# Patient Record
Sex: FEMALE | Race: WHITE | Employment: OTHER | URBAN - METROPOLITAN AREA
[De-identification: names, ages, dates, MRNs, and addresses within clinical notes are randomized per-mention and may not be internally consistent; named-entity substitution may affect disease eponyms.]

---

## 2017-06-07 ENCOUNTER — APPOINTMENT (OUTPATIENT)
Dept: GENERAL RADIOLOGY | Age: 67
DRG: 481 | End: 2017-06-07
Payer: MEDICARE

## 2017-06-07 ENCOUNTER — HOSPITAL ENCOUNTER (INPATIENT)
Age: 67
LOS: 4 days | Discharge: OTHER INSTITUTION | DRG: 481 | End: 2017-06-11
Attending: EMERGENCY MEDICINE | Admitting: ORTHOPAEDIC SURGERY
Payer: MEDICARE

## 2017-06-07 DIAGNOSIS — S72.002A HIP FRACTURE, LEFT, CLOSED, INITIAL ENCOUNTER (HCC): Primary | ICD-10-CM

## 2017-06-07 LAB
ABSOLUTE EOS #: 0.2 K/UL (ref 0–0.4)
ABSOLUTE LYMPH #: 1.2 K/UL (ref 1–4.8)
ABSOLUTE MONO #: 0.6 K/UL (ref 0.1–1.3)
ANION GAP SERPL CALCULATED.3IONS-SCNC: 14 MMOL/L (ref 9–17)
BASOPHILS # BLD: 1 %
BASOPHILS ABSOLUTE: 0 K/UL (ref 0–0.2)
BUN BLDV-MCNC: 25 MG/DL (ref 8–23)
BUN/CREAT BLD: ABNORMAL (ref 9–20)
CALCIUM SERPL-MCNC: 9.5 MG/DL (ref 8.6–10.4)
CHLORIDE BLD-SCNC: 105 MMOL/L (ref 98–107)
CO2: 25 MMOL/L (ref 20–31)
CREAT SERPL-MCNC: 0.98 MG/DL (ref 0.5–0.9)
DIFFERENTIAL TYPE: NORMAL
EOSINOPHILS RELATIVE PERCENT: 3 %
GFR AFRICAN AMERICAN: >60 ML/MIN
GFR NON-AFRICAN AMERICAN: 57 ML/MIN
GFR SERPL CREATININE-BSD FRML MDRD: ABNORMAL ML/MIN/{1.73_M2}
GFR SERPL CREATININE-BSD FRML MDRD: ABNORMAL ML/MIN/{1.73_M2}
GLUCOSE BLD-MCNC: 112 MG/DL (ref 70–99)
HCT VFR BLD CALC: 38.6 % (ref 36–46)
HEMOGLOBIN: 12.7 G/DL (ref 12–16)
INR BLD: 1
LYMPHOCYTES # BLD: 14 %
MCH RBC QN AUTO: 30 PG (ref 26–34)
MCHC RBC AUTO-ENTMCNC: 32.9 G/DL (ref 31–37)
MCV RBC AUTO: 91.2 FL (ref 80–100)
MONOCYTES # BLD: 7 %
PARTIAL THROMBOPLASTIN TIME: 25.2 SEC (ref 23–31)
PDW BLD-RTO: 13.6 % (ref 11.5–14.9)
PLATELET # BLD: 174 K/UL (ref 150–450)
PLATELET ESTIMATE: NORMAL
PMV BLD AUTO: 8 FL (ref 6–12)
POTASSIUM SERPL-SCNC: 4 MMOL/L (ref 3.7–5.3)
PROTHROMBIN TIME: 10.3 SEC (ref 9.7–12)
RBC # BLD: 4.23 M/UL (ref 4–5.2)
RBC # BLD: NORMAL 10*6/UL
SEG NEUTROPHILS: 75 %
SEGMENTED NEUTROPHILS ABSOLUTE COUNT: 6.4 K/UL (ref 1.3–9.1)
SODIUM BLD-SCNC: 144 MMOL/L (ref 135–144)
WBC # BLD: 8.4 K/UL (ref 3.5–11)
WBC # BLD: NORMAL 10*3/UL

## 2017-06-07 PROCEDURE — 99284 EMERGENCY DEPT VISIT MOD MDM: CPT

## 2017-06-07 PROCEDURE — 96374 THER/PROPH/DIAG INJ IV PUSH: CPT

## 2017-06-07 PROCEDURE — 6360000002 HC RX W HCPCS: Performed by: EMERGENCY MEDICINE

## 2017-06-07 PROCEDURE — 6370000000 HC RX 637 (ALT 250 FOR IP): Performed by: ORTHOPAEDIC SURGERY

## 2017-06-07 PROCEDURE — 93005 ELECTROCARDIOGRAM TRACING: CPT

## 2017-06-07 PROCEDURE — 80048 BASIC METABOLIC PNL TOTAL CA: CPT

## 2017-06-07 PROCEDURE — 85025 COMPLETE CBC W/AUTO DIFF WBC: CPT

## 2017-06-07 PROCEDURE — 6370000000 HC RX 637 (ALT 250 FOR IP): Performed by: EMERGENCY MEDICINE

## 2017-06-07 PROCEDURE — 73552 X-RAY EXAM OF FEMUR 2/>: CPT

## 2017-06-07 PROCEDURE — 36415 COLL VENOUS BLD VENIPUNCTURE: CPT

## 2017-06-07 PROCEDURE — 85610 PROTHROMBIN TIME: CPT

## 2017-06-07 PROCEDURE — 73502 X-RAY EXAM HIP UNI 2-3 VIEWS: CPT

## 2017-06-07 PROCEDURE — 1200000000 HC SEMI PRIVATE

## 2017-06-07 PROCEDURE — 85730 THROMBOPLASTIN TIME PARTIAL: CPT

## 2017-06-07 RX ORDER — SODIUM CHLORIDE 0.9 % (FLUSH) 0.9 %
10 SYRINGE (ML) INJECTION PRN
Status: DISCONTINUED | OUTPATIENT
Start: 2017-06-07 | End: 2017-06-09

## 2017-06-07 RX ORDER — MORPHINE SULFATE 2 MG/ML
2 INJECTION, SOLUTION INTRAMUSCULAR; INTRAVENOUS
Status: DISCONTINUED | OUTPATIENT
Start: 2017-06-07 | End: 2017-06-11 | Stop reason: HOSPADM

## 2017-06-07 RX ORDER — FENTANYL CITRATE 50 UG/ML
50 INJECTION, SOLUTION INTRAMUSCULAR; INTRAVENOUS ONCE
Status: COMPLETED | OUTPATIENT
Start: 2017-06-07 | End: 2017-06-07

## 2017-06-07 RX ORDER — HYDROCODONE BITARTRATE AND ACETAMINOPHEN 5; 325 MG/1; MG/1
1 TABLET ORAL EVERY 6 HOURS PRN
Status: ON HOLD | COMMUNITY
End: 2018-12-12

## 2017-06-07 RX ORDER — HYDROCODONE BITARTRATE AND ACETAMINOPHEN 5; 325 MG/1; MG/1
1 TABLET ORAL EVERY 4 HOURS PRN
Status: DISCONTINUED | OUTPATIENT
Start: 2017-06-07 | End: 2017-06-11 | Stop reason: HOSPADM

## 2017-06-07 RX ORDER — OXYCODONE HYDROCHLORIDE AND ACETAMINOPHEN 5; 325 MG/1; MG/1
1 TABLET ORAL ONCE
Status: COMPLETED | OUTPATIENT
Start: 2017-06-07 | End: 2017-06-07

## 2017-06-07 RX ORDER — HYDROCODONE BITARTRATE AND ACETAMINOPHEN 5; 325 MG/1; MG/1
2 TABLET ORAL EVERY 4 HOURS PRN
Status: DISCONTINUED | OUTPATIENT
Start: 2017-06-07 | End: 2017-06-11 | Stop reason: HOSPADM

## 2017-06-07 RX ORDER — SODIUM CHLORIDE 0.9 % (FLUSH) 0.9 %
10 SYRINGE (ML) INJECTION EVERY 12 HOURS SCHEDULED
Status: DISCONTINUED | OUTPATIENT
Start: 2017-06-07 | End: 2017-06-09

## 2017-06-07 RX ORDER — ACETAMINOPHEN 325 MG/1
650 TABLET ORAL EVERY 4 HOURS PRN
Status: DISCONTINUED | OUTPATIENT
Start: 2017-06-07 | End: 2017-06-09

## 2017-06-07 RX ADMIN — HYDROCODONE BITARTRATE AND ACETAMINOPHEN 2 TABLET: 5; 325 TABLET ORAL at 22:44

## 2017-06-07 RX ADMIN — FENTANYL CITRATE 50 MCG: 50 INJECTION, SOLUTION INTRAMUSCULAR; INTRAVENOUS at 17:13

## 2017-06-07 RX ADMIN — OXYCODONE HYDROCHLORIDE AND ACETAMINOPHEN 1 TABLET: 5; 325 TABLET ORAL at 15:46

## 2017-06-07 ASSESSMENT — ENCOUNTER SYMPTOMS
BACK PAIN: 0
COUGH: 0
SHORTNESS OF BREATH: 0
EYE DISCHARGE: 0
DIARRHEA: 0
RHINORRHEA: 0
ABDOMINAL PAIN: 0
NAUSEA: 0
EYE REDNESS: 0
WHEEZING: 0
SINUS PRESSURE: 0
STRIDOR: 0
VOMITING: 0

## 2017-06-07 ASSESSMENT — PAIN SCALES - GENERAL
PAINLEVEL_OUTOF10: 10
PAINLEVEL_OUTOF10: 10
PAINLEVEL_OUTOF10: 3
PAINLEVEL_OUTOF10: 10

## 2017-06-08 ENCOUNTER — ANESTHESIA EVENT (OUTPATIENT)
Dept: OPERATING ROOM | Age: 67
DRG: 481 | End: 2017-06-08
Payer: MEDICARE

## 2017-06-08 ENCOUNTER — APPOINTMENT (OUTPATIENT)
Dept: MRI IMAGING | Age: 67
DRG: 481 | End: 2017-06-08
Payer: MEDICARE

## 2017-06-08 ENCOUNTER — APPOINTMENT (OUTPATIENT)
Dept: GENERAL RADIOLOGY | Age: 67
DRG: 481 | End: 2017-06-08
Payer: MEDICARE

## 2017-06-08 LAB
EKG ATRIAL RATE: 72 BPM
EKG P AXIS: 72 DEGREES
EKG P-R INTERVAL: 144 MS
EKG Q-T INTERVAL: 394 MS
EKG QRS DURATION: 98 MS
EKG QTC CALCULATION (BAZETT): 431 MS
EKG R AXIS: 53 DEGREES
EKG T AXIS: 56 DEGREES
EKG VENTRICULAR RATE: 72 BPM

## 2017-06-08 PROCEDURE — 6370000000 HC RX 637 (ALT 250 FOR IP): Performed by: ORTHOPAEDIC SURGERY

## 2017-06-08 PROCEDURE — 51702 INSERT TEMP BLADDER CATH: CPT

## 2017-06-08 PROCEDURE — 1200000000 HC SEMI PRIVATE

## 2017-06-08 PROCEDURE — 99223 1ST HOSP IP/OBS HIGH 75: CPT | Performed by: INTERNAL MEDICINE

## 2017-06-08 PROCEDURE — 6360000002 HC RX W HCPCS: Performed by: ORTHOPAEDIC SURGERY

## 2017-06-08 PROCEDURE — 6370000000 HC RX 637 (ALT 250 FOR IP): Performed by: INTERNAL MEDICINE

## 2017-06-08 PROCEDURE — 70250 X-RAY EXAM OF SKULL: CPT

## 2017-06-08 PROCEDURE — 73721 MRI JNT OF LWR EXTRE W/O DYE: CPT

## 2017-06-08 PROCEDURE — 6360000002 HC RX W HCPCS: Performed by: INTERNAL MEDICINE

## 2017-06-08 PROCEDURE — 2580000003 HC RX 258: Performed by: ORTHOPAEDIC SURGERY

## 2017-06-08 RX ORDER — DIAZEPAM 5 MG/1
5 TABLET ORAL ONCE
Status: COMPLETED | OUTPATIENT
Start: 2017-06-08 | End: 2017-06-08

## 2017-06-08 RX ORDER — ATORVASTATIN CALCIUM 20 MG/1
20 TABLET, FILM COATED ORAL NIGHTLY
Status: DISCONTINUED | OUTPATIENT
Start: 2017-06-08 | End: 2017-06-11 | Stop reason: HOSPADM

## 2017-06-08 RX ORDER — AMLODIPINE BESYLATE 5 MG/1
5 TABLET ORAL DAILY
Status: DISCONTINUED | OUTPATIENT
Start: 2017-06-08 | End: 2017-06-11 | Stop reason: HOSPADM

## 2017-06-08 RX ORDER — TOPIRAMATE 25 MG/1
50 TABLET ORAL 2 TIMES DAILY
Status: DISCONTINUED | OUTPATIENT
Start: 2017-06-08 | End: 2017-06-11 | Stop reason: HOSPADM

## 2017-06-08 RX ORDER — QUETIAPINE FUMARATE 200 MG/1
200 TABLET, FILM COATED ORAL NIGHTLY
Status: DISCONTINUED | OUTPATIENT
Start: 2017-06-08 | End: 2017-06-11 | Stop reason: HOSPADM

## 2017-06-08 RX ORDER — ASPIRIN 81 MG/1
81 TABLET ORAL DAILY
Status: DISCONTINUED | OUTPATIENT
Start: 2017-06-08 | End: 2017-06-11 | Stop reason: HOSPADM

## 2017-06-08 RX ORDER — FERROUS SULFATE 325(65) MG
325 TABLET ORAL
Status: DISCONTINUED | OUTPATIENT
Start: 2017-06-09 | End: 2017-06-11 | Stop reason: HOSPADM

## 2017-06-08 RX ORDER — LAMOTRIGINE 100 MG/1
100 TABLET ORAL DAILY
Status: DISCONTINUED | OUTPATIENT
Start: 2017-06-08 | End: 2017-06-11 | Stop reason: HOSPADM

## 2017-06-08 RX ORDER — METOCLOPRAMIDE 5 MG/1
5 TABLET ORAL 3 TIMES DAILY
Status: DISCONTINUED | OUTPATIENT
Start: 2017-06-08 | End: 2017-06-11 | Stop reason: HOSPADM

## 2017-06-08 RX ADMIN — ASPIRIN 81 MG: 81 TABLET, COATED ORAL at 12:19

## 2017-06-08 RX ADMIN — MORPHINE SULFATE 2 MG: 2 INJECTION, SOLUTION INTRAMUSCULAR; INTRAVENOUS at 17:02

## 2017-06-08 RX ADMIN — TOPIRAMATE 50 MG: 25 TABLET, FILM COATED ORAL at 19:30

## 2017-06-08 RX ADMIN — MORPHINE SULFATE 2 MG: 2 INJECTION, SOLUTION INTRAMUSCULAR; INTRAVENOUS at 06:24

## 2017-06-08 RX ADMIN — ENOXAPARIN SODIUM 40 MG: 40 INJECTION SUBCUTANEOUS at 12:19

## 2017-06-08 RX ADMIN — MORPHINE SULFATE 2 MG: 2 INJECTION, SOLUTION INTRAMUSCULAR; INTRAVENOUS at 14:14

## 2017-06-08 RX ADMIN — ATORVASTATIN CALCIUM 20 MG: 20 TABLET, FILM COATED ORAL at 19:30

## 2017-06-08 RX ADMIN — VORTIOXETINE 10 MG: 10 TABLET, FILM COATED ORAL at 12:20

## 2017-06-08 RX ADMIN — MORPHINE SULFATE 2 MG: 2 INJECTION, SOLUTION INTRAMUSCULAR; INTRAVENOUS at 12:19

## 2017-06-08 RX ADMIN — MORPHINE SULFATE 2 MG: 2 INJECTION, SOLUTION INTRAMUSCULAR; INTRAVENOUS at 04:13

## 2017-06-08 RX ADMIN — QUETIAPINE FUMARATE 200 MG: 200 TABLET, FILM COATED ORAL at 19:36

## 2017-06-08 RX ADMIN — LAMOTRIGINE 100 MG: 100 TABLET ORAL at 12:19

## 2017-06-08 RX ADMIN — TOPIRAMATE 50 MG: 25 TABLET, FILM COATED ORAL at 12:20

## 2017-06-08 RX ADMIN — MORPHINE SULFATE 2 MG: 2 INJECTION, SOLUTION INTRAMUSCULAR; INTRAVENOUS at 00:42

## 2017-06-08 RX ADMIN — Medication 10 ML: at 19:36

## 2017-06-08 RX ADMIN — METOCLOPRAMIDE 5 MG: 5 TABLET ORAL at 12:21

## 2017-06-08 RX ADMIN — MORPHINE SULFATE 2 MG: 2 INJECTION, SOLUTION INTRAMUSCULAR; INTRAVENOUS at 19:29

## 2017-06-08 RX ADMIN — DIAZEPAM 5 MG: 5 TABLET ORAL at 14:14

## 2017-06-08 RX ADMIN — MORPHINE SULFATE 2 MG: 2 INJECTION, SOLUTION INTRAMUSCULAR; INTRAVENOUS at 09:20

## 2017-06-08 RX ADMIN — Medication 10 ML: at 12:20

## 2017-06-08 RX ADMIN — Medication 10 ML: at 14:15

## 2017-06-08 RX ADMIN — METOCLOPRAMIDE 5 MG: 5 TABLET ORAL at 19:31

## 2017-06-08 RX ADMIN — AMLODIPINE BESYLATE 5 MG: 5 TABLET ORAL at 12:19

## 2017-06-08 ASSESSMENT — PAIN SCALES - GENERAL
PAINLEVEL_OUTOF10: 8
PAINLEVEL_OUTOF10: 10
PAINLEVEL_OUTOF10: 10
PAINLEVEL_OUTOF10: 7
PAINLEVEL_OUTOF10: 8
PAINLEVEL_OUTOF10: 9
PAINLEVEL_OUTOF10: 8
PAINLEVEL_OUTOF10: 8
PAINLEVEL_OUTOF10: 4
PAINLEVEL_OUTOF10: 10
PAINLEVEL_OUTOF10: 5

## 2017-06-09 ENCOUNTER — ANESTHESIA (OUTPATIENT)
Dept: OPERATING ROOM | Age: 67
DRG: 481 | End: 2017-06-09
Payer: MEDICARE

## 2017-06-09 ENCOUNTER — APPOINTMENT (OUTPATIENT)
Dept: GENERAL RADIOLOGY | Age: 67
DRG: 481 | End: 2017-06-09
Payer: MEDICARE

## 2017-06-09 VITALS — DIASTOLIC BLOOD PRESSURE: 54 MMHG | OXYGEN SATURATION: 96 % | SYSTOLIC BLOOD PRESSURE: 102 MMHG

## 2017-06-09 LAB
ABSOLUTE EOS #: 0.4 K/UL (ref 0–0.4)
ABSOLUTE LYMPH #: 1.2 K/UL (ref 1–4.8)
ABSOLUTE MONO #: 0.9 K/UL (ref 0.1–1.3)
ANION GAP SERPL CALCULATED.3IONS-SCNC: 13 MMOL/L (ref 9–17)
BASOPHILS # BLD: 1 %
BASOPHILS ABSOLUTE: 0.1 K/UL (ref 0–0.2)
BUN BLDV-MCNC: 18 MG/DL (ref 8–23)
BUN/CREAT BLD: NORMAL (ref 9–20)
CALCIUM SERPL-MCNC: 9 MG/DL (ref 8.6–10.4)
CHLORIDE BLD-SCNC: 106 MMOL/L (ref 98–107)
CO2: 22 MMOL/L (ref 20–31)
CREAT SERPL-MCNC: 0.87 MG/DL (ref 0.5–0.9)
DIFFERENTIAL TYPE: ABNORMAL
EOSINOPHILS RELATIVE PERCENT: 5 %
GFR AFRICAN AMERICAN: >60 ML/MIN
GFR NON-AFRICAN AMERICAN: >60 ML/MIN
GFR SERPL CREATININE-BSD FRML MDRD: NORMAL ML/MIN/{1.73_M2}
GFR SERPL CREATININE-BSD FRML MDRD: NORMAL ML/MIN/{1.73_M2}
GLUCOSE BLD-MCNC: 99 MG/DL (ref 70–99)
HCT VFR BLD CALC: 40.2 % (ref 36–46)
HEMOGLOBIN: 13.4 G/DL (ref 12–16)
LYMPHOCYTES # BLD: 14 %
MCH RBC QN AUTO: 30.1 PG (ref 26–34)
MCHC RBC AUTO-ENTMCNC: 33.3 G/DL (ref 31–37)
MCV RBC AUTO: 90.4 FL (ref 80–100)
MONOCYTES # BLD: 10 %
PDW BLD-RTO: 13.1 % (ref 11.5–14.9)
PLATELET # BLD: 148 K/UL (ref 150–450)
PLATELET ESTIMATE: ABNORMAL
PMV BLD AUTO: 7.4 FL (ref 6–12)
POTASSIUM SERPL-SCNC: 3.8 MMOL/L (ref 3.7–5.3)
RBC # BLD: 4.44 M/UL (ref 4–5.2)
RBC # BLD: ABNORMAL 10*6/UL
SEG NEUTROPHILS: 70 %
SEGMENTED NEUTROPHILS ABSOLUTE COUNT: 6 K/UL (ref 1.3–9.1)
SODIUM BLD-SCNC: 141 MMOL/L (ref 135–144)
WBC # BLD: 8.6 K/UL (ref 3.5–11)
WBC # BLD: ABNORMAL 10*3/UL

## 2017-06-09 PROCEDURE — 73502 X-RAY EXAM HIP UNI 2-3 VIEWS: CPT

## 2017-06-09 PROCEDURE — 3209999900 FLUORO FOR SURGICAL PROCEDURES

## 2017-06-09 PROCEDURE — 6370000000 HC RX 637 (ALT 250 FOR IP): Performed by: INTERNAL MEDICINE

## 2017-06-09 PROCEDURE — 80048 BASIC METABOLIC PNL TOTAL CA: CPT

## 2017-06-09 PROCEDURE — 6370000000 HC RX 637 (ALT 250 FOR IP): Performed by: ORTHOPAEDIC SURGERY

## 2017-06-09 PROCEDURE — 2500000003 HC RX 250 WO HCPCS: Performed by: ORTHOPAEDIC SURGERY

## 2017-06-09 PROCEDURE — 6370000000 HC RX 637 (ALT 250 FOR IP): Performed by: ANESTHESIOLOGY

## 2017-06-09 PROCEDURE — 2580000003 HC RX 258: Performed by: ANESTHESIOLOGY

## 2017-06-09 PROCEDURE — 1200000000 HC SEMI PRIVATE

## 2017-06-09 PROCEDURE — 6360000002 HC RX W HCPCS: Performed by: NURSE ANESTHETIST, CERTIFIED REGISTERED

## 2017-06-09 PROCEDURE — 0QS734Z REPOSITION LEFT UPPER FEMUR WITH INTERNAL FIXATION DEVICE, PERCUTANEOUS APPROACH: ICD-10-PCS | Performed by: ORTHOPAEDIC SURGERY

## 2017-06-09 PROCEDURE — C1769 GUIDE WIRE: HCPCS | Performed by: ORTHOPAEDIC SURGERY

## 2017-06-09 PROCEDURE — 3700000001 HC ADD 15 MINUTES (ANESTHESIA): Performed by: ORTHOPAEDIC SURGERY

## 2017-06-09 PROCEDURE — 3600000003 HC SURGERY LEVEL 3 BASE: Performed by: ORTHOPAEDIC SURGERY

## 2017-06-09 PROCEDURE — 6360000002 HC RX W HCPCS: Performed by: ORTHOPAEDIC SURGERY

## 2017-06-09 PROCEDURE — 6360000002 HC RX W HCPCS: Performed by: ANESTHESIOLOGY

## 2017-06-09 PROCEDURE — 7100000000 HC PACU RECOVERY - FIRST 15 MIN: Performed by: ORTHOPAEDIC SURGERY

## 2017-06-09 PROCEDURE — 99233 SBSQ HOSP IP/OBS HIGH 50: CPT | Performed by: INTERNAL MEDICINE

## 2017-06-09 PROCEDURE — 36415 COLL VENOUS BLD VENIPUNCTURE: CPT

## 2017-06-09 PROCEDURE — 3700000000 HC ANESTHESIA ATTENDED CARE: Performed by: ORTHOPAEDIC SURGERY

## 2017-06-09 PROCEDURE — 2580000003 HC RX 258: Performed by: ORTHOPAEDIC SURGERY

## 2017-06-09 PROCEDURE — 94664 DEMO&/EVAL PT USE INHALER: CPT

## 2017-06-09 PROCEDURE — 3600000013 HC SURGERY LEVEL 3 ADDTL 15MIN: Performed by: ORTHOPAEDIC SURGERY

## 2017-06-09 PROCEDURE — 7100000001 HC PACU RECOVERY - ADDTL 15 MIN: Performed by: ORTHOPAEDIC SURGERY

## 2017-06-09 PROCEDURE — 2720000010 HC SURG SUPPLY STERILE: Performed by: ORTHOPAEDIC SURGERY

## 2017-06-09 PROCEDURE — 2500000003 HC RX 250 WO HCPCS: Performed by: NURSE ANESTHETIST, CERTIFIED REGISTERED

## 2017-06-09 PROCEDURE — C1713 ANCHOR/SCREW BN/BN,TIS/BN: HCPCS | Performed by: ORTHOPAEDIC SURGERY

## 2017-06-09 PROCEDURE — 85025 COMPLETE CBC W/AUTO DIFF WBC: CPT

## 2017-06-09 PROCEDURE — 2720000003 HC MISC SUTURE/STAPLES/RELOADS/ETC: Performed by: ORTHOPAEDIC SURGERY

## 2017-06-09 DEVICE — SCREW BNE L80MM DIA7.3MM THRD L16MM CANC S STL SELF DRL ST: Type: IMPLANTABLE DEVICE | Site: HIP | Status: FUNCTIONAL

## 2017-06-09 RX ORDER — ACETAMINOPHEN 325 MG/1
650 TABLET ORAL EVERY 4 HOURS PRN
Status: DISCONTINUED | OUTPATIENT
Start: 2017-06-09 | End: 2017-06-11 | Stop reason: HOSPADM

## 2017-06-09 RX ORDER — SODIUM CHLORIDE, SODIUM LACTATE, POTASSIUM CHLORIDE, CALCIUM CHLORIDE 600; 310; 30; 20 MG/100ML; MG/100ML; MG/100ML; MG/100ML
INJECTION, SOLUTION INTRAVENOUS CONTINUOUS
Status: DISCONTINUED | OUTPATIENT
Start: 2017-06-09 | End: 2017-06-11 | Stop reason: HOSPADM

## 2017-06-09 RX ORDER — FENTANYL CITRATE 50 UG/ML
INJECTION, SOLUTION INTRAMUSCULAR; INTRAVENOUS PRN
Status: DISCONTINUED | OUTPATIENT
Start: 2017-06-09 | End: 2017-06-09 | Stop reason: SDUPTHER

## 2017-06-09 RX ORDER — BACITRACIN 50000 [USP'U]/1
INJECTION, POWDER, LYOPHILIZED, FOR SOLUTION INTRAMUSCULAR PRN
Status: DISCONTINUED | OUTPATIENT
Start: 2017-06-09 | End: 2017-06-09 | Stop reason: HOSPADM

## 2017-06-09 RX ORDER — PROPOFOL 10 MG/ML
INJECTION, EMULSION INTRAVENOUS PRN
Status: DISCONTINUED | OUTPATIENT
Start: 2017-06-09 | End: 2017-06-09 | Stop reason: SDUPTHER

## 2017-06-09 RX ORDER — CEFAZOLIN SODIUM 1 G/3ML
INJECTION, POWDER, FOR SOLUTION INTRAMUSCULAR; INTRAVENOUS PRN
Status: DISCONTINUED | OUTPATIENT
Start: 2017-06-09 | End: 2017-06-09 | Stop reason: SDUPTHER

## 2017-06-09 RX ORDER — LABETALOL HYDROCHLORIDE 5 MG/ML
5 INJECTION, SOLUTION INTRAVENOUS EVERY 10 MIN PRN
Status: DISCONTINUED | OUTPATIENT
Start: 2017-06-09 | End: 2017-06-09 | Stop reason: HOSPADM

## 2017-06-09 RX ORDER — LIDOCAINE HYDROCHLORIDE 10 MG/ML
INJECTION, SOLUTION EPIDURAL; INFILTRATION; INTRACAUDAL; PERINEURAL PRN
Status: DISCONTINUED | OUTPATIENT
Start: 2017-06-09 | End: 2017-06-09 | Stop reason: SDUPTHER

## 2017-06-09 RX ORDER — SCOLOPAMINE TRANSDERMAL SYSTEM 1 MG/1
1 PATCH, EXTENDED RELEASE TRANSDERMAL
Status: DISCONTINUED | OUTPATIENT
Start: 2017-06-09 | End: 2017-06-09

## 2017-06-09 RX ORDER — SODIUM CHLORIDE 0.9 % (FLUSH) 0.9 %
10 SYRINGE (ML) INJECTION EVERY 12 HOURS SCHEDULED
Status: DISCONTINUED | OUTPATIENT
Start: 2017-06-09 | End: 2017-06-11 | Stop reason: HOSPADM

## 2017-06-09 RX ORDER — MEPERIDINE HYDROCHLORIDE 25 MG/ML
12.5 INJECTION INTRAMUSCULAR; INTRAVENOUS; SUBCUTANEOUS EVERY 5 MIN PRN
Status: DISCONTINUED | OUTPATIENT
Start: 2017-06-09 | End: 2017-06-09 | Stop reason: HOSPADM

## 2017-06-09 RX ORDER — ONDANSETRON 2 MG/ML
4 INJECTION INTRAMUSCULAR; INTRAVENOUS EVERY 6 HOURS PRN
Status: DISCONTINUED | OUTPATIENT
Start: 2017-06-09 | End: 2017-06-11 | Stop reason: HOSPADM

## 2017-06-09 RX ORDER — DOCUSATE SODIUM 100 MG/1
100 CAPSULE, LIQUID FILLED ORAL 2 TIMES DAILY
Status: DISCONTINUED | OUTPATIENT
Start: 2017-06-09 | End: 2017-06-11 | Stop reason: HOSPADM

## 2017-06-09 RX ORDER — ONDANSETRON 2 MG/ML
INJECTION INTRAMUSCULAR; INTRAVENOUS PRN
Status: DISCONTINUED | OUTPATIENT
Start: 2017-06-09 | End: 2017-06-09 | Stop reason: SDUPTHER

## 2017-06-09 RX ORDER — SODIUM CHLORIDE 0.9 % (FLUSH) 0.9 %
10 SYRINGE (ML) INJECTION PRN
Status: DISCONTINUED | OUTPATIENT
Start: 2017-06-09 | End: 2017-06-11 | Stop reason: HOSPADM

## 2017-06-09 RX ORDER — MIDAZOLAM HYDROCHLORIDE 1 MG/ML
INJECTION INTRAMUSCULAR; INTRAVENOUS PRN
Status: DISCONTINUED | OUTPATIENT
Start: 2017-06-09 | End: 2017-06-09 | Stop reason: SDUPTHER

## 2017-06-09 RX ORDER — ONDANSETRON 2 MG/ML
4 INJECTION INTRAMUSCULAR; INTRAVENOUS
Status: COMPLETED | OUTPATIENT
Start: 2017-06-09 | End: 2017-06-09

## 2017-06-09 RX ORDER — MORPHINE SULFATE 2 MG/ML
2 INJECTION, SOLUTION INTRAMUSCULAR; INTRAVENOUS EVERY 5 MIN PRN
Status: DISCONTINUED | OUTPATIENT
Start: 2017-06-09 | End: 2017-06-09 | Stop reason: HOSPADM

## 2017-06-09 RX ORDER — DIPHENHYDRAMINE HYDROCHLORIDE 50 MG/ML
12.5 INJECTION INTRAMUSCULAR; INTRAVENOUS
Status: DISCONTINUED | OUTPATIENT
Start: 2017-06-09 | End: 2017-06-09 | Stop reason: HOSPADM

## 2017-06-09 RX ADMIN — HYDROMORPHONE HYDROCHLORIDE 0.5 MG: 1 INJECTION, SOLUTION INTRAMUSCULAR; INTRAVENOUS; SUBCUTANEOUS at 14:18

## 2017-06-09 RX ADMIN — ATORVASTATIN CALCIUM 20 MG: 20 TABLET, FILM COATED ORAL at 23:09

## 2017-06-09 RX ADMIN — METOCLOPRAMIDE 5 MG: 5 TABLET ORAL at 23:10

## 2017-06-09 RX ADMIN — HYDROMORPHONE HYDROCHLORIDE 0.5 MG: 1 INJECTION, SOLUTION INTRAMUSCULAR; INTRAVENOUS; SUBCUTANEOUS at 14:12

## 2017-06-09 RX ADMIN — ONDANSETRON 4 MG: 2 INJECTION, SOLUTION INTRAMUSCULAR; INTRAVENOUS at 13:12

## 2017-06-09 RX ADMIN — HYDROMORPHONE HYDROCHLORIDE 0.5 MG: 1 INJECTION, SOLUTION INTRAMUSCULAR; INTRAVENOUS; SUBCUTANEOUS at 14:24

## 2017-06-09 RX ADMIN — QUETIAPINE FUMARATE 200 MG: 200 TABLET, FILM COATED ORAL at 23:09

## 2017-06-09 RX ADMIN — DOCUSATE SODIUM 100 MG: 100 CAPSULE, LIQUID FILLED ORAL at 23:09

## 2017-06-09 RX ADMIN — ONDANSETRON 4 MG: 2 INJECTION INTRAMUSCULAR; INTRAVENOUS at 14:20

## 2017-06-09 RX ADMIN — HYDROCODONE BITARTRATE AND ACETAMINOPHEN 1 TABLET: 5; 325 TABLET ORAL at 23:09

## 2017-06-09 RX ADMIN — MIDAZOLAM HYDROCHLORIDE 2 MG: 1 INJECTION, SOLUTION INTRAMUSCULAR; INTRAVENOUS at 12:38

## 2017-06-09 RX ADMIN — SODIUM CHLORIDE, POTASSIUM CHLORIDE, SODIUM LACTATE AND CALCIUM CHLORIDE: 600; 310; 30; 20 INJECTION, SOLUTION INTRAVENOUS at 12:36

## 2017-06-09 RX ADMIN — CEFAZOLIN 2000 MG: 330 INJECTION, POWDER, FOR SOLUTION INTRAMUSCULAR; INTRAVENOUS at 12:36

## 2017-06-09 RX ADMIN — LIDOCAINE HYDROCHLORIDE 50 MG: 10 INJECTION, SOLUTION EPIDURAL; INFILTRATION; INTRACAUDAL; PERINEURAL at 12:38

## 2017-06-09 RX ADMIN — TOPIRAMATE 50 MG: 25 TABLET, FILM COATED ORAL at 23:09

## 2017-06-09 RX ADMIN — Medication 10 ML: at 23:10

## 2017-06-09 RX ADMIN — HYDROMORPHONE HYDROCHLORIDE 0.5 MG: 1 INJECTION, SOLUTION INTRAMUSCULAR; INTRAVENOUS; SUBCUTANEOUS at 14:05

## 2017-06-09 RX ADMIN — PHENYLEPHRINE HYDROCHLORIDE 50 MCG: 10 INJECTION INTRAVENOUS at 12:48

## 2017-06-09 RX ADMIN — PROPOFOL 150 MG: 10 INJECTION, EMULSION INTRAVENOUS at 12:38

## 2017-06-09 RX ADMIN — FENTANYL CITRATE 50 MCG: 50 INJECTION, SOLUTION INTRAMUSCULAR; INTRAVENOUS at 12:38

## 2017-06-09 RX ADMIN — HYDROMORPHONE HYDROCHLORIDE 0.5 MG: 1 INJECTION, SOLUTION INTRAMUSCULAR; INTRAVENOUS; SUBCUTANEOUS at 18:03

## 2017-06-09 RX ADMIN — SODIUM CHLORIDE, POTASSIUM CHLORIDE, SODIUM LACTATE AND CALCIUM CHLORIDE: 600; 310; 30; 20 INJECTION, SOLUTION INTRAVENOUS at 12:34

## 2017-06-09 ASSESSMENT — PAIN SCALES - GENERAL
PAINLEVEL_OUTOF10: 0
PAINLEVEL_OUTOF10: 0
PAINLEVEL_OUTOF10: 6
PAINLEVEL_OUTOF10: 5
PAINLEVEL_OUTOF10: 10
PAINLEVEL_OUTOF10: 10
PAINLEVEL_OUTOF10: 7
PAINLEVEL_OUTOF10: 8
PAINLEVEL_OUTOF10: 8

## 2017-06-09 ASSESSMENT — PAIN DESCRIPTION - ORIENTATION
ORIENTATION: LEFT
ORIENTATION: LEFT

## 2017-06-09 ASSESSMENT — PAIN DESCRIPTION - LOCATION
LOCATION: HIP

## 2017-06-09 ASSESSMENT — PAIN DESCRIPTION - DESCRIPTORS
DESCRIPTORS: SORE;ACHING
DESCRIPTORS: DISCOMFORT

## 2017-06-09 ASSESSMENT — PAIN DESCRIPTION - PAIN TYPE
TYPE: SURGICAL PAIN
TYPE: SURGICAL PAIN

## 2017-06-09 ASSESSMENT — PAIN DESCRIPTION - ONSET: ONSET: ON-GOING

## 2017-06-09 ASSESSMENT — PAIN DESCRIPTION - FREQUENCY: FREQUENCY: INTERMITTENT

## 2017-06-09 ASSESSMENT — PAIN DESCRIPTION - PROGRESSION: CLINICAL_PROGRESSION: NOT CHANGED

## 2017-06-10 LAB
ABSOLUTE EOS #: 0.4 K/UL (ref 0–0.4)
ABSOLUTE LYMPH #: 1.2 K/UL (ref 1–4.8)
ABSOLUTE MONO #: 1 K/UL (ref 0.1–1.3)
ANION GAP SERPL CALCULATED.3IONS-SCNC: 14 MMOL/L (ref 9–17)
BASOPHILS # BLD: 0 %
BASOPHILS ABSOLUTE: 0 K/UL (ref 0–0.2)
BUN BLDV-MCNC: 18 MG/DL (ref 8–23)
BUN/CREAT BLD: ABNORMAL (ref 9–20)
CALCIUM SERPL-MCNC: 8.6 MG/DL (ref 8.6–10.4)
CHLORIDE BLD-SCNC: 105 MMOL/L (ref 98–107)
CO2: 22 MMOL/L (ref 20–31)
CREAT SERPL-MCNC: 0.89 MG/DL (ref 0.5–0.9)
DIFFERENTIAL TYPE: ABNORMAL
EOSINOPHILS RELATIVE PERCENT: 4 %
GFR AFRICAN AMERICAN: >60 ML/MIN
GFR NON-AFRICAN AMERICAN: >60 ML/MIN
GFR SERPL CREATININE-BSD FRML MDRD: ABNORMAL ML/MIN/{1.73_M2}
GFR SERPL CREATININE-BSD FRML MDRD: ABNORMAL ML/MIN/{1.73_M2}
GLUCOSE BLD-MCNC: 117 MG/DL (ref 70–99)
HCT VFR BLD CALC: 39.4 % (ref 36–46)
HEMOGLOBIN: 13.1 G/DL (ref 12–16)
LYMPHOCYTES # BLD: 12 %
MCH RBC QN AUTO: 30.1 PG (ref 26–34)
MCHC RBC AUTO-ENTMCNC: 33.2 G/DL (ref 31–37)
MCV RBC AUTO: 90.9 FL (ref 80–100)
MONOCYTES # BLD: 11 %
PDW BLD-RTO: 13.2 % (ref 11.5–14.9)
PLATELET # BLD: 148 K/UL (ref 150–450)
PLATELET ESTIMATE: ABNORMAL
PMV BLD AUTO: 8.1 FL (ref 6–12)
POTASSIUM SERPL-SCNC: 3.8 MMOL/L (ref 3.7–5.3)
RBC # BLD: 4.34 M/UL (ref 4–5.2)
RBC # BLD: ABNORMAL 10*6/UL
SEG NEUTROPHILS: 73 %
SEGMENTED NEUTROPHILS ABSOLUTE COUNT: 7.2 K/UL (ref 1.3–9.1)
SODIUM BLD-SCNC: 141 MMOL/L (ref 135–144)
WBC # BLD: 9.9 K/UL (ref 3.5–11)
WBC # BLD: ABNORMAL 10*3/UL

## 2017-06-10 PROCEDURE — 36415 COLL VENOUS BLD VENIPUNCTURE: CPT

## 2017-06-10 PROCEDURE — 80048 BASIC METABOLIC PNL TOTAL CA: CPT

## 2017-06-10 PROCEDURE — 97161 PT EVAL LOW COMPLEX 20 MIN: CPT

## 2017-06-10 PROCEDURE — 97535 SELF CARE MNGMENT TRAINING: CPT

## 2017-06-10 PROCEDURE — 6370000000 HC RX 637 (ALT 250 FOR IP): Performed by: ORTHOPAEDIC SURGERY

## 2017-06-10 PROCEDURE — 6360000002 HC RX W HCPCS: Performed by: ORTHOPAEDIC SURGERY

## 2017-06-10 PROCEDURE — 6370000000 HC RX 637 (ALT 250 FOR IP): Performed by: INTERNAL MEDICINE

## 2017-06-10 PROCEDURE — 85025 COMPLETE CBC W/AUTO DIFF WBC: CPT

## 2017-06-10 PROCEDURE — 97166 OT EVAL MOD COMPLEX 45 MIN: CPT

## 2017-06-10 PROCEDURE — 99233 SBSQ HOSP IP/OBS HIGH 50: CPT | Performed by: INTERNAL MEDICINE

## 2017-06-10 PROCEDURE — G8988 SELF CARE GOAL STATUS: HCPCS

## 2017-06-10 PROCEDURE — 1200000000 HC SEMI PRIVATE

## 2017-06-10 PROCEDURE — 97530 THERAPEUTIC ACTIVITIES: CPT

## 2017-06-10 PROCEDURE — 97116 GAIT TRAINING THERAPY: CPT

## 2017-06-10 PROCEDURE — G8987 SELF CARE CURRENT STATUS: HCPCS

## 2017-06-10 RX ADMIN — TOPIRAMATE 50 MG: 25 TABLET, FILM COATED ORAL at 21:05

## 2017-06-10 RX ADMIN — DOCUSATE SODIUM 100 MG: 100 CAPSULE, LIQUID FILLED ORAL at 21:05

## 2017-06-10 RX ADMIN — QUETIAPINE FUMARATE 200 MG: 200 TABLET, FILM COATED ORAL at 21:05

## 2017-06-10 RX ADMIN — VORTIOXETINE 10 MG: 10 TABLET, FILM COATED ORAL at 08:59

## 2017-06-10 RX ADMIN — LAMOTRIGINE 100 MG: 100 TABLET ORAL at 09:00

## 2017-06-10 RX ADMIN — FERROUS SULFATE TAB 325 MG (65 MG ELEMENTAL FE) 325 MG: 325 (65 FE) TAB at 08:59

## 2017-06-10 RX ADMIN — METOCLOPRAMIDE 5 MG: 5 TABLET ORAL at 21:06

## 2017-06-10 RX ADMIN — ENOXAPARIN SODIUM 40 MG: 40 INJECTION SUBCUTANEOUS at 08:59

## 2017-06-10 RX ADMIN — HYDROCODONE BITARTRATE AND ACETAMINOPHEN 2 TABLET: 5; 325 TABLET ORAL at 18:35

## 2017-06-10 RX ADMIN — ATORVASTATIN CALCIUM 20 MG: 20 TABLET, FILM COATED ORAL at 21:05

## 2017-06-10 RX ADMIN — METOCLOPRAMIDE 5 MG: 5 TABLET ORAL at 14:23

## 2017-06-10 RX ADMIN — DOCUSATE SODIUM 100 MG: 100 CAPSULE, LIQUID FILLED ORAL at 08:58

## 2017-06-10 RX ADMIN — HYDROCODONE BITARTRATE AND ACETAMINOPHEN 2 TABLET: 5; 325 TABLET ORAL at 23:31

## 2017-06-10 RX ADMIN — HYDROCODONE BITARTRATE AND ACETAMINOPHEN 2 TABLET: 5; 325 TABLET ORAL at 13:22

## 2017-06-10 RX ADMIN — HYDROCODONE BITARTRATE AND ACETAMINOPHEN 1 TABLET: 5; 325 TABLET ORAL at 08:58

## 2017-06-10 RX ADMIN — METOCLOPRAMIDE 5 MG: 5 TABLET ORAL at 09:00

## 2017-06-10 RX ADMIN — ASPIRIN 81 MG: 81 TABLET, COATED ORAL at 08:59

## 2017-06-10 RX ADMIN — AMLODIPINE BESYLATE 5 MG: 5 TABLET ORAL at 08:59

## 2017-06-10 RX ADMIN — TOPIRAMATE 50 MG: 25 TABLET, FILM COATED ORAL at 09:01

## 2017-06-10 ASSESSMENT — PAIN DESCRIPTION - ORIENTATION
ORIENTATION: LEFT

## 2017-06-10 ASSESSMENT — PAIN DESCRIPTION - ONSET
ONSET: ON-GOING
ONSET: ON-GOING

## 2017-06-10 ASSESSMENT — PAIN SCALES - GENERAL
PAINLEVEL_OUTOF10: 8
PAINLEVEL_OUTOF10: 10
PAINLEVEL_OUTOF10: 3
PAINLEVEL_OUTOF10: 10
PAINLEVEL_OUTOF10: 10
PAINLEVEL_OUTOF10: 3
PAINLEVEL_OUTOF10: 8
PAINLEVEL_OUTOF10: 9

## 2017-06-10 ASSESSMENT — PAIN DESCRIPTION - FREQUENCY
FREQUENCY: INTERMITTENT
FREQUENCY: INTERMITTENT

## 2017-06-10 ASSESSMENT — PAIN DESCRIPTION - PAIN TYPE
TYPE: SURGICAL PAIN

## 2017-06-10 ASSESSMENT — PAIN DESCRIPTION - PROGRESSION

## 2017-06-10 ASSESSMENT — PAIN DESCRIPTION - DESCRIPTORS
DESCRIPTORS: SORE
DESCRIPTORS: ACHING;SORE
DESCRIPTORS: ACHING;SORE

## 2017-06-10 ASSESSMENT — PAIN DESCRIPTION - LOCATION
LOCATION: HIP

## 2017-06-11 VITALS
RESPIRATION RATE: 14 BRPM | SYSTOLIC BLOOD PRESSURE: 103 MMHG | BODY MASS INDEX: 26.01 KG/M2 | WEIGHT: 137.79 LBS | DIASTOLIC BLOOD PRESSURE: 56 MMHG | TEMPERATURE: 97.9 F | OXYGEN SATURATION: 93 % | HEART RATE: 93 BPM | HEIGHT: 61 IN

## 2017-06-11 LAB
ABSOLUTE EOS #: 0.5 K/UL (ref 0–0.4)
ABSOLUTE LYMPH #: 1.3 K/UL (ref 1–4.8)
ABSOLUTE MONO #: 1 K/UL (ref 0.1–1.3)
ANION GAP SERPL CALCULATED.3IONS-SCNC: 14 MMOL/L (ref 9–17)
BASOPHILS # BLD: 1 %
BASOPHILS ABSOLUTE: 0.1 K/UL (ref 0–0.2)
BUN BLDV-MCNC: 31 MG/DL (ref 8–23)
BUN/CREAT BLD: ABNORMAL (ref 9–20)
CALCIUM SERPL-MCNC: 8.9 MG/DL (ref 8.6–10.4)
CHLORIDE BLD-SCNC: 103 MMOL/L (ref 98–107)
CO2: 23 MMOL/L (ref 20–31)
CREAT SERPL-MCNC: 0.93 MG/DL (ref 0.5–0.9)
DIFFERENTIAL TYPE: ABNORMAL
EOSINOPHILS RELATIVE PERCENT: 5 %
GFR AFRICAN AMERICAN: >60 ML/MIN
GFR NON-AFRICAN AMERICAN: >60 ML/MIN
GFR SERPL CREATININE-BSD FRML MDRD: ABNORMAL ML/MIN/{1.73_M2}
GFR SERPL CREATININE-BSD FRML MDRD: ABNORMAL ML/MIN/{1.73_M2}
GLUCOSE BLD-MCNC: 122 MG/DL (ref 70–99)
HCT VFR BLD CALC: 37.2 % (ref 36–46)
HEMOGLOBIN: 12.2 G/DL (ref 12–16)
LYMPHOCYTES # BLD: 12 %
MCH RBC QN AUTO: 30.8 PG (ref 26–34)
MCHC RBC AUTO-ENTMCNC: 32.9 G/DL (ref 31–37)
MCV RBC AUTO: 93.6 FL (ref 80–100)
MONOCYTES # BLD: 10 %
PDW BLD-RTO: 13.3 % (ref 11.5–14.9)
PLATELET # BLD: 140 K/UL (ref 150–450)
PLATELET ESTIMATE: ABNORMAL
PMV BLD AUTO: 8.1 FL (ref 6–12)
POTASSIUM SERPL-SCNC: 3.9 MMOL/L (ref 3.7–5.3)
RBC # BLD: 3.97 M/UL (ref 4–5.2)
RBC # BLD: ABNORMAL 10*6/UL
SEG NEUTROPHILS: 72 %
SEGMENTED NEUTROPHILS ABSOLUTE COUNT: 8.1 K/UL (ref 1.3–9.1)
SODIUM BLD-SCNC: 140 MMOL/L (ref 135–144)
WBC # BLD: 10.9 K/UL (ref 3.5–11)
WBC # BLD: ABNORMAL 10*3/UL

## 2017-06-11 PROCEDURE — 6370000000 HC RX 637 (ALT 250 FOR IP): Performed by: INTERNAL MEDICINE

## 2017-06-11 PROCEDURE — 85025 COMPLETE CBC W/AUTO DIFF WBC: CPT

## 2017-06-11 PROCEDURE — 80048 BASIC METABOLIC PNL TOTAL CA: CPT

## 2017-06-11 PROCEDURE — 6360000002 HC RX W HCPCS: Performed by: ORTHOPAEDIC SURGERY

## 2017-06-11 PROCEDURE — 99232 SBSQ HOSP IP/OBS MODERATE 35: CPT | Performed by: INTERNAL MEDICINE

## 2017-06-11 PROCEDURE — 6370000000 HC RX 637 (ALT 250 FOR IP): Performed by: ORTHOPAEDIC SURGERY

## 2017-06-11 PROCEDURE — 36415 COLL VENOUS BLD VENIPUNCTURE: CPT

## 2017-06-11 PROCEDURE — 97110 THERAPEUTIC EXERCISES: CPT

## 2017-06-11 RX ORDER — HYDROCODONE BITARTRATE AND ACETAMINOPHEN 5; 325 MG/1; MG/1
2 TABLET ORAL EVERY 4 HOURS PRN
Qty: 40 TABLET | Refills: 0 | Status: SHIPPED | OUTPATIENT
Start: 2017-06-11 | End: 2017-06-18

## 2017-06-11 RX ADMIN — DOCUSATE SODIUM 100 MG: 100 CAPSULE, LIQUID FILLED ORAL at 07:59

## 2017-06-11 RX ADMIN — FERROUS SULFATE TAB 325 MG (65 MG ELEMENTAL FE) 325 MG: 325 (65 FE) TAB at 07:59

## 2017-06-11 RX ADMIN — HYDROCODONE BITARTRATE AND ACETAMINOPHEN 2 TABLET: 5; 325 TABLET ORAL at 13:09

## 2017-06-11 RX ADMIN — HYDROCODONE BITARTRATE AND ACETAMINOPHEN 2 TABLET: 5; 325 TABLET ORAL at 07:58

## 2017-06-11 RX ADMIN — LAMOTRIGINE 100 MG: 100 TABLET ORAL at 08:03

## 2017-06-11 RX ADMIN — AMLODIPINE BESYLATE 5 MG: 5 TABLET ORAL at 07:59

## 2017-06-11 RX ADMIN — VORTIOXETINE 10 MG: 10 TABLET, FILM COATED ORAL at 08:04

## 2017-06-11 RX ADMIN — ENOXAPARIN SODIUM 40 MG: 40 INJECTION SUBCUTANEOUS at 07:59

## 2017-06-11 RX ADMIN — ASPIRIN 81 MG: 81 TABLET, COATED ORAL at 07:59

## 2017-06-11 RX ADMIN — TOPIRAMATE 50 MG: 25 TABLET, FILM COATED ORAL at 08:03

## 2017-06-11 RX ADMIN — METOCLOPRAMIDE 5 MG: 5 TABLET ORAL at 08:03

## 2017-06-11 ASSESSMENT — PAIN DESCRIPTION - LOCATION
LOCATION: HIP
LOCATION: HIP

## 2017-06-11 ASSESSMENT — PAIN DESCRIPTION - PAIN TYPE
TYPE: SURGICAL PAIN
TYPE: SURGICAL PAIN

## 2017-06-11 ASSESSMENT — PAIN DESCRIPTION - ORIENTATION
ORIENTATION: LEFT
ORIENTATION: LEFT

## 2017-06-11 ASSESSMENT — PAIN SCALES - GENERAL
PAINLEVEL_OUTOF10: 10
PAINLEVEL_OUTOF10: 5
PAINLEVEL_OUTOF10: 5

## 2017-06-11 ASSESSMENT — PAIN DESCRIPTION - DESCRIPTORS
DESCRIPTORS: ACHING;DISCOMFORT
DESCRIPTORS: ACHING;DISCOMFORT

## 2017-06-11 ASSESSMENT — PAIN DESCRIPTION - PROGRESSION: CLINICAL_PROGRESSION: GRADUALLY IMPROVING

## 2017-07-17 ENCOUNTER — OFFICE VISIT (OUTPATIENT)
Dept: ORTHOPEDIC SURGERY | Age: 67
End: 2017-07-17

## 2017-07-17 DIAGNOSIS — S72.002D HIP FRACTURE, LEFT, CLOSED, WITH ROUTINE HEALING, SUBSEQUENT ENCOUNTER: Primary | ICD-10-CM

## 2017-07-17 PROCEDURE — 99024 POSTOP FOLLOW-UP VISIT: CPT | Performed by: ORTHOPAEDIC SURGERY

## 2017-07-17 RX ORDER — ALENDRONATE SODIUM 70 MG/1
70 TABLET ORAL
Status: ON HOLD | COMMUNITY
Start: 2018-12-12 | End: 2022-05-09 | Stop reason: HOSPADM

## 2017-07-17 RX ORDER — POLYETHYLENE GLYCOL 3350 17 G/17G
17 POWDER, FOR SOLUTION ORAL DAILY PRN
Status: ON HOLD | COMMUNITY
End: 2018-12-11 | Stop reason: ALTCHOICE

## 2017-07-17 RX ORDER — ATORVASTATIN CALCIUM 10 MG/1
10 TABLET, FILM COATED ORAL NIGHTLY
COMMUNITY

## 2017-07-17 RX ORDER — LOPERAMIDE HYDROCHLORIDE 2 MG/1
2 CAPSULE ORAL 4 TIMES DAILY PRN
COMMUNITY

## 2017-07-18 ENCOUNTER — TELEPHONE (OUTPATIENT)
Dept: ORTHOPEDIC SURGERY | Age: 67
End: 2017-07-18

## 2017-08-28 ENCOUNTER — OFFICE VISIT (OUTPATIENT)
Dept: ORTHOPEDIC SURGERY | Age: 67
End: 2017-08-28

## 2017-08-28 DIAGNOSIS — S72.002D HIP FRACTURE, LEFT, CLOSED, WITH ROUTINE HEALING, SUBSEQUENT ENCOUNTER: Primary | ICD-10-CM

## 2017-08-28 PROCEDURE — 99024 POSTOP FOLLOW-UP VISIT: CPT | Performed by: ORTHOPAEDIC SURGERY

## 2017-08-28 RX ORDER — DOCUSATE SODIUM 100 MG/1
100 CAPSULE, LIQUID FILLED ORAL DAILY PRN
Status: ON HOLD | COMMUNITY
End: 2018-12-12

## 2017-08-28 RX ORDER — ACETAMINOPHEN 325 MG/1
650 TABLET ORAL EVERY 6 HOURS PRN
COMMUNITY
End: 2020-11-30

## 2017-09-18 ENCOUNTER — OFFICE VISIT (OUTPATIENT)
Dept: ORTHOPEDIC SURGERY | Age: 67
End: 2017-09-18

## 2017-09-18 DIAGNOSIS — S72.002D HIP FRACTURE, LEFT, CLOSED, WITH ROUTINE HEALING, SUBSEQUENT ENCOUNTER: Primary | ICD-10-CM

## 2017-09-18 PROCEDURE — 99024 POSTOP FOLLOW-UP VISIT: CPT | Performed by: ORTHOPAEDIC SURGERY

## 2018-02-20 ENCOUNTER — APPOINTMENT (OUTPATIENT)
Dept: GENERAL RADIOLOGY | Age: 68
End: 2018-02-20
Payer: MEDICARE

## 2018-02-20 ENCOUNTER — HOSPITAL ENCOUNTER (EMERGENCY)
Age: 68
Discharge: HOME | End: 2018-02-21
Attending: EMERGENCY MEDICINE
Payer: MEDICARE

## 2018-02-20 DIAGNOSIS — J18.9 PNEUMONIA DUE TO ORGANISM: ICD-10-CM

## 2018-02-20 DIAGNOSIS — J11.1 INFLUENZA: Primary | ICD-10-CM

## 2018-02-20 LAB
ABSOLUTE EOS #: 0.1 K/UL (ref 0–0.4)
ABSOLUTE IMMATURE GRANULOCYTE: ABNORMAL K/UL (ref 0–0.3)
ABSOLUTE LYMPH #: 0.4 K/UL (ref 1–4.8)
ABSOLUTE MONO #: 0.7 K/UL (ref 0.1–1.3)
BASOPHILS # BLD: 1 % (ref 0–2)
BASOPHILS ABSOLUTE: 0 K/UL (ref 0–0.2)
DIFFERENTIAL TYPE: ABNORMAL
EOSINOPHILS RELATIVE PERCENT: 2 % (ref 0–4)
HCT VFR BLD CALC: 40.1 % (ref 36–46)
HEMOGLOBIN: 13.4 G/DL (ref 12–16)
IMMATURE GRANULOCYTES: ABNORMAL %
LYMPHOCYTES # BLD: 5 % (ref 24–44)
MCH RBC QN AUTO: 31.3 PG (ref 26–34)
MCHC RBC AUTO-ENTMCNC: 33.5 G/DL (ref 31–37)
MCV RBC AUTO: 93.6 FL (ref 80–100)
MONOCYTES # BLD: 10 % (ref 1–7)
NRBC AUTOMATED: ABNORMAL PER 100 WBC
PDW BLD-RTO: 12.9 % (ref 11.5–14.9)
PLATELET # BLD: 186 K/UL (ref 150–450)
PLATELET ESTIMATE: ABNORMAL
PMV BLD AUTO: 8.1 FL (ref 6–12)
RBC # BLD: 4.28 M/UL (ref 4–5.2)
RBC # BLD: ABNORMAL 10*6/UL
SEG NEUTROPHILS: 82 % (ref 36–66)
SEGMENTED NEUTROPHILS ABSOLUTE COUNT: 6.1 K/UL (ref 1.3–9.1)
WBC # BLD: 7.4 K/UL (ref 3.5–11)
WBC # BLD: ABNORMAL 10*3/UL

## 2018-02-20 PROCEDURE — 84484 ASSAY OF TROPONIN QUANT: CPT

## 2018-02-20 PROCEDURE — 87804 INFLUENZA ASSAY W/OPTIC: CPT

## 2018-02-20 PROCEDURE — 2580000003 HC RX 258: Performed by: EMERGENCY MEDICINE

## 2018-02-20 PROCEDURE — 99284 EMERGENCY DEPT VISIT MOD MDM: CPT

## 2018-02-20 PROCEDURE — 36415 COLL VENOUS BLD VENIPUNCTURE: CPT

## 2018-02-20 PROCEDURE — 83605 ASSAY OF LACTIC ACID: CPT

## 2018-02-20 PROCEDURE — 71045 X-RAY EXAM CHEST 1 VIEW: CPT

## 2018-02-20 PROCEDURE — 80048 BASIC METABOLIC PNL TOTAL CA: CPT

## 2018-02-20 PROCEDURE — 85025 COMPLETE CBC W/AUTO DIFF WBC: CPT

## 2018-02-20 RX ORDER — 0.9 % SODIUM CHLORIDE 0.9 %
1000 INTRAVENOUS SOLUTION INTRAVENOUS ONCE
Status: COMPLETED | OUTPATIENT
Start: 2018-02-20 | End: 2018-02-21

## 2018-02-20 RX ADMIN — SODIUM CHLORIDE 1000 ML: 9 INJECTION, SOLUTION INTRAVENOUS at 23:57

## 2018-02-21 VITALS
DIASTOLIC BLOOD PRESSURE: 55 MMHG | TEMPERATURE: 100.2 F | WEIGHT: 140 LBS | HEIGHT: 62 IN | SYSTOLIC BLOOD PRESSURE: 124 MMHG | RESPIRATION RATE: 16 BRPM | BODY MASS INDEX: 25.76 KG/M2 | OXYGEN SATURATION: 97 % | HEART RATE: 111 BPM

## 2018-02-21 LAB
ANION GAP SERPL CALCULATED.3IONS-SCNC: 14 MMOL/L (ref 9–17)
BUN BLDV-MCNC: 22 MG/DL (ref 8–23)
BUN/CREAT BLD: ABNORMAL (ref 9–20)
CALCIUM SERPL-MCNC: 9 MG/DL (ref 8.6–10.4)
CHLORIDE BLD-SCNC: 101 MMOL/L (ref 98–107)
CO2: 23 MMOL/L (ref 20–31)
CREAT SERPL-MCNC: 0.97 MG/DL (ref 0.5–0.9)
DIRECT EXAM: ABNORMAL
DIRECT EXAM: ABNORMAL
GFR AFRICAN AMERICAN: >60 ML/MIN
GFR NON-AFRICAN AMERICAN: 57 ML/MIN
GFR SERPL CREATININE-BSD FRML MDRD: ABNORMAL ML/MIN/{1.73_M2}
GFR SERPL CREATININE-BSD FRML MDRD: ABNORMAL ML/MIN/{1.73_M2}
GLUCOSE BLD-MCNC: 116 MG/DL (ref 70–99)
LACTIC ACID: 1.2 MMOL/L (ref 0.5–2.2)
Lab: ABNORMAL
POTASSIUM SERPL-SCNC: 3.7 MMOL/L (ref 3.7–5.3)
SODIUM BLD-SCNC: 138 MMOL/L (ref 135–144)
SPECIMEN DESCRIPTION: ABNORMAL
SPECIMEN DESCRIPTION: ABNORMAL
STATUS: ABNORMAL
TROPONIN INTERP: NORMAL
TROPONIN T: <0.03 NG/ML

## 2018-02-21 PROCEDURE — 6370000000 HC RX 637 (ALT 250 FOR IP): Performed by: EMERGENCY MEDICINE

## 2018-02-21 PROCEDURE — 6360000002 HC RX W HCPCS: Performed by: EMERGENCY MEDICINE

## 2018-02-21 PROCEDURE — 94640 AIRWAY INHALATION TREATMENT: CPT

## 2018-02-21 PROCEDURE — 96374 THER/PROPH/DIAG INJ IV PUSH: CPT

## 2018-02-21 RX ORDER — KETOROLAC TROMETHAMINE 30 MG/ML
30 INJECTION, SOLUTION INTRAMUSCULAR; INTRAVENOUS ONCE
Status: COMPLETED | OUTPATIENT
Start: 2018-02-21 | End: 2018-02-21

## 2018-02-21 RX ORDER — ACETAMINOPHEN 325 MG/1
650 TABLET ORAL ONCE
Status: COMPLETED | OUTPATIENT
Start: 2018-02-21 | End: 2018-02-21

## 2018-02-21 RX ORDER — LEVOFLOXACIN 750 MG/1
750 TABLET ORAL DAILY
Qty: 10 TABLET | Refills: 0 | Status: SHIPPED | OUTPATIENT
Start: 2018-02-21 | End: 2018-03-03

## 2018-02-21 RX ORDER — ACETAMINOPHEN 325 MG/1
650 TABLET ORAL EVERY 6 HOURS PRN
Qty: 40 TABLET | Refills: 0 | Status: ON HOLD | OUTPATIENT
Start: 2018-02-21 | End: 2018-12-13 | Stop reason: HOSPADM

## 2018-02-21 RX ORDER — IPRATROPIUM BROMIDE AND ALBUTEROL SULFATE 2.5; .5 MG/3ML; MG/3ML
1 SOLUTION RESPIRATORY (INHALATION) ONCE
Status: COMPLETED | OUTPATIENT
Start: 2018-02-21 | End: 2018-02-21

## 2018-02-21 RX ORDER — LEVOFLOXACIN 500 MG/1
500 TABLET, FILM COATED ORAL ONCE
Status: COMPLETED | OUTPATIENT
Start: 2018-02-21 | End: 2018-02-21

## 2018-02-21 RX ADMIN — KETOROLAC TROMETHAMINE 30 MG: 30 INJECTION, SOLUTION INTRAMUSCULAR at 00:47

## 2018-02-21 RX ADMIN — ACETAMINOPHEN 650 MG: 325 TABLET, FILM COATED ORAL at 01:16

## 2018-02-21 RX ADMIN — IPRATROPIUM BROMIDE AND ALBUTEROL SULFATE 1 AMPULE: .5; 3 SOLUTION RESPIRATORY (INHALATION) at 00:14

## 2018-02-21 RX ADMIN — LEVOFLOXACIN 500 MG: 500 TABLET, FILM COATED ORAL at 01:16

## 2018-02-21 ASSESSMENT — PAIN SCALES - GENERAL
PAINLEVEL_OUTOF10: 10
PAINLEVEL_OUTOF10: 10
PAINLEVEL_OUTOF10: 8

## 2018-02-21 ASSESSMENT — PAIN DESCRIPTION - PAIN TYPE: TYPE: ACUTE PAIN

## 2018-02-21 NOTE — ED NOTES
Pt to ED after visiting the urgent care and was dx with pneumonia. Pt has had a cough and told her caregiver that she felt generalized weakness and body aches. Pt is complaining of severe pain but is awake, alert and cooperative.  Caregiver is at bedside     Ana Alvarado, [address] [place] [address]  02/21/18 0412

## 2018-02-21 NOTE — ED NOTES
Blood work and flu swab sent to lab- Dr. Catrachito Jimenez attempting IV access now     Cielo Lui, RN  02/20/18 8809

## 2018-08-23 ENCOUNTER — HOSPITAL ENCOUNTER (OUTPATIENT)
Age: 68
Setting detail: SPECIMEN
Discharge: HOME | End: 2018-08-23
Payer: MEDICARE

## 2018-08-23 LAB
-: ABNORMAL
AMORPHOUS: ABNORMAL
BACTERIA: ABNORMAL
BILIRUBIN URINE: NEGATIVE
CASTS UA: ABNORMAL /LPF (ref 0–8)
COLOR: YELLOW
COMMENT UA: ABNORMAL
CRYSTALS, UA: ABNORMAL /HPF
EPITHELIAL CELLS UA: ABNORMAL /HPF (ref 0–5)
GLUCOSE URINE: NEGATIVE
KETONES, URINE: NEGATIVE
LEUKOCYTE ESTERASE, URINE: ABNORMAL
MUCUS: ABNORMAL
NITRITE, URINE: NEGATIVE
OTHER OBSERVATIONS UA: ABNORMAL
PH UA: 6 (ref 5–8)
PROTEIN UA: NEGATIVE
RBC UA: ABNORMAL /HPF (ref 0–4)
RENAL EPITHELIAL, UA: ABNORMAL /HPF
SPECIFIC GRAVITY UA: 1.02 (ref 1–1.03)
TRICHOMONAS: ABNORMAL
TURBIDITY: ABNORMAL
URINE HGB: NEGATIVE
UROBILINOGEN, URINE: NORMAL
WBC UA: ABNORMAL /HPF (ref 0–5)
YEAST: ABNORMAL

## 2018-08-24 LAB
CULTURE: NO GROWTH
Lab: NORMAL
SPECIMEN DESCRIPTION: NORMAL
STATUS: NORMAL

## 2018-12-11 ENCOUNTER — APPOINTMENT (OUTPATIENT)
Dept: GENERAL RADIOLOGY | Age: 68
DRG: 605 | End: 2018-12-11
Payer: MEDICARE

## 2018-12-11 ENCOUNTER — APPOINTMENT (OUTPATIENT)
Dept: CT IMAGING | Age: 68
DRG: 605 | End: 2018-12-11
Payer: MEDICARE

## 2018-12-11 ENCOUNTER — HOSPITAL ENCOUNTER (INPATIENT)
Age: 68
LOS: 1 days | Discharge: NURSING, REHAB OR LONG-TERM CARE | DRG: 605 | End: 2018-12-13
Attending: EMERGENCY MEDICINE | Admitting: INTERNAL MEDICINE
Payer: MEDICARE

## 2018-12-11 DIAGNOSIS — S70.02XA CONTUSION OF LEFT HIP, INITIAL ENCOUNTER: ICD-10-CM

## 2018-12-11 DIAGNOSIS — S09.90XA INJURY OF HEAD, INITIAL ENCOUNTER: Primary | ICD-10-CM

## 2018-12-11 PROBLEM — S70.00XA CONTUSION OF HIP: Status: ACTIVE | Noted: 2018-12-11

## 2018-12-11 LAB
-: ABNORMAL
ABSOLUTE EOS #: 0.2 K/UL (ref 0–0.4)
ABSOLUTE IMMATURE GRANULOCYTE: ABNORMAL K/UL (ref 0–0.3)
ABSOLUTE LYMPH #: 1.4 K/UL (ref 1–4.8)
ABSOLUTE MONO #: 0.7 K/UL (ref 0.1–1.3)
AMORPHOUS: ABNORMAL
ANION GAP SERPL CALCULATED.3IONS-SCNC: 11 MMOL/L (ref 9–17)
BACTERIA: ABNORMAL
BASOPHILS # BLD: 1 % (ref 0–2)
BASOPHILS ABSOLUTE: 0 K/UL (ref 0–0.2)
BILIRUBIN URINE: NEGATIVE
BUN BLDV-MCNC: 19 MG/DL (ref 8–23)
BUN/CREAT BLD: ABNORMAL (ref 9–20)
CALCIUM SERPL-MCNC: 9.1 MG/DL (ref 8.6–10.4)
CASTS UA: ABNORMAL /LPF
CHLORIDE BLD-SCNC: 108 MMOL/L (ref 98–107)
CO2: 22 MMOL/L (ref 20–31)
COLOR: YELLOW
COMMENT UA: ABNORMAL
CREAT SERPL-MCNC: 1.04 MG/DL (ref 0.5–0.9)
CRYSTALS, UA: ABNORMAL /HPF
DIFFERENTIAL TYPE: ABNORMAL
EOSINOPHILS RELATIVE PERCENT: 2 % (ref 0–4)
EPITHELIAL CELLS UA: ABNORMAL /HPF
GFR AFRICAN AMERICAN: >60 ML/MIN
GFR NON-AFRICAN AMERICAN: 53 ML/MIN
GFR SERPL CREATININE-BSD FRML MDRD: ABNORMAL ML/MIN/{1.73_M2}
GFR SERPL CREATININE-BSD FRML MDRD: ABNORMAL ML/MIN/{1.73_M2}
GLUCOSE BLD-MCNC: 138 MG/DL (ref 70–99)
GLUCOSE URINE: NEGATIVE
HCT VFR BLD CALC: 40.1 % (ref 36–46)
HEMOGLOBIN: 13.1 G/DL (ref 12–16)
IMMATURE GRANULOCYTES: ABNORMAL %
KETONES, URINE: NEGATIVE
LEUKOCYTE ESTERASE, URINE: ABNORMAL
LYMPHOCYTES # BLD: 16 % (ref 24–44)
MCH RBC QN AUTO: 31.4 PG (ref 26–34)
MCHC RBC AUTO-ENTMCNC: 32.7 G/DL (ref 31–37)
MCV RBC AUTO: 96 FL (ref 80–100)
MONOCYTES # BLD: 7 % (ref 1–7)
MUCUS: ABNORMAL
NITRITE, URINE: NEGATIVE
NRBC AUTOMATED: ABNORMAL PER 100 WBC
OTHER OBSERVATIONS UA: ABNORMAL
PDW BLD-RTO: 12.8 % (ref 11.5–14.9)
PH UA: 7.5 (ref 5–8)
PLATELET # BLD: 210 K/UL (ref 150–450)
PLATELET ESTIMATE: ABNORMAL
PMV BLD AUTO: 8 FL (ref 6–12)
POTASSIUM SERPL-SCNC: 4 MMOL/L (ref 3.7–5.3)
PROTEIN UA: NEGATIVE
RBC # BLD: 4.18 M/UL (ref 4–5.2)
RBC # BLD: ABNORMAL 10*6/UL
RBC UA: ABNORMAL /HPF
RENAL EPITHELIAL, UA: ABNORMAL /HPF
SEG NEUTROPHILS: 74 % (ref 36–66)
SEGMENTED NEUTROPHILS ABSOLUTE COUNT: 6.9 K/UL (ref 1.3–9.1)
SODIUM BLD-SCNC: 141 MMOL/L (ref 135–144)
SPECIFIC GRAVITY UA: 1.01 (ref 1–1.03)
TRICHOMONAS: ABNORMAL
TURBIDITY: ABNORMAL
URINE HGB: NEGATIVE
UROBILINOGEN, URINE: NORMAL
WBC # BLD: 9.2 K/UL (ref 3.5–11)
WBC # BLD: ABNORMAL 10*3/UL
WBC UA: ABNORMAL /HPF
YEAST: ABNORMAL

## 2018-12-11 PROCEDURE — 72125 CT NECK SPINE W/O DYE: CPT

## 2018-12-11 PROCEDURE — G0378 HOSPITAL OBSERVATION PER HR: HCPCS

## 2018-12-11 PROCEDURE — 99223 1ST HOSP IP/OBS HIGH 75: CPT | Performed by: INTERNAL MEDICINE

## 2018-12-11 PROCEDURE — 99285 EMERGENCY DEPT VISIT HI MDM: CPT

## 2018-12-11 PROCEDURE — 71045 X-RAY EXAM CHEST 1 VIEW: CPT

## 2018-12-11 PROCEDURE — 36415 COLL VENOUS BLD VENIPUNCTURE: CPT

## 2018-12-11 PROCEDURE — 81001 URINALYSIS AUTO W/SCOPE: CPT

## 2018-12-11 PROCEDURE — 70450 CT HEAD/BRAIN W/O DYE: CPT

## 2018-12-11 PROCEDURE — 73502 X-RAY EXAM HIP UNI 2-3 VIEWS: CPT

## 2018-12-11 PROCEDURE — 85025 COMPLETE CBC W/AUTO DIFF WBC: CPT

## 2018-12-11 PROCEDURE — 80048 BASIC METABOLIC PNL TOTAL CA: CPT

## 2018-12-11 RX ORDER — TRAMADOL HYDROCHLORIDE 50 MG/1
50 TABLET ORAL DAILY PRN
Status: ON HOLD | COMMUNITY
End: 2018-12-13

## 2018-12-11 RX ORDER — TRAMADOL HYDROCHLORIDE 200 MG/1
200 CAPSULE ORAL
Status: ON HOLD | COMMUNITY
End: 2018-12-13 | Stop reason: HOSPADM

## 2018-12-11 RX ORDER — TOPIRAMATE 50 MG/1
75 TABLET, FILM COATED ORAL EVERY EVENING
COMMUNITY
End: 2020-12-01

## 2018-12-11 RX ORDER — SODIUM CHLORIDE 0.9 % (FLUSH) 0.9 %
10 SYRINGE (ML) INJECTION EVERY 12 HOURS SCHEDULED
Status: DISCONTINUED | OUTPATIENT
Start: 2018-12-11 | End: 2018-12-13 | Stop reason: HOSPADM

## 2018-12-11 RX ORDER — CHOLECALCIFEROL (VITAMIN D3) 125 MCG
5 CAPSULE ORAL DAILY
COMMUNITY
End: 2020-12-01

## 2018-12-11 RX ORDER — SODIUM CHLORIDE 0.9 % (FLUSH) 0.9 %
10 SYRINGE (ML) INJECTION PRN
Status: DISCONTINUED | OUTPATIENT
Start: 2018-12-11 | End: 2018-12-13 | Stop reason: HOSPADM

## 2018-12-11 RX ORDER — CALCIUM CARBONATE 200(500)MG
1 TABLET,CHEWABLE ORAL 4 TIMES DAILY PRN
Status: ON HOLD | COMMUNITY
End: 2022-05-09 | Stop reason: HOSPADM

## 2018-12-11 RX ORDER — CIPROFLOXACIN 500 MG/1
500 TABLET, FILM COATED ORAL ONCE
Status: DISCONTINUED | OUTPATIENT
Start: 2018-12-11 | End: 2018-12-11

## 2018-12-11 RX ORDER — TRAMADOL HYDROCHLORIDE 50 MG/1
50 TABLET ORAL 2 TIMES DAILY
Status: ON HOLD | COMMUNITY
End: 2018-12-13

## 2018-12-11 RX ORDER — IBUPROFEN 200 MG
1 TABLET ORAL PRN
COMMUNITY
End: 2020-12-01

## 2018-12-11 RX ORDER — CIPROFLOXACIN 250 MG/1
250 TABLET, FILM COATED ORAL EVERY 12 HOURS SCHEDULED
Status: DISCONTINUED | OUTPATIENT
Start: 2018-12-11 | End: 2018-12-13 | Stop reason: HOSPADM

## 2018-12-11 RX ORDER — LAMOTRIGINE 200 MG/1
200 TABLET ORAL NIGHTLY
COMMUNITY
End: 2020-12-01

## 2018-12-11 RX ORDER — ACETAMINOPHEN 325 MG/1
650 TABLET ORAL EVERY 4 HOURS PRN
Status: DISCONTINUED | OUTPATIENT
Start: 2018-12-11 | End: 2018-12-13 | Stop reason: HOSPADM

## 2018-12-11 RX ORDER — BUSPIRONE HYDROCHLORIDE 5 MG/1
5 TABLET ORAL 2 TIMES DAILY
COMMUNITY

## 2018-12-11 RX ORDER — BISMUTH SUBSALICYLATE 262 MG/1
524 TABLET, CHEWABLE ORAL PRN
COMMUNITY
End: 2022-05-05

## 2018-12-11 RX ORDER — IBUPROFEN 200 MG
200 TABLET ORAL EVERY 6 HOURS PRN
COMMUNITY
End: 2022-01-13

## 2018-12-11 RX ORDER — AMOXICILLIN 250 MG
1 CAPSULE ORAL PRN
COMMUNITY
End: 2022-05-05

## 2018-12-11 ASSESSMENT — PAIN SCALES - GENERAL
PAINLEVEL_OUTOF10: 5
PAINLEVEL_OUTOF10: 10

## 2018-12-11 ASSESSMENT — PAIN DESCRIPTION - FREQUENCY: FREQUENCY: CONTINUOUS

## 2018-12-11 ASSESSMENT — PAIN DESCRIPTION - PAIN TYPE
TYPE: ACUTE PAIN

## 2018-12-11 ASSESSMENT — PAIN DESCRIPTION - ORIENTATION
ORIENTATION: LEFT
ORIENTATION: LEFT

## 2018-12-11 ASSESSMENT — PAIN DESCRIPTION - LOCATION
LOCATION: LEG
LOCATION: LEG

## 2018-12-12 PROBLEM — R26.2 INABILITY TO AMBULATE DUE TO HIP: Status: ACTIVE | Noted: 2018-12-12

## 2018-12-12 PROBLEM — N39.0 UTI (URINARY TRACT INFECTION), UNCOMPLICATED: Status: ACTIVE | Noted: 2018-12-12

## 2018-12-12 LAB
ANION GAP SERPL CALCULATED.3IONS-SCNC: 10 MMOL/L (ref 9–17)
BUN BLDV-MCNC: 18 MG/DL (ref 8–23)
BUN/CREAT BLD: ABNORMAL (ref 9–20)
CALCIUM SERPL-MCNC: 8.7 MG/DL (ref 8.6–10.4)
CHLORIDE BLD-SCNC: 110 MMOL/L (ref 98–107)
CO2: 22 MMOL/L (ref 20–31)
CREAT SERPL-MCNC: 0.97 MG/DL (ref 0.5–0.9)
GFR AFRICAN AMERICAN: >60 ML/MIN
GFR NON-AFRICAN AMERICAN: 57 ML/MIN
GFR SERPL CREATININE-BSD FRML MDRD: ABNORMAL ML/MIN/{1.73_M2}
GFR SERPL CREATININE-BSD FRML MDRD: ABNORMAL ML/MIN/{1.73_M2}
GLUCOSE BLD-MCNC: 111 MG/DL (ref 70–99)
HCT VFR BLD CALC: 39.2 % (ref 36–46)
HEMOGLOBIN: 12.7 G/DL (ref 12–16)
MAGNESIUM: 2 MG/DL (ref 1.6–2.6)
MCH RBC QN AUTO: 31.1 PG (ref 26–34)
MCHC RBC AUTO-ENTMCNC: 32.5 G/DL (ref 31–37)
MCV RBC AUTO: 95.6 FL (ref 80–100)
NRBC AUTOMATED: NORMAL PER 100 WBC
PDW BLD-RTO: 12.7 % (ref 11.5–14.9)
PLATELET # BLD: 190 K/UL (ref 150–450)
PMV BLD AUTO: 8 FL (ref 6–12)
POTASSIUM SERPL-SCNC: 3.5 MMOL/L (ref 3.7–5.3)
RBC # BLD: 4.1 M/UL (ref 4–5.2)
SODIUM BLD-SCNC: 142 MMOL/L (ref 135–144)
WBC # BLD: 8.1 K/UL (ref 3.5–11)

## 2018-12-12 PROCEDURE — 6370000000 HC RX 637 (ALT 250 FOR IP): Performed by: NURSE PRACTITIONER

## 2018-12-12 PROCEDURE — 97162 PT EVAL MOD COMPLEX 30 MIN: CPT

## 2018-12-12 PROCEDURE — 83735 ASSAY OF MAGNESIUM: CPT

## 2018-12-12 PROCEDURE — G8979 MOBILITY GOAL STATUS: HCPCS

## 2018-12-12 PROCEDURE — G0378 HOSPITAL OBSERVATION PER HR: HCPCS

## 2018-12-12 PROCEDURE — 6360000002 HC RX W HCPCS: Performed by: INTERNAL MEDICINE

## 2018-12-12 PROCEDURE — 96372 THER/PROPH/DIAG INJ SC/IM: CPT

## 2018-12-12 PROCEDURE — G8987 SELF CARE CURRENT STATUS: HCPCS

## 2018-12-12 PROCEDURE — 85027 COMPLETE CBC AUTOMATED: CPT

## 2018-12-12 PROCEDURE — 97165 OT EVAL LOW COMPLEX 30 MIN: CPT

## 2018-12-12 PROCEDURE — 99221 1ST HOSP IP/OBS SF/LOW 40: CPT | Performed by: ORTHOPAEDIC SURGERY

## 2018-12-12 PROCEDURE — 36415 COLL VENOUS BLD VENIPUNCTURE: CPT

## 2018-12-12 PROCEDURE — G8978 MOBILITY CURRENT STATUS: HCPCS

## 2018-12-12 PROCEDURE — 97530 THERAPEUTIC ACTIVITIES: CPT

## 2018-12-12 PROCEDURE — G8988 SELF CARE GOAL STATUS: HCPCS

## 2018-12-12 PROCEDURE — 80048 BASIC METABOLIC PNL TOTAL CA: CPT

## 2018-12-12 RX ORDER — LIDOCAINE 4 G/G
1 PATCH TOPICAL DAILY
Status: DISCONTINUED | OUTPATIENT
Start: 2018-12-12 | End: 2018-12-13 | Stop reason: HOSPADM

## 2018-12-12 RX ORDER — TRAMADOL HYDROCHLORIDE 50 MG/1
50 TABLET ORAL 2 TIMES DAILY
Status: DISCONTINUED | OUTPATIENT
Start: 2018-12-12 | End: 2018-12-13 | Stop reason: HOSPADM

## 2018-12-12 RX ORDER — METOCLOPRAMIDE 5 MG/1
5 TABLET ORAL 3 TIMES DAILY
Status: DISCONTINUED | OUTPATIENT
Start: 2018-12-12 | End: 2018-12-13 | Stop reason: HOSPADM

## 2018-12-12 RX ORDER — LOPERAMIDE HYDROCHLORIDE 2 MG/1
2 CAPSULE ORAL 4 TIMES DAILY PRN
Status: DISCONTINUED | OUTPATIENT
Start: 2018-12-12 | End: 2018-12-13 | Stop reason: HOSPADM

## 2018-12-12 RX ORDER — ALENDRONATE SODIUM 70 MG/1
70 TABLET ORAL WEEKLY
Status: DISCONTINUED | OUTPATIENT
Start: 2018-12-12 | End: 2018-12-12

## 2018-12-12 RX ORDER — LAMOTRIGINE 100 MG/1
100 TABLET ORAL DAILY
Status: DISCONTINUED | OUTPATIENT
Start: 2018-12-12 | End: 2018-12-13 | Stop reason: HOSPADM

## 2018-12-12 RX ORDER — TOPIRAMATE 50 MG/1
50 TABLET, FILM COATED ORAL DAILY
Status: DISCONTINUED | OUTPATIENT
Start: 2018-12-13 | End: 2018-12-13 | Stop reason: HOSPADM

## 2018-12-12 RX ORDER — BISACODYL 10 MG
10 SUPPOSITORY, RECTAL RECTAL DAILY PRN
Status: DISCONTINUED | OUTPATIENT
Start: 2018-12-12 | End: 2018-12-13 | Stop reason: HOSPADM

## 2018-12-12 RX ORDER — CALCIUM CARBONATE 200(500)MG
1 TABLET,CHEWABLE ORAL 4 TIMES DAILY PRN
Status: DISCONTINUED | OUTPATIENT
Start: 2018-12-12 | End: 2018-12-13 | Stop reason: HOSPADM

## 2018-12-12 RX ORDER — BIOTIN 5 MG
1 TABLET ORAL DAILY
Status: DISCONTINUED | OUTPATIENT
Start: 2018-12-12 | End: 2018-12-12 | Stop reason: RX

## 2018-12-12 RX ORDER — BUSPIRONE HYDROCHLORIDE 5 MG/1
5 TABLET ORAL 2 TIMES DAILY
Status: DISCONTINUED | OUTPATIENT
Start: 2018-12-12 | End: 2018-12-13 | Stop reason: HOSPADM

## 2018-12-12 RX ORDER — POTASSIUM CHLORIDE 20MEQ/15ML
40 LIQUID (ML) ORAL PRN
Status: DISCONTINUED | OUTPATIENT
Start: 2018-12-12 | End: 2018-12-13 | Stop reason: HOSPADM

## 2018-12-12 RX ORDER — POTASSIUM CHLORIDE 7.45 MG/ML
10 INJECTION INTRAVENOUS PRN
Status: DISCONTINUED | OUTPATIENT
Start: 2018-12-12 | End: 2018-12-13 | Stop reason: HOSPADM

## 2018-12-12 RX ORDER — MAGNESIUM SULFATE 1 G/100ML
1 INJECTION INTRAVENOUS PRN
Status: DISCONTINUED | OUTPATIENT
Start: 2018-12-12 | End: 2018-12-13 | Stop reason: HOSPADM

## 2018-12-12 RX ORDER — ONDANSETRON 2 MG/ML
4 INJECTION INTRAMUSCULAR; INTRAVENOUS EVERY 6 HOURS PRN
Status: DISCONTINUED | OUTPATIENT
Start: 2018-12-12 | End: 2018-12-13 | Stop reason: HOSPADM

## 2018-12-12 RX ORDER — CHOLECALCIFEROL (VITAMIN D3) 125 MCG
5 CAPSULE ORAL NIGHTLY
Status: DISCONTINUED | OUTPATIENT
Start: 2018-12-12 | End: 2018-12-12 | Stop reason: RX

## 2018-12-12 RX ORDER — ASPIRIN 81 MG/1
81 TABLET ORAL DAILY
Status: DISCONTINUED | OUTPATIENT
Start: 2018-12-12 | End: 2018-12-13 | Stop reason: HOSPADM

## 2018-12-12 RX ORDER — LAMOTRIGINE 100 MG/1
200 TABLET ORAL NIGHTLY
Status: DISCONTINUED | OUTPATIENT
Start: 2018-12-12 | End: 2018-12-13 | Stop reason: HOSPADM

## 2018-12-12 RX ORDER — TRAMADOL HYDROCHLORIDE 50 MG/1
50 TABLET ORAL DAILY PRN
Status: DISCONTINUED | OUTPATIENT
Start: 2018-12-12 | End: 2018-12-13 | Stop reason: HOSPADM

## 2018-12-12 RX ORDER — BISMUTH SUBSALICYLATE 262 MG/1
524 TABLET, CHEWABLE ORAL PRN
Status: DISCONTINUED | OUTPATIENT
Start: 2018-12-12 | End: 2018-12-12 | Stop reason: RX

## 2018-12-12 RX ORDER — SENNA AND DOCUSATE SODIUM 50; 8.6 MG/1; MG/1
1 TABLET, FILM COATED ORAL PRN
Status: DISCONTINUED | OUTPATIENT
Start: 2018-12-12 | End: 2018-12-13 | Stop reason: HOSPADM

## 2018-12-12 RX ORDER — POTASSIUM CHLORIDE 20 MEQ/1
40 TABLET, EXTENDED RELEASE ORAL PRN
Status: DISCONTINUED | OUTPATIENT
Start: 2018-12-12 | End: 2018-12-13 | Stop reason: HOSPADM

## 2018-12-12 RX ORDER — QUETIAPINE FUMARATE 50 MG/1
50 TABLET, FILM COATED ORAL NIGHTLY
Status: DISCONTINUED | OUTPATIENT
Start: 2018-12-12 | End: 2018-12-13 | Stop reason: HOSPADM

## 2018-12-12 RX ORDER — FERROUS SULFATE 325(65) MG
325 TABLET ORAL
Status: DISCONTINUED | OUTPATIENT
Start: 2018-12-12 | End: 2018-12-13 | Stop reason: HOSPADM

## 2018-12-12 RX ORDER — TOPIRAMATE 25 MG/1
75 TABLET ORAL EVERY EVENING
Status: DISCONTINUED | OUTPATIENT
Start: 2018-12-12 | End: 2018-12-13 | Stop reason: HOSPADM

## 2018-12-12 RX ORDER — ATORVASTATIN CALCIUM 10 MG/1
10 TABLET, FILM COATED ORAL DAILY
Status: DISCONTINUED | OUTPATIENT
Start: 2018-12-12 | End: 2018-12-13 | Stop reason: HOSPADM

## 2018-12-12 RX ORDER — AMLODIPINE BESYLATE 5 MG/1
5 TABLET ORAL DAILY
Status: DISCONTINUED | OUTPATIENT
Start: 2018-12-12 | End: 2018-12-13 | Stop reason: HOSPADM

## 2018-12-12 RX ORDER — M-VIT,TX,IRON,MINS/CALC/FOLIC 27MG-0.4MG
1 TABLET ORAL DAILY
Status: DISCONTINUED | OUTPATIENT
Start: 2018-12-12 | End: 2018-12-13 | Stop reason: HOSPADM

## 2018-12-12 RX ORDER — TOPIRAMATE 25 MG/1
50 TABLET ORAL DAILY
Status: DISCONTINUED | OUTPATIENT
Start: 2018-12-12 | End: 2018-12-12 | Stop reason: CLARIF

## 2018-12-12 RX ORDER — TRAMADOL HYDROCHLORIDE 200 MG/1
200 CAPSULE ORAL
Status: DISCONTINUED | OUTPATIENT
Start: 2018-12-12 | End: 2018-12-12 | Stop reason: RX

## 2018-12-12 RX ADMIN — TOPIRAMATE 50 MG: 25 TABLET, FILM COATED ORAL at 08:56

## 2018-12-12 RX ADMIN — TRAMADOL HYDROCHLORIDE 50 MG: 50 TABLET, FILM COATED ORAL at 08:57

## 2018-12-12 RX ADMIN — METOCLOPRAMIDE HYDROCHLORIDE 5 MG: 5 TABLET ORAL at 08:58

## 2018-12-12 RX ADMIN — TOPIRAMATE 75 MG: 25 TABLET, FILM COATED ORAL at 16:37

## 2018-12-12 RX ADMIN — METOCLOPRAMIDE HYDROCHLORIDE 5 MG: 5 TABLET ORAL at 16:38

## 2018-12-12 RX ADMIN — POTASSIUM CHLORIDE 40 MEQ: 20 TABLET, EXTENDED RELEASE ORAL at 08:57

## 2018-12-12 RX ADMIN — ATORVASTATIN CALCIUM 10 MG: 10 TABLET, FILM COATED ORAL at 08:58

## 2018-12-12 RX ADMIN — ENOXAPARIN SODIUM 40 MG: 100 INJECTION SUBCUTANEOUS at 08:56

## 2018-12-12 RX ADMIN — CIPROFLOXACIN HYDROCHLORIDE 250 MG: 250 TABLET, FILM COATED ORAL at 00:20

## 2018-12-12 RX ADMIN — LAMOTRIGINE 200 MG: 100 TABLET ORAL at 21:38

## 2018-12-12 RX ADMIN — LAMOTRIGINE 100 MG: 100 TABLET ORAL at 08:56

## 2018-12-12 RX ADMIN — METOCLOPRAMIDE HYDROCHLORIDE 5 MG: 5 TABLET ORAL at 21:39

## 2018-12-12 RX ADMIN — MULTIPLE VITAMINS W/ MINERALS TAB 1 TABLET: TAB at 08:58

## 2018-12-12 RX ADMIN — QUETIAPINE FUMARATE 50 MG: 50 TABLET ORAL at 21:39

## 2018-12-12 RX ADMIN — CIPROFLOXACIN HYDROCHLORIDE 250 MG: 250 TABLET, FILM COATED ORAL at 08:58

## 2018-12-12 RX ADMIN — AMLODIPINE BESYLATE 5 MG: 5 TABLET ORAL at 08:58

## 2018-12-12 RX ADMIN — VORTIOXETINE 10 MG: 10 TABLET, FILM COATED ORAL at 08:56

## 2018-12-12 RX ADMIN — CIPROFLOXACIN HYDROCHLORIDE 250 MG: 250 TABLET, FILM COATED ORAL at 21:39

## 2018-12-12 RX ADMIN — ASPIRIN 81 MG: 81 TABLET, COATED ORAL at 08:58

## 2018-12-12 RX ADMIN — BUSPIRONE HYDROCHLORIDE 5 MG: 5 TABLET ORAL at 08:58

## 2018-12-12 RX ADMIN — TRAMADOL HYDROCHLORIDE 50 MG: 50 TABLET, FILM COATED ORAL at 21:39

## 2018-12-12 RX ADMIN — FERROUS SULFATE TAB 325 MG (65 MG ELEMENTAL FE) 325 MG: 325 (65 FE) TAB at 08:57

## 2018-12-12 ASSESSMENT — PAIN SCALES - GENERAL
PAINLEVEL_OUTOF10: 0
PAINLEVEL_OUTOF10: 1
PAINLEVEL_OUTOF10: 10
PAINLEVEL_OUTOF10: 10
PAINLEVEL_OUTOF10: 7
PAINLEVEL_OUTOF10: 10
PAINLEVEL_OUTOF10: 6
PAINLEVEL_OUTOF10: 10
PAINLEVEL_OUTOF10: 0

## 2018-12-12 ASSESSMENT — PAIN DESCRIPTION - ORIENTATION
ORIENTATION: LEFT
ORIENTATION: LEFT

## 2018-12-12 ASSESSMENT — ENCOUNTER SYMPTOMS
SHORTNESS OF BREATH: 0
NAUSEA: 0
SORE THROAT: 0
WHEEZING: 0
ABDOMINAL PAIN: 0
BACK PAIN: 0
CONSTIPATION: 0
VOMITING: 0
DIARRHEA: 0
COUGH: 0

## 2018-12-12 ASSESSMENT — PAIN DESCRIPTION - LOCATION: LOCATION: HIP

## 2018-12-12 ASSESSMENT — PAIN DESCRIPTION - PAIN TYPE
TYPE: ACUTE PAIN

## 2018-12-12 ASSESSMENT — PAIN DESCRIPTION - FREQUENCY: FREQUENCY: CONTINUOUS

## 2018-12-12 NOTE — PROGRESS NOTES
[address] [facility]    Occupational Therapy Evaluation  Date: 18  Patient Name: Soraida Farr       Room: 7218/3538-16  MRN: [id number]  Account: [de-identified]   : 1950  (74 y.o.) Gender: female     Discharge Recommendations:  24 hour supervision or assist, Home with nursing aide, Home with Home health OT (Group Home )  Equipment Needed: No    Referring Practitioner: Dr Dulce Lopez   Diagnosis: Left hip contusion after fall in bathroom    Additional Pertinent Hx:  Shunt;  Prior CVA;  ORIF left hip     Treatment Diagnosis: Altered Mobility and ability to care for self   Past Medical History:  has a past medical history of Acid reflux; Bipolar affective ([facility] [address] Chronic headaches; and Hydrocephalus. Past Surgical History:   has a past surgical history that includes other surgical history; shunt revision; Cholecystectomy; Tonsillectomy; Hip fracture surgery (Left, 2017); and hip pinning (Left, 2017).     Restrictions  Restrictions/Precautions: Fall Risk (History of Fallin with Head Contact;  V=P Shunt ; left hip ORIF; CVA  )  Other position/activity restrictions: Left leg support for pain reduction      Vitals  Temp: 97.5 °F (36.4 °C)  Pulse: 76  Resp: 18  BP: (!) 144/70  Height: 5' 1\" (154.9 cm)  Weight: 150 lb 5.7 oz (68.2 kg)  BMI (Calculated): 28.5  Oxygen Therapy  SpO2: 99 %  O2 Device: None (Room air)  O2 Flow Rate (L/min): 0 L/min  Level of Consciousness: Alert    Subjective  Subjective: Alert and cooperative   Overall Orientation Status: Within Functional Limits  Vision  Vision: Impaired  Vision Exceptions: Wears glasses at all times  Hearing  Hearing: Exceptions to [facility]  Hearing Exceptions: Bilateral hearing aid  Social/Functional History  Lives With: Other (comment) (Group Home )  Type of Home: House  Home Layout: Two level  Home Access: Level entry  Bathroom Shower/Tub: Walk-in shower  Bathroom Toilet: Handicap height  Bathroom Equipment: Grab bars in shower, Hand-held shower, Grab bars around toilet  Bathroom Accessibility: Accessible  Home Equipment: Rolling walker  Receives Help From: Personal care attendant, Other (comment) (Group Home )  ADL Assistance: Needs assistance  Homemaking Responsibilities: No  Ambulation Assistance: Independent  Transfer Assistance: Needs assistance (supervision / assist )  Active : No  Mode of Transportation: Other  Occupation: On disability    Objective      Cognition  Overall Cognitive Status: WFL       ADL  Feeding: Setup, Increased time to complete  Grooming: Minimal assistance  UE Bathing: Moderate assistance  LE Bathing: Maximum assistance  UE Dressing: Minimal assistance  LE Dressing: Maximum assistance  Toileting: Maximum assistance    UE Function  Hand Dominance  Hand Dominance: Right        LUE Strength  Gross LUE Strength: WFL  L Hand Grasp: 4/5     LUE Tone: Normotonic     LUE AROM (degrees)  LUE AROM : WFL     Left Hand AROM (degrees)  Left Hand AROM: WFL  RUE Strength  Gross RUE Strength: WFL  R Hand Grasp: 4/5      RUE Tone: Normotonic     RUE AROM (degrees)  RUE AROM : WFL     Right Hand AROM (degrees)  Right Hand AROM: WFL    Fine Motor Skills  Coordination  Movements Are Fluid And Coordinated: Yes                           Mobility          Balance  Sitting Balance: Modified independent                      Assessment  Performance deficits / Impairments: Decreased functional mobility , Decreased endurance, Decreased ADL status, Decreased strength, Decreased safe awareness  Treatment Diagnosis: Altered Mobility and ability to care for self   Prognosis: Good, Fair  Decision Making: Medium Complexity  History: Moderate Chart Review   Exam: 5 areas of altered performance   Assistance / Modification: Assist / setup for care   Patient Education: OT POC, safety.  conditioning   Barriers to Learning: Hard of Hearing   REQUIRES OT FOLLOW UP: Yes  Discharge Recommendations: 24 hour supervision or assist, Home with nursing aide, Home

## 2018-12-12 NOTE — H&P
[address] [facility]     HISTORY AND PHYSICAL EXAMINATION            Date:   12/12/2018  Patientname: David David  Date of admission:  12/11/2018  4:42 PM  MRN:   [id number]  Account:  [de-identified]  YOB: 1950  PCP:    Mary Gold MD  Room:   2068/2068-01  Code Status:    Full Code    CHIEF COMPLAINT     Chief Complaint   Patient presents with    Leg Pain     left    Neck Pain    Headache     HISTORY OF PRESENT ILLNESS  (Character, Onset, Location, Duration,  Exacerbating/RelievingFactors, Radiation,   Associated Symptoms, Severity )      The patient is a 70 y.o. female who presents with hip pain following a fall  According to patient, she was in the bathroom with her walker and fell backward. Pt is a resident of a [facility]). Pt denies any LOC. She complains of extreme, non-radiating, continuous pain to her left hip/leg. Symptoms are associated with pain, inability to bear weight to affected extremity  Denies fever, chills, chest pain, cough, abdominal pain, nausea, vomiting, diarrhea, and urinary symptoms. Symptoms are aggravated by Movement, ambulation  Symptoms are alleviated by rest and pain medications  Symptoms are reported as severe and debilitating, rating pain 15/10    PAST MEDICAL HISTORY   Patient  has a past medical history of Acid reflux; Bipolar affective ([address] Chronic headaches; and Hydrocephalus. PAST SURGICAL HISTORY    Patient  has a past surgical history that includes other surgical history; shunt revision; Cholecystectomy; Tonsillectomy; Hip fracture surgery (Left, 06/09/2017); and hip pinning (Left, 6/9/2017). FAMILY HISTORY    Patient family history is not on file. SOCIAL HISTORY    Patient  reports that she has never smoked. She has never used smokeless tobacco. She reports that she does not drink alcohol or use drugs.      HOME MEDICATIONS        Prior to Admission medications    Medication Sig Start Date -PT & OT eval and treat  -Patient with history of congenital hydrocephalus  --has 3 shunts; only one functional   -Lives in group home  --SS consult for discharge planning    Electronically signed by ANNALISA Patel CNP on 12/12/18 at 3:59 AM    Attending Physician Statement  Patient seen and examined  I have discussed the care of the patient, including pertinent history and exam findings,  with the resident. I have reviewed the key elements of all parts of the encounter with the resident. I agree with the assessment, plan and orders as documented by the resident.     Dom Conrad

## 2018-12-13 VITALS
RESPIRATION RATE: 18 BRPM | BODY MASS INDEX: 28.55 KG/M2 | OXYGEN SATURATION: 97 % | SYSTOLIC BLOOD PRESSURE: 136 MMHG | TEMPERATURE: 97.9 F | HEIGHT: 61 IN | DIASTOLIC BLOOD PRESSURE: 73 MMHG | HEART RATE: 79 BPM | WEIGHT: 151.24 LBS

## 2018-12-13 LAB
ANION GAP SERPL CALCULATED.3IONS-SCNC: 9 MMOL/L (ref 9–17)
BUN BLDV-MCNC: 20 MG/DL (ref 8–23)
BUN/CREAT BLD: ABNORMAL (ref 9–20)
CALCIUM SERPL-MCNC: 8.7 MG/DL (ref 8.6–10.4)
CHLORIDE BLD-SCNC: 110 MMOL/L (ref 98–107)
CO2: 22 MMOL/L (ref 20–31)
CREAT SERPL-MCNC: 1.03 MG/DL (ref 0.5–0.9)
GFR AFRICAN AMERICAN: >60 ML/MIN
GFR NON-AFRICAN AMERICAN: 53 ML/MIN
GFR SERPL CREATININE-BSD FRML MDRD: ABNORMAL ML/MIN/{1.73_M2}
GFR SERPL CREATININE-BSD FRML MDRD: ABNORMAL ML/MIN/{1.73_M2}
GLUCOSE BLD-MCNC: 99 MG/DL (ref 70–99)
HCT VFR BLD CALC: 38.7 % (ref 36–46)
HEMOGLOBIN: 12.8 G/DL (ref 12–16)
INR BLD: 1
MCH RBC QN AUTO: 31.6 PG (ref 26–34)
MCHC RBC AUTO-ENTMCNC: 33.2 G/DL (ref 31–37)
MCV RBC AUTO: 95.1 FL (ref 80–100)
NRBC AUTOMATED: NORMAL PER 100 WBC
PDW BLD-RTO: 12.9 % (ref 11.5–14.9)
PLATELET # BLD: 212 K/UL (ref 150–450)
PMV BLD AUTO: 8.2 FL (ref 6–12)
POTASSIUM SERPL-SCNC: 4.2 MMOL/L (ref 3.7–5.3)
PROTHROMBIN TIME: 10.3 SEC (ref 9.7–12)
RBC # BLD: 4.07 M/UL (ref 4–5.2)
SODIUM BLD-SCNC: 141 MMOL/L (ref 135–144)
WBC # BLD: 7 K/UL (ref 3.5–11)

## 2018-12-13 PROCEDURE — 99239 HOSP IP/OBS DSCHRG MGMT >30: CPT | Performed by: INTERNAL MEDICINE

## 2018-12-13 PROCEDURE — 36415 COLL VENOUS BLD VENIPUNCTURE: CPT

## 2018-12-13 PROCEDURE — 6360000002 HC RX W HCPCS: Performed by: INTERNAL MEDICINE

## 2018-12-13 PROCEDURE — 6370000000 HC RX 637 (ALT 250 FOR IP): Performed by: NURSE PRACTITIONER

## 2018-12-13 PROCEDURE — 97530 THERAPEUTIC ACTIVITIES: CPT

## 2018-12-13 PROCEDURE — 80048 BASIC METABOLIC PNL TOTAL CA: CPT

## 2018-12-13 PROCEDURE — 96372 THER/PROPH/DIAG INJ SC/IM: CPT

## 2018-12-13 PROCEDURE — 1200000000 HC SEMI PRIVATE

## 2018-12-13 PROCEDURE — 85610 PROTHROMBIN TIME: CPT

## 2018-12-13 PROCEDURE — 85027 COMPLETE CBC AUTOMATED: CPT

## 2018-12-13 RX ORDER — LIDOCAINE 4 G/G
1 PATCH TOPICAL DAILY
Qty: 3 PATCH | Refills: 0 | Status: SHIPPED | OUTPATIENT
Start: 2018-12-14 | End: 2022-01-13

## 2018-12-13 RX ORDER — SODIUM CHLORIDE 9 MG/ML
INJECTION, SOLUTION INTRAVENOUS CONTINUOUS
Status: DISCONTINUED | OUTPATIENT
Start: 2018-12-13 | End: 2018-12-13 | Stop reason: HOSPADM

## 2018-12-13 RX ORDER — TOPIRAMATE 50 MG/1
50 TABLET, FILM COATED ORAL DAILY
Qty: 60 TABLET | Refills: 3 | COMMUNITY
Start: 2018-12-13

## 2018-12-13 RX ORDER — TRAMADOL HYDROCHLORIDE 50 MG/1
50 TABLET ORAL DAILY PRN
Qty: 3 TABLET | Refills: 0 | Status: SHIPPED | OUTPATIENT
Start: 2018-12-13 | End: 2018-12-16

## 2018-12-13 RX ORDER — CIPROFLOXACIN 250 MG/1
250 TABLET, FILM COATED ORAL EVERY 12 HOURS SCHEDULED
Qty: 14 TABLET | Refills: 0 | Status: SHIPPED | OUTPATIENT
Start: 2018-12-13 | End: 2018-12-20

## 2018-12-13 RX ORDER — TRAMADOL HYDROCHLORIDE 50 MG/1
50 TABLET ORAL 2 TIMES DAILY
Qty: 6 TABLET | Refills: 0 | Status: SHIPPED | OUTPATIENT
Start: 2018-12-13 | End: 2018-12-16

## 2018-12-13 RX ADMIN — VORTIOXETINE 10 MG: 10 TABLET, FILM COATED ORAL at 10:01

## 2018-12-13 RX ADMIN — ENOXAPARIN SODIUM 40 MG: 100 INJECTION SUBCUTANEOUS at 09:53

## 2018-12-13 RX ADMIN — METOCLOPRAMIDE HYDROCHLORIDE 5 MG: 5 TABLET ORAL at 15:01

## 2018-12-13 RX ADMIN — FERROUS SULFATE TAB 325 MG (65 MG ELEMENTAL FE) 325 MG: 325 (65 FE) TAB at 09:56

## 2018-12-13 RX ADMIN — LAMOTRIGINE 100 MG: 100 TABLET ORAL at 09:56

## 2018-12-13 RX ADMIN — AMLODIPINE BESYLATE 5 MG: 5 TABLET ORAL at 09:53

## 2018-12-13 RX ADMIN — METOCLOPRAMIDE HYDROCHLORIDE 5 MG: 5 TABLET ORAL at 09:57

## 2018-12-13 RX ADMIN — BUSPIRONE HYDROCHLORIDE 5 MG: 5 TABLET ORAL at 09:53

## 2018-12-13 RX ADMIN — TOPIRAMATE 50 MG: 50 TABLET, FILM COATED ORAL at 09:57

## 2018-12-13 RX ADMIN — ATORVASTATIN CALCIUM 10 MG: 10 TABLET, FILM COATED ORAL at 09:57

## 2018-12-13 RX ADMIN — ASPIRIN 81 MG: 81 TABLET, COATED ORAL at 09:53

## 2018-12-13 RX ADMIN — MULTIPLE VITAMINS W/ MINERALS TAB 1 TABLET: TAB at 09:53

## 2018-12-13 RX ADMIN — CIPROFLOXACIN HYDROCHLORIDE 250 MG: 250 TABLET, FILM COATED ORAL at 09:53

## 2018-12-13 ASSESSMENT — PAIN DESCRIPTION - ORIENTATION: ORIENTATION: LEFT

## 2018-12-13 ASSESSMENT — PAIN DESCRIPTION - LOCATION: LOCATION: HIP

## 2018-12-13 ASSESSMENT — PAIN DESCRIPTION - FREQUENCY: FREQUENCY: CONTINUOUS

## 2018-12-13 ASSESSMENT — PAIN SCALES - GENERAL
PAINLEVEL_OUTOF10: 1
PAINLEVEL_OUTOF10: 10

## 2018-12-13 ASSESSMENT — PAIN DESCRIPTION - PAIN TYPE: TYPE: ACUTE PAIN

## 2018-12-13 NOTE — PROGRESS NOTES
[address] [facility]    INPATIENT OCCUPATIONAL THERAPY  PROGRESS NOTE  Date: 2018  Patient Name: Zhane Pat      Room: 4027/9048-52  MRN: [id number]    : 1950  (74 y.o.) Gender: female     Discharge Recommendations:  24 hour supervision or assist, Home with nursing aide, Home with Home health OT (Group Home )  Equipment Needed: No    Referring Practitioner: Dr Nieves Ferreira   Diagnosis: Left hip contusion after fall in bathroom    General  Chart Reviewed: Yes  Patient assessed for rehabilitation services?: Yes  Additional Pertinent Hx:  Shunt;  Prior CVA;  ORIF left hip   Family / Caregiver Present: No  Referring Practitioner: Dr Nieves Ferreira   Diagnosis: Left hip contusion after fall in bathroom      Restrictions  Restrictions/Precautions: Fall Risk (History of Fallin with Head Contact;  V=P Shunt ; left hip ORIF; CVA  )  Other position/activity restrictions: Left leg support for pain reduction       Subjective  Subjective: pt states that she lives in a group home, pt perseverates on hip pain  Comments: Alert and cooperative   Patient Currently in Pain: Yes  Pain Level: 10  Pain Location: Hip  Pain Orientation: Left  Overall Orientation Status: Within Functional Limits       Objective     Bed mobility  Scooting: Maximal assistance  Balance  Sitting Balance: Modified independent   Standing Balance: Contact guard assistance  Standing Balance  Time: 2-3 minutes c RW  Activity: func mobility  Sit to stand: Contact guard assistance  Stand to sit: Contact guard assistance  Comment: pt req encouragement to stand, VCs for tech c G return, no LOB noted  Functional Mobility  Functional - Mobility Device: Rolling Walker  Activity: Other (chair around bed to [facility], )  Assist Level: Contact guard assistance  Functional Mobility Comments: multiple short standing RB req   ADL  Toileting: Maximum assistance  Additional Comments: pt req aissist to pull down pants to sit on [facility]     Transfers  Sit to stand:

## 2018-12-13 NOTE — PROGRESS NOTES
Pt is alert and  O x4  No c/o of pain, or SOB at this time  Helped pt. To be repositioned in bed  Resp.  Easy  @  18/min

## 2018-12-13 NOTE — PROGRESS NOTES
SPINE WITHOUT CONTRAST 12/11/2018 6:02 pm TECHNIQUE: CT of the cervical spine was performed without the administration of intravenous contrast. Multiplanar reformatted images are provided for review. Dose modulation, iterative reconstruction, and/or weight based adjustment of the mA/kV was utilized to reduce the radiation dose to as low as reasonably achievable. COMPARISON: October 9, 2012 HISTORY: ORDERING SYSTEM PROVIDED HISTORY: Trauma TECHNOLOGIST PROVIDED HISTORY: Ordering Physician Provided Reason for Exam: trauma Acuity: Acute Mechanism of Injury: pt states fell backwards from a standing position Relevant Medical/Surgical History: pt states has 3 shunts, but only one functional FINDINGS: BONES/ALIGNMENT: There is no evidence of an acute cervical spine fracture. Slight reversal the normal cervical lordosis. No listhesis. . DEGENERATIVE CHANGES: Prominent hypertrophic changes are present in the mid and lower cervical spine. Mild facet arthropathy and disc disease is also present, unchanged in appearance. SOFT TISSUES: No acute soft tissue abnormality identified.  shunt tubing is partially visualized projecting over the left occipital region, left neck and chest.  Small left effusion. Ground-glass opacity in the right upper lobe is noted. No acute abnormality of the cervical spine. Ground-glass opacity in the right upper lobe is noted. This may represent developing inflammatory process in the appropriate clinical setting. Xr Chest Portable    Result Date: 12/11/2018  EXAMINATION: SINGLE XRAY VIEW OF THE CHEST 12/11/2018 5:34 pm COMPARISON: February 21, 2018 HISTORY: ORDERING SYSTEM PROVIDED HISTORY: Trauma TECHNOLOGIST PROVIDED HISTORY: Trauma Ordering Physician Provided Reason for Exam: Pain Acuity: Acute Type of Exam: Initial FINDINGS: Cardiac silhouette enlargement is noted. Left pleural effusion appears unchanged. Probable small right pleural effusion. No pneumothorax or evidence for edema. Multiple ventricular catheters are noted projecting over the left neck and left chest.  1 catheter courses off the field of view in the abdomen. Another catheter projects over the inferior left hemithorax. These findings appear unchanged. No acute osseous abnormality identified. Similar appearance of left pleural effusion. Suspect new small right pleural effusion. Partially visualized ventricular catheters are again noted, as seen previously. ASSESSMENT:    Patient Active Problem List   Diagnosis    Contusion of hip    UTI (urinary tract infection), uncomplicated    Inability to ambulate due to hip      hip contusion     hydrocephalus     unable to walk due to pain      on cipro   cr 1.03   pre renal stella     PLAN:    ivf at 74     cont cipro   pt     d/c planning       MD BERNIE Stephens[address], [address] [place] [address].    Phone [phone]   Fax: [phone]  Answering Service: [phone]

## 2018-12-13 NOTE — DISCHARGE SUMMARY
neck and chest.  Small left effusion. Ground-glass opacity in the right upper lobe is noted. No acute abnormality of the cervical spine. Ground-glass opacity in the right upper lobe is noted. This may represent developing inflammatory process in the appropriate clinical setting. Xr Chest Portable    Result Date: 12/11/2018  EXAMINATION: SINGLE XRAY VIEW OF THE CHEST 12/11/2018 5:34 pm COMPARISON: February 21, 2018 HISTORY: ORDERING SYSTEM PROVIDED HISTORY: Trauma TECHNOLOGIST PROVIDED HISTORY: Trauma Ordering Physician Provided Reason for Exam: Pain Acuity: Acute Type of Exam: Initial FINDINGS: Cardiac silhouette enlargement is noted. Left pleural effusion appears unchanged. Probable small right pleural effusion. No pneumothorax or evidence for edema. Multiple ventricular catheters are noted projecting over the left neck and left chest.  1 catheter courses off the field of view in the abdomen. Another catheter projects over the inferior left hemithorax. These findings appear unchanged. No acute osseous abnormality identified. Similar appearance of left pleural effusion. Suspect new small right pleural effusion. Partially visualized ventricular catheters are again noted, as seen previously. Consultations:    Consults:     Final Specialist Recommendations/Findings:   IP CONSULT TO INTERNAL MEDICINE  IP CONSULT TO SOCIAL WORK  IP CONSULT TO ORTHOPEDIC SURGERY      The patient was seen and examined on day of discharge and this discharge summary is in conjunction with any daily progress note from day of discharge.     Discharge plan:     Disposition: Skilled Facility    Physician Follow Up:     Lorri Abdi MD  [address] [place] [address] [id number] [phone]             Requiring Further Evaluation/Follow Up POST HOSPITALIZATION/Incidental Findings:     Diet: regular diet    Activity: As tolerated    Instructions to Patient:     Discharge Medications:      Medication List      START taking

## 2018-12-13 NOTE — PROGRESS NOTES
Tried to call report to [address], unsuccessful at this time, Nurse unable to take report at this time

## 2019-02-07 ENCOUNTER — APPOINTMENT (OUTPATIENT)
Dept: CT IMAGING | Age: 69
DRG: 535 | End: 2019-02-07
Payer: MEDICARE

## 2019-02-07 ENCOUNTER — HOSPITAL ENCOUNTER (INPATIENT)
Age: 69
LOS: 3 days | Discharge: NURSING, REHAB OR LONG-TERM CARE | DRG: 535 | End: 2019-02-10
Attending: EMERGENCY MEDICINE | Admitting: SURGERY
Payer: MEDICARE

## 2019-02-07 ENCOUNTER — APPOINTMENT (OUTPATIENT)
Dept: GENERAL RADIOLOGY | Age: 69
DRG: 535 | End: 2019-02-07
Payer: MEDICARE

## 2019-02-07 DIAGNOSIS — S32.592A CLOSED FRACTURE OF INFERIOR PUBIC RAMUS, LEFT, INITIAL ENCOUNTER (HCC): Primary | ICD-10-CM

## 2019-02-07 DIAGNOSIS — S32.402A CLOSED DISPLACED FRACTURE OF LEFT ACETABULUM, UNSPECIFIED PORTION OF ACETABULUM, INITIAL ENCOUNTER (HCC): ICD-10-CM

## 2019-02-07 LAB
-: NORMAL
ABSOLUTE EOS #: 0.2 K/UL (ref 0–0.4)
ABSOLUTE IMMATURE GRANULOCYTE: ABNORMAL K/UL (ref 0–0.3)
ABSOLUTE LYMPH #: 1.2 K/UL (ref 1–4.8)
ABSOLUTE MONO #: 0.5 K/UL (ref 0.1–1.3)
ANION GAP SERPL CALCULATED.3IONS-SCNC: 12 MMOL/L (ref 9–17)
BASOPHILS # BLD: 1 % (ref 0–2)
BASOPHILS ABSOLUTE: 0.1 K/UL (ref 0–0.2)
BUN BLDV-MCNC: 16 MG/DL (ref 8–23)
BUN/CREAT BLD: ABNORMAL (ref 9–20)
CALCIUM SERPL-MCNC: 9.2 MG/DL (ref 8.6–10.4)
CHLORIDE BLD-SCNC: 109 MMOL/L (ref 98–107)
CO2: 24 MMOL/L (ref 20–31)
CREAT SERPL-MCNC: 0.92 MG/DL (ref 0.5–0.9)
DIFFERENTIAL TYPE: ABNORMAL
EOSINOPHILS RELATIVE PERCENT: 2 % (ref 0–4)
GFR AFRICAN AMERICAN: >60 ML/MIN
GFR NON-AFRICAN AMERICAN: >60 ML/MIN
GFR SERPL CREATININE-BSD FRML MDRD: ABNORMAL ML/MIN/{1.73_M2}
GFR SERPL CREATININE-BSD FRML MDRD: ABNORMAL ML/MIN/{1.73_M2}
GLUCOSE BLD-MCNC: 102 MG/DL (ref 70–99)
HCT VFR BLD CALC: 43.3 % (ref 36–46)
HEMOGLOBIN: 14 G/DL (ref 12–16)
IMMATURE GRANULOCYTES: ABNORMAL %
INR BLD: 1
LYMPHOCYTES # BLD: 16 % (ref 24–44)
MCH RBC QN AUTO: 30.3 PG (ref 26–34)
MCHC RBC AUTO-ENTMCNC: 32.4 G/DL (ref 31–37)
MCV RBC AUTO: 93.6 FL (ref 80–100)
MONOCYTES # BLD: 6 % (ref 1–7)
NRBC AUTOMATED: ABNORMAL PER 100 WBC
PARTIAL THROMBOPLASTIN TIME: 29.8 SEC (ref 24–36)
PDW BLD-RTO: 12.9 % (ref 11.5–14.9)
PLATELET # BLD: 252 K/UL (ref 150–450)
PLATELET ESTIMATE: ABNORMAL
PMV BLD AUTO: 8.2 FL (ref 6–12)
POTASSIUM SERPL-SCNC: 4 MMOL/L (ref 3.7–5.3)
PROTHROMBIN TIME: 13.6 SEC (ref 11.8–14.6)
RBC # BLD: 4.62 M/UL (ref 4–5.2)
RBC # BLD: ABNORMAL 10*6/UL
REASON FOR REJECTION: NORMAL
SEG NEUTROPHILS: 75 % (ref 36–66)
SEGMENTED NEUTROPHILS ABSOLUTE COUNT: 5.4 K/UL (ref 1.3–9.1)
SODIUM BLD-SCNC: 145 MMOL/L (ref 135–144)
WBC # BLD: 7.2 K/UL (ref 3.5–11)
WBC # BLD: ABNORMAL 10*3/UL
ZZ NTE CLEAN UP: ORDERED TEST: NORMAL
ZZ NTE WITH NAME CLEAN UP: SPECIMEN SOURCE: NORMAL

## 2019-02-07 PROCEDURE — 72125 CT NECK SPINE W/O DYE: CPT

## 2019-02-07 PROCEDURE — 72131 CT LUMBAR SPINE W/O DYE: CPT

## 2019-02-07 PROCEDURE — 72128 CT CHEST SPINE W/O DYE: CPT

## 2019-02-07 PROCEDURE — 85610 PROTHROMBIN TIME: CPT

## 2019-02-07 PROCEDURE — 6370000000 HC RX 637 (ALT 250 FOR IP): Performed by: INTERNAL MEDICINE

## 2019-02-07 PROCEDURE — 85730 THROMBOPLASTIN TIME PARTIAL: CPT

## 2019-02-07 PROCEDURE — 80048 BASIC METABOLIC PNL TOTAL CA: CPT

## 2019-02-07 PROCEDURE — 71250 CT THORAX DX C-: CPT

## 2019-02-07 PROCEDURE — 74176 CT ABD & PELVIS W/O CONTRAST: CPT

## 2019-02-07 PROCEDURE — 2580000003 HC RX 258: Performed by: SURGERY

## 2019-02-07 PROCEDURE — 36415 COLL VENOUS BLD VENIPUNCTURE: CPT

## 2019-02-07 PROCEDURE — 72220 X-RAY EXAM SACRUM TAILBONE: CPT

## 2019-02-07 PROCEDURE — 6360000002 HC RX W HCPCS: Performed by: SURGERY

## 2019-02-07 PROCEDURE — 99285 EMERGENCY DEPT VISIT HI MDM: CPT

## 2019-02-07 PROCEDURE — 70450 CT HEAD/BRAIN W/O DYE: CPT

## 2019-02-07 PROCEDURE — 85025 COMPLETE CBC W/AUTO DIFF WBC: CPT

## 2019-02-07 PROCEDURE — 6370000000 HC RX 637 (ALT 250 FOR IP): Performed by: PHYSICIAN ASSISTANT

## 2019-02-07 PROCEDURE — 1200000000 HC SEMI PRIVATE

## 2019-02-07 RX ORDER — BUSPIRONE HYDROCHLORIDE 5 MG/1
5 TABLET ORAL 2 TIMES DAILY
Status: DISCONTINUED | OUTPATIENT
Start: 2019-02-07 | End: 2019-02-10 | Stop reason: HOSPADM

## 2019-02-07 RX ORDER — TOPIRAMATE 25 MG/1
75 TABLET ORAL EVERY EVENING
Status: DISCONTINUED | OUTPATIENT
Start: 2019-02-07 | End: 2019-02-10 | Stop reason: HOSPADM

## 2019-02-07 RX ORDER — ALENDRONATE SODIUM 70 MG/1
70 TABLET ORAL
Status: DISCONTINUED | OUTPATIENT
Start: 2019-02-13 | End: 2019-02-07

## 2019-02-07 RX ORDER — LOPERAMIDE HYDROCHLORIDE 2 MG/1
2 CAPSULE ORAL 4 TIMES DAILY PRN
Status: DISCONTINUED | OUTPATIENT
Start: 2019-02-07 | End: 2019-02-10 | Stop reason: HOSPADM

## 2019-02-07 RX ORDER — METOCLOPRAMIDE 5 MG/1
5 TABLET ORAL 3 TIMES DAILY
Status: DISCONTINUED | OUTPATIENT
Start: 2019-02-07 | End: 2019-02-10 | Stop reason: HOSPADM

## 2019-02-07 RX ORDER — TRAMADOL HYDROCHLORIDE 50 MG/1
50 TABLET ORAL ONCE
Status: COMPLETED | OUTPATIENT
Start: 2019-02-07 | End: 2019-02-07

## 2019-02-07 RX ORDER — FERROUS SULFATE 325(65) MG
325 TABLET ORAL
Status: DISCONTINUED | OUTPATIENT
Start: 2019-02-08 | End: 2019-02-10 | Stop reason: HOSPADM

## 2019-02-07 RX ORDER — LAMOTRIGINE 100 MG/1
200 TABLET ORAL NIGHTLY
Status: DISCONTINUED | OUTPATIENT
Start: 2019-02-07 | End: 2019-02-10 | Stop reason: HOSPADM

## 2019-02-07 RX ORDER — CALCIUM CARBONATE 200(500)MG
1 TABLET,CHEWABLE ORAL 4 TIMES DAILY PRN
Status: DISCONTINUED | OUTPATIENT
Start: 2019-02-07 | End: 2019-02-10 | Stop reason: HOSPADM

## 2019-02-07 RX ORDER — TOPIRAMATE 25 MG/1
50 TABLET ORAL DAILY
Status: DISCONTINUED | OUTPATIENT
Start: 2019-02-08 | End: 2019-02-10 | Stop reason: HOSPADM

## 2019-02-07 RX ORDER — 0.9 % SODIUM CHLORIDE 0.9 %
1000 INTRAVENOUS SOLUTION INTRAVENOUS ONCE
Status: DISCONTINUED | OUTPATIENT
Start: 2019-02-07 | End: 2019-02-10 | Stop reason: HOSPADM

## 2019-02-07 RX ORDER — ACETAMINOPHEN 325 MG/1
650 TABLET ORAL EVERY 4 HOURS PRN
Status: DISCONTINUED | OUTPATIENT
Start: 2019-02-07 | End: 2019-02-10 | Stop reason: HOSPADM

## 2019-02-07 RX ORDER — AMLODIPINE BESYLATE 5 MG/1
5 TABLET ORAL DAILY
Status: DISCONTINUED | OUTPATIENT
Start: 2019-02-08 | End: 2019-02-10 | Stop reason: HOSPADM

## 2019-02-07 RX ORDER — ATORVASTATIN CALCIUM 10 MG/1
10 TABLET, FILM COATED ORAL DAILY
Status: DISCONTINUED | OUTPATIENT
Start: 2019-02-07 | End: 2019-02-10 | Stop reason: HOSPADM

## 2019-02-07 RX ORDER — M-VIT,TX,IRON,MINS/CALC/FOLIC 27MG-0.4MG
1 TABLET ORAL DAILY
Status: DISCONTINUED | OUTPATIENT
Start: 2019-02-08 | End: 2019-02-10 | Stop reason: HOSPADM

## 2019-02-07 RX ORDER — LAMOTRIGINE 100 MG/1
100 TABLET ORAL DAILY
Status: DISCONTINUED | OUTPATIENT
Start: 2019-02-08 | End: 2019-02-10 | Stop reason: HOSPADM

## 2019-02-07 RX ORDER — SODIUM CHLORIDE 0.9 % (FLUSH) 0.9 %
10 SYRINGE (ML) INJECTION EVERY 12 HOURS SCHEDULED
Status: DISCONTINUED | OUTPATIENT
Start: 2019-02-07 | End: 2019-02-08

## 2019-02-07 RX ORDER — SODIUM CHLORIDE 0.9 % (FLUSH) 0.9 %
10 SYRINGE (ML) INJECTION PRN
Status: DISCONTINUED | OUTPATIENT
Start: 2019-02-07 | End: 2019-02-10 | Stop reason: HOSPADM

## 2019-02-07 RX ORDER — QUETIAPINE FUMARATE 50 MG/1
50 TABLET, FILM COATED ORAL NIGHTLY
Status: DISCONTINUED | OUTPATIENT
Start: 2019-02-07 | End: 2019-02-10 | Stop reason: HOSPADM

## 2019-02-07 RX ADMIN — LAMOTRIGINE 200 MG: 100 TABLET ORAL at 20:15

## 2019-02-07 RX ADMIN — TOPIRAMATE 75 MG: 25 TABLET, FILM COATED ORAL at 20:14

## 2019-02-07 RX ADMIN — METOCLOPRAMIDE HYDROCHLORIDE 5 MG: 5 TABLET ORAL at 20:14

## 2019-02-07 RX ADMIN — QUETIAPINE FUMARATE 50 MG: 50 TABLET ORAL at 20:15

## 2019-02-07 RX ADMIN — ENOXAPARIN SODIUM 40 MG: 100 INJECTION SUBCUTANEOUS at 20:14

## 2019-02-07 RX ADMIN — Medication 10 ML: at 20:15

## 2019-02-07 RX ADMIN — ATORVASTATIN CALCIUM 10 MG: 10 TABLET, FILM COATED ORAL at 20:14

## 2019-02-07 RX ADMIN — TRAMADOL HYDROCHLORIDE 50 MG: 50 TABLET, FILM COATED ORAL at 11:31

## 2019-02-07 RX ADMIN — BUSPIRONE HYDROCHLORIDE 5 MG: 5 TABLET ORAL at 20:15

## 2019-02-07 ASSESSMENT — PAIN DESCRIPTION - LOCATION
LOCATION: BACK
LOCATION: PELVIS
LOCATION: HEAD;BUTTOCKS

## 2019-02-07 ASSESSMENT — PAIN SCALES - GENERAL
PAINLEVEL_OUTOF10: 10
PAINLEVEL_OUTOF10: 10
PAINLEVEL_OUTOF10: 1
PAINLEVEL_OUTOF10: 10

## 2019-02-07 ASSESSMENT — PAIN DESCRIPTION - PAIN TYPE
TYPE: ACUTE PAIN

## 2019-02-07 ASSESSMENT — PAIN DESCRIPTION - FREQUENCY: FREQUENCY: CONTINUOUS

## 2019-02-07 ASSESSMENT — PAIN DESCRIPTION - DESCRIPTORS: DESCRIPTORS: ACHING

## 2019-02-08 ENCOUNTER — APPOINTMENT (OUTPATIENT)
Dept: GENERAL RADIOLOGY | Age: 69
DRG: 535 | End: 2019-02-08
Payer: MEDICARE

## 2019-02-08 PROBLEM — S32.040A CLOSED COMPRESSION FRACTURE OF L4 LUMBAR VERTEBRA: Status: ACTIVE | Noted: 2019-02-08

## 2019-02-08 PROCEDURE — 73502 X-RAY EXAM HIP UNI 2-3 VIEWS: CPT

## 2019-02-08 PROCEDURE — 99223 1ST HOSP IP/OBS HIGH 75: CPT | Performed by: ORTHOPAEDIC SURGERY

## 2019-02-08 PROCEDURE — 6370000000 HC RX 637 (ALT 250 FOR IP): Performed by: SURGERY

## 2019-02-08 PROCEDURE — 6360000002 HC RX W HCPCS: Performed by: SURGERY

## 2019-02-08 PROCEDURE — 6370000000 HC RX 637 (ALT 250 FOR IP): Performed by: INTERNAL MEDICINE

## 2019-02-08 PROCEDURE — 2580000003 HC RX 258: Performed by: SURGERY

## 2019-02-08 PROCEDURE — 1200000000 HC SEMI PRIVATE

## 2019-02-08 PROCEDURE — 99222 1ST HOSP IP/OBS MODERATE 55: CPT | Performed by: INTERNAL MEDICINE

## 2019-02-08 RX ORDER — MORPHINE SULFATE 4 MG/ML
1 INJECTION, SOLUTION INTRAMUSCULAR; INTRAVENOUS
Status: DISCONTINUED | OUTPATIENT
Start: 2019-02-08 | End: 2019-02-10 | Stop reason: HOSPADM

## 2019-02-08 RX ORDER — FAMOTIDINE 20 MG/1
20 TABLET, FILM COATED ORAL 2 TIMES DAILY
Status: DISCONTINUED | OUTPATIENT
Start: 2019-02-08 | End: 2019-02-10 | Stop reason: HOSPADM

## 2019-02-08 RX ORDER — SODIUM CHLORIDE 9 MG/ML
INJECTION, SOLUTION INTRAVENOUS CONTINUOUS
Status: DISCONTINUED | OUTPATIENT
Start: 2019-02-08 | End: 2019-02-10 | Stop reason: HOSPADM

## 2019-02-08 RX ADMIN — LAMOTRIGINE 100 MG: 100 TABLET ORAL at 09:05

## 2019-02-08 RX ADMIN — ACETAMINOPHEN 650 MG: 325 TABLET, FILM COATED ORAL at 09:03

## 2019-02-08 RX ADMIN — VORTIOXETINE 10 MG: 10 TABLET, FILM COATED ORAL at 09:06

## 2019-02-08 RX ADMIN — METOCLOPRAMIDE HYDROCHLORIDE 5 MG: 5 TABLET ORAL at 16:48

## 2019-02-08 RX ADMIN — MULTIPLE VITAMINS W/ MINERALS TAB 1 TABLET: TAB at 09:03

## 2019-02-08 RX ADMIN — FERROUS SULFATE TAB 325 MG (65 MG ELEMENTAL FE) 325 MG: 325 (65 FE) TAB at 09:03

## 2019-02-08 RX ADMIN — AMLODIPINE BESYLATE 5 MG: 5 TABLET ORAL at 09:03

## 2019-02-08 RX ADMIN — TOPIRAMATE 75 MG: 25 TABLET, FILM COATED ORAL at 16:48

## 2019-02-08 RX ADMIN — BUSPIRONE HYDROCHLORIDE 5 MG: 5 TABLET ORAL at 20:32

## 2019-02-08 RX ADMIN — TOPIRAMATE 50 MG: 25 TABLET, FILM COATED ORAL at 09:03

## 2019-02-08 RX ADMIN — LAMOTRIGINE 200 MG: 100 TABLET ORAL at 20:32

## 2019-02-08 RX ADMIN — FAMOTIDINE 20 MG: 20 TABLET ORAL at 20:32

## 2019-02-08 RX ADMIN — ATORVASTATIN CALCIUM 10 MG: 10 TABLET, FILM COATED ORAL at 09:03

## 2019-02-08 RX ADMIN — FAMOTIDINE 20 MG: 20 TABLET ORAL at 12:21

## 2019-02-08 RX ADMIN — SODIUM CHLORIDE: 9 INJECTION, SOLUTION INTRAVENOUS at 12:17

## 2019-02-08 RX ADMIN — ENOXAPARIN SODIUM 40 MG: 100 INJECTION SUBCUTANEOUS at 09:04

## 2019-02-08 RX ADMIN — MORPHINE SULFATE 1 MG: 4 INJECTION INTRAVENOUS at 12:21

## 2019-02-08 RX ADMIN — BUSPIRONE HYDROCHLORIDE 5 MG: 5 TABLET ORAL at 09:04

## 2019-02-08 RX ADMIN — QUETIAPINE FUMARATE 50 MG: 50 TABLET ORAL at 20:32

## 2019-02-08 RX ADMIN — METOCLOPRAMIDE HYDROCHLORIDE 5 MG: 5 TABLET ORAL at 09:03

## 2019-02-08 RX ADMIN — Medication 10 ML: at 09:06

## 2019-02-08 RX ADMIN — METOCLOPRAMIDE HYDROCHLORIDE 5 MG: 5 TABLET ORAL at 20:32

## 2019-02-08 RX ADMIN — MORPHINE SULFATE 1 MG: 4 INJECTION INTRAVENOUS at 16:41

## 2019-02-08 ASSESSMENT — PAIN DESCRIPTION - LOCATION
LOCATION: HIP
LOCATION: HIP

## 2019-02-08 ASSESSMENT — PAIN DESCRIPTION - PAIN TYPE
TYPE: ACUTE PAIN
TYPE: ACUTE PAIN

## 2019-02-08 ASSESSMENT — PAIN DESCRIPTION - ORIENTATION
ORIENTATION: LEFT
ORIENTATION: LEFT

## 2019-02-08 ASSESSMENT — PAIN SCALES - GENERAL
PAINLEVEL_OUTOF10: 5
PAINLEVEL_OUTOF10: 4
PAINLEVEL_OUTOF10: 8
PAINLEVEL_OUTOF10: 9

## 2019-02-08 ASSESSMENT — ENCOUNTER SYMPTOMS: BACK PAIN: 1

## 2019-02-09 PROCEDURE — 6360000002 HC RX W HCPCS: Performed by: SURGERY

## 2019-02-09 PROCEDURE — 1200000000 HC SEMI PRIVATE

## 2019-02-09 PROCEDURE — 97162 PT EVAL MOD COMPLEX 30 MIN: CPT

## 2019-02-09 PROCEDURE — 6370000000 HC RX 637 (ALT 250 FOR IP): Performed by: SURGERY

## 2019-02-09 PROCEDURE — 97116 GAIT TRAINING THERAPY: CPT

## 2019-02-09 PROCEDURE — 6370000000 HC RX 637 (ALT 250 FOR IP): Performed by: ORTHOPAEDIC SURGERY

## 2019-02-09 PROCEDURE — 6370000000 HC RX 637 (ALT 250 FOR IP): Performed by: INTERNAL MEDICINE

## 2019-02-09 PROCEDURE — 99232 SBSQ HOSP IP/OBS MODERATE 35: CPT | Performed by: INTERNAL MEDICINE

## 2019-02-09 PROCEDURE — 99231 SBSQ HOSP IP/OBS SF/LOW 25: CPT | Performed by: ORTHOPAEDIC SURGERY

## 2019-02-09 RX ORDER — HYDROCODONE BITARTRATE AND ACETAMINOPHEN 5; 325 MG/1; MG/1
1 TABLET ORAL EVERY 4 HOURS PRN
Status: DISCONTINUED | OUTPATIENT
Start: 2019-02-09 | End: 2019-02-10 | Stop reason: HOSPADM

## 2019-02-09 RX ORDER — HYDROCODONE BITARTRATE AND ACETAMINOPHEN 5; 325 MG/1; MG/1
2 TABLET ORAL EVERY 4 HOURS PRN
Status: DISCONTINUED | OUTPATIENT
Start: 2019-02-09 | End: 2019-02-10 | Stop reason: HOSPADM

## 2019-02-09 RX ADMIN — TOPIRAMATE 50 MG: 25 TABLET, FILM COATED ORAL at 09:38

## 2019-02-09 RX ADMIN — LAMOTRIGINE 100 MG: 100 TABLET ORAL at 09:40

## 2019-02-09 RX ADMIN — BUSPIRONE HYDROCHLORIDE 5 MG: 5 TABLET ORAL at 09:40

## 2019-02-09 RX ADMIN — ENOXAPARIN SODIUM 40 MG: 100 INJECTION SUBCUTANEOUS at 09:38

## 2019-02-09 RX ADMIN — MULTIPLE VITAMINS W/ MINERALS TAB 1 TABLET: TAB at 09:39

## 2019-02-09 RX ADMIN — LAMOTRIGINE 200 MG: 100 TABLET ORAL at 20:49

## 2019-02-09 RX ADMIN — FERROUS SULFATE TAB 325 MG (65 MG ELEMENTAL FE) 325 MG: 325 (65 FE) TAB at 09:39

## 2019-02-09 RX ADMIN — FAMOTIDINE 20 MG: 20 TABLET ORAL at 20:53

## 2019-02-09 RX ADMIN — VORTIOXETINE 10 MG: 10 TABLET, FILM COATED ORAL at 09:40

## 2019-02-09 RX ADMIN — ATORVASTATIN CALCIUM 10 MG: 10 TABLET, FILM COATED ORAL at 09:39

## 2019-02-09 RX ADMIN — AMLODIPINE BESYLATE 5 MG: 5 TABLET ORAL at 09:39

## 2019-02-09 RX ADMIN — TOPIRAMATE 75 MG: 25 TABLET, FILM COATED ORAL at 20:53

## 2019-02-09 RX ADMIN — QUETIAPINE FUMARATE 50 MG: 50 TABLET ORAL at 20:49

## 2019-02-09 RX ADMIN — MORPHINE SULFATE 1 MG: 4 INJECTION INTRAVENOUS at 01:29

## 2019-02-09 RX ADMIN — METOCLOPRAMIDE HYDROCHLORIDE 5 MG: 5 TABLET ORAL at 16:22

## 2019-02-09 RX ADMIN — METOCLOPRAMIDE HYDROCHLORIDE 5 MG: 5 TABLET ORAL at 20:53

## 2019-02-09 RX ADMIN — BUSPIRONE HYDROCHLORIDE 5 MG: 5 TABLET ORAL at 20:49

## 2019-02-09 RX ADMIN — HYDROCODONE BITARTRATE AND ACETAMINOPHEN 1 TABLET: 5; 325 TABLET ORAL at 16:22

## 2019-02-09 RX ADMIN — METOCLOPRAMIDE HYDROCHLORIDE 5 MG: 5 TABLET ORAL at 09:39

## 2019-02-09 RX ADMIN — FAMOTIDINE 20 MG: 20 TABLET ORAL at 09:39

## 2019-02-09 ASSESSMENT — PAIN DESCRIPTION - ONSET
ONSET: ON-GOING

## 2019-02-09 ASSESSMENT — PAIN DESCRIPTION - ORIENTATION
ORIENTATION: LEFT

## 2019-02-09 ASSESSMENT — PAIN SCALES - GENERAL
PAINLEVEL_OUTOF10: 10
PAINLEVEL_OUTOF10: 5
PAINLEVEL_OUTOF10: 8
PAINLEVEL_OUTOF10: 6
PAINLEVEL_OUTOF10: 3
PAINLEVEL_OUTOF10: 4

## 2019-02-09 ASSESSMENT — PAIN DESCRIPTION - LOCATION
LOCATION: LEG
LOCATION: HIP
LOCATION: HIP
LOCATION: BACK;HEAD
LOCATION: LEG

## 2019-02-09 ASSESSMENT — PAIN DESCRIPTION - DESCRIPTORS
DESCRIPTORS: ACHING
DESCRIPTORS: ACHING

## 2019-02-09 ASSESSMENT — PAIN DESCRIPTION - FREQUENCY
FREQUENCY: CONTINUOUS

## 2019-02-09 ASSESSMENT — PAIN DESCRIPTION - PAIN TYPE
TYPE: ACUTE PAIN
TYPE: CHRONIC PAIN

## 2019-02-09 ASSESSMENT — PAIN DESCRIPTION - PROGRESSION: CLINICAL_PROGRESSION: NOT CHANGED

## 2019-02-10 VITALS
HEART RATE: 77 BPM | OXYGEN SATURATION: 99 % | HEIGHT: 61 IN | WEIGHT: 134 LBS | RESPIRATION RATE: 14 BRPM | SYSTOLIC BLOOD PRESSURE: 139 MMHG | DIASTOLIC BLOOD PRESSURE: 66 MMHG | TEMPERATURE: 98.7 F | BODY MASS INDEX: 25.3 KG/M2

## 2019-02-10 PROCEDURE — 97110 THERAPEUTIC EXERCISES: CPT

## 2019-02-10 PROCEDURE — 6360000002 HC RX W HCPCS: Performed by: SURGERY

## 2019-02-10 PROCEDURE — 6370000000 HC RX 637 (ALT 250 FOR IP): Performed by: SURGERY

## 2019-02-10 PROCEDURE — 6370000000 HC RX 637 (ALT 250 FOR IP): Performed by: INTERNAL MEDICINE

## 2019-02-10 PROCEDURE — 97116 GAIT TRAINING THERAPY: CPT

## 2019-02-10 PROCEDURE — 6370000000 HC RX 637 (ALT 250 FOR IP): Performed by: ORTHOPAEDIC SURGERY

## 2019-02-10 PROCEDURE — 99232 SBSQ HOSP IP/OBS MODERATE 35: CPT | Performed by: INTERNAL MEDICINE

## 2019-02-10 RX ORDER — HYDROCODONE BITARTRATE AND ACETAMINOPHEN 5; 325 MG/1; MG/1
1 TABLET ORAL EVERY 4 HOURS PRN
Refills: 0 | Status: CANCELLED | OUTPATIENT
Start: 2019-02-10 | End: 2019-02-13

## 2019-02-10 RX ORDER — HYDROCODONE BITARTRATE AND ACETAMINOPHEN 5; 325 MG/1; MG/1
1 TABLET ORAL EVERY 6 HOURS PRN
Qty: 28 TABLET | Refills: 0 | Status: SHIPPED | OUTPATIENT
Start: 2019-02-10 | End: 2019-02-17

## 2019-02-10 RX ADMIN — LAMOTRIGINE 100 MG: 100 TABLET ORAL at 08:23

## 2019-02-10 RX ADMIN — ATORVASTATIN CALCIUM 10 MG: 10 TABLET, FILM COATED ORAL at 08:23

## 2019-02-10 RX ADMIN — MULTIPLE VITAMINS W/ MINERALS TAB 1 TABLET: TAB at 08:23

## 2019-02-10 RX ADMIN — HYDROCODONE BITARTRATE AND ACETAMINOPHEN 1 TABLET: 5; 325 TABLET ORAL at 08:23

## 2019-02-10 RX ADMIN — FAMOTIDINE 20 MG: 20 TABLET ORAL at 08:23

## 2019-02-10 RX ADMIN — AMLODIPINE BESYLATE 5 MG: 5 TABLET ORAL at 08:23

## 2019-02-10 RX ADMIN — ENOXAPARIN SODIUM 40 MG: 100 INJECTION SUBCUTANEOUS at 08:23

## 2019-02-10 RX ADMIN — METOCLOPRAMIDE HYDROCHLORIDE 5 MG: 5 TABLET ORAL at 08:23

## 2019-02-10 RX ADMIN — FERROUS SULFATE TAB 325 MG (65 MG ELEMENTAL FE) 325 MG: 325 (65 FE) TAB at 08:23

## 2019-02-10 RX ADMIN — HYDROCODONE BITARTRATE AND ACETAMINOPHEN 1 TABLET: 5; 325 TABLET ORAL at 13:23

## 2019-02-10 RX ADMIN — TOPIRAMATE 50 MG: 25 TABLET, FILM COATED ORAL at 08:23

## 2019-02-10 RX ADMIN — BUSPIRONE HYDROCHLORIDE 5 MG: 5 TABLET ORAL at 08:23

## 2019-02-10 RX ADMIN — VORTIOXETINE 10 MG: 10 TABLET, FILM COATED ORAL at 08:23

## 2019-02-10 ASSESSMENT — PAIN DESCRIPTION - DESCRIPTORS: DESCRIPTORS: ACHING

## 2019-02-10 ASSESSMENT — PAIN SCALES - GENERAL
PAINLEVEL_OUTOF10: 8
PAINLEVEL_OUTOF10: 8
PAINLEVEL_OUTOF10: 7
PAINLEVEL_OUTOF10: 0

## 2019-02-10 ASSESSMENT — PAIN DESCRIPTION - PAIN TYPE: TYPE: ACUTE PAIN

## 2019-02-10 ASSESSMENT — PAIN DESCRIPTION - PROGRESSION: CLINICAL_PROGRESSION: GRADUALLY IMPROVING

## 2019-02-10 ASSESSMENT — PAIN DESCRIPTION - ORIENTATION: ORIENTATION: LEFT

## 2019-02-10 ASSESSMENT — PAIN DESCRIPTION - FREQUENCY: FREQUENCY: CONTINUOUS

## 2019-02-10 ASSESSMENT — PAIN DESCRIPTION - LOCATION: LOCATION: HIP;GROIN

## 2019-02-10 ASSESSMENT — PAIN DESCRIPTION - ONSET: ONSET: ON-GOING

## 2019-03-18 ENCOUNTER — APPOINTMENT (OUTPATIENT)
Dept: CT IMAGING | Age: 69
End: 2019-03-18
Payer: MEDICARE

## 2019-03-18 ENCOUNTER — HOSPITAL ENCOUNTER (EMERGENCY)
Age: 69
Discharge: HOME | End: 2019-03-18
Attending: EMERGENCY MEDICINE
Payer: MEDICARE

## 2019-03-18 ENCOUNTER — APPOINTMENT (OUTPATIENT)
Dept: GENERAL RADIOLOGY | Age: 69
End: 2019-03-18
Payer: MEDICARE

## 2019-03-18 VITALS
RESPIRATION RATE: 14 BRPM | DIASTOLIC BLOOD PRESSURE: 59 MMHG | HEIGHT: 61 IN | HEART RATE: 79 BPM | TEMPERATURE: 97.5 F | OXYGEN SATURATION: 96 % | BODY MASS INDEX: 25.49 KG/M2 | SYSTOLIC BLOOD PRESSURE: 127 MMHG | WEIGHT: 135 LBS

## 2019-03-18 DIAGNOSIS — M25.511 ACUTE PAIN OF RIGHT SHOULDER: ICD-10-CM

## 2019-03-18 DIAGNOSIS — M79.601 ARM PAIN, RIGHT: ICD-10-CM
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
## 2019-03-18 DIAGNOSIS — S09.90XA CLOSED HEAD INJURY, INITIAL ENCOUNTER: ICD-10-CM

## 2019-03-18 DIAGNOSIS — M54.2 NECK PAIN: ICD-10-CM

## 2019-03-18 PROCEDURE — 73060 X-RAY EXAM OF HUMERUS: CPT

## 2019-03-18 PROCEDURE — 72100 X-RAY EXAM L-S SPINE 2/3 VWS: CPT

## 2019-03-18 PROCEDURE — 70486 CT MAXILLOFACIAL W/O DYE: CPT

## 2019-03-18 PROCEDURE — 99284 EMERGENCY DEPT VISIT MOD MDM: CPT

## 2019-03-18 PROCEDURE — 70450 CT HEAD/BRAIN W/O DYE: CPT

## 2019-03-18 PROCEDURE — 72072 X-RAY EXAM THORAC SPINE 3VWS: CPT

## 2019-03-18 PROCEDURE — 73030 X-RAY EXAM OF SHOULDER: CPT

## 2019-03-18 PROCEDURE — 73502 X-RAY EXAM HIP UNI 2-3 VIEWS: CPT

## 2019-03-18 PROCEDURE — 72125 CT NECK SPINE W/O DYE: CPT

## 2019-03-18 PROCEDURE — 6370000000 HC RX 637 (ALT 250 FOR IP): Performed by: PHYSICIAN ASSISTANT

## 2019-03-18 RX ORDER — TRAMADOL HYDROCHLORIDE 50 MG/1
50 TABLET ORAL ONCE
Status: COMPLETED | OUTPATIENT
Start: 2019-03-18 | End: 2019-03-18

## 2019-03-18 RX ADMIN — TRAMADOL HYDROCHLORIDE 50 MG: 50 TABLET, FILM COATED ORAL at 12:49

## 2019-03-18 ASSESSMENT — PAIN DESCRIPTION - FREQUENCY: FREQUENCY: CONTINUOUS

## 2019-03-18 ASSESSMENT — PAIN SCALES - GENERAL: PAINLEVEL_OUTOF10: 10

## 2019-04-03 ENCOUNTER — HOSPITAL ENCOUNTER (OUTPATIENT)
Age: 69
Setting detail: SPECIMEN
Discharge: HOME | End: 2019-04-03
Payer: MEDICARE

## 2019-04-03 LAB
-: ABNORMAL
AMORPHOUS: ABNORMAL
BACTERIA: ABNORMAL
BILIRUBIN URINE: NEGATIVE
CASTS UA: ABNORMAL /LPF
COLOR: YELLOW
COMMENT UA: ABNORMAL
CRYSTALS, UA: ABNORMAL /HPF
EPITHELIAL CELLS UA: ABNORMAL /HPF
GLUCOSE URINE: NEGATIVE
KETONES, URINE: NEGATIVE
LEUKOCYTE ESTERASE, URINE: ABNORMAL
MUCUS: ABNORMAL
NITRITE, URINE: NEGATIVE
OTHER OBSERVATIONS UA: ABNORMAL
PH UA: 6.5 (ref 5–8)
PROTEIN UA: NEGATIVE
RBC UA: ABNORMAL /HPF
RENAL EPITHELIAL, UA: ABNORMAL /HPF
SPECIFIC GRAVITY UA: 1.01 (ref 1–1.03)
TRICHOMONAS: ABNORMAL
TURBIDITY: ABNORMAL
URINE HGB: NEGATIVE
UROBILINOGEN, URINE: NORMAL
WBC UA: ABNORMAL /HPF
YEAST: ABNORMAL

## 2019-04-03 PROCEDURE — 87077 CULTURE AEROBIC IDENTIFY: CPT

## 2019-04-03 PROCEDURE — 87186 SC STD MICRODIL/AGAR DIL: CPT

## 2019-04-03 PROCEDURE — 87086 URINE CULTURE/COLONY COUNT: CPT

## 2019-04-03 PROCEDURE — 81001 URINALYSIS AUTO W/SCOPE: CPT

## 2019-04-05 LAB
CULTURE: ABNORMAL
Lab: ABNORMAL
SPECIMEN DESCRIPTION: ABNORMAL

## 2019-06-12 ENCOUNTER — APPOINTMENT (OUTPATIENT)
Dept: GENERAL RADIOLOGY | Age: 69
End: 2019-06-12
Payer: MEDICARE

## 2019-06-12 ENCOUNTER — HOSPITAL ENCOUNTER (EMERGENCY)
Age: 69
Discharge: HOME | End: 2019-06-12
Attending: EMERGENCY MEDICINE
Payer: MEDICARE

## 2019-06-12 ENCOUNTER — APPOINTMENT (OUTPATIENT)
Dept: CT IMAGING | Age: 69
End: 2019-06-12
Payer: MEDICARE

## 2019-06-12 VITALS
RESPIRATION RATE: 16 BRPM | HEART RATE: 73 BPM | BODY MASS INDEX: 27.38 KG/M2 | TEMPERATURE: 98 F | HEIGHT: 61 IN | WEIGHT: 145 LBS | OXYGEN SATURATION: 97 % | SYSTOLIC BLOOD PRESSURE: 122 MMHG | DIASTOLIC BLOOD PRESSURE: 60 MMHG

## 2019-06-12 DIAGNOSIS — M25.552 LEFT HIP PAIN: ICD-10-CM

## 2019-06-12 DIAGNOSIS — S01.01XA LACERATION OF SCALP, INITIAL ENCOUNTER: Primary | ICD-10-CM

## 2019-06-12 PROCEDURE — 2500000003 HC RX 250 WO HCPCS: Performed by: EMERGENCY MEDICINE

## 2019-06-12 PROCEDURE — 6370000000 HC RX 637 (ALT 250 FOR IP): Performed by: EMERGENCY MEDICINE

## 2019-06-12 PROCEDURE — 72125 CT NECK SPINE W/O DYE: CPT

## 2019-06-12 PROCEDURE — 70450 CT HEAD/BRAIN W/O DYE: CPT

## 2019-06-12 PROCEDURE — 73502 X-RAY EXAM HIP UNI 2-3 VIEWS: CPT

## 2019-06-12 PROCEDURE — 99284 EMERGENCY DEPT VISIT MOD MDM: CPT

## 2019-06-12 PROCEDURE — 12001 RPR S/N/AX/GEN/TRNK 2.5CM/<: CPT

## 2019-06-12 RX ORDER — LIDOCAINE HYDROCHLORIDE AND EPINEPHRINE 10; 10 MG/ML; UG/ML
20 INJECTION, SOLUTION INFILTRATION; PERINEURAL ONCE
Status: COMPLETED | OUTPATIENT
Start: 2019-06-12 | End: 2019-06-12

## 2019-06-12 RX ORDER — GINSENG 100 MG
CAPSULE ORAL ONCE
Status: COMPLETED | OUTPATIENT
Start: 2019-06-12 | End: 2019-06-12

## 2019-06-12 RX ADMIN — LIDOCAINE HYDROCHLORIDE,EPINEPHRINE BITARTRATE 20 ML: 10; .01 INJECTION, SOLUTION INFILTRATION; PERINEURAL at 20:00

## 2019-06-12 RX ADMIN — BACITRACIN 1 EACH: 500 OINTMENT TOPICAL at 21:31

## 2019-06-12 ASSESSMENT — ENCOUNTER SYMPTOMS
CONSTIPATION: 0
ABDOMINAL PAIN: 0
NAUSEA: 0
COLOR CHANGE: 0
DIARRHEA: 0
COUGH: 0
SHORTNESS OF BREATH: 0
BLOOD IN STOOL: 0
SORE THROAT: 0
VOMITING: 0
BACK PAIN: 0
TROUBLE SWALLOWING: 0

## 2019-06-12 ASSESSMENT — PAIN SCALES - GENERAL
PAINLEVEL_OUTOF10: 10
PAINLEVEL_OUTOF10: 10

## 2019-06-12 ASSESSMENT — PAIN DESCRIPTION - LOCATION: LOCATION: HEAD;HIP

## 2019-06-12 ASSESSMENT — PAIN DESCRIPTION - ORIENTATION: ORIENTATION: LEFT

## 2019-06-12 ASSESSMENT — PAIN DESCRIPTION - FREQUENCY: FREQUENCY: CONTINUOUS

## 2019-06-12 NOTE — ED NOTES
Bed: 01  Expected date: 6/12/19  Expected time: 7:39 PM  Means of arrival: [facility]  Comments:  Fall head injury headache     Val Douglass [facility]  06/12/19 1942

## 2019-06-13 NOTE — ED PROVIDER NOTES
[address] ED  eMERGENCY dEPARTMENT eNCOUnter    Pt Name: Radha Zheng  MRN: [id number]  YOB: 1950  Date of evaluation:6/12/19  PCP: Francisca Gentile MD    CHIEF COMPLAINT       Chief Complaint   Patient presents with    Head Injury     fell backwards and hit head    Hip Pain     (L)        HISTORY OF Paul Vázquez is a 70 y.o. female who presents after mechanical fall. Patient had a witnessed fall today where she fell backwards and hit her head on either chair or the ground. There is no loss of consciousness. She does have frequent falls. She does have developmental delay. There is no loss of conscious. She did have some bleeding from a small wound on the back of her head. She complains of some mild pain in that area. Nothing makes her symptoms better or worse. Also complaining of left hip pain that does not radiate. No numbness or tingling. No neck or back pain. No chest pain, abdominal pain, nausea or vomiting. Symptoms are acute. Symptoms are moderate. Nothing made symptoms better or worse. Patient has no other complaints at this time. REVIEW OF SYSTEMS       Review of Systems   Constitutional: Negative for chills, fatigue and fever. HENT: Negative for congestion, ear pain, sore throat and trouble swallowing. Eyes: Negative for visual disturbance. Respiratory: Negative for cough and shortness of breath. Cardiovascular: Negative for chest pain, palpitations and leg swelling. Gastrointestinal: Negative for abdominal pain, blood in stool, constipation, diarrhea, nausea and vomiting. Genitourinary: Negative for dysuria and flank pain. Musculoskeletal: Positive for arthralgias. Negative for back pain, myalgias and neck pain. Skin: Positive for wound. Negative for color change and rash. Neurological: Positive for headaches. Negative for dizziness, weakness, light-headedness and numbness.    Psychiatric/Behavioral: Negative for tablet by mouth 4 times daily as needed for Heartburn      acetaminophen (TYLENOL) 325 MG tablet Take 650 mg by mouth every 6 hours as needed for Pain      alendronate (FOSAMAX) 70 MG tablet Take 70 mg by mouth every 7 days Every Wednesday      atorvastatin (LIPITOR) 10 MG tablet Take 10 mg by mouth daily      loperamide (IMODIUM) 2 MG capsule Take 2 mg by mouth 4 times daily as needed for Diarrhea      VORTIoxetine (TRINTELLIX) 5 MG tablet Take 10 mg by mouth daily      amLODIPine (NORVASC) 10 MG tablet Take 5 mg by mouth daily       Multiple Vitamins-Minerals (THERAPEUTIC MULTIVITAMIN-MINERALS) tablet Take 1 tablet by mouth daily. ferrous sulfate 325 (65 FE) MG EC tablet Take 325 mg by mouth daily (with breakfast). !! lamoTRIgine (LAMICTAL) 100 MG tablet Take 100 mg by mouth daily       aspirin 81 MG tablet Take 81 mg by mouth daily. QUEtiapine Fumarate (SEROQUEL PO) Take 50 mg by mouth nightly       Metoclopramide HCl (REGLAN PO) Take 5 mg by mouth 3 times daily        ! ! - Potential duplicate medications found. Please discuss with provider. ALLERGIES     is allergic to levaquin [levofloxacin in d5w]; adhesive tape; chocolate; indomethacin; and neurontin [gabapentin]. FAMILY HISTORY     indicated that her mother is . She indicated that her father is . family history is not on file. SOCIAL HISTORY      reports that she has never smoked. She has never used smokeless tobacco. She reports that she does not drink alcohol or use drugs. PHYSICAL EXAM     INITIAL VITALS:  height is 5' 1\" (1.549 m) and weight is 145 lb (65.8 kg). Her oral temperature is 98 °F (36.7 °C). Her blood pressure is 129/69 and her pulse is 79. Her respiration is 16 and oxygen saturation is 95%. Physical Exam   Constitutional: She is oriented to person, place, and time. No distress. HENT:   Head: Normocephalic and atraumatic. Eyes: Pupils are equal, round, and reactive to light. Conjunctivae are normal.   Neck: Neck supple. Cardiovascular: Normal rate, regular rhythm, normal heart sounds and intact distal pulses. No murmur heard. Pulmonary/Chest: Effort normal and breath sounds normal. No respiratory distress. Abdominal: Soft. Bowel sounds are normal. She exhibits no distension. There is no tenderness. Musculoskeletal: She exhibits no edema. Left hip: She exhibits bony tenderness. She exhibits no deformity. Cervical back: She exhibits tenderness. Lymphadenopathy:     She has no cervical adenopathy. Neurological: She is alert and oriented to person, place, and time. Skin: Skin is warm and dry. No rash noted. Psychiatric: Judgment normal.   Nursing note and vitals reviewed. DIFFERENTIAL DIAGNOSIS/MDM:   69-year-old female presents after witnessed mechanical fall. She is afebrile, nontoxic, normal vital signs. Not in any acute distress. She was brought in without cervical collar by EMS. She does have some tenderness throughout her posterior neck however I do not appreciate any specific midline cervical spine tenderness. She has no neurologic deficits on exam.  She is alert and oriented. She is at her baseline per caretaker. She has some left hip tenderness as well without any obvious deformity. Will get head CT, cervical spine CT, x-ray of pelvis. Will update tetanus, close laceration. DIAGNOSTIC RESULTS     EKG: All EKG's are interpreted by the Emergency Department Physician who either signs or Co-signs this chart in the absence of a cardiologist.        RADIOLOGY:   I directly visualized the following  images and reviewed the radiologist interpretations:  XR HIP 2-3 VW W PELVIS LEFT   Final Result   No acute fracture. Bilateral femoral screws unchanged. Healing fracture   left inferior pubic ramus unchanged. Possible loose catheter fragment in the   pelvis unchanged. New right lower quadrant calcification.          CT Cervical Spine WO Contrast   Final Result   No acute intracranial abnormality. No acute abnormality of the cervical spine. CT Head WO Contrast   Final Result   No acute intracranial abnormality. No acute abnormality of the cervical spine. ED BEDSIDE ULTRASOUND:      LABS:  Labs Reviewed - No data to display      EMERGENCY DEPARTMENT COURSE:   Vitals:    Vitals:    06/12/19 1944   BP: 129/69   Pulse: 79   Resp: 16   Temp: 98 °F (36.7 °C)   TempSrc: Oral   SpO2: 95%   Weight: 145 lb (65.8 kg)   Height: 5' 1\" (1.549 m)     9:12 PM  CT head and cervical spine are unremarkable. Hydrocephalus is unchanged. X-ray of pelvis shows old healing fracture seen on prior x-rays but no acute fracture or dislocation. Pain is well controlled. Patient feels comfortable going home. We will ambulate her, send back to group home. Advised to return in 5 to 7 days for wound recheck and staple removal.  Advised to return sooner if any symptoms worsen. Patient and caretaker agreeable to plan she will be discharged home today. CRITICALCARE:      CONSULTS:  None      PROCEDURES:  Lac Repair  Date/Time: 6/12/2019 8:16 PM  Performed by: Fay Antonio DO  Authorized by: Fay Antonio DO     Consent:     Consent obtained:  Verbal    Consent given by:  Patient    Risks discussed:  Infection, pain, retained foreign body, need for additional repair and poor cosmetic result    Alternatives discussed:  No treatment and delayed treatment  Anesthesia (see MAR for exact dosages):      Anesthesia method:  Local infiltration    Local anesthetic:  Lidocaine 1% w/o epi  Laceration details:     Location:  Scalp    Scalp location:  Occipital    Length (cm):  2    Depth (mm):  1  Repair type:     Repair type:  Simple  Pre-procedure details:     Preparation:  Patient was prepped and draped in usual sterile fashion  Exploration:     Hemostasis achieved with:  Direct pressure    Wound exploration: entire depth of wound probed and

## 2019-06-17 ENCOUNTER — HOSPITAL ENCOUNTER (OUTPATIENT)
Age: 69
Setting detail: SPECIMEN
Discharge: HOME | End: 2019-06-17
Payer: MEDICARE

## 2019-06-17 LAB
-: ABNORMAL
AMORPHOUS: ABNORMAL
BACTERIA: ABNORMAL
BILIRUBIN URINE: NEGATIVE
CASTS UA: ABNORMAL /LPF
COLOR: YELLOW
COMMENT UA: ABNORMAL
CRYSTALS, UA: ABNORMAL /HPF
EPITHELIAL CELLS UA: ABNORMAL /HPF
GLUCOSE URINE: NEGATIVE
KETONES, URINE: NEGATIVE
LEUKOCYTE ESTERASE, URINE: ABNORMAL
MUCUS: ABNORMAL
NITRITE, URINE: POSITIVE
OTHER OBSERVATIONS UA: ABNORMAL
PH UA: 6 (ref 5–8)
PROTEIN UA: NEGATIVE
RBC UA: ABNORMAL /HPF
RENAL EPITHELIAL, UA: ABNORMAL /HPF
SPECIFIC GRAVITY UA: 1.01 (ref 1–1.03)
TRICHOMONAS: ABNORMAL
TURBIDITY: ABNORMAL
URINE HGB: NEGATIVE
UROBILINOGEN, URINE: NORMAL
WBC UA: ABNORMAL /HPF
YEAST: ABNORMAL

## 2019-06-17 PROCEDURE — 87086 URINE CULTURE/COLONY COUNT: CPT

## 2019-06-17 PROCEDURE — 81001 URINALYSIS AUTO W/SCOPE: CPT

## 2019-06-17 PROCEDURE — 87077 CULTURE AEROBIC IDENTIFY: CPT

## 2019-06-17 PROCEDURE — 87186 SC STD MICRODIL/AGAR DIL: CPT

## 2019-06-19 LAB
CULTURE: ABNORMAL
Lab: ABNORMAL
SPECIMEN DESCRIPTION: ABNORMAL

## 2019-06-21 ENCOUNTER — HOSPITAL ENCOUNTER (EMERGENCY)
Age: 69
Discharge: HOME | End: 2019-06-21
Attending: EMERGENCY MEDICINE
Payer: MEDICARE

## 2019-06-21 VITALS
TEMPERATURE: 98.4 F | BODY MASS INDEX: 25.68 KG/M2 | SYSTOLIC BLOOD PRESSURE: 152 MMHG | WEIGHT: 136 LBS | HEIGHT: 61 IN | OXYGEN SATURATION: 95 % | DIASTOLIC BLOOD PRESSURE: 74 MMHG | RESPIRATION RATE: 15 BRPM | HEART RATE: 95 BPM

## 2019-06-21 DIAGNOSIS — Z48.02 ENCOUNTER FOR STAPLE REMOVAL: Primary | ICD-10-CM

## 2019-06-21 PROCEDURE — 99281 EMR DPT VST MAYX REQ PHY/QHP: CPT

## 2019-06-21 ASSESSMENT — ENCOUNTER SYMPTOMS
VOMITING: 0
SHORTNESS OF BREATH: 0
NAUSEA: 0
TROUBLE SWALLOWING: 0
COUGH: 0

## 2019-06-21 NOTE — ED PROVIDER NOTES
[address]  eMERGENCY dEPARTMENT eNCOUnter      Pt Name: Jesica Lyman  MRN: [id number]  [place] 1950  Date of evaluation: 6/21/2019  Provider: Della Najera, APRN - [address]       Chief Complaint   Patient presents with    Suture / Staple Removal         HISTORY OF PRESENT ILLNESS  (Location/Symptom, Timing/Onset, Context/Setting, Quality, Duration, ModifyingFactors, Severity.)   Jesica Lyman is a 70 y.o. female who presents to the emergency department for suture or staple removal:    Location/Symptom:    Staple or suture removal evaluation  Context/Setting: Patient to ED for suture/ staples removal. Sutures/ staples were placed 8 days ago. Patient had laceration to scalp. Wound is healing well and patient denies any pain, redness, swelling or drainage. Denies any fever or chills. Denies any other complaints. Quality:   No pain  Duration:   resolved  Modifying Factors:   none  Severity:none    NursingNotes were reviewed and I agree. REVIEW OF SYSTEMS    (2-9 systems for level 4, 10 or more for level 5)     Review of Systems   Constitutional: Negative for chills and fever. HENT: Negative for trouble swallowing. Respiratory: Negative for cough and shortness of breath. Cardiovascular: Negative for chest pain and palpitations. Gastrointestinal: Negative for nausea and vomiting. Skin: Positive for wound (here for staples removal). Except as noted above the remainder of the review of systems was reviewed and negative. PASTMEDICAL HISTORY         Diagnosis Date    Acid reflux     Bipolar affective (HCC)     Chronic headaches     Hydrocephalus      Reviewed.   SURGICAL HISTORY           Procedure Laterality Date    CHOLECYSTECTOMY      HIP FRACTURE SURGERY Left 06/09/2017    PINNING AND SCREW    HIP PINNING Left 6/9/2017    HIP PINNING 7.3 CANNULATED SCREW WITH SYNTHES PRODUCT APPLICATION AND INTRAOPERATIVE C-ARM performed by Lenora Jennings MD at

## 2019-07-02 NOTE — ED PROVIDER NOTES
eMERGENCY dEPARTMENT eNCOUnter   Independent Attestation     Pt Name: Daly Gaitan  MRN: [id number]  [place] [birth year]  Date of evaluation: 7/1/19     Daly Gaitan is a 70 y.o. female with CC: Suture / Staple Removal      Based on the medical record the care appears appropriate. I was personally available for consultation in the Emergency Department.     Amy Khan MD  Attending Emergency Physician                    Amy Khan MD  07/01/19 7738

## 2019-08-01 ENCOUNTER — HOSPITAL ENCOUNTER (EMERGENCY)
Age: 69
Discharge: HOME | End: 2019-08-01
Attending: EMERGENCY MEDICINE
Payer: MEDICARE

## 2019-08-01 ENCOUNTER — APPOINTMENT (OUTPATIENT)
Dept: CT IMAGING | Age: 69
End: 2019-08-01
Payer: MEDICARE

## 2019-08-01 VITALS
OXYGEN SATURATION: 97 % | DIASTOLIC BLOOD PRESSURE: 65 MMHG | SYSTOLIC BLOOD PRESSURE: 128 MMHG | WEIGHT: 135 LBS | HEART RATE: 88 BPM | RESPIRATION RATE: 17 BRPM | BODY MASS INDEX: 24.84 KG/M2 | HEIGHT: 62 IN | TEMPERATURE: 98.2 F

## 2019-08-01 DIAGNOSIS — S01.01XA LACERATION OF SCALP, INITIAL ENCOUNTER: ICD-10-CM
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
## 2019-08-01 DIAGNOSIS — S16.1XXA ACUTE STRAIN OF NECK MUSCLE, INITIAL ENCOUNTER: ICD-10-CM

## 2019-08-01 DIAGNOSIS — S09.90XA INJURY OF HEAD, INITIAL ENCOUNTER: Primary | ICD-10-CM

## 2019-08-01 PROCEDURE — 70450 CT HEAD/BRAIN W/O DYE: CPT

## 2019-08-01 PROCEDURE — 12002 RPR S/N/AX/GEN/TRNK2.6-7.5CM: CPT

## 2019-08-01 PROCEDURE — 99283 EMERGENCY DEPT VISIT LOW MDM: CPT

## 2019-08-01 PROCEDURE — 72125 CT NECK SPINE W/O DYE: CPT

## 2019-08-01 ASSESSMENT — PAIN SCALES - GENERAL: PAINLEVEL_OUTOF10: 7

## 2019-08-01 NOTE — ED PROVIDER NOTES
[address] [address]  [phone]    If symptoms worsen      DISCHARGE MEDICATIONS:  Discharge Medication List as of 8/1/2019 12:36 PM          (Please note that portions of this note were completed with a voice recognition program.  Efforts were made to edit the dictations but occasionally words are mis-transcribed.)        Johnna Tejada PA-C  08/01/19 5009

## 2019-08-09 ENCOUNTER — HOSPITAL ENCOUNTER (EMERGENCY)
Age: 69
Discharge: HOME | End: 2019-08-09
Attending: EMERGENCY MEDICINE
Payer: MEDICARE

## 2019-08-09 VITALS
SYSTOLIC BLOOD PRESSURE: 132 MMHG | TEMPERATURE: 97.5 F | DIASTOLIC BLOOD PRESSURE: 69 MMHG | OXYGEN SATURATION: 97 % | HEIGHT: 61 IN | HEART RATE: 80 BPM | WEIGHT: 140 LBS | BODY MASS INDEX: 26.43 KG/M2 | RESPIRATION RATE: 18 BRPM

## 2019-08-09 DIAGNOSIS — Z48.02 ENCOUNTER FOR STAPLE REMOVAL: Primary | ICD-10-CM

## 2019-08-09 PROCEDURE — 99281 EMR DPT VST MAYX REQ PHY/QHP: CPT

## 2019-08-09 ASSESSMENT — PAIN DESCRIPTION - LOCATION: LOCATION: NECK;HEAD;FACE

## 2019-08-09 ASSESSMENT — PAIN DESCRIPTION - DESCRIPTORS: DESCRIPTORS: ACHING;CONSTANT

## 2019-08-09 ASSESSMENT — PAIN DESCRIPTION - ONSET: ONSET: ON-GOING

## 2019-08-09 ASSESSMENT — PAIN DESCRIPTION - FREQUENCY: FREQUENCY: CONTINUOUS

## 2019-08-09 ASSESSMENT — PAIN SCALES - GENERAL: PAINLEVEL_OUTOF10: 10

## 2019-08-09 ASSESSMENT — PAIN DESCRIPTION - PAIN TYPE: TYPE: CHRONIC PAIN

## 2019-08-09 NOTE — ED PROVIDER NOTES
VITALS: /69   Pulse 80   Temp 97.5 °F (36.4 °C) (Oral)   Resp 18   Ht 5' 1\" (1.549 m)   Wt 140 lb (63.5 kg)   SpO2 97%   BMI 26.45 kg/m²     Constitutional:  Well developed   Eyes:  Pupils equal and readily reactive to light  HENT:  Atraumatic, external ears normal, nose normal, oropharynx moist. Neck- supple   Respiratory:  Clear to auscultation bilaterally with good air exchange, no W/R/R  Cardiovascular:  RRR with normal S1 and S2  Gastrointestinal/Abdomen:  Soft, NT. BS present. No pulsatile masses palpated. Musculoskeletal:  No edema, no tenderness, no deformities. Back:  No CVA tenderness. Normal to inspection. Integument:   3 staples in place over occipital aspect of scalp. Scabbing noted. Wound is well healed. No fluctuance, induration, erythema, swelling. Neurologic:  Alert & oriented x 3, no focal deficits noted       DIAGNOSTIC RESULTS     EKG: All EKG's are interpreted by the Emergency Department Physician who either signs or Co-signs this chart in the absence of a cardiologist.  Not indicated    RADIOLOGY:   Reviewed the radiologist:  No orders to display           LABS:  Labs Reviewed - No data to display      EMERGENCY DEPARTMENT COURSE:   -------------------------    No orders of the defined types were placed in this encounter. CONSULTS:  None      Suture/ Staple Removal Procedure Note  Indication: Wound healed    Procedure: The patient was placed in the appropriate position and the staples were removed without difficulty. Other items: Staple count: 3    The patient tolerated the procedure well. Complications: None      FINAL IMPRESSION      1.  Encounter for staple removal          DISPOSITION/PLAN:  DISPOSITION Decision To Discharge 08/09/2019 01:06:04 PM        PATIENT REFERRED TO:  Jennifer Jha MD  [address] [place] [address] [phone]    Call       [facility] ED  [address]  [phone]    If symptoms worsen      DISCHARGE MEDICATIONS:  New Prescriptions    No medications on file       (Please note that portions of this note were completed with a voice recognition program.  Efforts were made to edit the dictations but occasionally words are mis-transcribed.)    Annabel Flores 124, PA-C  08/09/19 4698

## 2020-04-29 ENCOUNTER — HOSPITAL ENCOUNTER (OUTPATIENT)
Age: 70
Setting detail: SPECIMEN
Discharge: HOME | End: 2020-04-29
Payer: MEDICARE

## 2020-04-29 LAB
-: ABNORMAL
AMORPHOUS: ABNORMAL
BACTERIA: ABNORMAL
BILIRUBIN URINE: NEGATIVE
CASTS UA: ABNORMAL /LPF
COLOR: YELLOW
COMMENT UA: ABNORMAL
CRYSTALS, UA: ABNORMAL /HPF
EPITHELIAL CELLS UA: ABNORMAL /HPF
GLUCOSE URINE: NEGATIVE
KETONES, URINE: NEGATIVE
LEUKOCYTE ESTERASE, URINE: ABNORMAL
MUCUS: ABNORMAL
NITRITE, URINE: NEGATIVE
OTHER OBSERVATIONS UA: ABNORMAL
PH UA: 7 (ref 5–8)
PROTEIN UA: NEGATIVE
RBC UA: ABNORMAL /HPF
RENAL EPITHELIAL, UA: ABNORMAL /HPF
SPECIFIC GRAVITY UA: 1.01 (ref 1–1.03)
TRICHOMONAS: ABNORMAL
TURBIDITY: ABNORMAL
URINE HGB: NEGATIVE
UROBILINOGEN, URINE: NORMAL
WBC UA: ABNORMAL /HPF
YEAST: ABNORMAL

## 2020-04-29 PROCEDURE — 81001 URINALYSIS AUTO W/SCOPE: CPT

## 2020-04-29 PROCEDURE — 87086 URINE CULTURE/COLONY COUNT: CPT

## 2020-04-29 PROCEDURE — 87186 SC STD MICRODIL/AGAR DIL: CPT

## 2020-04-29 PROCEDURE — 87077 CULTURE AEROBIC IDENTIFY: CPT

## 2020-05-01 LAB
CULTURE: ABNORMAL
Lab: ABNORMAL
SPECIMEN DESCRIPTION: ABNORMAL

## 2020-06-03 ENCOUNTER — APPOINTMENT (OUTPATIENT)
Dept: CT IMAGING | Age: 70
End: 2020-06-03
Payer: MEDICARE

## 2020-06-03 ENCOUNTER — HOSPITAL ENCOUNTER (EMERGENCY)
Age: 70
Discharge: HOME | End: 2020-06-03
Attending: EMERGENCY MEDICINE
Payer: MEDICARE

## 2020-06-03 ENCOUNTER — APPOINTMENT (OUTPATIENT)
Dept: GENERAL RADIOLOGY | Age: 70
End: 2020-06-03
Payer: MEDICARE

## 2020-06-03 VITALS
OXYGEN SATURATION: 98 % | SYSTOLIC BLOOD PRESSURE: 131 MMHG | TEMPERATURE: 97.7 F | RESPIRATION RATE: 15 BRPM | HEART RATE: 71 BPM | DIASTOLIC BLOOD PRESSURE: 86 MMHG

## 2020-06-03 PROCEDURE — 73030 X-RAY EXAM OF SHOULDER: CPT

## 2020-06-03 PROCEDURE — 72170 X-RAY EXAM OF PELVIS: CPT

## 2020-06-03 PROCEDURE — 73562 X-RAY EXAM OF KNEE 3: CPT

## 2020-06-03 PROCEDURE — 71045 X-RAY EXAM CHEST 1 VIEW: CPT

## 2020-06-03 PROCEDURE — 70450 CT HEAD/BRAIN W/O DYE: CPT

## 2020-06-03 PROCEDURE — 99284 EMERGENCY DEPT VISIT MOD MDM: CPT

## 2020-06-03 PROCEDURE — 6370000000 HC RX 637 (ALT 250 FOR IP): Performed by: STUDENT IN AN ORGANIZED HEALTH CARE EDUCATION/TRAINING PROGRAM

## 2020-06-03 RX ORDER — ACETAMINOPHEN 325 MG/1
650 TABLET ORAL EVERY 6 HOURS PRN
Qty: 30 TABLET | Refills: 0 | Status: ON HOLD | OUTPATIENT
Start: 2020-06-03 | End: 2022-01-18 | Stop reason: HOSPADM

## 2020-06-03 RX ORDER — ACETAMINOPHEN 500 MG
1000 TABLET ORAL ONCE
Status: COMPLETED | OUTPATIENT
Start: 2020-06-03 | End: 2020-06-03

## 2020-06-03 RX ADMIN — ACETAMINOPHEN 1000 MG: 500 TABLET, FILM COATED ORAL at 16:09

## 2020-06-03 ASSESSMENT — ENCOUNTER SYMPTOMS
VOMITING: 0
PHOTOPHOBIA: 0
SHORTNESS OF BREATH: 0
WHEEZING: 0
RHINORRHEA: 0
ABDOMINAL PAIN: 0
NAUSEA: 0

## 2020-06-03 ASSESSMENT — PAIN DESCRIPTION - PAIN TYPE: TYPE: ACUTE PAIN

## 2020-06-03 ASSESSMENT — PAIN SCALES - GENERAL
PAINLEVEL_OUTOF10: 6
PAINLEVEL_OUTOF10: 2

## 2020-06-03 ASSESSMENT — PAIN DESCRIPTION - LOCATION: LOCATION: KNEE;BACK

## 2020-06-03 ASSESSMENT — PAIN DESCRIPTION - ORIENTATION: ORIENTATION: LEFT;UPPER

## 2020-06-03 ASSESSMENT — PAIN DESCRIPTION - DESCRIPTORS: DESCRIPTORS: HEADACHE

## 2020-06-03 NOTE — ED PROVIDER NOTES
[facility] ED  Emergency Department Encounter  Emergency Medicine Resident     Pt Name: Dayna Dodd  MRN: [id number]  [place] [birth year]  Date of evaluation: 6/3/20  PCP:  Mary Gonzales MD    [address] [place] [address]       Chief Complaint   Patient presents with   Evertt Sol    Hip Pain     left       HISTORY OFPRESENT ILLNESS  (Location/Symptom, Timing/Onset, Context/Setting, Quality, Duration, Modifying Factors,Severity.)      Dayna Dodd is a 78 y. o.yo female who presents with hip pain after fall from standing height. Patient states that prior to arrival she sustained a mechanical fall where her foot got twisted on something on the floor. Patient states that she did hit her head on a carpeted floor. Did not lose consciousness. No emesis or seizures after the episode. Patient does not take any blood thinners. She states that her pain is in her left hip. She denies taking anything for the pain prior to arrival.  Patient does state that she has a  shunt in place for history of hydrocephalus. PAST MEDICAL / SURGICAL / SOCIAL / FAMILY HISTORY      has a past medical history of Acid reflux, Bipolar affective ([address] Chronic headaches, and Hydrocephalus ([address] has a past surgical history that includes other surgical history; shunt revision; Cholecystectomy; Tonsillectomy; Hip fracture surgery (Left, 06/09/2017); and hip pinning (Left, 6/9/2017).      Social History     Socioeconomic History    Marital status: Single     Spouse name: Not on file    Number of children: Not on file    Years of education: Not on file    Highest education level: Not on file   Occupational History    Not on file   Social Needs    Financial resource strain: Not on file    Food insecurity     Worry: Not on file     Inability: Not on file    Transportation needs     Medical: Not on file     Non-medical: Not on file   Tobacco Use    Smoking status: Never Smoker    Smokeless tobacco: Never Used Muscle strength equal and intact for bilateral upper and lower extremities. DIFFERENTIAL  DIAGNOSIS     PLAN (LABS / IMAGING / EKG):  Orders Placed This Encounter   Procedures    CT HEAD WO CONTRAST    XR PELVIS (1-2 VIEWS)    XR SHOULDER RIGHT (MIN 2 VIEWS)    XR KNEE LEFT (3 VIEWS)    XR SHUNT SERIES PLACEMENT (<4 VIEWS)    XR KNEE RIGHT (3 VIEWS)       MEDICATIONS ORDERED:  Orders Placed This Encounter   Medications    acetaminophen (TYLENOL) tablet 1,000 mg       Initial MDM/Plan: 78 y.o. female who presents with mechanical fall from standing height. Patient did hit her head on the ground. No focal neurological deficit. I will obtain a CT scan of patient's head because she does have a  shunt in place. Also obtain a shunt series. No step-offs or deformities or midline tenderness to the neck or back and do not feel that imaging is warranted in this case. She does have some tenderness to palpation of the right shoulder as well as the left knee and will obtain x-rays. However, lower suspicion for acute fracture or dislocation given full range of motion. Patient also complaining of left hip pain. She does have a history of surgery to the left hip. Will obtain x-rays at this time and treat pain with Tylenol. We will follow-up imaging and determine disposition. Patient is extremely adamant that this was a mechanical fall. She does not have any chest pain, shortness of breath, dizziness or lightheadedness. Will not pursue laboratory evaluation at this time due to the nature of the fall.     DIAGNOSTIC RESULTS / EMERGENCYDEPARTMENT COURSE / MDM     LABS:  Labs Reviewed - No data to display      RADIOLOGY:  Xr Pelvis (1-2 Views)    Result Date: 6/3/2020  EXAMINATION: THREE XRAY VIEWS OF THE RIGHT SHOULDER; ONE XRAY VIEW OF THE PELVIS; THREE XRAY VIEWS OF THE LEFT KNEE 6/3/2020 5:02 pm COMPARISON: Pelvis 06/12/2019, right shoulder 03/18/2019 HISTORY: ORDERING SYSTEM PROVIDED HISTORY: fall maintained. There is mild tricompartmental spurring. Curvilinear calcifications are noted in the medial and lateral femorotibial compartments consistent with chondrocalcinosis. No focal acute soft tissue abnormality or joint effusion is evident. 1. No acute osseous abnormality of the right knee. 2. Mild tricompartment degenerative changes. 3. Medial and lateral compartment chondrocalcinosis. Ct Head Wo Contrast    Result Date: 6/3/2020  EXAMINATION: CT OF THE HEAD WITHOUT CONTRAST  6/3/2020 4:23 pm TECHNIQUE: CT of the head was performed without the administration of intravenous contrast. Dose modulation, iterative reconstruction, and/or weight based adjustment of the mA/kV was utilized to reduce the radiation dose to as low as reasonably achievable. COMPARISON: CT brain performed 08/01/2019. HISTORY: ORDERING SYSTEM PROVIDED HISTORY: fall, hit head, shunt TECHNOLOGIST PROVIDED HISTORY: fall, hit head, shunt Reason for Exam: fall, hit head, shunt Acuity: Acute Type of Exam: Initial FINDINGS: BRAIN/VENTRICLES: There is no acute intracranial hemorrhage, mass effect, or midline shift. 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 There is stable ventricular size with multiple bilateral shunt tubes. The infratentorial structures are unremarkable. ORBITS: The visualized portion of the orbits demonstrate no acute abnormality. SINUSES: The mastoid air cells are normally aerated. There is sinusitis most pronounced in the left maxillary sinus, left ethmoid air cells, and left frontal sinuses. SOFT TISSUES/SKULL:  No acute abnormality of the visualized skull or soft tissues. Remote right cerebral hemisphere infarct without acute intracranial abnormality. Multiple intraventricular shunt tubes with stable ventricular size. Chronic sinusitis involving the left maxillary sinus, left ethmoid air cells, and left frontal sinuses.      Xr Shunt Series Placement (<4 Views)    Result Date:

## 2020-06-03 NOTE — ED NOTES
Spoke with Jewel Severin (caregiver) she is going to call the home and notify a staff member to come  pt from ED. Robby Mann will call back to notify ED staff about  time.        Gia Shrestha RN  06/03/20 1950

## 2020-11-11 ENCOUNTER — HOSPITAL ENCOUNTER (EMERGENCY)
Age: 70
Discharge: HOME | End: 2020-11-11
Attending: STUDENT IN AN ORGANIZED HEALTH CARE EDUCATION/TRAINING PROGRAM
Payer: MEDICARE

## 2020-11-11 ENCOUNTER — APPOINTMENT (OUTPATIENT)
Dept: CT IMAGING | Age: 70
End: 2020-11-11
Payer: MEDICARE

## 2020-11-11 ENCOUNTER — APPOINTMENT (OUTPATIENT)
Dept: GENERAL RADIOLOGY | Age: 70
End: 2020-11-11
Payer: MEDICARE

## 2020-11-11 VITALS
SYSTOLIC BLOOD PRESSURE: 111 MMHG | HEIGHT: 61 IN | RESPIRATION RATE: 20 BRPM | BODY MASS INDEX: 27 KG/M2 | DIASTOLIC BLOOD PRESSURE: 77 MMHG | HEART RATE: 82 BPM | OXYGEN SATURATION: 98 % | WEIGHT: 143 LBS | TEMPERATURE: 97.8 F

## 2020-11-11 PROCEDURE — 73030 X-RAY EXAM OF SHOULDER: CPT

## 2020-11-11 PROCEDURE — 99285 EMERGENCY DEPT VISIT HI MDM: CPT

## 2020-11-11 PROCEDURE — 70450 CT HEAD/BRAIN W/O DYE: CPT

## 2020-11-11 PROCEDURE — 6370000000 HC RX 637 (ALT 250 FOR IP): Performed by: STUDENT IN AN ORGANIZED HEALTH CARE EDUCATION/TRAINING PROGRAM

## 2020-11-11 PROCEDURE — 70486 CT MAXILLOFACIAL W/O DYE: CPT

## 2020-11-11 PROCEDURE — 72125 CT NECK SPINE W/O DYE: CPT

## 2020-11-11 RX ORDER — ACETAMINOPHEN 325 MG/1
650 TABLET ORAL ONCE
Status: COMPLETED | OUTPATIENT
Start: 2020-11-11 | End: 2020-11-11

## 2020-11-11 RX ORDER — GINSENG 100 MG
CAPSULE ORAL ONCE
Status: COMPLETED | OUTPATIENT
Start: 2020-11-11 | End: 2020-11-11

## 2020-11-11 RX ADMIN — BACITRACIN: 500 OINTMENT TOPICAL at 22:50

## 2020-11-11 RX ADMIN — ACETAMINOPHEN 650 MG: 325 TABLET, FILM COATED ORAL at 22:17

## 2020-11-11 ASSESSMENT — ENCOUNTER SYMPTOMS
VOMITING: 0
DIARRHEA: 0
PHOTOPHOBIA: 0
COLOR CHANGE: 0
RHINORRHEA: 0
FACIAL SWELLING: 0
NAUSEA: 0
SHORTNESS OF BREATH: 0
ABDOMINAL PAIN: 0
EYE ITCHING: 0
COUGH: 0

## 2020-11-11 ASSESSMENT — PAIN SCALES - GENERAL: PAINLEVEL_OUTOF10: 8

## 2020-11-12 NOTE — ED PROVIDER NOTES
EMERGENCY DEPARTMENT ENCOUNTER    Pt Name: Lilibeth Clark  MRN: [id number]  [place] 1950  Date of evaluation: 11/11/20  CHIEF COMPLAINT       Chief Complaint   Patient presents with    Fall     HISTORY OF PRESENT ILLNESS   HPI  78-year-old female history of bipolar, congenital hydrocephalus status post  shunt presents with chief complaint of fall and head pain. Patient states she was getting out of the shower immediately prior to arrival when she bent over and accidentally fell forward hitting her head. Unclear if she remembers the whole event. No nausea or vomiting. No visual changes. Endorses left-sided facial pain. Also has some right shoulder pain. No lower extremity complaints. Does have some neck pain. No numbness or tingling anywhere. No weakness. No other recent illness. No home treatment prior to arrival.      REVIEW OF SYSTEMS     Review of Systems   Constitutional: Negative for chills and fatigue. HENT: Negative for facial swelling, postnasal drip and rhinorrhea. Eyes: Negative for photophobia and itching. Respiratory: Negative for cough and shortness of breath. Cardiovascular: Negative for chest pain and leg swelling. Gastrointestinal: Negative for abdominal pain, diarrhea, nausea and vomiting. Genitourinary: Negative for dysuria, flank pain and hematuria. Musculoskeletal: Positive for arthralgias. Negative for joint swelling. Skin: Negative for color change and rash. Neurological: Positive for headaches. Negative for dizziness and numbness.      PASTMEDICAL HISTORY     Past Medical History:   Diagnosis Date    Acid reflux     Bipolar affective (HCC)     Chronic headaches     Hydrocephalus (HCC)      Past Problem List  Patient Active Problem List   Diagnosis Code    Contusion of hip S70.00XA    UTI (urinary tract infection), uncomplicated F88.1    Inability to ambulate due to hip R26.2    Closed fracture of left inferior pubic ramus ([facility] S32.593M    Closed compression fracture of L4 lumbar vertebra S32.040A     SURGICAL HISTORY       Past Surgical History:   Procedure Laterality Date    CHOLECYSTECTOMY      HIP FRACTURE SURGERY Left 06/09/2017    PINNING AND SCREW    HIP PINNING Left 6/9/2017    HIP PINNING 7.3 CANNULATED SCREW WITH SYNTHES PRODUCT APPLICATION AND INTRAOPERATIVE C-ARM performed by Harjeet Acevedo MD at [place] [address]      shunt x3 head   [facility] [address]       Discharge Medication List as of 11/11/2020 10:22 PM      CONTINUE these medications which have NOT CHANGED    Details   !! acetaminophen (TYLENOL) 325 MG tablet Take 2 tablets by mouth every 6 hours as needed for Pain, Disp-30 tablet, R-0Print      !! topiramate (TOPAMAX) 50 MG tablet Take 1 tablet by mouth daily, Disp-60 tablet, R-3Historical Med      lidocaine PTCH Place 1 patch onto the skin daily, Disp-3 patch, R-0Print      Misc Natural Products (GLUCOSAMINE CHONDROITIN ADV PO) Take 1 tablet by mouth dailyHistorical Med      busPIRone (BUSPAR) 5 MG tablet Take 5 mg by mouth 2 times dailyHistorical Med      melatonin 5 MG TABS tablet Take 5 mg by mouth dailyHistorical Med      !! lamoTRIgine (LAMICTAL) 200 MG tablet Take 200 mg by mouth nightlyHistorical Med      !! topiramate (TOPAMAX) 50 MG tablet Take 75 mg by mouth every evening 1 and a half tabsHistorical Med      bismuth subsalicylate (BISMATROL) 262 MG chewable tablet Take 524 mg by mouth as needed for Heartburn (8 times daily)Historical Med      senna-docusate (PERICOLACE) 8.6-50 MG per tablet Take 1 tablet by mouth as needed for Constipation (in eveninig) Or 2 tabsHistorical Med      neomycin-bacitracin-polymyxin (NEOSPORIN) 5-400-5000 ointment Apply 1 each topically as needed (rash) Apply topically 4 times daily. , Topical, PRN, Historical Med      ibuprofen (ADVIL;MOTRIN) 200 MG tablet Take 200 mg by mouth every 6 hours as needed for Pain (or 2 tabs)Historical Med      magnesium hydroxide (MILK OF MAGNESIA) 400 MG/5ML suspension Take 5 mLs by mouth daily as needed for ConstipationHistorical Med      calcium carbonate (TUMS) 500 MG chewable tablet Take 1 tablet by mouth 4 times daily as needed for HeartburnHistorical Med      !! acetaminophen (TYLENOL) 325 MG tablet Take 650 mg by mouth every 6 hours as needed for PainHistorical Med      alendronate (FOSAMAX) 70 MG tablet Take 70 mg by mouth every 7 days Every WednesdayHistorical Med      atorvastatin (LIPITOR) 10 MG tablet Take 10 mg by mouth dailyHistorical Med      loperamide (IMODIUM) 2 MG capsule Take 2 mg by mouth 4 times daily as needed for DiarrheaHistorical Med      VORTIoxetine (TRINTELLIX) 5 MG tablet Take 10 mg by mouth dailyHistorical Med      amLODIPine (NORVASC) 10 MG tablet Take 5 mg by mouth daily Historical Med      Multiple Vitamins-Minerals (THERAPEUTIC MULTIVITAMIN-MINERALS) tablet Take 1 tablet by mouth daily. ferrous sulfate 325 (65 FE) MG EC tablet Take 325 mg by mouth daily (with breakfast). !! lamoTRIgine (LAMICTAL) 100 MG tablet Take 100 mg by mouth daily Historical Med      aspirin 81 MG tablet Take 81 mg by mouth daily. QUEtiapine Fumarate (SEROQUEL PO) Take 50 mg by mouth nightly Historical Med      Metoclopramide HCl (REGLAN PO) Take 5 mg by mouth 3 times daily Historical Med       !! - Potential duplicate medications found. Please discuss with provider. ALLERGIES     is allergic to levaquin [levofloxacin in d5w]; adhesive tape; chocolate; indomethacin; and neurontin [gabapentin]. FAMILY HISTORY     She indicated that her mother is . She indicated that her father is .      SOCIAL HISTORY       Social History     Tobacco Use    Smoking status: Never Smoker    Smokeless tobacco: Never Used   Substance Use Topics    Alcohol use: No    Drug use: No     PHYSICAL EXAM     INITIAL VITALS: /77   Pulse 82   Temp 97.8 °F (36.6 °C) Resp 20   Ht 5' 1\" (1.549 m)   Wt 143 lb (64.9 kg)   SpO2 98%   BMI 27.02 kg/m²    Physical Exam  Constitutional:       Appearance: She is normal weight. HENT:      Head: Normocephalic and atraumatic. Comments: Bruising and ecchymosis and swelling left orbital rim and over left maxilla  Eyes:      Extraocular Movements: Extraocular movements intact. Pupils: Pupils are equal, round, and reactive to light. Comments: No proptosis. Extraocular movements intact. No hyphema. Pupils equal round and reactive. Neck:      Musculoskeletal: Normal range of motion and neck supple. Comments: C-spine tenderness  Cardiovascular:      Rate and Rhythm: Normal rate and regular rhythm. Pulmonary:      Effort: Pulmonary effort is normal.      Breath sounds: Normal breath sounds. Abdominal:      General: Abdomen is flat. There is no distension. Palpations: There is no mass. Musculoskeletal: Normal range of motion. General: No swelling. Comments: Neurovascular intact throughout. Soft compartments throughout. Some right shoulder tenderness to palpation. Skin:     General: Skin is warm and dry. Neurological:      General: No focal deficit present. Mental Status: She is alert. Mental status is at baseline. MEDICAL DECISION MAKIN-year-old female presents for evaluation after a fall. Signs of head trauma will get CT head, CT face, CT C-spine with neck pain. Get a shoulder x-ray with pain. Imaging negative. Stable on reassessment. Will discharge home under the care of caretaker.        CRITICAL CARE:       PROCEDURES:    Procedures    DIAGNOSTIC RESULTS   EKG:All EKG's are interpreted by the Emergency Department Physician who either signs or Co-signs this chart in the absence of a cardiologist.        RADIOLOGY:All plain film, CT, MRI, and formal ultrasound images (except ED bedside ultrasound) are read by the radiologist, see reports below, unless otherwisenoted in MDM or here. XR SHOULDER RIGHT (MIN 2 VIEWS)   Final Result   No acute abnormality. CT Cervical Spine WO Contrast   Final Result   No acute abnormality of the cervical spine. Multilevel degenerative changes. 3 mm noncalcified left upper lobe nodule. RECOMMENDATIONS:   Guidelines for follow-up and management of pulmonary nodules found on   incomplete chest CT:      <6 mm - No follow up recommend on the basis of the estimated low risk of   malignancy. Reference:  Radiology. 2017 Jul; 284(1):228-243         CT FACIAL BONES WO CONTRAST   Final Result   1. No acute facial bone abnormality. 2. Left frontal scalp hematoma and left facial soft tissue swelling. CT Head WO Contrast   Final Result   1. No acute intracranial abnormality. Chronic ischemic and postoperative   changes are similar to prior study. 2. Left supraorbital soft tissue contusion/hematoma. No acute calvarial   fracture. 3. Left frontal and ethmoid sinusitis, similar to prior examination. LABS: All lab results were reviewed by myself, and all abnormals are listed below. Labs Reviewed - No data to display    EMERGENCY DEPARTMENTCOURSE:         Vitals:    Vitals:    11/11/20 2027   BP: 111/77   Pulse: 82   Resp: 20   Temp: 97.8 °F (36.6 °C)   SpO2: 98%   Weight: 143 lb (64.9 kg)   Height: 5' 1\" (1.549 m)       The patient was given the following medications while in the emergency department:  Orders Placed This Encounter   Medications    acetaminophen (TYLENOL) tablet 650 mg    bacitracin ointment     CONSULTS:  None    FINAL IMPRESSION      1. Closed head injury, initial encounter    2.  Fall, initial encounter          DISPOSITION/PLAN   DISPOSITION Decision To Discharge 11/11/2020 10:21:55 PM      PATIENT REFERRED TO:  Willi Anderson MD  [address] [place] [id number]  [phone]      As needed    DISCHARGE MEDICATIONS:  Discharge Medication List as of 11/11/2020 10:22 PM Johnny Vallecillo MD  Attending Emergency Physician                    Johnny Vallecillo MD  11/12/20 0187

## 2020-11-12 NOTE — ED NOTES
Bed: 15  Expected date: 11/11/20  Expected time: 8:15 PM  Means of arrival: [facility]  Comments:  80 Y/O female Fall c/o left eye injury left arm injury no LOC no neck     Genus Dionisio Rodriguez RN  11/11/20 2026

## 2020-11-30 ENCOUNTER — HOSPITAL ENCOUNTER (INPATIENT)
Age: 70
LOS: 3 days | Discharge: NURSING, REHAB OR LONG-TERM CARE | DRG: 603 | End: 2020-12-03
Attending: EMERGENCY MEDICINE | Admitting: INTERNAL MEDICINE
Payer: MEDICARE

## 2020-11-30 ENCOUNTER — APPOINTMENT (OUTPATIENT)
Dept: CT IMAGING | Age: 70
DRG: 603 | End: 2020-11-30
Payer: MEDICARE

## 2020-11-30 ENCOUNTER — APPOINTMENT (OUTPATIENT)
Dept: GENERAL RADIOLOGY | Age: 70
DRG: 603 | End: 2020-11-30
Payer: MEDICARE

## 2020-11-30 PROBLEM — L03.116 CELLULITIS OF LEFT LEG: Status: ACTIVE | Noted: 2020-11-30

## 2020-11-30 LAB
ABSOLUTE EOS #: 0.2 K/UL (ref 0–0.4)
ABSOLUTE IMMATURE GRANULOCYTE: ABNORMAL K/UL (ref 0–0.3)
ABSOLUTE LYMPH #: 1.3 K/UL (ref 1–4.8)
ABSOLUTE MONO #: 0.6 K/UL (ref 0.1–1.3)
ANION GAP SERPL CALCULATED.3IONS-SCNC: 10 MMOL/L (ref 9–17)
BASOPHILS # BLD: 1 % (ref 0–2)
BASOPHILS ABSOLUTE: 0.1 K/UL (ref 0–0.2)
BNP INTERPRETATION: NORMAL
BUN BLDV-MCNC: 24 MG/DL (ref 8–23)
BUN/CREAT BLD: ABNORMAL (ref 9–20)
CALCIUM SERPL-MCNC: 9.4 MG/DL (ref 8.6–10.4)
CHLORIDE BLD-SCNC: 107 MMOL/L (ref 98–107)
CO2: 24 MMOL/L (ref 20–31)
CREAT SERPL-MCNC: 0.97 MG/DL (ref 0.5–0.9)
DIFFERENTIAL TYPE: ABNORMAL
EOSINOPHILS RELATIVE PERCENT: 3 % (ref 0–4)
GFR AFRICAN AMERICAN: >60 ML/MIN
GFR NON-AFRICAN AMERICAN: 57 ML/MIN
GFR SERPL CREATININE-BSD FRML MDRD: ABNORMAL ML/MIN/{1.73_M2}
GFR SERPL CREATININE-BSD FRML MDRD: ABNORMAL ML/MIN/{1.73_M2}
GLUCOSE BLD-MCNC: 120 MG/DL (ref 70–99)
HCT VFR BLD CALC: 36.7 % (ref 36–46)
HEMOGLOBIN: 12.3 G/DL (ref 12–16)
IMMATURE GRANULOCYTES: ABNORMAL %
INR BLD: 1
LYMPHOCYTES # BLD: 17 % (ref 24–44)
MAGNESIUM: 2 MG/DL (ref 1.6–2.6)
MCH RBC QN AUTO: 32 PG (ref 26–34)
MCHC RBC AUTO-ENTMCNC: 33.5 G/DL (ref 31–37)
MCV RBC AUTO: 95.6 FL (ref 80–100)
MONOCYTES # BLD: 8 % (ref 1–7)
NRBC AUTOMATED: ABNORMAL PER 100 WBC
PARTIAL THROMBOPLASTIN TIME: 29.8 SEC (ref 24–36)
PDW BLD-RTO: 13.2 % (ref 11.5–14.9)
PLATELET # BLD: 216 K/UL (ref 150–450)
PLATELET ESTIMATE: ABNORMAL
PMV BLD AUTO: 7.7 FL (ref 6–12)
POTASSIUM SERPL-SCNC: 4 MMOL/L (ref 3.7–5.3)
PRO-BNP: 154 PG/ML
PROTHROMBIN TIME: 13.2 SEC (ref 11.8–14.6)
RBC # BLD: 3.85 M/UL (ref 4–5.2)
RBC # BLD: ABNORMAL 10*6/UL
SEG NEUTROPHILS: 71 % (ref 36–66)
SEGMENTED NEUTROPHILS ABSOLUTE COUNT: 5.3 K/UL (ref 1.3–9.1)
SODIUM BLD-SCNC: 141 MMOL/L (ref 135–144)
TROPONIN INTERP: ABNORMAL
TROPONIN INTERP: ABNORMAL
TROPONIN T: ABNORMAL NG/ML
TROPONIN T: ABNORMAL NG/ML
TROPONIN, HIGH SENSITIVITY: 18 NG/L (ref 0–14)
TROPONIN, HIGH SENSITIVITY: 19 NG/L (ref 0–14)
WBC # BLD: 7.4 K/UL (ref 3.5–11)
WBC # BLD: ABNORMAL 10*3/UL

## 2020-11-30 PROCEDURE — 83735 ASSAY OF MAGNESIUM: CPT

## 2020-11-30 PROCEDURE — 99223 1ST HOSP IP/OBS HIGH 75: CPT | Performed by: INTERNAL MEDICINE

## 2020-11-30 PROCEDURE — 6360000004 HC RX CONTRAST MEDICATION: Performed by: EMERGENCY MEDICINE

## 2020-11-30 PROCEDURE — 93005 ELECTROCARDIOGRAM TRACING: CPT | Performed by: EMERGENCY MEDICINE

## 2020-11-30 PROCEDURE — 93971 EXTREMITY STUDY: CPT

## 2020-11-30 PROCEDURE — 85025 COMPLETE CBC W/AUTO DIFF WBC: CPT

## 2020-11-30 PROCEDURE — 99285 EMERGENCY DEPT VISIT HI MDM: CPT

## 2020-11-30 PROCEDURE — 93005 ELECTROCARDIOGRAM TRACING: CPT

## 2020-11-30 PROCEDURE — 80048 BASIC METABOLIC PNL TOTAL CA: CPT

## 2020-11-30 PROCEDURE — 85610 PROTHROMBIN TIME: CPT

## 2020-11-30 PROCEDURE — 85730 THROMBOPLASTIN TIME PARTIAL: CPT

## 2020-11-30 PROCEDURE — 84484 ASSAY OF TROPONIN QUANT: CPT

## 2020-11-30 PROCEDURE — 2060000000 HC ICU INTERMEDIATE R&B

## 2020-11-30 PROCEDURE — 2580000003 HC RX 258: Performed by: EMERGENCY MEDICINE

## 2020-11-30 PROCEDURE — 36415 COLL VENOUS BLD VENIPUNCTURE: CPT

## 2020-11-30 PROCEDURE — 71260 CT THORAX DX C+: CPT

## 2020-11-30 PROCEDURE — 73590 X-RAY EXAM OF LOWER LEG: CPT

## 2020-11-30 PROCEDURE — 83880 ASSAY OF NATRIURETIC PEPTIDE: CPT

## 2020-11-30 RX ORDER — SODIUM CHLORIDE 0.9 % (FLUSH) 0.9 %
10 SYRINGE (ML) INJECTION ONCE
Status: COMPLETED | OUTPATIENT
Start: 2020-11-30 | End: 2020-11-30

## 2020-11-30 RX ORDER — 0.9 % SODIUM CHLORIDE 0.9 %
80 INTRAVENOUS SOLUTION INTRAVENOUS ONCE
Status: COMPLETED | OUTPATIENT
Start: 2020-11-30 | End: 2020-11-30

## 2020-11-30 RX ADMIN — Medication 10 ML: at 21:57

## 2020-11-30 RX ADMIN — IOPAMIDOL 75 ML: 755 INJECTION, SOLUTION INTRAVENOUS at 21:57

## 2020-11-30 RX ADMIN — SODIUM CHLORIDE 80 ML: 9 INJECTION, SOLUTION INTRAVENOUS at 21:57

## 2020-11-30 ASSESSMENT — ENCOUNTER SYMPTOMS
BACK PAIN: 0
SHORTNESS OF BREATH: 1
ABDOMINAL PAIN: 0
COUGH: 0
DIARRHEA: 0
VOMITING: 0

## 2020-11-30 ASSESSMENT — PAIN SCALES - GENERAL: PAINLEVEL_OUTOF10: 4

## 2020-12-01 PROBLEM — Z78.9 FAILURE OF OUTPATIENT TREATMENT: Status: ACTIVE | Noted: 2020-12-01

## 2020-12-01 PROBLEM — G91.9 HYDROCEPHALUS (HCC): Status: ACTIVE | Noted: 2020-12-01

## 2020-12-01 PROBLEM — Z98.2 S/P VP SHUNT: Status: ACTIVE | Noted: 2020-12-01

## 2020-12-01 LAB
ANION GAP SERPL CALCULATED.3IONS-SCNC: 12 MMOL/L (ref 9–17)
BUN BLDV-MCNC: 21 MG/DL (ref 8–23)
BUN/CREAT BLD: ABNORMAL (ref 9–20)
C-REACTIVE PROTEIN: 3.2 MG/L (ref 0–5)
CALCIUM SERPL-MCNC: 9 MG/DL (ref 8.6–10.4)
CHLORIDE BLD-SCNC: 109 MMOL/L (ref 98–107)
CO2: 22 MMOL/L (ref 20–31)
CREAT SERPL-MCNC: 0.92 MG/DL (ref 0.5–0.9)
GFR AFRICAN AMERICAN: >60 ML/MIN
GFR NON-AFRICAN AMERICAN: >60 ML/MIN
GFR SERPL CREATININE-BSD FRML MDRD: ABNORMAL ML/MIN/{1.73_M2}
GFR SERPL CREATININE-BSD FRML MDRD: ABNORMAL ML/MIN/{1.73_M2}
GLUCOSE BLD-MCNC: 91 MG/DL (ref 70–99)
HCT VFR BLD CALC: 39.9 % (ref 36–46)
HEMOGLOBIN: 13 G/DL (ref 12–16)
MCH RBC QN AUTO: 32.1 PG (ref 26–34)
MCHC RBC AUTO-ENTMCNC: 32.5 G/DL (ref 31–37)
MCV RBC AUTO: 98.7 FL (ref 80–100)
NRBC AUTOMATED: NORMAL PER 100 WBC
PDW BLD-RTO: 13.4 % (ref 11.5–14.9)
PLATELET # BLD: 216 K/UL (ref 150–450)
PMV BLD AUTO: 8 FL (ref 6–12)
POTASSIUM SERPL-SCNC: 4 MMOL/L (ref 3.7–5.3)
RBC # BLD: 4.05 M/UL (ref 4–5.2)
SEDIMENTATION RATE, ERYTHROCYTE: 14 MM (ref 0–30)
SODIUM BLD-SCNC: 143 MMOL/L (ref 135–144)
WBC # BLD: 7.4 K/UL (ref 3.5–11)

## 2020-12-01 PROCEDURE — 6360000002 HC RX W HCPCS: Performed by: EMERGENCY MEDICINE

## 2020-12-01 PROCEDURE — 86140 C-REACTIVE PROTEIN: CPT

## 2020-12-01 PROCEDURE — 36415 COLL VENOUS BLD VENIPUNCTURE: CPT

## 2020-12-01 PROCEDURE — 85652 RBC SED RATE AUTOMATED: CPT

## 2020-12-01 PROCEDURE — 6360000002 HC RX W HCPCS: Performed by: NURSE PRACTITIONER

## 2020-12-01 PROCEDURE — 2580000003 HC RX 258: Performed by: NURSE PRACTITIONER

## 2020-12-01 PROCEDURE — 2060000000 HC ICU INTERMEDIATE R&B

## 2020-12-01 PROCEDURE — 80048 BASIC METABOLIC PNL TOTAL CA: CPT

## 2020-12-01 PROCEDURE — 2580000003 HC RX 258: Performed by: EMERGENCY MEDICINE

## 2020-12-01 PROCEDURE — 6370000000 HC RX 637 (ALT 250 FOR IP): Performed by: NURSE PRACTITIONER

## 2020-12-01 PROCEDURE — 99222 1ST HOSP IP/OBS MODERATE 55: CPT | Performed by: INTERNAL MEDICINE

## 2020-12-01 PROCEDURE — 85027 COMPLETE CBC AUTOMATED: CPT

## 2020-12-01 RX ORDER — TOPIRAMATE 50 MG/1
50 TABLET, FILM COATED ORAL DAILY
Status: DISCONTINUED | OUTPATIENT
Start: 2020-12-01 | End: 2020-12-03 | Stop reason: HOSPADM

## 2020-12-01 RX ORDER — BIOTIN 5 MG
1 TABLET ORAL DAILY
Status: DISCONTINUED | OUTPATIENT
Start: 2020-12-01 | End: 2020-12-01 | Stop reason: RX

## 2020-12-01 RX ORDER — FLUTICASONE PROPIONATE 50 MCG
1 SPRAY, SUSPENSION (ML) NASAL DAILY
Status: DISCONTINUED | OUTPATIENT
Start: 2020-12-01 | End: 2020-12-03 | Stop reason: HOSPADM

## 2020-12-01 RX ORDER — CALCIUM CARBONATE 200(500)MG
1 TABLET,CHEWABLE ORAL 4 TIMES DAILY PRN
Status: DISCONTINUED | OUTPATIENT
Start: 2020-12-01 | End: 2020-12-03 | Stop reason: HOSPADM

## 2020-12-01 RX ORDER — MECLIZINE HYDROCHLORIDE 25 MG/1
25 TABLET ORAL 3 TIMES DAILY PRN
Status: DISCONTINUED | OUTPATIENT
Start: 2020-12-01 | End: 2020-12-03 | Stop reason: HOSPADM

## 2020-12-01 RX ORDER — SODIUM CHLORIDE 0.9 % (FLUSH) 0.9 %
10 SYRINGE (ML) INJECTION EVERY 12 HOURS SCHEDULED
Status: DISCONTINUED | OUTPATIENT
Start: 2020-12-01 | End: 2020-12-03 | Stop reason: HOSPADM

## 2020-12-01 RX ORDER — AMLODIPINE BESYLATE 5 MG/1
5 TABLET ORAL DAILY
Status: DISCONTINUED | OUTPATIENT
Start: 2020-12-01 | End: 2020-12-03 | Stop reason: HOSPADM

## 2020-12-01 RX ORDER — ATORVASTATIN CALCIUM 10 MG/1
10 TABLET, FILM COATED ORAL DAILY
Status: DISCONTINUED | OUTPATIENT
Start: 2020-12-01 | End: 2020-12-03 | Stop reason: HOSPADM

## 2020-12-01 RX ORDER — SODIUM CHLORIDE 9 MG/ML
INJECTION, SOLUTION INTRAVENOUS CONTINUOUS
Status: DISCONTINUED | OUTPATIENT
Start: 2020-12-01 | End: 2020-12-03 | Stop reason: HOSPADM

## 2020-12-01 RX ORDER — SODIUM CHLORIDE 0.9 % (FLUSH) 0.9 %
10 SYRINGE (ML) INJECTION PRN
Status: DISCONTINUED | OUTPATIENT
Start: 2020-12-01 | End: 2020-12-03 | Stop reason: HOSPADM

## 2020-12-01 RX ORDER — BUSPIRONE HYDROCHLORIDE 5 MG/1
5 TABLET ORAL 2 TIMES DAILY
Status: DISCONTINUED | OUTPATIENT
Start: 2020-12-01 | End: 2020-12-03 | Stop reason: HOSPADM

## 2020-12-01 RX ORDER — OMEPRAZOLE 40 MG/1
40 CAPSULE, DELAYED RELEASE ORAL DAILY
COMMUNITY

## 2020-12-01 RX ORDER — MECLIZINE HYDROCHLORIDE CHEWABLE TABLETS 25 MG/1
25 TABLET, CHEWABLE ORAL DAILY PRN
COMMUNITY

## 2020-12-01 RX ORDER — POTASSIUM CHLORIDE 20 MEQ/1
40 TABLET, EXTENDED RELEASE ORAL PRN
Status: DISCONTINUED | OUTPATIENT
Start: 2020-12-01 | End: 2020-12-03 | Stop reason: HOSPADM

## 2020-12-01 RX ORDER — LANOLIN ALCOHOL/MO/W.PET/CERES
3 CREAM (GRAM) TOPICAL NIGHTLY
Status: DISCONTINUED | OUTPATIENT
Start: 2020-12-01 | End: 2020-12-03 | Stop reason: HOSPADM

## 2020-12-01 RX ORDER — TRAMADOL HYDROCHLORIDE 50 MG/1
50 TABLET ORAL EVERY 6 HOURS PRN
Status: ON HOLD | COMMUNITY
End: 2020-12-03 | Stop reason: HOSPADM

## 2020-12-01 RX ORDER — QUETIAPINE FUMARATE 50 MG/1
50 TABLET, FILM COATED ORAL NIGHTLY
COMMUNITY

## 2020-12-01 RX ORDER — METOCLOPRAMIDE 5 MG/1
5 TABLET ORAL
Status: DISCONTINUED | OUTPATIENT
Start: 2020-12-01 | End: 2020-12-03 | Stop reason: HOSPADM

## 2020-12-01 RX ORDER — SUMATRIPTAN 100 MG/1
100 TABLET, FILM COATED ORAL 2 TIMES DAILY PRN
Status: DISCONTINUED | OUTPATIENT
Start: 2020-12-01 | End: 2020-12-03 | Stop reason: HOSPADM

## 2020-12-01 RX ORDER — ACETAMINOPHEN 650 MG/1
650 SUPPOSITORY RECTAL EVERY 6 HOURS PRN
Status: DISCONTINUED | OUTPATIENT
Start: 2020-12-01 | End: 2020-12-03 | Stop reason: HOSPADM

## 2020-12-01 RX ORDER — POTASSIUM CHLORIDE 7.45 MG/ML
10 INJECTION INTRAVENOUS PRN
Status: DISCONTINUED | OUTPATIENT
Start: 2020-12-01 | End: 2020-12-03 | Stop reason: HOSPADM

## 2020-12-01 RX ORDER — FLUTICASONE PROPIONATE 50 MCG
1 SPRAY, SUSPENSION (ML) NASAL DAILY
COMMUNITY

## 2020-12-01 RX ORDER — TRAMADOL HYDROCHLORIDE 50 MG/1
50 TABLET ORAL EVERY 6 HOURS PRN
Status: DISCONTINUED | OUTPATIENT
Start: 2020-12-01 | End: 2020-12-03 | Stop reason: HOSPADM

## 2020-12-01 RX ORDER — POLYETHYLENE GLYCOL 3350 17 G/17G
17 POWDER, FOR SOLUTION ORAL DAILY PRN
Status: DISCONTINUED | OUTPATIENT
Start: 2020-12-01 | End: 2020-12-03 | Stop reason: HOSPADM

## 2020-12-01 RX ORDER — PANTOPRAZOLE SODIUM 40 MG/1
40 TABLET, DELAYED RELEASE ORAL
Status: DISCONTINUED | OUTPATIENT
Start: 2020-12-01 | End: 2020-12-03 | Stop reason: HOSPADM

## 2020-12-01 RX ORDER — M-VIT,TX,IRON,MINS/CALC/FOLIC 27MG-0.4MG
1 TABLET ORAL DAILY
Status: DISCONTINUED | OUTPATIENT
Start: 2020-12-01 | End: 2020-12-03 | Stop reason: HOSPADM

## 2020-12-01 RX ORDER — LAMOTRIGINE 100 MG/1
200 TABLET ORAL NIGHTLY
Status: DISCONTINUED | OUTPATIENT
Start: 2020-12-01 | End: 2020-12-03 | Stop reason: HOSPADM

## 2020-12-01 RX ORDER — FERROUS SULFATE 325(65) MG
325 TABLET ORAL
Status: DISCONTINUED | OUTPATIENT
Start: 2020-12-01 | End: 2020-12-03 | Stop reason: HOSPADM

## 2020-12-01 RX ORDER — BUTALBITAL, ACETAMINOPHEN AND CAFFEINE 300; 40; 50 MG/1; MG/1; MG/1
1 CAPSULE ORAL EVERY 6 HOURS PRN
COMMUNITY
End: 2022-05-05

## 2020-12-01 RX ORDER — ASPIRIN 81 MG/1
81 TABLET ORAL DAILY
Status: DISCONTINUED | OUTPATIENT
Start: 2020-12-01 | End: 2020-12-03 | Stop reason: HOSPADM

## 2020-12-01 RX ORDER — MECOBALAMIN 5000 MCG
5 TABLET,DISINTEGRATING ORAL NIGHTLY
COMMUNITY
End: 2022-05-05

## 2020-12-01 RX ORDER — MAGNESIUM SULFATE 1 G/100ML
1 INJECTION INTRAVENOUS PRN
Status: DISCONTINUED | OUTPATIENT
Start: 2020-12-01 | End: 2020-12-03 | Stop reason: HOSPADM

## 2020-12-01 RX ORDER — QUETIAPINE FUMARATE 50 MG/1
25 TABLET, FILM COATED ORAL DAILY
Status: DISCONTINUED | OUTPATIENT
Start: 2020-12-01 | End: 2020-12-03 | Stop reason: HOSPADM

## 2020-12-01 RX ORDER — LAMOTRIGINE 100 MG/1
100 TABLET ORAL DAILY
Status: DISCONTINUED | OUTPATIENT
Start: 2020-12-01 | End: 2020-12-03 | Stop reason: HOSPADM

## 2020-12-01 RX ORDER — BUPRENORPHINE 10 UG/H
1 PATCH TRANSDERMAL WEEKLY
Status: ON HOLD | COMMUNITY
End: 2020-12-03 | Stop reason: SDUPTHER

## 2020-12-01 RX ORDER — QUETIAPINE FUMARATE 25 MG/1
25 TABLET, FILM COATED ORAL DAILY
COMMUNITY

## 2020-12-01 RX ORDER — PROMETHAZINE HYDROCHLORIDE 25 MG/1
12.5 TABLET ORAL EVERY 6 HOURS PRN
Status: DISCONTINUED | OUTPATIENT
Start: 2020-12-01 | End: 2020-12-03 | Stop reason: HOSPADM

## 2020-12-01 RX ORDER — CETIRIZINE HYDROCHLORIDE 10 MG/1
10 TABLET ORAL DAILY
Status: DISCONTINUED | OUTPATIENT
Start: 2020-12-01 | End: 2020-12-03 | Stop reason: HOSPADM

## 2020-12-01 RX ORDER — POLYETHYLENE GLYCOL 3350 17 G/17G
17 POWDER, FOR SOLUTION ORAL DAILY PRN
COMMUNITY

## 2020-12-01 RX ORDER — QUETIAPINE FUMARATE 50 MG/1
50 TABLET, FILM COATED ORAL NIGHTLY
Status: DISCONTINUED | OUTPATIENT
Start: 2020-12-01 | End: 2020-12-03 | Stop reason: HOSPADM

## 2020-12-01 RX ORDER — LORATADINE 10 MG/1
10 TABLET ORAL DAILY
COMMUNITY

## 2020-12-01 RX ORDER — TOPIRAMATE 100 MG/1
100 TABLET, FILM COATED ORAL EVERY EVENING
Status: DISCONTINUED | OUTPATIENT
Start: 2020-12-01 | End: 2020-12-03 | Stop reason: HOSPADM

## 2020-12-01 RX ORDER — LAMOTRIGINE 200 MG/1
200 TABLET ORAL NIGHTLY
COMMUNITY

## 2020-12-01 RX ORDER — ONDANSETRON 2 MG/ML
4 INJECTION INTRAMUSCULAR; INTRAVENOUS EVERY 6 HOURS PRN
Status: DISCONTINUED | OUTPATIENT
Start: 2020-12-01 | End: 2020-12-03 | Stop reason: HOSPADM

## 2020-12-01 RX ORDER — ACETAMINOPHEN 325 MG/1
650 TABLET ORAL EVERY 6 HOURS PRN
Status: DISCONTINUED | OUTPATIENT
Start: 2020-12-01 | End: 2020-12-03 | Stop reason: HOSPADM

## 2020-12-01 RX ORDER — SUMATRIPTAN 50 MG/1
50 TABLET, FILM COATED ORAL PRN
COMMUNITY

## 2020-12-01 RX ORDER — TOPIRAMATE 100 MG/1
100 TABLET, FILM COATED ORAL EVERY EVENING
COMMUNITY

## 2020-12-01 RX ADMIN — CETIRIZINE HYDROCHLORIDE 10 MG: 10 TABLET, FILM COATED ORAL at 09:33

## 2020-12-01 RX ADMIN — METOCLOPRAMIDE 5 MG: 5 TABLET ORAL at 06:44

## 2020-12-01 RX ADMIN — VANCOMYCIN HYDROCHLORIDE 1500 MG: 1.5 INJECTION, POWDER, LYOPHILIZED, FOR SOLUTION INTRAVENOUS at 00:22

## 2020-12-01 RX ADMIN — FERROUS SULFATE TAB 325 MG (65 MG ELEMENTAL FE) 325 MG: 325 (65 FE) TAB at 09:37

## 2020-12-01 RX ADMIN — Medication 10 ML: at 19:27

## 2020-12-01 RX ADMIN — ASPIRIN 81 MG: 81 TABLET, COATED ORAL at 09:33

## 2020-12-01 RX ADMIN — METOCLOPRAMIDE 5 MG: 5 TABLET ORAL at 09:33

## 2020-12-01 RX ADMIN — QUETIAPINE FUMARATE 50 MG: 50 TABLET ORAL at 21:41

## 2020-12-01 RX ADMIN — MULTIPLE VITAMINS W/ MINERALS TAB 1 TABLET: TAB at 09:33

## 2020-12-01 RX ADMIN — QUETIAPINE FUMARATE 25 MG: 50 TABLET ORAL at 09:33

## 2020-12-01 RX ADMIN — TOPIRAMATE 100 MG: 100 TABLET, FILM COATED ORAL at 21:41

## 2020-12-01 RX ADMIN — BUSPIRONE HYDROCHLORIDE 5 MG: 5 TABLET ORAL at 09:33

## 2020-12-01 RX ADMIN — TRAMADOL HYDROCHLORIDE 50 MG: 50 TABLET, FILM COATED ORAL at 09:33

## 2020-12-01 RX ADMIN — LAMOTRIGINE 200 MG: 100 TABLET ORAL at 21:43

## 2020-12-01 RX ADMIN — LAMOTRIGINE 100 MG: 100 TABLET ORAL at 09:36

## 2020-12-01 RX ADMIN — PANTOPRAZOLE SODIUM 40 MG: 40 TABLET, DELAYED RELEASE ORAL at 06:44

## 2020-12-01 RX ADMIN — TOPIRAMATE 50 MG: 50 TABLET, FILM COATED ORAL at 09:36

## 2020-12-01 RX ADMIN — TRAMADOL HYDROCHLORIDE 50 MG: 50 TABLET, FILM COATED ORAL at 19:26

## 2020-12-01 RX ADMIN — BUSPIRONE HYDROCHLORIDE 5 MG: 5 TABLET ORAL at 19:26

## 2020-12-01 RX ADMIN — ATORVASTATIN CALCIUM 10 MG: 10 TABLET, FILM COATED ORAL at 09:33

## 2020-12-01 RX ADMIN — ENOXAPARIN SODIUM 40 MG: 40 INJECTION SUBCUTANEOUS at 09:33

## 2020-12-01 RX ADMIN — FLUTICASONE PROPIONATE 1 SPRAY: 50 SPRAY, METERED NASAL at 09:36

## 2020-12-01 RX ADMIN — AMLODIPINE BESYLATE 5 MG: 5 TABLET ORAL at 09:33

## 2020-12-01 RX ADMIN — VORTIOXETINE 5 MG: 5 TABLET, FILM COATED ORAL at 09:36

## 2020-12-01 RX ADMIN — METOCLOPRAMIDE 5 MG: 5 TABLET ORAL at 17:26

## 2020-12-01 RX ADMIN — SODIUM CHLORIDE: 9 INJECTION, SOLUTION INTRAVENOUS at 02:25

## 2020-12-01 ASSESSMENT — PAIN SCALES - GENERAL
PAINLEVEL_OUTOF10: 0
PAINLEVEL_OUTOF10: 4
PAINLEVEL_OUTOF10: 10
PAINLEVEL_OUTOF10: 7

## 2020-12-01 ASSESSMENT — ENCOUNTER SYMPTOMS
VOMITING: 0
COUGH: 0
ABDOMINAL PAIN: 0
COLOR CHANGE: 1
SHORTNESS OF BREATH: 0
DIARRHEA: 0
NAUSEA: 0

## 2020-12-01 ASSESSMENT — PAIN DESCRIPTION - PAIN TYPE: TYPE: ACUTE PAIN

## 2020-12-01 ASSESSMENT — PAIN DESCRIPTION - ORIENTATION: ORIENTATION: LEFT

## 2020-12-01 ASSESSMENT — PAIN DESCRIPTION - LOCATION: LOCATION: LEG

## 2020-12-01 NOTE — PLAN OF CARE
Problem: Falls - Risk of:  Goal: Will remain free from falls  Description: Will remain free from falls  Outcome: Ongoing  Note: Patient remained free from falls. Call light within reach. Problem: Safety:  Goal: Free from accidental physical injury  Description: Free from accidental physical injury  Outcome: Ongoing  Note: Patient free from accidental physical injury this shift. Problem: Daily Care:  Goal: Daily care needs are met  Description: Daily care needs are met  Outcome: Ongoing  Note: All ADLs met throughout this shift.

## 2020-12-01 NOTE — CARE COORDINATION
CASE MANAGEMENT NOTE:    Admission Date:  11/30/2020 Cheryal Lanes is a 69 y.o.  female    Admitted for : Cellulitis of left leg [L03.116]    Met with:  Patient    PCP:  Dr. Caroline Mckay:  Medicare      Current Residence/ Living Arrangements:  independently at home             Current Services PTA:  Patient is from a group home    Is patient agreeable to VNS: has nursing service there    Freedom of choice provided:  NA    List of [address] provided: NA    VNS chosen:  NA    DME:  walker and shower chair    Home Oxygen: No    Nebulizer: No    CPAP/BIPAP: No    Supplier: N/A    Potential Assistance Needed: No    SNF needed: No    Freedom of choice and list provided: NA    [facility]:  [facility] [place]       Does Patient want to use MEDS to BEDS? No    Is patient currently receiving oral anticoagulation therapy? No    Is the Patient an [facility] with Readmission Risk Score greater than 14%? No  If yes, pt needs a follow up appointment made within 7 days. Family Members/Caregivers that pt would like involved in their care:   yes    If yes, list name here:  Francisco    Transportation Provider:  Family             Is patient in Isolation/One on One/Altered Mental Status? No  If yes, skip next question. If no, would they like an I-Pad to  use? No  If yes, call [phone]. Discharge Plan:  12/1/20 Medicare Pt is from home in a group home with 6 other residents pt uses a walker and has a shower chair pt has nursing care and has meds set up plan is to discharge back to home when discharged will follow for needs . //tv                Electronically signed by:  Rhina Humphries RN on 12/1/2020 at 10:33 AM

## 2020-12-01 NOTE — CONSULTS
[facility] -   Infectious diseases evaluation  admission date 11/30/2020    reason for consultation:   Left leg cellulitis    Impression :   Current:  · Left leg cellulitis failed outpatient treatment  · History of hydrocephalus status post  shunt  · Chronic left pleural effusion and left lung base consolidation  · Bipolar disorder  · GERD. · Levaquin allergy      Recommendations   · Continue IV vancomycin  · Lower extremity venous Doppler pending  · Follow CBC renal function and vancomycin level  · Consider PICC line for IV vancomycin on discharge if lower extremity venous Doppler negative. History of Present Illness:   Initial history: Isac Clark is a 69y.o.-year-old female was brought to the hospital from [facility] home for left lower extremity pain, throbbing, continuous, no radiation, moderate in severity associated with swelling and redness for 2 weeks, was treated with antibiotics as an outpatient with no improvement  CT chest 11/30/2020 showed no PE, did show large left pleural effusion with pleural thickening and  shunt catheter in the pleural space, left lung base consolidation. 11/30/2020 left tibia-fibula x-ray showed soft tissue swelling and edema  11/30/2020 WBC 7.4, creatinine 0.97  Interval changes  12/1/2020   She denied any fever or chills, denied nausea or vomiting, no diarrhea, no other complaint  C-reactive protein and sedimentation rate are normal today  Patient Vitals for the past 8 hrs:   BP Temp Temp src Pulse Resp SpO2   12/01/20 0900 (!) 142/75 97.9 °F (36.6 °C) Axillary 83 16 100 %           I have personally reviewed the past medical history, past surgical history, medications, social history, and family history, and I haveupdated the database accordingly. Allergies:   Levaquin [levofloxacin in d5w]; Adhesive tape; Chocolate;  Indomethacin; and Neurontin [gabapentin]     Review of Systems:     Review of Systems  As per history present illness other than above 14 system reviewed were negative  Physical Examination :       Physical Exam  Constitutional:       General: She is not in acute distress. HENT:      Right Ear: External ear normal.      Left Ear: External ear normal.      Mouth/Throat:      Pharynx: Oropharynx is clear. No posterior oropharyngeal erythema. Eyes:      General: No scleral icterus. Conjunctiva/sclera: Conjunctivae normal.   Neck:      Musculoskeletal: Neck supple. No neck rigidity. Cardiovascular:      Rate and Rhythm: Normal rate and regular rhythm. Heart sounds: No murmur. Pulmonary:      Effort: Pulmonary effort is normal. No respiratory distress. Breath sounds: No wheezing. Abdominal:      General: Abdomen is flat. There is no distension. Palpations: Abdomen is soft. Tenderness: There is no abdominal tenderness. Musculoskeletal:         General: Tenderness present. Left lower leg: Edema present. Skin:     Comments: Bruises noted to the cheeks  Left leg warmth, edema, tenderness and erythema, no fluctuation, no crepitance. Neurological:      Mental Status: She is alert and oriented to person, place, and time. Mental status is at baseline.          Past Medical History:     Past Medical History:   Diagnosis Date    Acid reflux     Bipolar affective (HCC)     Chronic headaches     Hydrocephalus (HCC)        Past Surgical  History:     Past Surgical History:   Procedure Laterality Date    CHOLECYSTECTOMY      HIP FRACTURE SURGERY Left 06/09/2017    PINNING AND SCREW    HIP PINNING Left 6/9/2017    HIP PINNING 7.3 CANNULATED SCREW WITH SYNTHES PRODUCT APPLICATION AND INTRAOPERATIVE C-ARM performed by Nevin Nicolas MD at [address] [place] [address] Drive,[facility]      shunt x3 head    SHUNT REVISION      TONSILLECTOMY         Medications:      amLODIPine  5 mg Oral Daily    aspirin  81 mg Oral Daily    atorvastatin  10 mg Oral Daily    busPIRone Not on file     Emotionally abused: Not on file     Physically abused: Not on file     Forced sexual activity: Not on file   Other Topics Concern    Not on file   Social History Narrative    Not on file       Family History:   History reviewed. No pertinent family history. Medical Decision Making:   I have independently reviewed/ordered the following labs:    CBC with Differential:   Recent Labs     11/30/20 2105 12/01/20  0547   WBC 7.4 7.4   HGB 12.3 13.0   HCT 36.7 39.9    216   LYMPHOPCT 17*  --    MONOPCT 8*  --      BMP:  Recent Labs     11/30/20 2105 12/01/20  0547    143   K 4.0 4.0    109*   CO2 24 22   BUN 24* 21   CREATININE 0.97* 0.92*   MG 2.0  --      Hepatic Function Panel: No results for input(s): PROT, LABALBU, BILIDIR, IBILI, BILITOT, ALKPHOS, ALT, AST in the last 72 hours. No results for input(s): RPR in the last 72 hours. No results for input(s): HIV in the last 72 hours. No results for input(s): BC in the last 72 hours. Lab Results   Component Value Date    CREATININE 0.92 12/01/2020    GLUCOSE 91 12/01/2020       Detailed results: Thank you for allowing us to participate in the care of this patient. Please call with questions. This note is created with the assistance of a speech recognition program.  While intending to generate adocument that actually reflects the content of the visit, the document can still have some errors including those of syntax and sound a like substitutions which may escape proof reading. It such instances, actual meaningcan be extrapolated by contextual diversion.     Faith Chen MD  Office: [phone]  Perfect serve / office [phone]

## 2020-12-01 NOTE — H&P
[address] [facility]     HISTORY AND PHYSICAL EXAMINATION            Date:   12/1/2020  Patientname: Freddy Aguayo  Date of admission:  11/30/2020  8:27 PM  MRN:   [id number]  Account:  [de-identified]  YOB: 1950  PCP:    Luis Fernando Nayak MD  Room:   2091/2091-01  Code Status:    Full Code    CHIEF COMPLAINT     Chief Complaint   Patient presents with    Leg Swelling     Left       HISTORY OF PRESENT ILLNESS  (Character, Onset, Location, Duration,  Exacerbating/RelievingFactors, Radiation,   Associated Symptoms, Severity )      The patient is a 69 y.o.  female, with a history of hydrocephalus status post  shunt, chronic headaches, bipolar disorder, and acid reflux. Patient resides at [facility]. She was evaluated here in the ER for evaluation of left lower extremity pain, redness and swelling for the past 2 weeks. Patient has been on antibiotics 2 weeks ago for left leg cellulitis. She also has a history of frequent falls. No chest pain, shortness of breath, cough, abdominal pain, nausea, vomiting or diarrhea. She also has history of chronic weakness in her left side. HPI   1) Location/Symptom Left lower leg, pain, redness and swelling  2) Timing/Onset: 2 weeks  3) Context/Setting: Assisted living  4) Quality: Throbbing  5) Duration: continuous   6) Modifying Factors: Failure of outpatient treatment of antibiotics. Worse with exertion  7) Severity: 4 out of 10    PAST MEDICAL HISTORY   Patient  has a past medical history of Acid reflux, Bipolar affective ([address] Chronic headaches, and Hydrocephalus ([address] PAST SURGICAL HISTORY    Patient  has a past surgical history that includes other surgical history; shunt revision; Cholecystectomy; Tonsillectomy; Hip fracture surgery (Left, 06/09/2017); and hip pinning (Left, 6/9/2017). FAMILY HISTORY    Patient family history is not on file. SOCIAL HISTORY    Patient  reports that she has never smoked.  She has never used smokeless tobacco. She reports that she does not drink alcohol or use drugs. HOME MEDICATIONS        Prior to Admission medications    Medication Sig Start Date End Date Taking?  Authorizing Provider   fluticasone (FLONASE) 50 MCG/ACT nasal spray 1 spray by Each Nostril route daily   Yes Historical Provider, MD   loratadine (CLARITIN) 10 MG capsule Take 10 mg by mouth daily   Yes Historical Provider, MD   omeprazole (PRILOSEC) 20 MG delayed release capsule Take 20 mg by mouth daily   Yes Historical Provider, MD   polyethylene glycol (GLYCOLAX) 17 GM/SCOOP powder Take 17 g by mouth daily   Yes Historical Provider, MD   QUEtiapine (SEROQUEL) 25 MG tablet Take 25 mg by mouth daily   Yes Historical Provider, MD   VORTIoxetine (TRINTELLIX) 5 MG tablet Take 5 mg by mouth daily   Yes Historical Provider, MD   topiramate (TOPAMAX) 100 MG tablet Take 100 mg by mouth every evening   Yes Historical Provider, MD   lamoTRIgine (LAMICTAL) 200 MG tablet Take 200 mg by mouth nightly   Yes Historical Provider, MD   melatonin 5 MG TBDP disintegrating tablet Take 5 mg by mouth nightly   Yes Historical Provider, MD   QUEtiapine (SEROQUEL) 25 MG tablet Take 50 mg by mouth nightly   Yes Historical Provider, MD   butalbital-APAP-caffeine (FIORICET) -40 MG CAPS per capsule Take 1 capsule by mouth every 6 hours as needed for Headaches   Yes Historical Provider, MD   meclizine (ANTIVERT) 25 MG tablet Take 25 mg by mouth 3 times daily as needed for Dizziness   Yes Historical Provider, MD   Ca Carbonate-Mag Hydroxide (ROLAIDS PO) Take 2 tablets by mouth every 4 hours as needed (acid reflux)   Yes Historical Provider, MD   SUMAtriptan (IMITREX) 100 MG tablet Take 100 mg by mouth 2 times daily as needed for Migraine   Yes Historical Provider, MD   topiramate (TOPAMAX) 50 MG tablet Take 1 tablet by mouth daily 12/13/18  Yes Magan Lopez MD   Misc Natural Products (GLUCOSAMINE CHONDROITIN ADV PO) Take 1 tablet by mouth daily Yes Historical Provider, MD   busPIRone (BUSPAR) 5 MG tablet Take 5 mg by mouth 2 times daily   Yes Historical Provider, MD   senna-docusate (Felicia Goldmann) 8.6-50 MG per tablet Take 1 tablet by mouth as needed for Constipation (in eveninig) Or 2 tabs   Yes Historical Provider, MD   atorvastatin (LIPITOR) 10 MG tablet Take 10 mg by mouth daily   Yes Historical Provider, MD   amLODIPine (NORVASC) 10 MG tablet Take 5 mg by mouth daily    Yes Historical Provider, MD   Multiple Vitamins-Minerals (THERAPEUTIC MULTIVITAMIN-MINERALS) tablet Take 1 tablet by mouth daily. Yes Historical Provider, MD   ferrous sulfate 325 (65 FE) MG EC tablet Take 325 mg by mouth daily (with breakfast). Yes Historical Provider, MD   lamoTRIgine (LAMICTAL) 100 MG tablet Take 100 mg by mouth daily    Yes Historical Provider, MD   aspirin 81 MG tablet Take 81 mg by mouth daily. Yes Historical Provider, MD   Metoclopramide HCl (REGLAN PO) Take 5 mg by mouth 3 times daily    Yes Historical Provider, MD   buprenorphine (BUPRENEX) 10 MCG/HR [address] 1 patch onto the skin once a week. Historical Provider, MD   traMADol (ULTRAM) 50 MG tablet Take 50 mg by mouth every 6 hours as needed for Pain.     Historical Provider, MD   acetaminophen (TYLENOL) 325 MG tablet Take 2 tablets by mouth every 6 hours as needed for Pain 6/3/20   DO luisa [facility] AND [facility] 1 patch onto the skin daily 12/14/18   Ramsey Copeland MD   bismuth subsalicylate (BISMATROL) 262 MG chewable tablet Take 524 mg by mouth as needed for Heartburn (8 times daily)    Historical Provider, MD   ibuprofen (ADVIL;MOTRIN) 200 MG tablet Take 200 mg by mouth every 6 hours as needed for Pain (or 2 tabs)    Historical Provider, MD   magnesium hydroxide (MILK OF MAGNESIA) 400 MG/5ML suspension Take 5 mLs by mouth daily as needed for Constipation    Historical Provider, MD   calcium carbonate (TUMS) 500 MG chewable tablet Take 1 tablet by mouth 4 times daily as needed for is normal.      Breath sounds: Normal breath sounds. No wheezing, rhonchi or rales. Abdominal:      General: Bowel sounds are normal.      Palpations: Abdomen is soft. Tenderness: There is no abdominal tenderness. Musculoskeletal:      Right lower leg: No edema. Left lower leg: She exhibits tenderness and swelling. Edema present. Skin:     General: Skin is warm and dry. Capillary Refill: Capillary refill takes 2 to 3 seconds. Findings: Erythema present. Comments: Erythema and swelling noted to the left lower extremity from the knee down to the foot. Pedal pulses palpable. Patient exhibits generalized tenderness to the left lower leg. There is a scratch noted to the anterior aspect of the leg. No drainage. Neurological:      Mental Status: She is alert. Comments: Patient sleeping initially upon exam.  She awakes to command. Moves all extremities to command. Psychiatric:         Attention and Perception: Attention normal.         Mood and Affect: Mood normal.         Speech: Speech normal.       DIAGNOSTICS      EKG: (as documented in ED note):Normal sinus rhythm with a heart rate 84 bpm normal axis T wave inversion V1 V2 no acute ST or T wave changes       Labs:  CBC:   Recent Labs     11/30/20  2105   WBC 7.4   HGB 12.3        BMP:    Recent Labs     11/30/20 2105      K 4.0      CO2 24   BUN 24*   CREATININE 0.97*   GLUCOSE 120*     S. Calcium:  Recent Labs     11/30/20 2105   CALCIUM 9.4     S. Ionized Calcium:No results for input(s): IONCA in the last 72 hours. S. Magnesium:  Recent Labs     11/30/20  2105   MG 2.0     S. Phosphorus:No results for input(s): PHOS in the last 72 hours. S. Glucose:No results for input(s): POCGLU in the last 72 hours. Glycosylated hemoglobin A1C: No results found for: LABA1C  Hepatic: No results for input(s): AST, ALT, ALB, ALKPHOS in the last 72 hours.     Invalid input(s):  PROT,  BILITOT,  BILIDIR  CARDIAC ENZY:   Recent Labs     11/30/20 2105 11/30/20  2252   TROPHS 19* 18*     INR:   Recent Labs     11/30/20 2105   INR 1.0     BNP:   Recent Labs     11/30/20 2105   PROBNP 154      ABGs: No results for input(s): PH, PCO2, PO2, HCO3, O2SAT in the last 72 hours. Lipids: No results for input(s): CHOL, TRIG, HDL, LDLCALC in the last 72 hours. Invalid input(s): LDL  Pancreatic functions:No results for input(s): LIPASE, AMYLASE in the last 72 hours. Greer Coral: No results for input(s): LACTA in the last 72 hours. Thyroid functions: No results found for: TSH   U/A:No results for input(s): NITRITE, COLORU, WBCUA, RBCUA, MUCUS, BACTERIA, CLARITYU, SPECGRAV, LEUKOCYTESUR, BLOODU, GLUCOSEU, AMORPHOUS in the last 72 hours. Invalid input(s): Ngoc Odor    Imaging/Diagonstics:       Xr Tibia Fibula Left (2 Views)    Result Date: 11/30/2020  EXAMINATION: 2 XRAY VIEWS OF THE LEFT TIBIA AND FIBULA 11/30/2020 10:47 pm COMPARISON: Marcy 3, 2020. HISTORY: ORDERING SYSTEM PROVIDED HISTORY: pain TECHNOLOGIST PROVIDED HISTORY: pain Reason for Exam: Leg swelling Acuity: Acute Type of Exam: Initial FINDINGS: Soft tissue swelling and edema about the knee and proximal to mid lower leg. No cortical destruction to suggest acute osteomyelitis. No acute fracture. Bony alignment is grossly normal.  Degenerative changes in the knee and ankle. Bilateral meniscal chondrocalcinosis can be seen in the setting of CPPD arthropathy. Redemonstration of osseous irregularity and possible intra-articular ossification in the region of the tibial spines. Dorsal calcaneal bone spur. No cortical destruction to suggest acute osteomyelitis. No acute fracture. Degenerative changes in the left knee and ankle. Soft tissue swelling and edema about the knee and proximal to mid lower leg could relate to cellulitis.        Ct Chest Pulmonary Embolism W Contrast    Result Date: 11/30/2020  EXAMINATION: CTA OF THE CHEST 11/30/2020 9:35 pm TECHNIQUE: CTA of the chest was performed after the administration of intravenous contrast.  Multiplanar reformatted images are provided for review. MIP images are provided for review. Dose modulation, iterative reconstruction, and/or weight based adjustment of the mA/kV was utilized to reduce the radiation dose to as low as reasonably achievable. COMPARISON: 02/07/2019. HISTORY: ORDERING SYSTEM PROVIDED HISTORY: LLE swelling CP SOB TECHNOLOGIST PROVIDED HISTORY: LLE swelling CP SOB Reason for Exam: LLE swelling CP SOB, PE Acuity: Acute Type of Exam: Initial FINDINGS: Pulmonary Arteries: Pulmonary arteries are adequately opacified for evaluation. No evidence of intraluminal filling defect to suggest pulmonary embolism. Main pulmonary artery is normal in caliber. Mediastinum: No evidence of mediastinal lymphadenopathy. The heart and pericardium demonstrate no acute abnormality. There is no acute abnormality of the thoracic aorta. Stable cranial linear radiopacity in the left mediastinum and hilar region. Lungs/pleura: Chronic moderate to large left pleural effusion with pleural thickening and a ventriculoperitoneal shunt catheter in the pleural space. Left lung base rounded consolidation is similar to the prior study and is most suggestive of rounded atelectasis. Right middle and left upper lobe subsegmental atelectasis/scarring. No pneumothorax. No new suspicious pulmonary nodule, mass or consolidation. Upper Abdomen: Partially visualized right renal atrophy and cortical scarring. Left renal cyst measures up to 3.0 cm. Atherosclerosis. Stable small left adrenal gland nodule likely represents a benign adenoma. Soft Tissues/Bones: Multilevel degenerative changes in the visualized spine. Suggestion of osteopenia. T12 and L2 superior endplate compression fracture deformities with osseous sclerosis. The L2 superior endplate compression deformity demonstrates some cortical irregularity.   These could represent subacute to chronic compression fractures. Multilevel ventriculoperitoneal shunt catheters. No acute pulmonary thromboembolic disease. No pulmonary hypertension. Chronic moderate to large left pleural effusion with pleural thickening and ventriculoperitoneal shunt catheter in the pleural space. Left lung base rounded consolidation is similar to the prior study and is most consistent with rounded atelectasis. Osteopenia. T12 and L2 superior endplate compression fracture deformities with osseous sclerosis could represent subacute to chronic compression fractures. ASSESSMENT  and  PLAN     Principal Problem:    Cellulitis of left leg  Active Problems:    Failure of outpatient treatment  Resolved Problems:    * No resolved hospital problems. *    Plan:    Cellulitis of left lower extremity  -IV vancomycin initiated in ED  --Continue vancomycin on admission to unit  -Venous Doppler of left lower extremity negative for DVT  -X-ray left tib-fib shows no cortical destruction to suggest acute osteomyelitis. No acute fracture. Degenerative changes in the left knee and ankle. Soft tissue swelling and edema about the knee and proximal to mid lower leg could relate to cellulitis. -Elevate affected limb  -IV fluids- [email or web address]  -Consult ID  -WBC 7.4  -Failure of outpatient treatment    Left leg swelling  -  -CT chest shows no acute pulmonary thromboembolic disease. No pulmonary hypertension. Chronic moderate to large left pleural effusion with pleural thickening and ventriculoperitoneal shunt catheter in the pleural space. Left lung base rounded consolidation is similar to the prior study and is most consistent with rounded atelectasis.   -Tropes flat  -PT/OT eval  -Creatinine 0.97, BUN 24    DVT prophylaxis Lovenox    Consultations:     PHARMACY TO DOSE VANCOMYCIN  IP CONSULT TO INFECTIOUS DISEASES      ANNALISA Mcdowell CNP   12/1/2020  2:00 AM    [address]   [address] [place], [place] [address].   Phone [phone]     Attending Physician Statement  I have discussed the care of [facility] with the CNP. I have examined the patient myself and taken ros and hpi , including pertinent history and exam findings. I have reviewed the key elements of all parts of the encounter with the CNPt. I agree with the assessment, plan and orders as documented by the CNP. 1. Cellulitis of left lower extremity, no sepsis, failure of outpatient antibiotics-treated with IV vancomycin      Body mass index is 24.24 kg/m².       DVT prophylaxis-Lovenox    Electronically signed by Andreina Pina MD

## 2020-12-01 NOTE — PROGRESS NOTES
Pharmacy Note  Vancomycin Consult    [address] [facility] is a 69 y.o. female started on Vancomycin for cellulitis; consult received from Dr. Stefania Byrne to manage therapy. Patient Active Problem List   Diagnosis    Contusion of hip    UTI (urinary tract infection), uncomplicated    Inability to ambulate due to hip    Closed fracture of left inferior pubic ramus (HCC)    Closed compression fracture of L4 lumbar vertebra    Cellulitis of left leg    Failure of outpatient treatment    Hydrocephalus (HCC)    S/P  shunt       Allergies:  Levaquin [levofloxacin in d5w]; Adhesive tape; Chocolate; Indomethacin; and Neurontin [gabapentin]     Temp max: 97.7    Recent Labs     11/30/20 2105   BUN 24*       Recent Labs     11/30/20 2105   CREATININE 0.97*       Recent Labs     11/30/20 2105   WBC 7.4       No intake or output data in the 24 hours ending 12/01/20 0216    Culture Date      Source                       Results  none    Ht Readings from Last 1 Encounters:   11/30/20 5' 1\" (1.549 m)        Wt Readings from Last 1 Encounters:   11/30/20 143 lb (64.9 kg)         Body mass index is 27.02 kg/m². Estimated Creatinine Clearance: 46 mL/min (A) (based on SCr of 0.97 mg/dL (H)). Goal Trough Level: 10-20 mcg/mL    Assessment/Plan:  Will initiate Vancomycin with a one time loading dose of 1500 mg x1, followed by 1000 mg IV every 24 hours. Timing of trough level will be determined based on culture results, renal function, and clinical response. Thank you for the consult. Will continue to follow.

## 2020-12-01 NOTE — PROGRESS NOTES
Physical Therapy  DATE: 2020    NAME: Reinaldo Booker  MRN: [id number]   : 1950    Patient not seen this date for Physical Therapy due to:  [] Blood transfusion in progress  [] Hemodialysis  [] Patient Declined  [] Spine Precautions   [] Strict Bedrest  [] Surgery/ Procedure  [] Testing      [x] Other Awaiting dopplers results for L LE to be  posted. [] PT is being discontinued at this time. Patient independent. No further needs. [] PT is being discontinued at this time due to declining physical/ medical status. Therapy is not appropriate at this time.     Amada Holder, PT

## 2020-12-01 NOTE — ED PROVIDER NOTES
EMERGENCY DEPARTMENT ENCOUNTER    Pt Name: Liliya Menendez  MRN: [id number]  [place] 1950  Date of evaluation: 11/30/20  CHIEF COMPLAINT       Chief Complaint   Patient presents with    Leg Swelling     Left     HISTORY OF PRESENT ILLNESS   HPI     This is a 71-year-old female with a history of hydrocephalus status post  shunt and chronic headaches who comes in today with her caregiver. The patient states for the last month or 2 she has had left lower extremity swelling. She did see her doctor who prescribed her antibiotics about 2 weeks ago she did finish the course of antibiotics but it did not improve. Her home nurse evaluated it and recommended that she come here for further evaluation. Patient states that she does have pain in her left lower extremity. She is able to ambulate but she does have falls she was seen and evaluated a week or 2 ago because of a fall. She has not had a fall since. The patient states that she has difficult time walking secondary to the pain but is able to walk and feels like the swelling and the redness has gone up further in her leg. 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 The patient does have some chest pain and shortness of breath which is unusual per the caregiver. She denies any abdominal pain nausea or vomiting. REVIEW OF SYSTEMS     Review of Systems   Constitutional: Negative for fever. HENT: Negative for congestion. Respiratory: Positive for shortness of breath. Negative for cough. Cardiovascular: Positive for chest pain. Gastrointestinal: Negative for abdominal pain, diarrhea and vomiting. Genitourinary: Negative for dysuria. Musculoskeletal: Negative for back pain. Left lower extremity pain   Skin: Negative for rash. Neurological: Negative for headaches. All other systems reviewed and are negative.     PASTMEDICAL HISTORY     Past Medical History:   Diagnosis Date    Acid reflux     Bipolar affective (HCC)     Chronic headaches     Hydrocephalus (HCC)      SURGICAL HISTORY       Past Surgical History:   Procedure Laterality Date    CHOLECYSTECTOMY      HIP FRACTURE SURGERY Left 06/09/2017    PINNING AND SCREW    HIP PINNING Left 6/9/2017    HIP PINNING 7.3 CANNULATED SCREW WITH [employer]NTHES PRODUCT APPLICATION AND INTRAOPERATIVE C-ARM performed by Daria Lira MD at [place] [address]      shunt x3 head    SHUNT REVISION      TONSILLECTOMY       CURRENT MEDICATIONS       Previous Medications    ACETAMINOPHEN (TYLENOL) 325 MG TABLET    Take 2 tablets by mouth every 6 hours as needed for Pain    ALENDRONATE (FOSAMAX) 70 MG TABLET    Take 70 mg by mouth every 7 days Every Wednesday    AMLODIPINE (NORVASC) 10 MG TABLET    Take 5 mg by mouth daily     ASPIRIN 81 MG TABLET    Take 81 mg by mouth daily. ATORVASTATIN (LIPITOR) 10 MG TABLET    Take 10 mg by mouth daily    BISMUTH SUBSALICYLATE (BISMATROL) 262 MG CHEWABLE TABLET    Take 524 mg by mouth as needed for Heartburn (8 times daily)    BUSPIRONE (BUSPAR) 5 MG TABLET    Take 5 mg by mouth 2 times daily    CALCIUM CARBONATE (TUMS) 500 MG CHEWABLE TABLET    Take 1 tablet by mouth 4 times daily as needed for Heartburn    FERROUS SULFATE 325 (65 FE) MG EC TABLET    Take 325 mg by mouth daily (with breakfast).     IBUPROFEN (ADVIL;MOTRIN) 200 MG TABLET    Take 200 mg by mouth every 6 hours as needed for Pain (or 2 tabs)    LAMOTRIGINE (LAMICTAL) 100 MG TABLET    Take 100 mg by mouth daily     LAMOTRIGINE (LAMICTAL) 200 MG TABLET    Take 200 mg by mouth nightly    LIDOCAINE PTCH    Place 1 patch onto the skin daily    LOPERAMIDE (IMODIUM) 2 MG CAPSULE    Take 2 mg by mouth 4 times daily as needed for Diarrhea    MAGNESIUM HYDROXIDE (MILK OF MAGNESIA) 400 MG/5ML SUSPENSION    Take 5 mLs by mouth daily as needed for Constipation    MELATONIN 5 MG TABS TABLET    Take 5 mg by mouth daily    METOCLOPRAMIDE HCL (REGLAN PO)    Take 5 mg by mouth 3 times daily     MISC NATURAL PRODUCTS (GLUCOSAMINE CHONDROITIN ADV PO)    Take 1 tablet by mouth daily    MULTIPLE VITAMINS-MINERALS (THERAPEUTIC MULTIVITAMIN-MINERALS) TABLET    Take 1 tablet by mouth daily. NEOMYCIN-BACITRACIN-POLYMYXIN (NEOSPORIN) 5-400-5000 OINTMENT    Apply 1 each topically as needed (rash) Apply topically 4 times daily. QUETIAPINE FUMARATE (SEROQUEL PO)    Take 50 mg by mouth nightly     SENNA-DOCUSATE (PERICOLACE) 8.6-50 MG PER TABLET    Take 1 tablet by mouth as needed for Constipation (in eveninig) Or 2 tabs    TOPIRAMATE (TOPAMAX) 50 MG TABLET    Take 75 mg by mouth every evening 1 and a half tabs    TOPIRAMATE (TOPAMAX) 50 MG TABLET    Take 1 tablet by mouth daily    VORTIOXETINE (TRINTELLIX) 5 MG TABLET    Take 10 mg by mouth daily     ALLERGIES     is allergic to levaquin [levofloxacin in d5w]; adhesive tape; chocolate; indomethacin; and neurontin [gabapentin]. FAMILY HISTORY     She indicated that her mother is . She indicated that her father is . SOCIAL HISTORY       Social History     Tobacco Use    Smoking status: Never Smoker    Smokeless tobacco: Never Used   Substance Use Topics    Alcohol use: No    Drug use: No     PHYSICAL EXAM     INITIAL VITALS: /71   Pulse 104   Temp 97.8 °F (36.6 °C) (Temporal)   Resp 16   Ht 5' 1\" (1.549 m)   Wt 143 lb (64.9 kg)   SpO2 94%   BMI 27.02 kg/m²    Physical Exam  Vitals signs and nursing note reviewed. Constitutional:       General: She is not in acute distress. Appearance: She is well-developed. HENT:      Head:      Comments: Healing periorbital ecchymoses and ecchymoses in the infraorbital ridge nontender to palpation  Eyes:      Conjunctiva/sclera: Conjunctivae normal.   Neck:      Musculoskeletal: Neck supple. Cardiovascular:      Rate and Rhythm: Regular rhythm. Tachycardia present. Heart sounds: No murmur. No friction rub.    Pulmonary: flush 0.9 % injection 10 mL    iopamidol (ISOVUE-370) 76 % injection 75 mL    vancomycin (VANCOCIN) intermittent dosing (placeholder)     Order Specific Question:   Antimicrobial Indications     Answer:   Skin and Soft Tissue Infection    vancomycin (VANCOCIN) 1,500 mg in dextrose 5 % 250 mL IVPB (ADDAVIAL)     Order Specific Question:   Antimicrobial Indications     Answer:   Skin and Soft Tissue Infection         FINAL IMPRESSION      1. Cellulitis of left lower extremity    2.  Chest pain, unspecified type         DISPOSITION/PLAN   DISPOSITION Decision To Admit 11/30/2020 11:42:21 PM    Tricia Garland MD  Attending Emergency Physician    This charting supersedes any ED resident or staff charting and was written using speech recognition software        Tricia Garland MD  11/30/20 [id number]

## 2020-12-02 ENCOUNTER — APPOINTMENT (OUTPATIENT)
Dept: INTERVENTIONAL RADIOLOGY/VASCULAR | Age: 70
DRG: 603 | End: 2020-12-02
Payer: MEDICARE

## 2020-12-02 LAB
EKG ATRIAL RATE: 84 BPM
EKG P AXIS: 65 DEGREES
EKG P-R INTERVAL: 142 MS
EKG Q-T INTERVAL: 352 MS
EKG QRS DURATION: 88 MS
EKG QTC CALCULATION (BAZETT): 415 MS
EKG R AXIS: 37 DEGREES
EKG T AXIS: 34 DEGREES
EKG VENTRICULAR RATE: 84 BPM

## 2020-12-02 PROCEDURE — 99232 SBSQ HOSP IP/OBS MODERATE 35: CPT | Performed by: INTERNAL MEDICINE

## 2020-12-02 PROCEDURE — 02HV33Z INSERTION OF INFUSION DEVICE INTO SUPERIOR VENA CAVA, PERCUTANEOUS APPROACH: ICD-10-PCS | Performed by: RADIOLOGY

## 2020-12-02 PROCEDURE — 97530 THERAPEUTIC ACTIVITIES: CPT

## 2020-12-02 PROCEDURE — 2060000000 HC ICU INTERMEDIATE R&B

## 2020-12-02 PROCEDURE — 2580000003 HC RX 258: Performed by: EMERGENCY MEDICINE

## 2020-12-02 PROCEDURE — 2580000003 HC RX 258: Performed by: RADIOLOGY

## 2020-12-02 PROCEDURE — 93010 ELECTROCARDIOGRAM REPORT: CPT | Performed by: INTERNAL MEDICINE

## 2020-12-02 PROCEDURE — 6360000002 HC RX W HCPCS: Performed by: EMERGENCY MEDICINE

## 2020-12-02 PROCEDURE — 2580000003 HC RX 258: Performed by: NURSE PRACTITIONER

## 2020-12-02 PROCEDURE — 97535 SELF CARE MNGMENT TRAINING: CPT

## 2020-12-02 PROCEDURE — 36573 INSJ PICC RS&I 5 YR+: CPT | Performed by: RADIOLOGY

## 2020-12-02 PROCEDURE — 97165 OT EVAL LOW COMPLEX 30 MIN: CPT

## 2020-12-02 PROCEDURE — 97162 PT EVAL MOD COMPLEX 30 MIN: CPT

## 2020-12-02 PROCEDURE — 6360000002 HC RX W HCPCS: Performed by: NURSE PRACTITIONER

## 2020-12-02 PROCEDURE — 6370000000 HC RX 637 (ALT 250 FOR IP): Performed by: NURSE PRACTITIONER

## 2020-12-02 PROCEDURE — 2709999900 IR PICC WO SQ PORT/PUMP > 5 YEARS

## 2020-12-02 RX ORDER — SODIUM CHLORIDE 0.9 % (FLUSH) 0.9 %
10 SYRINGE (ML) INJECTION EVERY 12 HOURS SCHEDULED
Status: DISCONTINUED | OUTPATIENT
Start: 2020-12-02 | End: 2020-12-03 | Stop reason: HOSPADM

## 2020-12-02 RX ORDER — SODIUM CHLORIDE 0.9 % (FLUSH) 0.9 %
10 SYRINGE (ML) INJECTION PRN
Status: DISCONTINUED | OUTPATIENT
Start: 2020-12-02 | End: 2020-12-03 | Stop reason: HOSPADM

## 2020-12-02 RX ADMIN — ATORVASTATIN CALCIUM 10 MG: 10 TABLET, FILM COATED ORAL at 09:30

## 2020-12-02 RX ADMIN — Medication 10 ML: at 09:38

## 2020-12-02 RX ADMIN — TOPIRAMATE 100 MG: 100 TABLET, FILM COATED ORAL at 18:10

## 2020-12-02 RX ADMIN — PANTOPRAZOLE SODIUM 40 MG: 40 TABLET, DELAYED RELEASE ORAL at 05:26

## 2020-12-02 RX ADMIN — ASPIRIN 81 MG: 81 TABLET, COATED ORAL at 09:29

## 2020-12-02 RX ADMIN — QUETIAPINE FUMARATE 50 MG: 50 TABLET ORAL at 21:48

## 2020-12-02 RX ADMIN — FERROUS SULFATE TAB 325 MG (65 MG ELEMENTAL FE) 325 MG: 325 (65 FE) TAB at 09:30

## 2020-12-02 RX ADMIN — METOCLOPRAMIDE 5 MG: 5 TABLET ORAL at 05:26

## 2020-12-02 RX ADMIN — VANCOMYCIN HYDROCHLORIDE 1000 MG: 1 INJECTION, POWDER, LYOPHILIZED, FOR SOLUTION INTRAVENOUS at 00:50

## 2020-12-02 RX ADMIN — VORTIOXETINE 5 MG: 5 TABLET, FILM COATED ORAL at 09:33

## 2020-12-02 RX ADMIN — Medication 10 ML: at 21:50

## 2020-12-02 RX ADMIN — FLUTICASONE PROPIONATE 1 SPRAY: 50 SPRAY, METERED NASAL at 09:33

## 2020-12-02 RX ADMIN — BUSPIRONE HYDROCHLORIDE 5 MG: 5 TABLET ORAL at 09:29

## 2020-12-02 RX ADMIN — LAMOTRIGINE 200 MG: 100 TABLET ORAL at 21:49

## 2020-12-02 RX ADMIN — BUSPIRONE HYDROCHLORIDE 5 MG: 5 TABLET ORAL at 21:48

## 2020-12-02 RX ADMIN — QUETIAPINE FUMARATE 25 MG: 50 TABLET ORAL at 09:29

## 2020-12-02 RX ADMIN — METOCLOPRAMIDE 5 MG: 5 TABLET ORAL at 12:36

## 2020-12-02 RX ADMIN — MULTIPLE VITAMINS W/ MINERALS TAB 1 TABLET: TAB at 09:29

## 2020-12-02 RX ADMIN — METOCLOPRAMIDE 5 MG: 5 TABLET ORAL at 16:15

## 2020-12-02 RX ADMIN — LAMOTRIGINE 100 MG: 100 TABLET ORAL at 09:39

## 2020-12-02 RX ADMIN — AMLODIPINE BESYLATE 5 MG: 5 TABLET ORAL at 09:29

## 2020-12-02 RX ADMIN — ENOXAPARIN SODIUM 40 MG: 40 INJECTION SUBCUTANEOUS at 09:30

## 2020-12-02 RX ADMIN — TOPIRAMATE 50 MG: 50 TABLET, FILM COATED ORAL at 09:33

## 2020-12-02 RX ADMIN — SODIUM CHLORIDE: 9 INJECTION, SOLUTION INTRAVENOUS at 09:30

## 2020-12-02 RX ADMIN — CETIRIZINE HYDROCHLORIDE 10 MG: 10 TABLET, FILM COATED ORAL at 09:29

## 2020-12-02 RX ADMIN — SUMATRIPTAN SUCCINATE 100 MG: 100 TABLET ORAL at 09:32

## 2020-12-02 ASSESSMENT — PAIN DESCRIPTION - LOCATION: LOCATION: HEAD;FACE;NECK

## 2020-12-02 ASSESSMENT — PAIN DESCRIPTION - PAIN TYPE: TYPE: CHRONIC PAIN

## 2020-12-02 ASSESSMENT — PAIN DESCRIPTION - FREQUENCY: FREQUENCY: CONTINUOUS

## 2020-12-02 ASSESSMENT — PAIN DESCRIPTION - ONSET: ONSET: ON-GOING

## 2020-12-02 ASSESSMENT — PAIN DESCRIPTION - PROGRESSION: CLINICAL_PROGRESSION: NOT CHANGED

## 2020-12-02 ASSESSMENT — PAIN SCALES - GENERAL
PAINLEVEL_OUTOF10: 10
PAINLEVEL_OUTOF10: 10

## 2020-12-02 ASSESSMENT — PAIN - FUNCTIONAL ASSESSMENT: PAIN_FUNCTIONAL_ASSESSMENT: ACTIVITIES ARE NOT PREVENTED

## 2020-12-02 ASSESSMENT — PAIN DESCRIPTION - DESCRIPTORS: DESCRIPTORS: ACHING;DISCOMFORT;CONSTANT

## 2020-12-02 NOTE — PROGRESS NOTES
[facility]    Occupational Therapy Evaluation  Date: 20  Patient Name: Melvin Breen       Room:   MRN: [id number]  Account: [de-identified]   : 1950  (74 y.o.) Gender: female     Discharge Recommendations:  Further Occupational Therapy is recommended upon facility discharge. Diagnosis: Cellulitis of LLE  Additional Pertinent Hx: This is a 65-year-old female with a history of hydrocephalus status post  shunt and chronic headaches who comes in today with her caregiver. The patient states for the last month or 2 she has had left lower extremity swelling. She did see her doctor who prescribed her antibiotics about 2 weeks ago she did finish the course of antibiotics but it did not improve. Her home nurse evaluated it and recommended that she come here for further evaluation. Patient states that she does have pain in her left lower extremity. She is able to ambulate but she does have falls she was seen and evaluated a week or 2 ago because of a fall. She has not had a fall since. The patient states that she has difficult time walking secondary to the pain but is able to walk and feels like the swelling and the redness has gone up further in her leg. 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 The patient does have some chest pain and shortness of breath which is unusual per the caregiver. She denies any abdominal pain nausea or vomiting. Treatment Diagnosis: impaired self care status  Past Medical History:  has a past medical history of Acid reflux, Bipolar affective ([address] Chronic headaches, and Hydrocephalus ([address] Past Surgical History:   has a past surgical history that includes other surgical history; shunt revision; Cholecystectomy; Tonsillectomy; Hip fracture surgery (Left, 2017); and hip pinning (Left, 2017).     Restrictions  Restrictions/Precautions: General Precautions, Fall Risk  Implants present? : Metal implants(L hip, shunt)  Other position/activity restrictions: up with assistance; hx falls, hx  shunt, CVA L side weak; LLE doppler neg for dvt  Required Braces or Orthoses?: No       Subjective  Subjective: \"I have to go to the bathroom now! \" patient yelling from room  Comments: RN ok'd OT evaluation this date. Patient agreeable, perseverates on IV and antibiotics throughout session     Vision  Vision: Impaired  Vision Exceptions: Wears glasses at all times  Hearing  Hearing: Exceptions to [facility]  Hearing Exceptions: Hard of hearing/hearing concerns, Bilateral hearing aid  Social/Functional History  Lives With: (Group Home)  Type of Home: House  Home Layout: One level  Home Access: Level entry  Bathroom Shower/Tub: Walk-in shower  Bathroom Toilet: Standard  Bathroom Equipment: Grab bars in shower, Hand-held shower, Shower chair  Bathroom Accessibility: Accessible, Walker accessible  Home Equipment: Rolling walker  Receives Help From: Other (comment)(staff)  ADL Assistance: Needs assistance(IND dressing/toileting, A with bathing)  Homemaking Assistance: Needs assistance(A with meals/laundry)  Homemaking Responsibilities: No  Ambulation Assistance: Independent(uses RW at all times)  Transfer Assistance: Independent  Active : No  Occupation: Retired  IADL Comments: sleeps with bed elevated due to shunt  Additional Comments: Pt reports receiving home health.         Objective      Cognition  Overall Cognitive Status: Exceptions  Safety Judgement: Decreased awareness of need for assistance, Decreased awareness of need for safety  Insights: Decreased awareness of deficits   Sensation  Overall Sensation Status: WFL(pt denies)   ADL  Feeding: Setup  Grooming: Contact guard assistance, Stand by assistance(standing at sink washing hands)  UE Bathing: Stand by assistance  LE Bathing: Contact guard assistance  UE Dressing: Stand by assistance  LE Dressing: Contact guard assistance(SBA doffing/donning hospital footies at edge of bed)  Toileting: Contact guard assistance, Stand by assistance(seated hygiene, able to pull up brief and manage hospital gown with CGA)  Additional Comments: Patient currently limited by cognition deficits and decreased balance requiring SBA-CGA for ADLs/mobility. Patient performed lower body dressing task and toileting routine with SBA-CGA using rolling walker for mobility. Other areas assessed by clinical observation/reasoning.     UE Function           LUE Strength  Gross LUE Strength: WFL     LUE Tone: Normotonic     LUE AROM (degrees)  LUE AROM : WFL  LUE General AROM: ROM WFL weak from hx of CVA     Left Hand AROM (degrees)  Left Hand AROM: WFL  RUE Strength  RUE Strength Comment: grossly 4/5      RUE Tone: Normotonic     RUE AROM (degrees)  RUE AROM : WFL     Right Hand AROM (degrees)  Right Hand AROM: WFL    Fine Motor Skills  Coordination  Movements Are Fluid And Coordinated: No  Coordination and Movement description: Left UE, Gross motor impairments, Decreased accuracy, Decreased speed   Comment: reports hx of CVA L side weakness               Quality of Movement Other  Comment: reports hx of CVA L side weakness       Mobility  Supine to Sit: Stand by assistance  Sit to Supine: Stand by assistance       Balance  Sitting Balance: Stand by assistance(seated edge of bed for strength/ROM assessment and for lower body dressing task)  Standing Balance: Contact guard assistance(CGA-SBA)  Standing Balance  Time: 1-2 min  Activity: standing at sink for hand hygiene at sink  Comment: UE support as needed - CGA-SBA  Functional Mobility  Functional - Mobility Device: Rolling Walker  Activity: To/from bathroom  Assist Level: Contact guard assistance  Functional Mobility Comments: CGA, slight unsteadiness on feet, safety concerns  Bed mobility  Supine to Sit: Stand by assistance  Sit to Supine: Stand by assistance  Scooting: Stand by assistance  Comment: head of bed elevated, safety  Short term goal 4: actively participate in 15+ minutes of therapeutic exercise/activity to promote increased safety and independence with self care/mobility tasks    Plan  Safety Devices  Safety Devices in place: Yes  Type of devices: Bed alarm in place, Gait belt, Nurse notified, Call light within reach, Left in bed(RN in room)     Plan  Times per week: 4-5  Current Treatment Recommendations: Strengthening, Endurance Training, Patient/Caregiver Education & Training, Equipment Evaluation, Education, & procurement, Self-Care / ADL, Balance Training, Functional Mobility Training, Safety Education & Training, Positioning  OT Education  OT Education: OT Role, Plan of Care, ADL Adaptive Strategies, Transfer Training, Equipment, Orientation  Patient Education: safety, activity promotion       OT Individual Minutes  Time In: 1410  Time Out: 10[facility]  Minutes: 25    Electronically signed by Danny Cochran OT on 12/2/20 at 2:52 PM EST

## 2020-12-02 NOTE — PROGRESS NOTES
Patient called out and found with IV pulled and blood on gown and bed. Former IV site left arm swollen and slightly reddened. Vancomycin was running. Left arm elevated with ice applied. Patient asked how IV came out and unable to tell writer what happened.

## 2020-12-02 NOTE — CARE COORDINATION
Spoke to Rebecca from [facility] and they will not accept the patient back on iv atb. LSW is checking with [facility] per guardians choice. Will continue to follow for needs. //tv  Informed patient that she would be going to a snf.//tv

## 2020-12-02 NOTE — PROGRESS NOTES
Physical Therapy    Facility/Department: [address] [place] [address] CARE  Initial Assessment    NAME: Sridevi Espinal  : [birth year]  MRN: [id number]    Date of Service: 2020    Discharge Recommendations:  Patient would benefit from continued therapy after discharge   PT Equipment Recommendations  Equipment Needed: No  Other: Pt has RW    Assessment   Body structures, Functions, Activity limitations: Decreased ADL status; Decreased functional mobility ; Decreased strength;Decreased safe awareness;Decreased cognition;Decreased balance; Increased pain  Assessment: Pt requiring SBA/CGA for mobility using RW. Pt is questionable historian and perseverates at times on other topics or gets distracted easily but is motivated for PT and enjoys ambulating. Pt will benefit from continued PT  Treatment Diagnosis: Impaired functional mobility 2* cellulitis  Specific instructions for Next Treatment: elevate LLE, progress amb, HEP, therex, balance  Prognosis: Good  Decision Making: Medium Complexity  History: 65-year-old female with a history of hydrocephalus status post  shunt and chronic headaches who comes in today with her caregiver. The patient states for the last month or 2 she has had left lower extremity swelling. She did see her doctor who prescribed her antibiotics about 2 weeks ago she did finish the course of antibiotics but it did not improve. Her home nurse evaluated it and recommended that she come here for further evaluation. Patient states that she does have pain in her left lower extremity. She is able to ambulate but she does have falls she was seen and evaluated a week or 2 ago because of a fall. She has not had a fall since. The patient states that she has difficult time walking secondary to the pain but is able to walk and feels like the swelling and the redness has gone up further in her leg.    Exam: ROM, MMT, bed mobility, transfers, amb, balance  Clinical Presentation: Pt cooperative, agreeable to Head;Face;Neck  Pain Descriptors: Aching;Discomfort; Constant  Pain Frequency: Continuous  Pain Onset: On-going  Clinical Progression: Not changed  Functional Pain Assessment: Activities are not prevented  Non-Pharmaceutical Pain Intervention(s): Ambulation/Increased Activity; Distraction;Repositioned; Rest  Response to Pain Intervention: None  Vital Signs  Patient Currently in Pain: Yes  Oxygen Therapy  O2 Device: None (Room air)  Patient Observation  Observations: Pt perseverates at times on topics, B hearing aides, mild erythema LLE       Orientation  Orientation  Overall Orientation Status: Within Functional Limits  Social/Functional History  Social/Functional History  Lives With: (Group Home)  Type of Home: House  Home Layout: One level  Home Access: Level entry  Bathroom Shower/Tub: Walk-in shower  Bathroom Toilet: Standard  Bathroom Equipment: Grab bars in shower, Hand-held shower, Shower chair  Bathroom Accessibility: Accessible, Walker accessible  Home Equipment: Rolling walker  Receives Help From: Other (comment)(staff)  ADL Assistance: Needs assistance(IND dressing/toileting, A with bathing)  Homemaking Assistance: Needs assistance(A with meals/laundry)  Homemaking Responsibilities: No  Ambulation Assistance: Independent(uses RW at all times)  Transfer Assistance: Independent  Active : No  Occupation: Retired  IADL Comments: sleeps with bed elevated due to shunt  Additional Comments: Pt reports receiving home health.    Cognition        Objective          AROM RLE (degrees)  RLE AROM: WFL  AROM LLE (degrees)  LLE AROM : WFL  AROM RUE (degrees)  RUE AROM : WFL  AROM LUE (degrees)  LUE AROM : WFL  Strength RLE  Strength RLE: WFL  Comment: Grossly 3/5, difficulty following commands for resistance  Strength LLE  Strength LLE: WFL  Comment: Grossly 3/5, difficulty following commands for resistance  Strength RUE  Strength RUE: WFL  Strength LUE  Strength LUE: WFL  Comment:       Sensation  Overall Sensation Status: WFL(pt denies)  Bed mobility  Rolling to Right: Stand by assistance  Supine to Sit: Stand by assistance  Sit to Supine: Stand by assistance  Scooting: Stand by assistance  Comment: Pt having difficulty scooting to bridge over - gets distracted easily. Head of bed elevated. Bed alarm on. Transfers  Sit to Stand: Contact guard assistance;Stand by assistance  Stand to sit: Contact guard assistance;Stand by assistance  Comment: Using RW, cues for hand placement. Ambulation  Ambulation?: Yes  Ambulation 1  Surface: level tile  Device: Rolling Walker  Assistance: Contact guard assistance;Stand by assistance  Quality of Gait: slow shravan, increased lateral sway, no LOB  Gait Deviations: Slow Shravan  Distance: 25'  Comments: Patient happy to be ambulating - good RW management. Stairs/Curb  Stairs?: No     Balance  Posture: Fair  Sitting - Static: Good  Sitting - Dynamic: Good;-  Standing - Static: Good;-  Standing - Dynamic: Fair;+  Comments: Fall risk, standing balance with RW. Plan   Plan  Times per week: 5-6x/week  Specific instructions for Next Treatment: elevate LLE, progress amb, HEP, therex, balance  Current Treatment Recommendations: Strengthening, Balance Training, Functional Mobility Training, Transfer Training, Endurance Training, Gait Training, Equipment Evaluation, Education, & procurement, Patient/Caregiver Education & Training, Safety Education & Training, Home Exercise Program, Positioning  Safety Devices  Type of devices:  All fall risk precautions in place, Bed alarm in place, Call light within reach, Patient at risk for falls, Gait belt, Left in bed, Nurse notified(OMAIRA Leonardo)    G-Code       OutComes Score                                                  AM-PAC Score     AM-PAC Inpatient Mobility without Stair Climbing Raw Score : 15 (12/02/20 1328)  AM-PAC Inpatient without Stair Climbing T-Scale Score : 43.03 (12/02/20 1328)  Mobility Inpatient CMS 0-100% Score: 47.43 (12/02/20 1328)  Mobility Inpatient without Stair CMS G-Code Modifier : CK (12/02/20 1328)       Goals  Short term goals  Time Frame for Short term goals: 3-4 days  Short term goal 1: Pt to demo Bed mobility with supervision. Short term goal 2: Pt to demo transfers with supervision. Short term goal 3: Pt to amb 75'-100' supervision. Short term goal 4: Pt to improve BLE strength by 1/2 MMG with good technique for HEP. Patient Goals   Patient goals :  To go home       Therapy Time   Individual Concurrent Group Co-treatment   Time In [address] [place] [address] Drive         Time Out 1404         Minutes 36         Timed Code Treatment Minutes: [address], PT

## 2020-12-02 NOTE — PROGRESS NOTES
RN spoke with Dr. Gal Forde and notified her that the venous doppler was negative.  New orders to place a PICC line for long term outpatient IV vanco.

## 2020-12-02 NOTE — PROGRESS NOTES
[facility] -   Infectious diseases evaluation  admission date 11/30/2020    reason for consultation:   Left leg cellulitis    Impression :   Current:  · Left leg cellulitis failed outpatient treatment  · History of hydrocephalus status post  shunt  · Chronic left pleural effusion and left lung base consolidation  · Bipolar disorder  · GERD. · Levaquin allergy      Recommendations   · Continue IV vancomycin until 12/9/2020  · PICC line was placed  · Lower extremity venous Doppler showed no DVT   · Follow CBC renal function and vancomycin level  · The patient may be discharged from infectious disease point of view        History of Present Illness:   Initial history: Elsie Dorado is a 69y.o.-year-old female was brought to the hospital from [facility] home for left lower extremity pain, throbbing, continuous, no radiation, moderate in severity associated with swelling and redness for 2 weeks, was treated with antibiotics as an outpatient with no improvement  CT chest 11/30/2020 showed no PE, did show large left pleural effusion with pleural thickening and  shunt catheter in the pleural space, left lung base consolidation. 11/30/2020 left tibia-fibula x-ray showed soft tissue swelling and edema  11/30/2020 WBC 7.4, creatinine 0.97  Interval changes  12/2/2020   She still complaining of left leg pain, less swelling and redness, denied cough or shortness of breath, no nausea or vomiting, no diarrhea no other complaints  Patient Vitals for the past 8 hrs:   BP Temp Temp src Pulse Resp SpO2   12/02/20 1432 (!) 141/66 97.3 °F (36.3 °C) Oral 80 16 100 %   12/02/20 0828 135/71 97.4 °F (36.3 °C) Oral 89 16 99 %           I have personally reviewed the past medical history, past surgical history, medications, social history, and family history, and I haveupdated the database accordingly. Allergies:   Levaquin [levofloxacin in d5w]; Adhesive tape;  Chocolate; organizations: Not on file     Relationship status: Not on file    Intimate partner violence     Fear of current or ex partner: Not on file     Emotionally abused: Not on file     Physically abused: Not on file     Forced sexual activity: Not on file   Other Topics Concern    Not on file   Social History Narrative    Not on file       Family History:   History reviewed. No pertinent family history. Medical Decision Making:   I have independently reviewed/ordered the following labs:    CBC with Differential:   Recent Labs     11/30/20 2105 12/01/20  0547   WBC 7.4 7.4   HGB 12.3 13.0   HCT 36.7 39.9    216   LYMPHOPCT 17*  --    MONOPCT 8*  --      BMP:  Recent Labs     11/30/20  2105 12/01/20  0547    143   K 4.0 4.0    109*   CO2 24 22   BUN 24* 21   CREATININE 0.97* 0.92*   MG 2.0  --      Hepatic Function Panel: No results for input(s): PROT, LABALBU, BILIDIR, IBILI, BILITOT, ALKPHOS, ALT, AST in the last 72 hours. No results for input(s): RPR in the last 72 hours. No results for input(s): HIV in the last 72 hours. No results for input(s): BC in the last 72 hours. Lab Results   Component Value Date    CREATININE 0.92 12/01/2020    GLUCOSE 91 12/01/2020       Detailed results: Thank you for allowing us to participate in the care of this patient. Please call with questions. This note is created with the assistance of a speech recognition program.  While intending to generate adocument that actually reflects the content of the visit, the document can still have some errors including those of syntax and sound a like substitutions which may escape proof reading. It such instances, actual meaningcan be extrapolated by contextual diversion.     Lauren Harrington MD  Office: [phone]  Perfect serve / office [phone]

## 2020-12-02 NOTE — PLAN OF CARE
Problem: Falls - Risk of:  Goal: Will remain free from falls  Description: Will remain free from falls  Outcome: Ongoing  Note: Bed alarm on. Assisted to bathroom with walker. Patient forgetful. Goal: Absence of physical injury  Description: Absence of physical injury  Outcome: Ongoing     Problem: Infection:  Goal: Will remain free from infection  Description: Will remain free from infection  Outcome: Ongoing  Note: Left leg reddened and edematous. IV vanco continues. Problem: Safety:  Goal: Free from accidental physical injury  Description: Free from accidental physical injury  Outcome: Ongoing  Goal: Free from intentional harm  Description: Free from intentional harm  Outcome: Ongoing     Problem: Daily Care:  Goal: Daily care needs are met  Description: Daily care needs are met  Outcome: Ongoing     Problem: Pain:  Goal: Patient's pain/discomfort is manageable  Description: Patient's pain/discomfort is manageable  Outcome: Ongoing  Note: Patient with pain in left leg. Medicated with tramadol with relief of pain. Problem: Skin Integrity:  Goal: Skin integrity will stabilize  Description: Skin integrity will stabilize  Outcome: Ongoing  Note: Bruising noted left hip. Turns and repositions self. Skin intact.      Problem: Discharge Planning:  Goal: Patients continuum of care needs are met  Description: Patients continuum of care needs are met  Outcome: Ongoing

## 2020-12-02 NOTE — DISCHARGE INSTR - COC
Continuity of Care Form    Patient Name: Malcolm Byrne   :    MRN:  [id number]    Admit date:  2020  Discharge date:  20    Code Status Order: Full Code   Advance Directives:   [facility] Documentation       Date/Time Healthcare Directive Type of Healthcare Directive Copy in [facility] [address] Agent's Name Healthcare Agent's Phone Number    20 0126  No, patient does not have an advance directive for healthcare treatment -- -- -- -- --            Admitting Physician:  Kris Mims MD  PCP: Miguel Angel Figueroa MD    Discharging Nurse: Rickey Hunter [facility] Unit/Room#: /2091  Discharging Unit Phone Number: [phone]    Emergency Contact:   Extended Emergency Contact Information  Primary Emergency Contact: Francisco Ignacio  Address: [address] [place] [address] Phone: [phone]  Relation: Brother/Sister  Hearing or visual needs: None  Other needs: None  Preferred language: English   needed? No  Secondary Emergency Contact: Candelaria Baptiste, [facility] Phone: [phone]  Relation: Lay Caregiver    Past Surgical History:  Past Surgical History:   Procedure Laterality Date    CHOLECYSTECTOMY      HIP FRACTURE SURGERY Left 2017    PINNING AND SCREW    HIP PINNING Left 2017    HIP PINNING 7.3 CANNULATED SCREW WITH [employer] PRODUCT APPLICATION AND INTRAOPERATIVE C-ARM performed by Deya Suarez MD at [facility]      shunt x3 head    SHUNT REVISION      TONSILLECTOMY         Immunization History: There is no immunization history for the selected administration types on file for this patient.     Active Problems:  Patient Active Problem List   Diagnosis Code    Contusion of hip S70.00XA    UTI (urinary tract infection), uncomplicated V07.8    Inability to ambulate due to hip R26.2    Closed fracture of left inferior pubic ramus ([address] S32.591D    Closed compression fracture of L4 lumbar vertebra S32.040A    Cellulitis of left leg L03. 116    Failure of outpatient treatment Z78.9    Hydrocephalus ([address] G91.9    S/P  shunt Z98.2       Isolation/Infection:   Isolation            No Isolation          Patient Infection Status       None to display            Nurse Assessment:  Last Vital Signs: /71   Pulse 89   Temp 97.4 °F (36.3 °C) (Oral)   Resp 16   Ht 5' 1\" (1.549 m)   Wt 132 lb 4.4 oz (60 kg)   SpO2 99%   BMI 24.99 kg/m²     Last documented pain score (0-10 scale): Pain Level: 10  Last Weight:   Wt Readings from Last 1 Encounters:   12/02/20 132 lb 4.4 oz (60 kg)     Mental Status:  oriented and alert    IV Access:  - PICC - site  R Basilic, insertion date: 12/02    Nursing Mobility/ADLs:  Walking   Assisted  Transfer  Assisted  Bathing  Assisted  Dressing  Assisted  Toileting  Assisted  Feeding  Assisted  Med Admin  Assisted  Med Delivery   whole    Wound Care Documentation and Therapy:        Elimination:  Continence:   · Bowel: Yes  · Bladder: Yes  Urinary Catheter: None   Colostomy/Ileostomy/Ileal Conduit: No       Date of Last BM: 12/3/2020    Intake/Output Summary (Last 24 hours) at 12/2/2020 1140  Last data filed at 12/2/2020 0403  Gross per 24 hour   Intake 610 ml   Output --   Net 610 ml     I/O last 3 completed shifts: In: 56 [P.O.:360; IV Piggyback:250]  Out: -     Safety Concerns: At Risk for Falls    Impairments/Disabilities:      Vision and Hearing    Nutrition Therapy:  Current Nutrition Therapy:   - Oral Diet:  Cardiac    Routes of Feeding: Oral  Liquids: No Restrictions  Daily Fluid Restriction: no  Last Modified Barium Swallow with Video (Video Swallowing Test): not done    Treatments at the Time of Hospital Discharge:   Respiratory Treatments:   Oxygen Therapy:  is not on home oxygen therapy.   Ventilator:    - No ventilator support    Rehab Therapies: Physical Therapy and Occupational Therapy  Weight Bearing Status/Restrictions: Other Medical Equipment (for information only, NOT a DME order): Other Treatments: skilled nursing assessment per protocol medication education   Picc line care per protocol   PICC Double Lumen 74/73/66 Right Basilic     Placement date:  12/02/20   Removal date:  --     Placement time:  1127   Removal time:  --     Site:  Basilic   Days:  less than 1    Timeout:  Patient;Procedure;Site/Side;Consent Confirmed; Appropriate Equipment;Sterile Technique using full body drape;Site prepped with chlorhexidine;Sterile drsg with biopatch; Correct Position   Ultrasound guided:  Yes     Size (Fr):  5   Length (cm):  43 cm     Lot #:  [id number]   LDA Expiration Date:  07/31/22     Orientation:  Right   Site Prep:  Chlorhexidine     Local Anesthetic:  Injectable   Extremity Circumference: 5cm above AC:  24 cm     Inserted by:  Dr Sharmila Roberts   Insertion attempts:  1     Patient Tolerance:  Cooperative   Placement Verification:  Xray;Blood Return; Other (Comment)  cavo atrial junction        Assessments       12/02/20 1130        Is this a power PICC? Yes         Site Assessment  Clean;Dry; Intact         Purple Lumen Status  Alcohol cap  applied;Blood  return note-  d;Capped;Flu-  shed;Normal  saline locked         Orange Lumen Status  Alcohol cap  applied;Blood  return note-  d;Capped;Flu-  shed;Normal  saline locked         Extremity Circumference (cm)  24 cm         Alcohol Cap Used  Yes         Dressing Status  Clean;Dry; Intact         Dressing Type  Transparent;   Anti-microbi-  al patch         Dressing Intervention  New                  Patient's personal belongings (please select all that are sent with patient):  Glasses    RN SIGNATURE:  Electronically signed by Danika Quan RN on 12/3/20 at 1:23 PM EST    CASE MANAGEMENT/SOCIAL WORK SECTION    Inpatient Status Date: 11/30/20    Readmission Risk Assessment Score:  Readmission Risk              Risk of Unplanned Readmission:        17 Discharging to Facility/ Agency   [facility] (if applicable)   · Name:  · Address:  · Dialysis Schedule:  · Phone:  · Fax:    / signature: Electronically signed by Ramirez Crowe RN on 12/2/20 at 3:49 PM EST    PHYSICIAN SECTION    Prognosis: Good    Condition at Discharge: Stable    Rehab Potential (if transferring to Rehab): Good    Recommended Labs or Other Treatments After Discharge: CBC, BMP and Renal Function 12/6/20 and 12/13/20. Vancomycin pharmacy to dose Q24 hrs until 12/9/20. Last dose 12/2 @0100 1000mg in 250mL. Physician Certification: I certify the above information and transfer of Trev Snider  is necessary for the continuing treatment of the diagnosis listed and that she requires [facility] for greater 30 days.      Update Admission H&P: No change in H&P    PHYSICIAN SIGNATURE:  Electronically signed by Giovany Nuñez MD on 12/2/20 at 11:41 AM EST

## 2020-12-02 NOTE — DISCHARGE SUMMARY
[facility] Internal Medicine    Discharge Summary     Patient ID: Melvin Breen  :  89   MRN: [id number]     ACCOUNT:  [de-identified]   Patient's PCP: Sonia Rollins MD  Admit Date: 2020   Discharge Date: 12/3/2020   Length of Stay: 3  Code Status:  Full Code  Admitting Physician: Cristina Fernandez MD  Discharge Physician: Colten Mckeon MD     Active Discharge Diagnoses:     Primary Problem  Cellulitis of left leg      [place] Problems    Diagnosis Date Noted    Failure of outpatient treatment [Z78.9] 2020    S/P  shunt [Z98.2] 2020    Hydrocephalus ([address] [G91.9] 2020    Cellulitis of left leg [L03.116] 2020       Admission Condition:  fair     Discharged Condition: fair    Hospital Stay:     Hospital Course: Melvin Breen is a 69 y.o. female who was admitted for the management of Cellulitis of left leg , presented to ER with Leg Swelling (Left)    The patient is a 70 y.o.  female, with a history of hydrocephalus status post  shunt, chronic headaches, bipolar disorder, and acid reflux.  Patient resides at [facility] home. Samson Edmondson was evaluated here in the ER for evaluation of left lower extremity pain, redness and swelling for the past 2 weeks.  Patient has been on antibiotics 2 weeks ago for left leg cellulitis.  She also has a history of frequent falls.  No chest pain, shortness of breath, cough, abdominal pain, nausea, vomiting or diarrhea.  She also has history of chronic weakness in her left side  Lower extremity venous Dopplers showed no DVT    Was started on IV vancomycin, with improvement. Seen by ID. Discharge  Vancomycin plan until 2020 per PICC line.     Significant therapeutic interventions: As above    Significant Diagnostic Studies:   Labs / Micro:    Lab Results   Component Value Date    WBC 7.4 2020    HGB 13.0 2020    HCT 39.9 2020    MCV 98.7 2020    PLT 216 12/01/2020          Lab Results   Component Value Date     12/01/2020    K 4.0 12/01/2020     12/01/2020    CO2 22 12/01/2020    BUN 21 12/01/2020    CREATININE 0.92 12/01/2020    GLUCOSE 91 12/01/2020    CALCIUM 9.0 12/01/2020          Radiology:    Xr Shoulder Right (min 2 Views)    Result Date: 11/11/2020  EXAMINATION: THREE XRAY VIEWS OF THE RIGHT SHOULDER 11/11/2020 9:31 pm COMPARISON: 06/03/2020 HISTORY: ORDERING SYSTEM PROVIDED HISTORY: fall, right shoulder pain TECHNOLOGIST PROVIDED HISTORY: fall, right shoulder pain Reason for Exam: PT CO pain in right shoulder after falling today. PT poor historian but is able to report that her right shoulder hurts. Acuity: Acute Type of Exam: Initial FINDINGS: No acute fracture. No dislocation. Mild narrowing within the glenohumeral and acromioclavicular joints. No acute abnormality. Xr Tibia Fibula Left (2 Views)    Result Date: 11/30/2020  EXAMINATION: 2 XRAY VIEWS OF THE LEFT TIBIA AND FIBULA 11/30/2020 10:47 pm COMPARISON: Marcy 3, 2020. HISTORY: ORDERING SYSTEM PROVIDED HISTORY: pain TECHNOLOGIST PROVIDED HISTORY: pain Reason for Exam: Leg swelling Acuity: Acute Type of Exam: Initial FINDINGS: Soft tissue swelling and edema about the knee and proximal to mid lower leg. No cortical destruction to suggest acute osteomyelitis. No acute fracture. Bony alignment is grossly normal.  Degenerative changes in the knee and ankle. Bilateral meniscal chondrocalcinosis can be seen in the setting of CPPD arthropathy. Redemonstration of osseous irregularity and possible intra-articular ossification in the region of the tibial spines. Dorsal calcaneal bone spur. No cortical destruction to suggest acute osteomyelitis. No acute fracture. Degenerative changes in the left knee and ankle. Soft tissue swelling and edema about the knee and proximal to mid lower leg could relate to cellulitis.      Ct Head Wo Contrast    Result Date: 11/11/2020  EXAMINATION: CT OF THE HEAD WITHOUT CONTRAST  11/11/2020 8:55 pm TECHNIQUE: CT of the head was performed without the administration of intravenous contrast. Dose modulation, iterative reconstruction, and/or weight based adjustment of the mA/kV was utilized to reduce the radiation dose to as low as reasonably achievable. COMPARISON: 06/03/2020 HISTORY: ORDERING SYSTEM PROVIDED HISTORY: fall, head pain TECHNOLOGIST PROVIDED HISTORY: fall, head pain Reason for Exam: fall; head pain Acuity: Acute Type of Exam: Initial FINDINGS: BRAIN/VENTRICLES: There is no acute intracranial hemorrhage, mass effect or midline shift. No abnormal extra-axial fluid collection. The gray-white differentiation is maintained without evidence of an acute infarct. Retained right parietal ventricular shunt catheters is noted, unchanged. Left frontal approach ventricular shunt catheter terminates near the foramen of Monro. Left parietal approach ventricular shunt catheter terminates in the anterior horn of the left lateral ventricle. There is right frontoparietal encephalomalacia. There is ex vacuo dilation of the right lateral ventricle. Ventricular size is unchanged. ORBITS: The visualized portion of the orbits demonstrate no acute abnormality. SINUSES: Opacification of the left frontal sinus and left anterior ethmoid air cells. SOFT TISSUES/SKULL:  Postoperative changes of the cranium without acute cranial abnormality. There is left supraorbital subgaleal hematoma. 1. No acute intracranial abnormality. Chronic ischemic and postoperative changes are similar to prior study. 2. Left supraorbital soft tissue contusion/hematoma. No acute calvarial fracture. 3. Left frontal and ethmoid sinusitis, similar to prior examination.      Ct Facial Bones Wo Contrast    Result Date: 11/11/2020  EXAMINATION: CT OF THE FACE WITHOUT CONTRAST  11/11/2020 8:55 pm TECHNIQUE: CT of the face was performed without the administration of intravenous contrast. Multiplanar reformatted images are provided for review. Dose modulation, iterative reconstruction, and/or weight based adjustment of the mA/kV was utilized to reduce the radiation dose to as low as reasonably achievable. COMPARISON: CT facial bones 03/18/2019. HISTORY: ORDERING SYSTEM PROVIDED HISTORY: fall, pain and swelling TECHNOLOGIST PROVIDED HISTORY: fall, pain and swelling Reason for Exam: fall, pain and swelling Acuity: Acute Type of Exam: Initial FINDINGS: FACIAL BONES:  The maxilla, pterygoid plates and zygomatic arches are intact. The mandible is intact. The mandibular condyles are normally situated. The nasal bones and maxillary nasal processes are intact. ORBITS:  The globes appear intact. The extraocular muscles, optic nerve sheath complexes and lacrimal glands appear unremarkable. No retrobulbar hematoma or mass is seen. The orbital walls and rims are intact. SINUSES/MASTOIDS:  Chronic mucosal thickening in the left anterior ethmoid air cells and near complete opacification of the left frontal sinus. No acute paranasal sinus abnormality SOFT TISSUES:  Left frontal scalp hematoma. Left facial soft tissue swelling. 1. No acute facial bone abnormality. 2. Left frontal scalp hematoma and left facial soft tissue swelling. Ct Cervical Spine Wo Contrast    Result Date: 11/11/2020  EXAMINATION: CT OF THE CERVICAL SPINE WITHOUT CONTRAST 11/11/2020 8:55 pm TECHNIQUE: CT of the cervical spine was performed without the administration of intravenous contrast. Multiplanar reformatted images are provided for review. Dose modulation, iterative reconstruction, and/or weight based adjustment of the mA/kV was utilized to reduce the radiation dose to as low as reasonably achievable.  COMPARISON: 08/01/2019 HISTORY: ORDERING SYSTEM PROVIDED HISTORY: fall, neck pain TECHNOLOGIST PROVIDED HISTORY: fall, neck pain Reason for Exam: fall; neck pain Acuity: Acute Type of Exam: Initial FINDINGS: BONES/ALIGNMENT: There is reversal of the normal cervical lordosis, unchanged. Vertebral body heights are unchanged. No acute fracture. DEGENERATIVE CHANGES: Multilevel degenerative changes of the cervical spine. Prominent anterior osteophytes are again noted, suggestive of DISH. SOFT TISSUES: There is no prevertebral soft tissue swelling. 3 mm noncalcified left upper lobe nodule. There is a subcentimeter right thyroid nodule, which does not require further imaging follow-up     No acute abnormality of the cervical spine. Multilevel degenerative changes. 3 mm noncalcified left upper lobe nodule. RECOMMENDATIONS: Guidelines for follow-up and management of pulmonary nodules found on incomplete chest CT: <6 mm - No follow up recommend on the basis of the estimated low risk of malignancy. Reference:  Radiology. 2017 Jul; 284(1):228-243     Ct Chest Pulmonary Embolism W Contrast    Result Date: 11/30/2020  EXAMINATION: CTA OF THE CHEST 11/30/2020 9:35 pm TECHNIQUE: CTA of the chest was performed after the administration of intravenous contrast.  Multiplanar reformatted images are provided for review. MIP images are provided for review. Dose modulation, iterative reconstruction, and/or weight based adjustment of the mA/kV was utilized to reduce the radiation dose to as low as reasonably achievable. COMPARISON: 02/07/2019. HISTORY: ORDERING SYSTEM PROVIDED HISTORY: LLE swelling CP SOB TECHNOLOGIST PROVIDED HISTORY: LLE swelling CP SOB Reason for Exam: LLE swelling CP SOB, PE Acuity: Acute Type of Exam: Initial FINDINGS: Pulmonary Arteries: Pulmonary arteries are adequately opacified for evaluation. No evidence of intraluminal filling defect to suggest pulmonary embolism. Main pulmonary artery is normal in caliber. Mediastinum: No evidence of mediastinal lymphadenopathy. The heart and pericardium demonstrate no acute abnormality. There is no acute abnormality of the thoracic aorta.   Stable cranial linear radiopacity in the left mediastinum and hilar region. Lungs/pleura: Chronic moderate to large left pleural effusion with pleural thickening and a ventriculoperitoneal shunt catheter in the pleural space. Left lung base rounded consolidation is similar to the prior study and is most suggestive of rounded atelectasis. Right middle and left upper lobe subsegmental atelectasis/scarring. No pneumothorax. No new suspicious pulmonary nodule, mass or consolidation. Upper Abdomen: Partially visualized right renal atrophy and cortical scarring. Left renal cyst measures up to 3.0 cm. Atherosclerosis. Stable small left adrenal gland nodule likely represents a benign adenoma. Soft Tissues/Bones: Multilevel degenerative changes in the visualized spine. Suggestion of osteopenia. T12 and L2 superior endplate compression fracture deformities with osseous sclerosis. The L2 superior endplate compression deformity demonstrates some cortical irregularity. These could represent subacute to chronic compression fractures. Multilevel ventriculoperitoneal shunt catheters. No acute pulmonary thromboembolic disease. No pulmonary hypertension. Chronic moderate to large left pleural effusion with pleural thickening and ventriculoperitoneal shunt catheter in the pleural space. Left lung base rounded consolidation is similar to the prior study and is most consistent with rounded atelectasis. Osteopenia. T12 and L2 superior endplate compression fracture deformities with osseous sclerosis could represent subacute to chronic compression fractures.      Vl Lower Extremity Venous Left    Result Date: 12/2/2020    [facility]  Vascular Lower Extremities DVT Study Procedure   Patient Name [facility]   Date of Study           11/30/2020               [facility]   Date of      1950  Gender                  Female  Birth   Age          69 year(s)  Race                       Room Number  2091   Corporate ID [id number] #   Cong Luna [de-identified]  #   MR #         [id number]      Marino Russ   Accession #  [id number]  Interpreting Physician  Sanjay Bowman   Referring                Referring Physician     Marilyn Perez MD  Nurse  Practitioner  Procedure Type of Study:   Veins: Lower Extremities DVT Study, Venous Scan Lower Left. Indications for Study:Leg Swelling. Patient Status:Out Patient. Technical Quality:Limited visualization. Limitation reason:Edema, patient positioning. Conclusions   Summary   Technically limited visualization. No evidence of superficial or deep venous thrombosis in the left lower  extremity. Signature   ----------------------------------------------------------------  Electronically signed by Gamal Pollock(Sonographer) on  11/30/2020 10:11 PM  ----------------------------------------------------------------   ----------------------------------------------------------------  Electronically signed by Keyur Ellison(Interpreting physician)  on 12/02/2020 12:20 AM  ----------------------------------------------------------------  Findings:   Right Impression:                       Left Impression:  The common femoral vein demonstrates    Non - visualization of the  normal compressibility and              posterior tibial and peroneal  augmentation. veins. Remaining deep veins                                          demonstrate normal compressibility                                          and augmentation. Normal compressibility of the                                          great saphenous vein. Normal compressibility of the                                          small saphenous vein.   Velocities are measured in cm/s ; Diameters are measured in cm Right Lower Extremities DVT Study Measurements Right 2D Measurements +------------------------------------+----------+---------------+----------+ ! Location                            ! Visualized! Compressibility! Thrombosis! +------------------------------------+----------+---------------+----------+ ! Common Femoral                      !Yes       ! Yes            ! None      ! +------------------------------------+----------+---------------+----------+ Right Doppler Measurements +----------------------------+------+------+-------------------------------+ ! Location                    ! Signal!Reflux! Reflux (msec)                  ! +----------------------------+------+------+-------------------------------+ ! Common Femoral              !Phasic!      !                               ! +----------------------------+------+------+-------------------------------+ Left Lower Extremities DVT Study Measurements Left 2D Measurements +------------------------------------+----------+---------------+----------+ ! Location                            ! Visualized! Compressibility! Thrombosis! +------------------------------------+----------+---------------+----------+ ! Common Femoral                      !Yes       ! Yes            ! None      ! +------------------------------------+----------+---------------+----------+ ! Prox Femoral                        !Yes       ! Yes            ! None      ! +------------------------------------+----------+---------------+----------+ ! Mid Femoral                         !Yes       ! Yes            ! None      ! +------------------------------------+----------+---------------+----------+ ! Dist Femoral                        !Partial   !Yes            ! None      ! +------------------------------------+----------+---------------+----------+ ! Popliteal                           !Partial   !Yes            ! None      ! +------------------------------------+----------+---------------+----------+ ! Sapheno Femoral Junction            ! Yes       ! Yes            ! None      ! +------------------------------------+----------+---------------+----------+ ! PTV                                 ! No        !               !          ! +------------------------------------+----------+---------------+----------+ ! Peroneal                            !No        !               !          ! +------------------------------------+----------+---------------+----------+ ! Gastroc                             ! Partial   !Yes            ! None      ! +------------------------------------+----------+---------------+----------+ ! GSV Thigh                           ! Yes       ! Yes            ! None      ! +------------------------------------+----------+---------------+----------+ ! GSV Knee                            ! Yes       ! Yes            ! None      ! +------------------------------------+----------+---------------+----------+ ! GSV Ankle                           ! Yes       ! Yes            ! None      ! +------------------------------------+----------+---------------+----------+ ! SSV                                 ! Partial   !Yes            ! None      ! +------------------------------------+----------+---------------+----------+ Left Doppler Measurements +---------------------------+------+------+--------------------------------+ ! Location                   ! Signal!Reflux! Reflux (msec)                   ! +---------------------------+------+------+--------------------------------+ ! Common Femoral             !Phasic!      !                                ! +---------------------------+------+------+--------------------------------+ ! Prox Femoral               !Phasic!      !                                ! +---------------------------+------+------+--------------------------------+ ! Popliteal                  !Phasic!      !                                ! +---------------------------+------+------+--------------------------------+        Consultations:    Consults:     Final Specialist Recommendations/Findings:   PHARMACY TO DOSE VANCOMYCIN  IP CONSULT TO INFECTIOUS DISEASES      The patient was seen and examined on day of discharge and this discharge summary is in conjunction with any daily progress note from day of discharge. Discharge plan:     Disposition: ECF    Instructions to Patient: Keep follow-up appointments      Requiring Further Evaluation/Follow Up POST HOSPITALIZATION/Incidental Findings:    Physician Follow Up:     No follow-up provider specified. Diet: Regular    Activity: As tolerated    Discharge Medications:      Medication List      CHANGE how you take these medications    traMADol 50 MG tablet  Commonly known as:  ULTRAM  Take 1 tablet by mouth every 6 hours as needed for Pain for up to 3 days.  1-2 tablets every 6 hours  What changed:  additional instructions        CONTINUE taking these medications    acetaminophen 325 MG tablet  Commonly known as:  Tylenol  Take 2 tablets by mouth every 6 hours as needed for Pain     alendronate 70 MG tablet  Commonly known as:  FOSAMAX     amLODIPine 10 MG tablet  Commonly known as:  NORVASC     aspirin 81 MG tablet     atorvastatin 10 MG tablet  Commonly known as:  LIPITOR     Bismatrol 262 MG chewable tablet  Generic drug:  bismuth subsalicylate     busPIRone 5 MG tablet  Commonly known as:  BUSPAR     calcium carbonate 500 MG chewable tablet  Commonly known as:  TUMS     ferrous sulfate 325 (65 Fe) MG EC tablet  Commonly known as:  FE TABS 325     Fioricet -40 MG Caps per capsule  Generic drug:  butalbital-APAP-caffeine     fluticasone 50 MCG/ACT nasal spray  Commonly known as:  FLONASE     GLUCOSAMINE CHONDROITIN ADV PO     ibuprofen 200 MG tablet  Commonly known as:  ADVIL;MOTRIN     * lamoTRIgine 200 MG tablet  Commonly known as:  LAMICTAL     * lamoTRIgine 100 MG tablet  Commonly known as:  LAMICTAL     lidocaine 4 % external patch  Place 1 patch onto the skin daily     loperamide 2 MG capsule  Commonly known as: IMODIUM     loratadine 10 MG capsule  Commonly known as:  CLARITIN     magnesium hydroxide 400 MG/5ML suspension  Commonly known as:  MILK OF MAGNESIA     meclizine 25 MG tablet  Commonly known as:  ANTIVERT     melatonin 5 MG Tbdp disintegrating tablet     omeprazole 20 MG delayed release capsule  Commonly known as:  PRILOSEC     polyethylene glycol 17 GM/SCOOP powder  Commonly known as:  GLYCOLAX     * QUEtiapine 25 MG tablet  Commonly known as:  SEROQUEL     * SEROquel 25 MG tablet  Generic drug:  QUEtiapine     REGLAN PO     ROLAIDS PO     senna-docusate 8.6-50 MG per tablet  Commonly known as:  PERICOLACE     SUMAtriptan 100 MG tablet  Commonly known as:  IMITREX     therapeutic multivitamin-minerals tablet     * topiramate 100 MG tablet  Commonly known as:  TOPAMAX     * Topamax 50 MG tablet  Generic drug:  topiramate     Trintellix 5 MG tablet  Generic drug:  VORTIoxetine         * This list has 6 medication(s) that are the same as other medications prescribed for you. Read the directions carefully, and ask your doctor or other care provider to review them with you. STOP taking these medications    buprenorphine 10 MCG/HR Ptwk  Commonly known as:  Nanette Tariq           Where to Get Your Medications      You can get these medications from any pharmacy    Bring a paper prescription for each of these medications  · traMADol 50 MG tablet         Time Spent on discharge is  35 mins in patient examination, evaluation, counseling as well as medication reconciliation, prescriptions for required medications, discharge plan and follow up. Electronically signed by   General Bobbi MD  12/2/2020  11:44 AM      Thank you Dr. Dexter Hawley MD for the opportunity to be involved in this patient's care.

## 2020-12-02 NOTE — CARE COORDINATION
ANTONIA learned that this patient will need to go to a facility for her IV antibiotics. ANTONIA learned that the patient had been to [facility] in the past and was familiar and comfortable there. ANTONIA got the referral to [facility] admissions and then called back and spoke with Carly in admissions. The facility is still reviewing and they reported that they will have an final answer tomorrow morning. ANTONIA will follow up in the AM with this facility.

## 2020-12-02 NOTE — BRIEF OP NOTE
Brief Postoperative Note    Sukhwinder Bowling  YOB: 1950  [id number]    Pre-operative Diagnosis: Cellulitis    Post-operative Diagnosis: Same    Procedure: PICC    Anesthesia: Local    Surgeons/Assistants: Ezio    Estimated Blood Loss: less than 50     Complications: None    Specimens: Was Not Obtained    Electronically signed by Giovana Teran MD on 12/2/2020 at 12:25 PM

## 2020-12-02 NOTE — PROGRESS NOTES
Patient refused am labs. Became aggitated. Yelling and crying. Instructed lab to return later in day.

## 2020-12-02 NOTE — PLAN OF CARE
Problem: Falls - Risk of:  Goal: Will remain free from falls  Description: Will remain free from falls  12/2/2020 1623 by Tom Root RN  Outcome: Ongoing  Note: Patient remained free from falls. Call light within reach. Problem: Infection:  Goal: Will remain free from infection  Description: Will remain free from infection  12/2/2020 1623 by Tom Root RN  Outcome: Ongoing  Note: Patient received antibiotics as ordered. Problem: Daily Care:  Goal: Daily care needs are met  Description: Daily care needs are met  12/2/2020 1623 by Tom Root RN  Outcome: Ongoing  Note: Patient was taken to the bathroom as needed. Patient did not want a bath/shower during this shift. Problem: Pain:  Goal: Patient's pain/discomfort is manageable  Description: Patient's pain/discomfort is manageable  12/2/2020 1623 by Tom Root RN  Outcome: Ongoing  Note: Patient given pain medication as ordered. Problem: Skin Integrity:  Goal: Skin integrity will stabilize  Description: Skin integrity will stabilize  12/2/2020 1623 by Tom Root RN  Outcome: Ongoing  Note: No new alterations in skin integrity.

## 2020-12-03 VITALS
OXYGEN SATURATION: 99 % | SYSTOLIC BLOOD PRESSURE: 118 MMHG | RESPIRATION RATE: 18 BRPM | WEIGHT: 123.02 LBS | BODY MASS INDEX: 23.23 KG/M2 | HEART RATE: 88 BPM | HEIGHT: 61 IN | DIASTOLIC BLOOD PRESSURE: 71 MMHG | TEMPERATURE: 98.2 F

## 2020-12-03 LAB
ANION GAP SERPL CALCULATED.3IONS-SCNC: 14 MMOL/L (ref 9–17)
BUN BLDV-MCNC: 21 MG/DL (ref 8–23)
BUN/CREAT BLD: ABNORMAL (ref 9–20)
CALCIUM SERPL-MCNC: 9.2 MG/DL (ref 8.6–10.4)
CHLORIDE BLD-SCNC: 112 MMOL/L (ref 98–107)
CO2: 21 MMOL/L (ref 20–31)
CREAT SERPL-MCNC: 0.74 MG/DL (ref 0.5–0.9)
GFR AFRICAN AMERICAN: >60 ML/MIN
GFR NON-AFRICAN AMERICAN: >60 ML/MIN
GFR SERPL CREATININE-BSD FRML MDRD: ABNORMAL ML/MIN/{1.73_M2}
GFR SERPL CREATININE-BSD FRML MDRD: ABNORMAL ML/MIN/{1.73_M2}
GLUCOSE BLD-MCNC: 113 MG/DL (ref 70–99)
HCT VFR BLD CALC: 40 % (ref 36–46)
HEMOGLOBIN: 13.3 G/DL (ref 12–16)
MCH RBC QN AUTO: 32.2 PG (ref 26–34)
MCHC RBC AUTO-ENTMCNC: 33.3 G/DL (ref 31–37)
MCV RBC AUTO: 96.5 FL (ref 80–100)
NRBC AUTOMATED: NORMAL PER 100 WBC
PDW BLD-RTO: 13.3 % (ref 11.5–14.9)
PLATELET # BLD: 200 K/UL (ref 150–450)
PMV BLD AUTO: 8 FL (ref 6–12)
POTASSIUM SERPL-SCNC: 4.3 MMOL/L (ref 3.7–5.3)
RBC # BLD: 4.14 M/UL (ref 4–5.2)
SARS-COV-2, RAPID: NOT DETECTED
SARS-COV-2: NORMAL
SARS-COV-2: NORMAL
SODIUM BLD-SCNC: 147 MMOL/L (ref 135–144)
SOURCE: NORMAL
WBC # BLD: 7.8 K/UL (ref 3.5–11)

## 2020-12-03 PROCEDURE — U0002 COVID-19 LAB TEST NON-CDC: HCPCS

## 2020-12-03 PROCEDURE — 2580000003 HC RX 258: Performed by: NURSE PRACTITIONER

## 2020-12-03 PROCEDURE — 6370000000 HC RX 637 (ALT 250 FOR IP): Performed by: NURSE PRACTITIONER

## 2020-12-03 PROCEDURE — 99232 SBSQ HOSP IP/OBS MODERATE 35: CPT | Performed by: INTERNAL MEDICINE

## 2020-12-03 PROCEDURE — 97110 THERAPEUTIC EXERCISES: CPT

## 2020-12-03 PROCEDURE — 6360000002 HC RX W HCPCS: Performed by: EMERGENCY MEDICINE

## 2020-12-03 PROCEDURE — 99239 HOSP IP/OBS DSCHRG MGMT >30: CPT | Performed by: INTERNAL MEDICINE

## 2020-12-03 PROCEDURE — 36415 COLL VENOUS BLD VENIPUNCTURE: CPT

## 2020-12-03 PROCEDURE — 80048 BASIC METABOLIC PNL TOTAL CA: CPT

## 2020-12-03 PROCEDURE — 2580000003 HC RX 258: Performed by: EMERGENCY MEDICINE

## 2020-12-03 PROCEDURE — 85027 COMPLETE CBC AUTOMATED: CPT

## 2020-12-03 PROCEDURE — 6360000002 HC RX W HCPCS: Performed by: NURSE PRACTITIONER

## 2020-12-03 PROCEDURE — 97116 GAIT TRAINING THERAPY: CPT

## 2020-12-03 PROCEDURE — 2580000003 HC RX 258: Performed by: RADIOLOGY

## 2020-12-03 RX ORDER — TRAMADOL HYDROCHLORIDE 50 MG/1
50 TABLET ORAL EVERY 6 HOURS PRN
Qty: 12 TABLET | Refills: 1 | Status: SHIPPED | OUTPATIENT
Start: 2020-12-03 | End: 2020-12-06

## 2020-12-03 RX ORDER — BUPRENORPHINE 10 UG/H
1 PATCH TRANSDERMAL WEEKLY
Qty: 4 PATCH | Refills: 0
Start: 2020-12-03 | End: 2020-12-03 | Stop reason: HOSPADM

## 2020-12-03 RX ADMIN — MULTIPLE VITAMINS W/ MINERALS TAB 1 TABLET: TAB at 08:46

## 2020-12-03 RX ADMIN — TRAMADOL HYDROCHLORIDE 50 MG: 50 TABLET, FILM COATED ORAL at 15:08

## 2020-12-03 RX ADMIN — QUETIAPINE FUMARATE 25 MG: 50 TABLET ORAL at 08:47

## 2020-12-03 RX ADMIN — Medication 10 ML: at 08:50

## 2020-12-03 RX ADMIN — VORTIOXETINE 5 MG: 5 TABLET, FILM COATED ORAL at 15:14

## 2020-12-03 RX ADMIN — LAMOTRIGINE 100 MG: 100 TABLET ORAL at 08:49

## 2020-12-03 RX ADMIN — ATORVASTATIN CALCIUM 10 MG: 10 TABLET, FILM COATED ORAL at 08:47

## 2020-12-03 RX ADMIN — VANCOMYCIN HYDROCHLORIDE 1000 MG: 1 INJECTION, POWDER, LYOPHILIZED, FOR SOLUTION INTRAVENOUS at 00:36

## 2020-12-03 RX ADMIN — TRAMADOL HYDROCHLORIDE 50 MG: 50 TABLET, FILM COATED ORAL at 08:48

## 2020-12-03 RX ADMIN — TOPIRAMATE 50 MG: 50 TABLET, FILM COATED ORAL at 11:02

## 2020-12-03 RX ADMIN — METOCLOPRAMIDE 5 MG: 5 TABLET ORAL at 15:09

## 2020-12-03 RX ADMIN — Medication 10 ML: at 08:49

## 2020-12-03 RX ADMIN — AMLODIPINE BESYLATE 5 MG: 5 TABLET ORAL at 08:48

## 2020-12-03 RX ADMIN — METOCLOPRAMIDE 5 MG: 5 TABLET ORAL at 05:59

## 2020-12-03 RX ADMIN — BUSPIRONE HYDROCHLORIDE 5 MG: 5 TABLET ORAL at 08:47

## 2020-12-03 RX ADMIN — CETIRIZINE HYDROCHLORIDE 10 MG: 10 TABLET, FILM COATED ORAL at 08:47

## 2020-12-03 RX ADMIN — FLUTICASONE PROPIONATE 1 SPRAY: 50 SPRAY, METERED NASAL at 08:48

## 2020-12-03 RX ADMIN — ENOXAPARIN SODIUM 40 MG: 40 INJECTION SUBCUTANEOUS at 08:48

## 2020-12-03 RX ADMIN — PANTOPRAZOLE SODIUM 40 MG: 40 TABLET, DELAYED RELEASE ORAL at 05:59

## 2020-12-03 RX ADMIN — ASPIRIN 81 MG: 81 TABLET, COATED ORAL at 08:48

## 2020-12-03 RX ADMIN — METOCLOPRAMIDE 5 MG: 5 TABLET ORAL at 11:01

## 2020-12-03 RX ADMIN — FERROUS SULFATE TAB 325 MG (65 MG ELEMENTAL FE) 325 MG: 325 (65 FE) TAB at 08:46

## 2020-12-03 ASSESSMENT — PAIN SCALES - GENERAL
PAINLEVEL_OUTOF10: 10

## 2020-12-03 ASSESSMENT — PAIN DESCRIPTION - PAIN TYPE
TYPE: CHRONIC PAIN
TYPE: CHRONIC PAIN

## 2020-12-03 ASSESSMENT — PAIN DESCRIPTION - LOCATION: LOCATION: HEAD;NECK

## 2020-12-03 ASSESSMENT — PAIN DESCRIPTION - DESCRIPTORS
DESCRIPTORS: DISCOMFORT;THROBBING
DESCRIPTORS: DISCOMFORT

## 2020-12-03 ASSESSMENT — PAIN DESCRIPTION - ONSET
ONSET: ON-GOING
ONSET: ON-GOING

## 2020-12-03 ASSESSMENT — PAIN DESCRIPTION - FREQUENCY
FREQUENCY: CONTINUOUS
FREQUENCY: CONTINUOUS

## 2020-12-03 NOTE — PLAN OF CARE
Problem: Falls - Risk of:  Goal: Will remain free from falls  12/3/2020 1359 by Susu Perez RN  Outcome: Completed  12/3/2020 0624 by Yg Vargas RN  Outcome: Ongoing  12/3/2020 0343 by Yg Vargas RN  Outcome: Ongoing  Goal: Absence of physical injury  12/3/2020 1359 by Susu Perez RN  Outcome: Completed  12/3/2020 0624 by Yg Vargas RN  Outcome: Ongoing  12/3/2020 0343 by Yg Vargas RN  Outcome: Ongoing     Problem: Infection:  Goal: Will remain free from infection  12/3/2020 1359 by Susu Perez RN  Outcome: Completed  12/3/2020 0624 by Yg Vargas RN  Outcome: Ongoing  12/3/2020 0343 by Yg Vargas RN  Outcome: Ongoing     Problem: Safety:  Goal: Free from accidental physical injury  12/3/2020 1359 by Susu Perez RN  Outcome: Completed  12/3/2020 0624 by Yg Vargas RN  Outcome: Ongoing  12/3/2020 0343 by Yg Vargas RN  Outcome: Ongoing  Goal: Free from intentional harm  12/3/2020 1359 by Susu Perez RN  Outcome: Completed  12/3/2020 0624 by Yg Vargas RN  Outcome: Ongoing  12/3/2020 0343 by Yg Vargas RN  Outcome: Ongoing     Problem: Daily Care:  Goal: Daily care needs are met  12/3/2020 1359 by Susu Perez RN  Outcome: Completed  12/3/2020 0624 by Yg Vargas RN  Outcome: Ongoing  12/3/2020 0343 by Yg Vargas RN  Outcome: Ongoing     Problem: Pain:  Goal: Patient's pain/discomfort is manageable  12/3/2020 1359 by Susu Perez RN  Outcome: Completed  12/3/2020 0624 by Yg Vargas RN  Outcome: Ongoing  12/3/2020 0343 by Yg Vargas RN  Outcome: Ongoing  Goal: Pain level will decrease  12/3/2020 1359 by Susu Perez RN  Outcome: Completed  12/3/2020 0624 by Yg Vargas RN  Outcome: Ongoing  12/3/2020 0343 by Yg Vargas RN  Outcome: Ongoing  Goal: Control of acute pain  12/3/2020 1359 by Priscille Alba, RN  Outcome: Completed  12/3/2020 0624 by Yg Vargas RN  Outcome: Ongoing  12/3/2020 0343 by Twan Harrington RN  Outcome: Ongoing  Goal: Control of chronic pain  12/3/2020 1359 by Danika Quan RN  Outcome: Completed  12/3/2020 0624 by Twan Harrington RN  Outcome: Ongoing  12/3/2020 0343 by Twan Harrington RN  Outcome: Ongoing     Problem: Skin Integrity:  Goal: Skin integrity will stabilize  12/3/2020 1359 by Danika Quan RN  Outcome: Completed  12/3/2020 0624 by Twan Harrington RN  Outcome: Ongoing  12/3/2020 0343 by Twan Harrington RN  Outcome: Ongoing     Problem: Discharge Planning:  Goal: Patients continuum of care needs are met  12/3/2020 1359 by Danika Quan RN  Outcome: Completed  12/3/2020 0624 by Twan Harrington RN  Outcome: Ongoing  12/3/2020 0343 by Twan Harrington RN  Outcome: Ongoing     Problem: Musculor/Skeletal Functional Status  Goal: Highest potential functional level  12/3/2020 1359 by Danika Quan RN  Outcome: Completed  12/3/2020 0624 by Twan Harrington RN  Outcome: Ongoing  12/3/2020 0343 by Twan Harrington RN  Outcome: Ongoing  Goal: Absence of falls  12/3/2020 1359 by Danika Quan RN  Outcome: Completed  12/3/2020 0624 by Twan Harrington RN  Outcome: Ongoing  12/3/2020 0343 by Twan Harrington RN  Outcome: Ongoing

## 2020-12-03 NOTE — PLAN OF CARE
Problem: Falls - Risk of:  Goal: Will remain free from falls  Description: Will remain free from falls  12/3/2020 0343 by Mel Castillo RN  Outcome: Ongoing     Problem: Infection:  Goal: Will remain free from infection  Description: Will remain free from infection  12/3/2020 0343 by Mel Castillo RN  Outcome: Ongoing   Pt educated on cellulitis of the left leg, pt educated on the antibiotics and the use of the picc line for the IV antibiotics. Problem: Safety:  Goal: Free from accidental physical injury  Description: Free from accidental physical injury  Outcome: Ongoing     Problem: Daily Care:  Goal: Daily care needs are met  Description: Daily care needs are met  12/3/2020 0343 by Mel Castillo RN  Outcome: Ongoing   Pt calls out and up with 1 assist and walker, bed alarm engaged.

## 2020-12-03 NOTE — PROGRESS NOTES
[address] [facility]    Physical Therapy Progress Note    Date: 12/3/20  Patient Name: Joe Reyes       Room:   MRN: [id number]   Account: [de-identified]   : 1950  (74 y.o.)   Gender: female     Discharge Recommendations   Patient would benefit from continued therapy after discharge  Equipment Needed: No  Other: Pt has RW       Diagnosis: Cellulitis of LLE  Restrictions/Precautions: General Precautions, Fall Risk  Implants present? : Metal implants(L hip, shunt)  Other position/activity restrictions: up with assistance; hx falls, hx  shunt, CVA L side weak; LLE doppler neg for dvt   Past Medical History:  has a past medical history of Acid reflux, Bipolar affective ([facility] [address] Chronic headaches, and Hydrocephalus ([facility] [address] Past Surgical History:   has a past surgical history that includes other surgical history; shunt revision; Cholecystectomy; Tonsillectomy; Hip fracture surgery (Left, 2017); and hip pinning (Left, 2017). Additional Pertinent Hx: bipolar, chronic headache, (-) CT head, (-) DVT LLE, (-) PE    Overall Orientation Status: Within Functional Limits  Restrictions/Precautions  Restrictions/Precautions: General Precautions; Fall Risk  Required Braces or Orthoses?: No  Implants present? : Metal implants(L hip, shunt)  Position Activity Restriction  Other position/activity restrictions: up with assistance; hx falls, hx  shunt, CVA L side weak; LLE doppler neg for dvt    Subjective: Pt reports going to discharge today  Comments: OMAIRA King reports receiving discharge orders but awaiting transportation information. OMAIRA King okay with PT tx at this time while we wait. Pt is anxious to leave however willing to work with PT at this time.                 Patient Observation  Observations: Pt very anxious about pending discharge       Bed Mobility:   Rolling: Supervision  Supine to Sit: Supervision  Sit to Supine: Supervision  Scooting: Supervision  Comment: HOB elevated to 45 degrees      Transfers:  Sit to Stand: Contact guard assistance  Stand to sit: Contact guard assistance                 Ambulation 1  Surface: level tile  Device: Rolling Walker  Assistance: Contact guard assistance  Quality of Gait: Very fast impulsive movements, picks RW off floor, impulsive stops and turns with slight LOB on turns  Gait Deviations: Deviated path  Distance: 200ft  Comments: pt walking into objects in hallway and doorways, cues to slow pace down and have improved control on RW, pt educated to stay inside RW        Stairs/Curb  Stairs?: No                      Posture: Fair  Sitting - Static: Good  Sitting - Dynamic: Good;-  Standing - Static: Good;-  Standing - Dynamic: Fair;+  Comments: Seated EOB, standing with RW     Other exercises?: Yes  Other exercises 1: Dangle EOB 10 min. Other exercises 2: Seated bilat LE exercises, LAQ and ankle pumps x10-15  Other exercises 3: Seated Dynamic Activity reaching OOBOS, high/low and crossing midline           Activity Tolerance: Patient Tolerated treatment well;Patient limited by cognitive status  Activity Tolerance: Student nursing needing to remove IV to prepare pt for discharge. PT Equipment Recommendations  Equipment Needed: No  Other: Pt has RW       Assessment  Activity Tolerance: Patient Tolerated treatment well;Patient limited by cognitive status   Body structures, Functions, Activity limitations: Decreased ADL status; Decreased functional mobility ; Decreased strength;Decreased safe awareness;Decreased cognition;Decreased balance; Increased pain  Prognosis: Good  Discharge Recommendations: Patient would benefit from continued therapy after discharge     Type of devices: Bed alarm in place;Call light within reach; Left in bed;Nurse notified     Plan  Times per week: 5-6x/week  Current Treatment Recommendations: Strengthening, Balance Training, Functional Mobility Training, Transfer Training, Endurance Training, Gait Training, Equipment Evaluation,

## 2020-12-03 NOTE — PROGRESS NOTES
Spoke with Dr Rita Spicer, ok to discontinue pain patch, can be resumed at group home at discretion of group home physician. Rx for tramadol will be continued.

## 2020-12-03 NOTE — PROGRESS NOTES
 metoclopramide  5 mg Oral TID AC    therapeutic multivitamin-minerals  1 tablet Oral Daily    pantoprazole  40 mg Oral QAM AC    QUEtiapine  25 mg Oral Daily    QUEtiapine  50 mg Oral Nightly    topiramate  100 mg Oral QPM    topiramate  50 mg Oral Daily    VORTIoxetine  5 mg Oral Daily    sodium chloride flush  10 mL Intravenous 2 times per day    enoxaparin  40 mg Subcutaneous Daily    vancomycin  1,000 mg Intravenous Q24H    vancomycin (VANCOCIN) intermittent dosing (placeholder)   Other RX Placeholder     Continuous Infusions:    sodium chloride 75 mL/hr at 20 0930     PRN Meds: sodium chloride flush, bismuth subsalicylate, calcium carbonate, meclizine, SUMAtriptan, traMADol, sodium chloride flush, potassium chloride **OR** potassium alternative oral replacement **OR** potassium chloride, magnesium sulfate, acetaminophen **OR** acetaminophen, polyethylene glycol, promethazine **OR** ondansetron    Data:     Past Medical History:   has a past medical history of Acid reflux, Bipolar affective ([facility] [address] Chronic headaches, and Hydrocephalus ([facility] [address] Social History:   reports that she has never smoked. She has never used smokeless tobacco. She reports that she does not drink alcohol or use drugs. Family History: History reviewed. No pertinent family history. Vitals:  /80   Pulse 74   Temp 97.6 °F (36.4 °C) (Oral)   Resp 16   Ht 5' 1\" (1.549 m)   Wt 123 lb 0.3 oz (55.8 kg)   SpO2 100%   BMI 23.24 kg/m²   Temp (24hrs), Av.6 °F (36.4 °C), Min:97.3 °F (36.3 °C), Max:97.9 °F (36.6 °C)    No results for input(s): POCGLU in the last 72 hours.     I/O (24Hr):    Labs:  Labs and vitals reviewed      Lab Results   Component Value Date/Time    SPECIAL NOT REPORTED 2020 04:11 PM     Lab Results   Component Value Date/Time    CULTURE ESCHERICHIA COLI >[id number] CFU/ML (A) 2020 04:11 PM         Radiology:    Recent data reviewed    Physical Examination:        General appearance: alert, cooperative and no distress  Eyes: Anicteric sclera. Pupils are equally round and reactive to light. Extraocular movements are intact. Lungs:  clear to auscultation bilaterally, normal effort  Heart:  regular rate and rhythm, no murmur  Abdomen:  soft, nontender, nondistended, normal bowel sounds, no masses, hepatomegaly, splenomegaly  Extremities:  no edema, redness, tenderness in the calves, left calf tenderness and redness, improved compared to yesterday      Assessment:        Primary Problem  Cellulitis of left leg    Active Hospital Problems    Diagnosis Date Noted    Failure of outpatient treatment [Z78.9] 12/01/2020    S/P  shunt [Z98.2] 12/01/2020    Hydrocephalus ([address] [G91.9] 12/01/2020    Cellulitis of left leg [L03.116] 11/30/2020           DVT prophylaxis: Lovenox  Antibiotics: Vancomycin    Plan:        Cellulitis of left lower extremity improving, labs and vitals reviewed and stable.   Patient to be discharged on IV vancomycin to extended care facility  Will liaise with /care coordinator for discharge      Robb Hope  12/2/2020

## 2020-12-03 NOTE — CARE COORDINATION
ANTONIA received info that this patient could DC on this date. [place] admissions reported that they could not take a medication that she is currently on explaining that it is a form of suboxone. ANTONIA informed Fox Guevara RN Clinical lead of this information. She worked with Dr. Feroz Diaz and she prescribed her what medication that she was on while she was here. Once she is ready to DC back to her Group Home that staff will have to call the doctor that prescribes this medication and get it restarted. ANTONIA did speak with this staff concerning this issue and they are aware of this information. ANTONIA then attempted to set up transportation. David Foley was scheduled out too late, [facility] could transport at 7:00 but then ANTONIA received a call back from [facility], they ended up with a cancellation and are able to pick the patient up at 4:15. ANTONIA  Informed the staff here, the facility, and ANTONIA also called Francisco, the patient's legal Guardian to inform her of the DC/transport time. ANTONIA provided the number for report to this patient's nurse, Fernando. ([phone]). ANTONIA also completed and submitted the 7000 form via [employer].

## 2020-12-03 NOTE — PROGRESS NOTES
[facility] -   Infectious diseases evaluation  admission date 11/30/2020    reason for consultation:   Left leg cellulitis    Impression :   Current:  · Left leg cellulitis failed outpatient treatment  · History of hydrocephalus status post  shunt  · Chronic left pleural effusion and left lung base consolidation  · Bipolar disorder  · GERD. · Levaquin allergy      Recommendations   · Continue IV vancomycin until 12/9/2020  · PICC line was placed  · Lower extremity venous Doppler showed no DVT   · Follow CBC renal function and vancomycin level  · The patient may be discharged from infectious disease point of view        History of Present Illness:   Initial history: Mariam Wheeler is a 69y.o.-year-old female was brought to the hospital from [facility] home for left lower extremity pain, throbbing, continuous, no radiation, moderate in severity associated with swelling and redness for 2 weeks, was treated with antibiotics as an outpatient with no improvement  CT chest 11/30/2020 showed no PE, did show large left pleural effusion with pleural thickening and  shunt catheter in the pleural space, left lung base consolidation. 11/30/2020 left tibia-fibula x-ray showed soft tissue swelling and edema  11/30/2020 WBC 7.4, creatinine 0.97  Interval changes  12/3/2020   She is feeling better, less swelling, redness and pain to the left leg, denied fever or chills, denied nausea or vomiting no other complaints. COVID-19 rapid test was negative  CBC renal function normal  Patient Vitals for the past 8 hrs:   BP Temp Temp src Pulse Resp SpO2 Weight   12/03/20 0700 130/80 97.6 °F (36.4 °C) Oral 74 16 100 % --   12/03/20 0311 -- -- -- -- -- -- 123 lb 0.3 oz (55.8 kg)           I have personally reviewed the past medical history, past surgical history, medications, social history, and family history, and I haveupdated the database accordingly.       Allergies:   Levaquin [levofloxacin in d5w]; Adhesive tape; Chocolate; Indomethacin; and Neurontin [gabapentin]     Review of Systems:     Review of Systems  As per history present illness other than above 12 system reviewed were negative  Physical Examination :       Physical Exam  Constitutional:       General: She is not in acute distress. HENT:      Right Ear: External ear normal.      Left Ear: External ear normal.      Mouth/Throat:      Pharynx: Oropharynx is clear. No posterior oropharyngeal erythema. Eyes:      General: No scleral icterus. Conjunctiva/sclera: Conjunctivae normal.   Neck:      Musculoskeletal: Neck supple. No neck rigidity. Cardiovascular:      Rate and Rhythm: Normal rate and regular rhythm. Heart sounds: No murmur. Pulmonary:      Effort: Pulmonary effort is normal. No respiratory distress. Breath sounds: No wheezing. Abdominal:      General: Abdomen is flat. There is no distension. Palpations: Abdomen is soft. Tenderness: There is no abdominal tenderness. Musculoskeletal:         General: Tenderness present. Left lower leg: Edema present. Skin:     Comments: Bruises noted to the cheeks  Left leg warmth, edema, tenderness and erythema, no fluctuation, no crepitance. Neurological:      Mental Status: She is alert and oriented to person, place, and time. Mental status is at baseline.          Past Medical History:     Past Medical History:   Diagnosis Date    Acid reflux     Bipolar affective (HCC)     Chronic headaches     Hydrocephalus (HCC)        Past Surgical  History:     Past Surgical History:   Procedure Laterality Date    CHOLECYSTECTOMY      HIP FRACTURE SURGERY Left 06/09/2017    PINNING AND SCREW    HIP PINNING Left 6/9/2017    HIP PINNING 7.3 CANNULATED SCREW WITH [employer] PRODUCT APPLICATION AND INTRAOPERATIVE C-ARM performed by Hanny Mayer MD at [address] [place] [address]      shunt x3 head    SHUNT REVISION      TONSILLECTOMY Medications:      sodium chloride flush  10 mL Intravenous 2 times per day    amLODIPine  5 mg Oral Daily    aspirin  81 mg Oral Daily    atorvastatin  10 mg Oral Daily    busPIRone  5 mg Oral BID    ferrous sulfate  325 mg Oral Daily with breakfast    fluticasone  1 spray Each Nostril Daily    lamoTRIgine  100 mg Oral Daily    lamoTRIgine  200 mg Oral Nightly    cetirizine  10 mg Oral Daily    melatonin  3 mg Oral Nightly    metoclopramide  5 mg Oral TID AC    therapeutic multivitamin-minerals  1 tablet Oral Daily    pantoprazole  40 mg Oral QAM AC    QUEtiapine  25 mg Oral Daily    QUEtiapine  50 mg Oral Nightly    topiramate  100 mg Oral QPM    topiramate  50 mg Oral Daily    VORTIoxetine  5 mg Oral Daily    sodium chloride flush  10 mL Intravenous 2 times per day    enoxaparin  40 mg Subcutaneous Daily    vancomycin  1,000 mg Intravenous Q24H    vancomycin (VANCOCIN) intermittent dosing (placeholder)   Other RX Placeholder       Social History:     Social History     Socioeconomic History    Marital status: Single     Spouse name: Not on file    Number of children: Not on file    Years of education: Not on file    Highest education level: Not on file   Occupational History    Not on file   Social Needs    Financial resource strain: Not on file    Food insecurity     Worry: Not on file     Inability: Not on file    Transportation needs     Medical: Not on file     Non-medical: Not on file   Tobacco Use    Smoking status: Never Smoker    Smokeless tobacco: Never Used   Substance and Sexual Activity    Alcohol use: No    Drug use: No    Sexual activity: Not on file   Lifestyle    Physical activity     Days per week: Not on file     Minutes per session: Not on file    Stress: Not on file   Relationships    Social connections     Talks on phone: Not on file     Gets together: Not on file     Attends Church service: Not on file     Active member of club or organization: Not on file     Attends meetings of clubs or organizations: Not on file     Relationship status: Not on file    Intimate partner violence     Fear of current or ex partner: Not on file     Emotionally abused: Not on file     Physically abused: Not on file     Forced sexual activity: Not on file   Other Topics Concern    Not on file   Social History Narrative    Not on file       Family History:   History reviewed. No pertinent family history. Medical Decision Making:   I have independently reviewed/ordered the following labs:    CBC with Differential:   Recent Labs     11/30/20 2105 12/01/20 0547 12/03/20  0502   WBC 7.4 7.4 7.8   HGB 12.3 13.0 13.3   HCT 36.7 39.9 40.0    216 200   LYMPHOPCT 17*  --   --    MONOPCT 8*  --   --      BMP:  Recent Labs     11/30/20 2105 12/01/20 0547 12/03/20  0502    143 147*   K 4.0 4.0 4.3    109* 112*   CO2 24 22 21   BUN 24* 21 21   CREATININE 0.97* 0.92* 0.74   MG 2.0  --   --      Hepatic Function Panel: No results for input(s): PROT, LABALBU, BILIDIR, IBILI, BILITOT, ALKPHOS, ALT, AST in the last 72 hours. No results for input(s): RPR in the last 72 hours. No results for input(s): HIV in the last 72 hours. No results for input(s): BC in the last 72 hours. Lab Results   Component Value Date    CREATININE 0.74 12/03/2020    GLUCOSE 113 12/03/2020       Detailed results: Thank you for allowing us to participate in the care of this patient. Please call with questions. This note is created with the assistance of a speech recognition program.  While intending to generate adocument that actually reflects the content of the visit, the document can still have some errors including those of syntax and sound a like substitutions which may escape proof reading. It such instances, actual meaningcan be extrapolated by contextual diversion.     Faith Chen MD  Office: [phone]  Perfect serve / office [phone]

## 2021-02-25 ENCOUNTER — APPOINTMENT (OUTPATIENT)
Dept: CT IMAGING | Age: 71
End: 2021-02-25
Payer: MEDICARE

## 2021-02-25 ENCOUNTER — HOSPITAL ENCOUNTER (EMERGENCY)
Age: 71
Discharge: HOME | End: 2021-02-25
Attending: EMERGENCY MEDICINE
Payer: MEDICARE

## 2021-02-25 VITALS
HEART RATE: 86 BPM | OXYGEN SATURATION: 95 % | DIASTOLIC BLOOD PRESSURE: 76 MMHG | TEMPERATURE: 97.8 F | SYSTOLIC BLOOD PRESSURE: 123 MMHG | RESPIRATION RATE: 16 BRPM
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
## 2021-02-25 PROCEDURE — 70450 CT HEAD/BRAIN W/O DYE: CPT

## 2021-02-25 PROCEDURE — 99283 EMERGENCY DEPT VISIT LOW MDM: CPT

## 2021-02-25 PROCEDURE — 70486 CT MAXILLOFACIAL W/O DYE: CPT

## 2021-02-25 PROCEDURE — 72125 CT NECK SPINE W/O DYE: CPT

## 2021-02-25 ASSESSMENT — ENCOUNTER SYMPTOMS
COUGH: 0
VOMITING: 0
CONSTIPATION: 0
TROUBLE SWALLOWING: 0
ABDOMINAL PAIN: 0
BLOOD IN STOOL: 0
NAUSEA: 0
BACK PAIN: 0
SORE THROAT: 0
COLOR CHANGE: 0
DIARRHEA: 0
SHORTNESS OF BREATH: 0

## 2021-02-25 NOTE — ED TRIAGE NOTES
Mode of arrival (squad #, walk in, police, etc) : [place] [employer]      Chief complaint(s):fall head injury      Arrival Note (brief scenario, treatment PTA, etc). : from assisted living unable to give name of location of home  fell denies LOC neck pain which is chronic per patient who is also [facility] with hearing aid in left side         C= \"Have you ever felt that you should Cut down on your drinking? \"  No  A= \"Have people Annoyed you by criticizing your drinking? \"  No  G= \"Have you ever felt bad or Guilty about your drinking? \"  No  E= \"Have you ever had a drink as an Eye-opener first thing in the morning to steady your nerves or to help a hangover? \"  No      Deferred []      Reason for deferring: N/A    *If yes to two or more: probable alcohol abuse. *

## 2021-02-26 NOTE — ED PROVIDER NOTES
[address]  EMERGENCY DEPARTMENT ENCOUNTER    Pt Name: Sofy Hoyt  MRN: [id number]  YOB: 1950  Date of evaluation:2/25/21  PCP: Rachel Joya MD    CHIEF COMPLAINT       Chief Complaint   Patient presents with    Head Injury    Fall       HISTORY OF PRESENT ILLNESS    Sofy Hoyt is a 69 y.o. female who presents with a chief complaint of a fall. Patient apparently had a witnessed fall at her nursing home. There is no reported loss of consciousness. Was placed in a cervical collar by EMS and brought here. Patient is very hard of hearing however does not seem to have any complaints. Denies any pain in her head or her neck. Denies any pain anywhere else. There is no reported seizure activity. Symptoms are acute. Symptoms are mild. Nothing makes symptoms better or worse. Patient has no other complaints at this time. REVIEW OF SYSTEMS       Review of Systems   Constitutional: Negative for chills, fatigue and fever. HENT: Negative for congestion, ear pain, sore throat and trouble swallowing. Eyes: Negative for visual disturbance. Respiratory: Negative for cough and shortness of breath. Cardiovascular: Negative for chest pain, palpitations and leg swelling. Gastrointestinal: Negative for abdominal pain, blood in stool, constipation, diarrhea, nausea and vomiting. Genitourinary: Negative for dysuria and flank pain. Musculoskeletal: Negative for arthralgias, back pain, myalgias and neck pain. Skin: Negative for color change, rash and wound. Neurological: Negative for dizziness, weakness, light-headedness, numbness and headaches. Psychiatric/Behavioral: Negative for confusion. All other systems reviewed and are negative. Negative in 10 essential Systems except as mentioned above and in the HPI.         PAST MEDICAL HISTORY     Past Medical History:   Diagnosis Date    Acid reflux     Bipolar affective (HCC)     Chronic headaches     Hydrocephalus [facility])          SURGICAL HISTORY      has a past surgical history that includes other surgical history; shunt revision; Cholecystectomy; Tonsillectomy; Hip fracture surgery (Left, 06/09/2017); and hip pinning (Left, 6/9/2017). CURRENT MEDICATIONS       Current Discharge Medication List      CONTINUE these medications which have NOT CHANGED    Details   fluticasone (FLONASE) 50 MCG/ACT nasal spray 1 spray by Each Nostril route daily      loratadine (CLARITIN) 10 MG capsule Take 10 mg by mouth daily      omeprazole (PRILOSEC) 20 MG delayed release capsule Take 20 mg by mouth daily      polyethylene glycol (GLYCOLAX) 17 GM/SCOOP powder Take 17 g by mouth daily      !! QUEtiapine (SEROQUEL) 25 MG tablet Take 25 mg by mouth daily      VORTIoxetine (TRINTELLIX) 5 MG tablet Take 5 mg by mouth daily      !! topiramate (TOPAMAX) 100 MG tablet Take 100 mg by mouth every evening      !! lamoTRIgine (LAMICTAL) 200 MG tablet Take 200 mg by mouth nightly      melatonin 5 MG TBDP disintegrating tablet Take 5 mg by mouth nightly      !!  QUEtiapine (SEROQUEL) 25 MG tablet Take 50 mg by mouth nightly      butalbital-APAP-caffeine (FIORICET) -40 MG CAPS per capsule Take 1 capsule by mouth every 6 hours as needed for Headaches      meclizine (ANTIVERT) 25 MG tablet Take 25 mg by mouth 3 times daily as needed for Dizziness      Ca Carbonate-Mag Hydroxide (ROLAIDS PO) Take 2 tablets by mouth every 4 hours as needed (acid reflux)      SUMAtriptan (IMITREX) 100 MG tablet Take 100 mg by mouth 2 times daily as needed for Migraine      acetaminophen (TYLENOL) 325 MG tablet Take 2 tablets by mouth every 6 hours as needed for Pain  Qty: 30 tablet, Refills: 0      !! topiramate (TOPAMAX) 50 MG tablet Take 1 tablet by mouth daily  Qty: 60 tablet, Refills: 3    Associated Diagnoses: Contusion of left hip, initial encounter      lidocaine PTCH Place 1 patch onto the skin daily  Qty: 3 patch, Refills: 0      Misc Natural Products (GLUCOSAMINE CHONDROITIN ADV PO) Take 1 tablet by mouth daily      busPIRone (BUSPAR) 5 MG tablet Take 5 mg by mouth 2 times daily      bismuth subsalicylate (BISMATROL) 262 MG chewable tablet Take 524 mg by mouth as needed for Heartburn (8 times daily)      senna-docusate (PERICOLACE) 8.6-50 MG per tablet Take 1 tablet by mouth as needed for Constipation (in eveninig) Or 2 tabs      ibuprofen (ADVIL;MOTRIN) 200 MG tablet Take 200 mg by mouth every 6 hours as needed for Pain (or 2 tabs)      magnesium hydroxide (MILK OF MAGNESIA) 400 MG/5ML suspension Take 5 mLs by mouth daily as needed for Constipation      calcium carbonate (TUMS) 500 MG chewable tablet Take 1 tablet by mouth 4 times daily as needed for Heartburn      alendronate (FOSAMAX) 70 MG tablet Take 70 mg by mouth every 7 days Every Wednesday      atorvastatin (LIPITOR) 10 MG tablet Take 10 mg by mouth daily      loperamide (IMODIUM) 2 MG capsule Take 2 mg by mouth 4 times daily as needed for Diarrhea      amLODIPine (NORVASC) 10 MG tablet Take 5 mg by mouth daily       Multiple Vitamins-Minerals (THERAPEUTIC MULTIVITAMIN-MINERALS) tablet Take 1 tablet by mouth daily. ferrous sulfate 325 (65 FE) MG EC tablet Take 325 mg by mouth daily (with breakfast). !! lamoTRIgine (LAMICTAL) 100 MG tablet Take 100 mg by mouth daily       aspirin 81 MG tablet Take 81 mg by mouth daily. Metoclopramide HCl (REGLAN PO) Take 5 mg by mouth 3 times daily        ! ! - Potential duplicate medications found. Please discuss with provider. ALLERGIES     is allergic to levaquin [levofloxacin in d5w]; adhesive tape; chocolate; indomethacin; and neurontin [gabapentin]. FAMILY HISTORY     She indicated that her mother is . She indicated that her father is . family history is not on file. SOCIAL HISTORY      reports that she has never smoked.  She has never used smokeless tobacco. She reports that she does not drink alcohol or use CERVICAL SPINE WO CONTRAST   Final Result   1. No acute abnormality of the cervical spine. 2. Multilevel degenerative changes. 3. 4 mm soft tissue lesion in the anterior trachea, indeterminate. Direct   visualization could provide further information as clinically indicated. CT FACIAL BONES WO CONTRAST   Final Result   No acute traumatic injury of the facial bones. CT HEAD WO CONTRAST   Final Result   No acute intracranial abnormality. Multiple intraventricular shunt tubes which are unchanged in positioning with   stable ventricular size. ED BEDSIDE ULTRASOUND:      LABS:  Labs Reviewed - No data to display      EMERGENCY DEPARTMENT COURSE:   Vitals:    Vitals:    02/25/21 1850   BP: 116/71   Pulse: 88   Resp: 16   Temp: 97.8 °F (36.6 °C)   TempSrc: Oral   SpO2: 95%     10:03 PM EST  CT scans are unremarkable. Patient cervical spine is cleared. I removed her cervical collar. She was able to ambulate with a walker down the balderas. Her caretaker is here from the nursing home and states she seems to be at her baseline at this time. Will discharge home with instruction to follow-up with PCP, return if any symptoms worsen. Patient and caretaker are agreeable with plan she will be discharged home today. CRITICALCARE:      CONSULTS:  None      PROCEDURES:      FINAL IMPRESSION      1.  Fall, initial encounter            DISPOSITION/PLAN   DISPOSITION Decision To Discharge 02/25/2021 08:46:33 PM        PATIENT REFERRED TO:  Park Martin MD  [address] [facility] [address]  [phone]    Schedule an appointment as soon as possible for a visit       [facility] ED  [facility] [address] [place] [id number]  [phone]    As needed, If symptoms worsen      DISCHARGE MEDICATIONS:  Current Discharge Medication List          (Please note that portions ofthis note were completed with a voice recognition program.  Efforts were made to edit the dictations but occasionally words are mis-transcribed.)    Pretty Martel DO  Attending Emergency Physician          Pretty Martel DO  02/25/21 7901

## 2021-04-21 ENCOUNTER — HOSPITAL ENCOUNTER (EMERGENCY)
Age: 71
Discharge: HOME | End: 2021-04-22
Attending: EMERGENCY MEDICINE
Payer: MEDICARE

## 2021-04-21 VITALS
DIASTOLIC BLOOD PRESSURE: 64 MMHG | HEART RATE: 99 BPM | TEMPERATURE: 97.5 F | OXYGEN SATURATION: 95 % | WEIGHT: 130 LBS | RESPIRATION RATE: 16 BRPM | SYSTOLIC BLOOD PRESSURE: 126 MMHG | BODY MASS INDEX: 24.55 KG/M2 | HEIGHT: 61 IN

## 2021-04-21 DIAGNOSIS — R60.0 LEG EDEMA, LEFT: Primary | ICD-10-CM

## 2021-04-21 LAB — D-DIMER QUANTITATIVE: 0.93 MG/L FEU (ref 0–0.59)

## 2021-04-21 PROCEDURE — 36415 COLL VENOUS BLD VENIPUNCTURE: CPT

## 2021-04-21 PROCEDURE — 96372 THER/PROPH/DIAG INJ SC/IM: CPT

## 2021-04-21 PROCEDURE — 99282 EMERGENCY DEPT VISIT SF MDM: CPT

## 2021-04-21 PROCEDURE — 85379 FIBRIN DEGRADATION QUANT: CPT

## 2021-04-22 ENCOUNTER — APPOINTMENT (OUTPATIENT)
Dept: CT IMAGING | Age: 71
End: 2021-04-22
Payer: MEDICARE

## 2021-04-22 VITALS
TEMPERATURE: 97.9 F | HEIGHT: 61 IN | SYSTOLIC BLOOD PRESSURE: 129 MMHG | HEART RATE: 76 BPM | OXYGEN SATURATION: 95 % | BODY MASS INDEX: 22.84 KG/M2 | DIASTOLIC BLOOD PRESSURE: 70 MMHG | RESPIRATION RATE: 16 BRPM | WEIGHT: 121 LBS

## 2021-04-22 DIAGNOSIS — M79.89 LEG SWELLING: Primary | ICD-10-CM

## 2021-04-22 DIAGNOSIS — L03.116 CELLULITIS OF LEFT LOWER EXTREMITY: ICD-10-CM

## 2021-04-22 PROBLEM — R60.0 LEG EDEMA: Status: ACTIVE | Noted: 2021-04-22

## 2021-04-22 LAB
ABSOLUTE EOS #: 0.2 K/UL (ref 0–0.4)
ABSOLUTE IMMATURE GRANULOCYTE: ABNORMAL K/UL (ref 0–0.3)
ABSOLUTE LYMPH #: 1 K/UL (ref 1–4.8)
ABSOLUTE MONO #: 0.5 K/UL (ref 0.1–1.3)
ALBUMIN SERPL-MCNC: 4.2 G/DL (ref 3.5–5.2)
ALBUMIN/GLOBULIN RATIO: ABNORMAL (ref 1–2.5)
ALP BLD-CCNC: 115 U/L (ref 35–104)
ALT SERPL-CCNC: 24 U/L (ref 5–33)
ANION GAP SERPL CALCULATED.3IONS-SCNC: 8 MMOL/L (ref 9–17)
AST SERPL-CCNC: 21 U/L
BASOPHILS # BLD: 1 % (ref 0–2)
BASOPHILS ABSOLUTE: 0.1 K/UL (ref 0–0.2)
BILIRUB SERPL-MCNC: 0.22 MG/DL (ref 0.3–1.2)
BNP INTERPRETATION: NORMAL
BUN BLDV-MCNC: 31 MG/DL (ref 8–23)
BUN/CREAT BLD: ABNORMAL (ref 9–20)
CALCIUM SERPL-MCNC: 9.7 MG/DL (ref 8.6–10.4)
CHLORIDE BLD-SCNC: 108 MMOL/L (ref 98–107)
CO2: 27 MMOL/L (ref 20–31)
CREAT SERPL-MCNC: 1.07 MG/DL (ref 0.5–0.9)
DIFFERENTIAL TYPE: ABNORMAL
EOSINOPHILS RELATIVE PERCENT: 3 % (ref 0–4)
GFR AFRICAN AMERICAN: >60 ML/MIN
GFR NON-AFRICAN AMERICAN: 51 ML/MIN
GFR SERPL CREATININE-BSD FRML MDRD: ABNORMAL ML/MIN/{1.73_M2}
GFR SERPL CREATININE-BSD FRML MDRD: ABNORMAL ML/MIN/{1.73_M2}
GLUCOSE BLD-MCNC: 102 MG/DL (ref 70–99)
HCT VFR BLD CALC: 39.3 % (ref 36–46)
HEMOGLOBIN: 13.1 G/DL (ref 12–16)
IMMATURE GRANULOCYTES: ABNORMAL %
INR BLD: 1
LYMPHOCYTES # BLD: 17 % (ref 24–44)
MCH RBC QN AUTO: 32.1 PG (ref 26–34)
MCHC RBC AUTO-ENTMCNC: 33.5 G/DL (ref 31–37)
MCV RBC AUTO: 96.1 FL (ref 80–100)
MONOCYTES # BLD: 9 % (ref 1–7)
NRBC AUTOMATED: ABNORMAL PER 100 WBC
PARTIAL THROMBOPLASTIN TIME: 35.6 SEC (ref 24–36)
PDW BLD-RTO: 12.9 % (ref 11.5–14.9)
PLATELET # BLD: 175 K/UL (ref 150–450)
PLATELET ESTIMATE: ABNORMAL
PMV BLD AUTO: 8 FL (ref 6–12)
POTASSIUM SERPL-SCNC: 4.1 MMOL/L (ref 3.7–5.3)
PRO-BNP: 178 PG/ML
PROTHROMBIN TIME: 13.4 SEC (ref 11.8–14.6)
RBC # BLD: 4.09 M/UL (ref 4–5.2)
RBC # BLD: ABNORMAL 10*6/UL
SEG NEUTROPHILS: 70 % (ref 36–66)
SEGMENTED NEUTROPHILS ABSOLUTE COUNT: 4.2 K/UL (ref 1.3–9.1)
SODIUM BLD-SCNC: 143 MMOL/L (ref 135–144)
TOTAL PROTEIN: 7.2 G/DL (ref 6.4–8.3)
WBC # BLD: 6 K/UL (ref 3.5–11)
WBC # BLD: ABNORMAL 10*3/UL

## 2021-04-22 PROCEDURE — 6360000004 HC RX CONTRAST MEDICATION: Performed by: PHYSICIAN ASSISTANT

## 2021-04-22 PROCEDURE — 80053 COMPREHEN METABOLIC PANEL: CPT

## 2021-04-22 PROCEDURE — 6360000002 HC RX W HCPCS: Performed by: EMERGENCY MEDICINE

## 2021-04-22 PROCEDURE — 71260 CT THORAX DX C+: CPT

## 2021-04-22 PROCEDURE — 93971 EXTREMITY STUDY: CPT

## 2021-04-22 PROCEDURE — 2580000003 HC RX 258: Performed by: PHYSICIAN ASSISTANT

## 2021-04-22 PROCEDURE — 85730 THROMBOPLASTIN TIME PARTIAL: CPT

## 2021-04-22 PROCEDURE — 85610 PROTHROMBIN TIME: CPT

## 2021-04-22 PROCEDURE — 36415 COLL VENOUS BLD VENIPUNCTURE: CPT

## 2021-04-22 PROCEDURE — 85025 COMPLETE CBC W/AUTO DIFF WBC: CPT

## 2021-04-22 PROCEDURE — 83880 ASSAY OF NATRIURETIC PEPTIDE: CPT

## 2021-04-22 RX ORDER — CEPHALEXIN 500 MG/1
500 CAPSULE ORAL 4 TIMES DAILY
Qty: 28 CAPSULE | Refills: 0 | Status: SHIPPED | OUTPATIENT
Start: 2021-04-22 | End: 2021-04-22 | Stop reason: SDUPTHER

## 2021-04-22 RX ORDER — SODIUM CHLORIDE 0.9 % (FLUSH) 0.9 %
10 SYRINGE (ML) INJECTION PRN
Status: DISCONTINUED | OUTPATIENT
Start: 2021-04-22 | End: 2021-04-22 | Stop reason: HOSPADM

## 2021-04-22 RX ORDER — 0.9 % SODIUM CHLORIDE 0.9 %
80 INTRAVENOUS SOLUTION INTRAVENOUS ONCE
Status: COMPLETED | OUTPATIENT
Start: 2021-04-22 | End: 2021-04-22

## 2021-04-22 RX ORDER — CEPHALEXIN 500 MG/1
500 CAPSULE ORAL 4 TIMES DAILY
Qty: 28 CAPSULE | Refills: 0 | Status: SHIPPED | OUTPATIENT
Start: 2021-04-22 | End: 2021-04-29

## 2021-04-22 RX ADMIN — SODIUM CHLORIDE 80 ML: 9 INJECTION, SOLUTION INTRAVENOUS at 13:41

## 2021-04-22 RX ADMIN — SODIUM CHLORIDE, PRESERVATIVE FREE 10 ML: 5 INJECTION INTRAVENOUS at 13:41

## 2021-04-22 RX ADMIN — IOPAMIDOL 75 ML: 755 INJECTION, SOLUTION INTRAVENOUS at 13:45

## 2021-04-22 RX ADMIN — ENOXAPARIN SODIUM 60 MG: 60 INJECTION SUBCUTANEOUS at 00:10

## 2021-04-22 NOTE — ED NOTES
Unsuccessful IV attempts x2. Vascular tech in with patient at this time. Will attempt another IV following doppler scan.       Ursula Dickson RN  04/22/21 5322

## 2021-04-22 NOTE — ED TRIAGE NOTES
Mode of arrival (squad #, walk in, police, etc) : walk in        Chief complaint(s): leg pain/swelling        Arrival Note (brief scenario, treatment PTA, etc). : Pt is concerned about a blood clot. Pt has left leg swelling and redness, and warmth. C= \"Have you ever felt that you should Cut down on your drinking? \"  No  A= \"Have people Annoyed you by criticizing your drinking? \"  No  G= \"Have you ever felt bad or Guilty about your drinking? \"  No  E= \"Have you ever had a drink as an Eye-opener first thing in the morning to steady your nerves or to help a hangover? \"  No      Deferred []      Reason for deferring: N/A    *If yes to two or more: probable alcohol abuse. *

## 2021-04-22 NOTE — ED PROVIDER NOTES
EMERGENCY DEPARTMENT ENCOUNTER   ATTENDING ATTESTATION     Pt Name: Maya Obrien  MRN: [id number]  [place] 1950  Date of evaluation: 4/22/21   Maya Obrien is a 69 y.o. female with CC: Leg Swelling and Leg Pain    MDM:   Left leg redness and swelling  Here for DVT scan  Suspect mild cellulitis  Duplex shows no DVT  Ct chest no pe, chronic effusion and consolidation  No pneumonia symtoms  rx keflex for cellulitis of left leg  Do not suspect ischemic leg or compartment syndrome         RADIOLOGY:All plain film, CT, MRI, and formal ultrasound images (except ED bedside ultrasound) are read by the radiologist, see reports below, unless otherwise noted in MDM or here. CT CHEST PULMONARY EMBOLISM W CONTRAST   Final Result   No evidence of pulmonary embolism. .  No new areas of infiltrate or   consolidation are noted. Persistent left effusion and basilar consolidation in the lower lobe. This   finding is stable compared to prior study         VL Lower Extremity Venous Left    (Results Pending)     LABS: All lab results were reviewed by myself, and all abnormals are listed below. Labs Reviewed   CBC WITH AUTO DIFFERENTIAL - Abnormal; Notable for the following components:       Result Value    Seg Neutrophils 70 (*)     Lymphocytes 17 (*)     Monocytes 9 (*)     All other components within normal limits   COMPREHENSIVE METABOLIC PANEL W/ REFLEX TO MG FOR LOW K - Abnormal; Notable for the following components:    Glucose 102 (*)     BUN 31 (*)     CREATININE 1.07 (*)     Chloride 108 (*)     Anion Gap 8 (*)     Alkaline Phosphatase 115 (*)     Total Bilirubin 0.22 (*)     GFR Non- 51 (*)     All other components within normal limits   BRAIN NATRIURETIC PEPTIDE   PROTIME-INR   APTT     CONSULTS:  None  FINAL IMPRESSION      1. Leg swelling    2.  Cellulitis of left lower extremity            PASTMEDICAL HISTORY     Past Medical History:   Diagnosis Date    Acid reflux     [facility]     Chronic headaches     Hydrocephalus ([facility]      SURGICAL HISTORY       Past Surgical History:   Procedure Laterality Date    CHOLECYSTECTOMY      HIP FRACTURE SURGERY Left 06/09/2017    PINNING AND SCREW    HIP PINNING Left 6/9/2017    HIP PINNING 7.3 CANNULATED SCREW WITH SYNTHES PRODUCT APPLICATION AND INTRAOPERATIVE C-ARM performed by Sherian Leyden, MD at [facility] [place]      shunt x3 head   [facility] [address]       Current Discharge Medication List      CONTINUE these medications which have NOT CHANGED    Details   fluticasone (FLONASE) 50 MCG/ACT nasal spray 1 spray by Each Nostril route daily      loratadine (CLARITIN) 10 MG capsule Take 10 mg by mouth daily      omeprazole (PRILOSEC) 20 MG delayed release capsule Take 20 mg by mouth daily      polyethylene glycol (GLYCOLAX) 17 GM/SCOOP powder Take 17 g by mouth daily      !! QUEtiapine (SEROQUEL) 25 MG tablet Take 25 mg by mouth daily      VORTIoxetine (TRINTELLIX) 5 MG tablet Take 5 mg by mouth daily      !! topiramate (TOPAMAX) 100 MG tablet Take 100 mg by mouth every evening      !! lamoTRIgine (LAMICTAL) 200 MG tablet Take 200 mg by mouth nightly      melatonin 5 MG TBDP disintegrating tablet Take 5 mg by mouth nightly      !!  QUEtiapine (SEROQUEL) 25 MG tablet Take 50 mg by mouth nightly      butalbital-APAP-caffeine (FIORICET) -40 MG CAPS per capsule Take 1 capsule by mouth every 6 hours as needed for Headaches      meclizine (ANTIVERT) 25 MG tablet Take 25 mg by mouth 3 times daily as needed for Dizziness      Ca Carbonate-Mag Hydroxide (ROLAIDS PO) Take 2 tablets by mouth every 4 hours as needed (acid reflux)      SUMAtriptan (IMITREX) 100 MG tablet Take 100 mg by mouth 2 times daily as needed for Migraine      acetaminophen (TYLENOL) 325 MG tablet Take 2 tablets by mouth every 6 hours as needed for Pain  Qty: 30 tablet, Refills: 0      !! topiramate mother is . She indicated that her father is . SOCIAL HISTORY       Social History     Tobacco Use    Smoking status: Never Smoker    Smokeless tobacco: Never Used   Substance Use Topics    Alcohol use: No    Drug use: No       I personally evaluated and examined the patient in conjunction with the APC and agree with the assessment, treatment plan, and disposition of the patient as recorded by the APC.    Elver Stevens MD  Attending Emergency Physician         Elver Stevens MD  21 [id number]

## 2021-04-22 NOTE — ED NOTES
Mode of arrival (squad #, walk in, police, etc) : walk in with walker use and caregiver         Chief complaint(s): left leg pain and swelling        Arrival Note (brief scenario, treatment PTA, etc). : patient states she was seen in the ED last night for same symptoms, but was unable to have vascular doppler preformed. Patient states she is here for the doppler scan. Patient denies any SOB or chest pain. Patient is complaining of left lower leg pain. Patient is alert and oriented x3. Caregiver at bedside. C= \"Have you ever felt that you should Cut down on your drinking? \"  No  A= \"Have people Annoyed you by criticizing your drinking? \"  No  G= \"Have you ever felt bad or Guilty about your drinking? \"  No  E= \"Have you ever had a drink as an Eye-opener first thing in the morning to steady your nerves or to help a hangover? \"  No      Deferred []      Reason for deferring: N/A    *If yes to two or more: probable alcohol abuse. Romel Glass RN  04/22/21 9671

## 2021-04-22 NOTE — ED PROVIDER NOTES
[address]  eMERGENCY dEPARTMENT eNCOUnter      Pt Name: Anna Stout  MRN: [id number]  [place] 1950  Date of evaluation: 4/22/2021  Provider: Kadeem Vigil PA-C    CHIEF COMPLAINT       Chief Complaint   Patient presents with    Leg Swelling    Leg Pain           HISTORY OF PRESENT ILLNESS  (Location/Symptom, Timing/Onset, Context/Setting, Quality, Duration, Modifying Factors, Severity.)   Anna Stout is a 69 y.o. female who presents to the emergency department from residential for evaluation of left leg swelling. Pt reports she was evaluated in our ED last night. Pt had an elevated d-dimer. She was given a shot of heparin and told to come back today for a venous doppler of leg. Pt reports her left lower leg has been swollen for 4 months. States initially it was very painful but the pain is now gone. She denies numbness, tingling. No injury. Pt reports mild cough. No chest pain, sob, nausea, emesis, abd pain, fevers. Pt is not on blood thinners. Unsure if dvt in the past.  No other complaints. Nursing Notes were reviewed. REVIEW OF SYSTEMS    (2-9 systems for level 4, 10 or more for level 5)     Review of Systems   Leg swelling      Except as noted above the remainder of the review of systems was reviewed and negative.        PAST MEDICAL HISTORY     Past Medical History:   Diagnosis Date    Acid reflux     Bipolar affective ([address]     Chronic headaches     Hydrocephalus ([address]      None otherwise stated in nurses notes    SURGICAL HISTORY       Past Surgical History:   Procedure Laterality Date    CHOLECYSTECTOMY      HIP FRACTURE SURGERY Left 06/09/2017    PINNING AND SCREW    HIP PINNING Left 6/9/2017    HIP PINNING 7.3 CANNULATED SCREW WITH SYNTHES PRODUCT APPLICATION AND INTRAOPERATIVE C-ARM performed by Nataly Ram MD at [facility]      shunt x3 head    SHUNT REVISION      TONSILLECTOMY       None otherwise stated in Med      busPIRone (BUSPAR) 5 MG tablet Take 5 mg by mouth 2 times dailyHistorical Med      bismuth subsalicylate (BISMATROL) 262 MG chewable tablet Take 524 mg by mouth as needed for Heartburn (8 times daily)Historical Med      senna-docusate (PERICOLACE) 8.6-50 MG per tablet Take 1 tablet by mouth as needed for Constipation (in eveninig) Or 2 tabsHistorical Med      ibuprofen (ADVIL;MOTRIN) 200 MG tablet Take 200 mg by mouth every 6 hours as needed for Pain (or 2 tabs)Historical Med      magnesium hydroxide (MILK OF MAGNESIA) 400 MG/5ML suspension Take 5 mLs by mouth daily as needed for ConstipationHistorical Med      calcium carbonate (TUMS) 500 MG chewable tablet Take 1 tablet by mouth 4 times daily as needed for HeartburnHistorical Med      alendronate (FOSAMAX) 70 MG tablet Take 70 mg by mouth every 7 days Every WednesdayHistorical Med      atorvastatin (LIPITOR) 10 MG tablet Take 10 mg by mouth dailyHistorical Med      loperamide (IMODIUM) 2 MG capsule Take 2 mg by mouth 4 times daily as needed for DiarrheaHistorical Med      amLODIPine (NORVASC) 10 MG tablet Take 5 mg by mouth daily Historical Med      Multiple Vitamins-Minerals (THERAPEUTIC MULTIVITAMIN-MINERALS) tablet Take 1 tablet by mouth daily. ferrous sulfate 325 (65 FE) MG EC tablet Take 325 mg by mouth daily (with breakfast). !! lamoTRIgine (LAMICTAL) 100 MG tablet Take 100 mg by mouth daily Historical Med      aspirin 81 MG tablet Take 81 mg by mouth daily. Metoclopramide HCl (REGLAN PO) Take 5 mg by mouth 3 times daily Historical Med       !! - Potential duplicate medications found. Please discuss with provider. ALLERGIES     Levaquin [levofloxacin in d5w], Adhesive tape, Chocolate, Indomethacin, and Neurontin [gabapentin]    FAMILY HISTORY     History reviewed. No pertinent family history.   Family Status   Relation Name Status    Mother      Father        None otherwise stated in nurses notes    SOCIAL (KEFLEX) 500 MG capsule     Sig: Take 1 capsule by mouth 4 times daily for 7 days     Dispense:  28 capsule     Refill:  0         CONSULTS:  None    PROCEDURES:  None      FINAL IMPRESSION      1. Leg swelling    2. Cellulitis of left lower extremity          DISPOSITION/PLAN   DISPOSITION Decision To Discharge    PATIENT REFERRED TO:  Erik Chan MD  [address] [facility] [address]  [phone]          [facility]  Armando Hicks [address] [place] [address]  [phone]          DISCHARGE MEDICATIONS:  Discharge Medication List as of 4/22/2021  3:06 PM      START taking these medications    Details   cephALEXin (KEFLEX) 500 MG capsule Take 1 capsule by mouth 4 times daily for 7 days, Disp-28 capsule, R-0Print               Summation      Patient Course:    Left lower leg swelling x 4 months. Nontender. No injury. No rashes, erythema. No chest pain, sob, cough, fevers. Elevated ddimer yesterday. Given shot of heparin. Told to come back today for doppler. Venous doppler is negative. Will obtain labs and CT PE study. Slightly elevated renal function. Ct scan reveals no PE. No new consolidation. Pt did have a small scab on left leg with mild erythema. Will start on abx. Recommend warm compresses. Follow up with PCP. Strict return instructions discussed with patient and aide. Discussed results and plan with the pt. They expressed appropriate understanding. Pt given close follow up, supportive care instructions and strict return instructions at the bedside.         ED Medications administered this visit:    Medications   sodium chloride flush 0.9 % injection 10 mL (10 mLs Intravenous Given 4/22/21 1341)   0.9 % sodium chloride bolus (0 mLs Intravenous Stopped 4/22/21 1345)   iopamidol (ISOVUE-370) 76 % injection 75 mL (75 mLs Intravenous Given 4/22/21 1345)       New Prescriptions from this visit:    Discharge Medication List as of 4/22/2021  3:06 PM      START taking these medications    Details   cephALEXin (KEFLEX) 500 MG capsule Take 1 capsule by mouth 4 times daily for 7 days, Disp-28 capsule, R-0Print             Follow-up:  Jesica Velez MD  [address] [facility] [address]  [phone]          KARIME ROBLES   [place] [address]  [phone]            Final Impression:   1. Leg swelling    2.  Cellulitis of left lower extremity               (Please note that portions of this note were completed with a voice recognition program.  Efforts were made to edit the dictations but occasionally words are mis-transcribed.)      (Please note that portions of this note were completed with a voice recognition program.  Efforts were made to edit the dictations but occasionally words are mis-transcribed.)    Wilfrido Pulido. Medardo Claire, PA-C  04/22/21 1558

## 2021-04-23 ASSESSMENT — ENCOUNTER SYMPTOMS
BACK PAIN: 0
VOMITING: 0
COUGH: 0

## 2021-04-23 NOTE — ED PROVIDER NOTES
MEDICATIONS       Discharge Medication List as of 4/21/2021 11:30 PM      CONTINUE these medications which have NOT CHANGED    Details   fluticasone (FLONASE) 50 MCG/ACT nasal spray 1 spray by Each Nostril route dailyHistorical Med      loratadine (CLARITIN) 10 MG capsule Take 10 mg by mouth dailyHistorical Med      omeprazole (PRILOSEC) 20 MG delayed release capsule Take 20 mg by mouth dailyHistorical Med      polyethylene glycol (GLYCOLAX) 17 GM/SCOOP powder Take 17 g by mouth dailyHistorical Med      !! QUEtiapine (SEROQUEL) 25 MG tablet Take 25 mg by mouth dailyHistorical Med      VORTIoxetine (TRINTELLIX) 5 MG tablet Take 5 mg by mouth dailyHistorical Med      !! topiramate (TOPAMAX) 100 MG tablet Take 100 mg by mouth every eveningHistorical Med      !! lamoTRIgine (LAMICTAL) 200 MG tablet Take 200 mg by mouth nightlyHistorical Med      melatonin 5 MG TBDP disintegrating tablet Take 5 mg by mouth nightlyHistorical Med      !! QUEtiapine (SEROQUEL) 25 MG tablet Take 50 mg by mouth nightlyHistorical Med      butalbital-APAP-caffeine (FIORICET) -40 MG CAPS per capsule Take 1 capsule by mouth every 6 hours as needed for HeadachesHistorical Med      meclizine (ANTIVERT) 25 MG tablet Take 25 mg by mouth 3 times daily as needed for DizzinessHistorical Med      Ca Carbonate-Mag Hydroxide (ROLAIDS PO) Take 2 tablets by mouth every 4 hours as needed (acid reflux)Historical Med      SUMAtriptan (IMITREX) 100 MG tablet Take 100 mg by mouth 2 times daily as needed for MigraineHistorical Med      acetaminophen (TYLENOL) 325 MG tablet Take 2 tablets by mouth every 6 hours as needed for Pain, Disp-30 tablet, R-0Print      !! topiramate (TOPAMAX) 50 MG tablet Take 1 tablet by mouth daily, Disp-60 tablet, R-3Historical Med      lidocaine PTCH Place 1 patch onto the skin daily, Disp-3 patch, R-0Print      Misc Natural Products (GLUCOSAMINE CHONDROITIN ADV PO) Take 1 tablet by mouth dailyHistorical Med      busPIRone (BUSPAR) 5 MG tablet Take 5 mg by mouth 2 times dailyHistorical Med      bismuth subsalicylate (BISMATROL) 262 MG chewable tablet Take 524 mg by mouth as needed for Heartburn (8 times daily)Historical Med      senna-docusate (PERICOLACE) 8.6-50 MG per tablet Take 1 tablet by mouth as needed for Constipation (in eveninig) Or 2 tabsHistorical Med      ibuprofen (ADVIL;MOTRIN) 200 MG tablet Take 200 mg by mouth every 6 hours as needed for Pain (or 2 tabs)Historical Med      magnesium hydroxide (MILK OF MAGNESIA) 400 MG/5ML suspension Take 5 mLs by mouth daily as needed for ConstipationHistorical Med      calcium carbonate (TUMS) 500 MG chewable tablet Take 1 tablet by mouth 4 times daily as needed for HeartburnHistorical Med      alendronate (FOSAMAX) 70 MG tablet Take 70 mg by mouth every 7 days Every WednesdayHistorical Med      atorvastatin (LIPITOR) 10 MG tablet Take 10 mg by mouth dailyHistorical Med      loperamide (IMODIUM) 2 MG capsule Take 2 mg by mouth 4 times daily as needed for DiarrheaHistorical Med      amLODIPine (NORVASC) 10 MG tablet Take 5 mg by mouth daily Historical Med      Multiple Vitamins-Minerals (THERAPEUTIC MULTIVITAMIN-MINERALS) tablet Take 1 tablet by mouth daily. ferrous sulfate 325 (65 FE) MG EC tablet Take 325 mg by mouth daily (with breakfast). !! lamoTRIgine (LAMICTAL) 100 MG tablet Take 100 mg by mouth daily Historical Med      aspirin 81 MG tablet Take 81 mg by mouth daily. Metoclopramide HCl (REGLAN PO) Take 5 mg by mouth 3 times daily Historical Med       !! - Potential duplicate medications found. Please discuss with provider. ALLERGIES     is allergic to levaquin [levofloxacin in d5w]; adhesive tape; chocolate; indomethacin; and neurontin [gabapentin]. FAMILY HISTORY     She indicated that her mother is . She indicated that her father is . SOCIAL HISTORY      reports that she has never smoked.  She has never used smokeless tobacco. She reports that she does not drink alcohol or use drugs. PHYSICAL EXAM     INITIAL VITALS: /64   Pulse 99   Temp 97.5 °F (36.4 °C) (Oral)   Resp 16   Ht 5' 1\" (1.549 m)   Wt 130 lb (59 kg)   SpO2 95%   BMI 24.56 kg/m²   Gen: NAD  Head: Normocephalic, atraumatic  Eye: Pupils equal round reactive to light, no conjunctivitis  ENT: MMM  Neck: no JVD  Heart: Regular rate and rhythm no murmurs  Lungs: Clear to auscultation bilaterally, no respiratory distress  Chest wall: No crepitus, no tenderness palpation  Abdomen: Soft, nontender, nondistended, with no peritoneal signs  Neurologic: Patient is alert and oriented x3, motor and sensation is intact in all 4 extremities, fluent speech  Extremities: left leg edema, no calf tenderness to palpation, no erythema. DP/ PT pulses appropriate. MEDICAL DECISION MAKING:     MDM & Emergency Department course:    69 y.o. female comes to the emergency department for unilateral leg swelling. D.dimer obtained and was elevated. We do not have doppler services at this time. Doppler study ordered. Discussed with care workers that the patient can return tomorrow for a doppler. She was treated with a dose of lovenox pending the results of the doppler. DIAGNOSTIC RESULTS       RADIOLOGY:All plain film, CT, MRI, and formal ultrasound images (except ED bedside ultrasound) are read by the radiologist and the images and interpretations are directly viewed by the emergency physician. VL DUP LOWER EXTREMITY VENOUS LEFT    (Results Pending)       LABS: All lab results were reviewed by myself, and all abnormals are listed below.   Labs Reviewed   D-DIMER, QUANTITATIVE - Abnormal; Notable for the following components:       Result Value    D-Dimer, Quant 0.93 (*)     All other components within normal limits       EMERGENCY DEPARTMENT COURSE:   Vitals:    Vitals:    04/21/21 2037   BP: 126/64   Pulse: 99   Resp: 16   Temp: 97.5 °F (36.4 °C)   TempSrc: Oral   SpO2: 95% Weight: 130 lb (59 kg)   Height: 5' 1\" (1.549 m)       The patient was given the following medications while in the emergency department:  Orders Placed This Encounter   Medications    enoxaparin (LOVENOX) injection 60 mg     -------------------------  CRITICAL CARE:   CONSULTS: None  PROCEDURES: Procedures     FINAL IMPRESSION      1.  Leg edema, left          DISPOSITION/PLAN   DISPOSITION Decision To Discharge 04/21/2021 11:26:29 PM      PATIENT REFERRED TO:  Mary Peres MD  [address] [place] [id number]  [phone]    In 1 day      [facility] [place]  [facility] [address]  [phone]          DISCHARGE MEDICATIONS:  Discharge Medication List as of 4/21/2021 11:30 PM        Ke Luciano MD  Attending Emergency Physician       Ke Luciano MD  04/23/21 8251

## 2021-07-12 ENCOUNTER — HOSPITAL ENCOUNTER (OUTPATIENT)
Dept: CT IMAGING | Age: 71
Discharge: HOME | End: 2021-07-14
Payer: MEDICARE

## 2021-07-12 PROCEDURE — 70450 CT HEAD/BRAIN W/O DYE: CPT

## 2021-07-27 ENCOUNTER — HOSPITAL ENCOUNTER (OUTPATIENT)
Age: 71
Setting detail: SPECIMEN
Discharge: HOME | End: 2021-07-27
Payer: MEDICARE

## 2021-07-27 LAB
-: ABNORMAL
AMORPHOUS: ABNORMAL
BACTERIA: ABNORMAL
BILIRUBIN URINE: NEGATIVE
CASTS UA: ABNORMAL /LPF (ref 0–8)
COLOR: YELLOW
COMMENT UA: ABNORMAL
CRYSTALS, UA: ABNORMAL /HPF
EPITHELIAL CELLS UA: ABNORMAL /HPF (ref 0–5)
GLUCOSE URINE: NEGATIVE
KETONES, URINE: NEGATIVE
LEUKOCYTE ESTERASE, URINE: ABNORMAL
MUCUS: ABNORMAL
NITRITE, URINE: NEGATIVE
OTHER OBSERVATIONS UA: ABNORMAL
PH UA: 7 (ref 5–8)
PROTEIN UA: NEGATIVE
RBC UA: ABNORMAL /HPF (ref 0–4)
RENAL EPITHELIAL, UA: ABNORMAL /HPF
SPECIFIC GRAVITY UA: 1.01 (ref 1–1.03)
TRICHOMONAS: ABNORMAL
TURBIDITY: ABNORMAL
URINE HGB: NEGATIVE
UROBILINOGEN, URINE: NORMAL
WBC UA: ABNORMAL /HPF (ref 0–5)
YEAST: ABNORMAL

## 2021-07-27 PROCEDURE — 87186 SC STD MICRODIL/AGAR DIL: CPT

## 2021-07-27 PROCEDURE — 87077 CULTURE AEROBIC IDENTIFY: CPT

## 2021-07-27 PROCEDURE — 87086 URINE CULTURE/COLONY COUNT: CPT

## 2021-07-27 PROCEDURE — 81001 URINALYSIS AUTO W/SCOPE: CPT

## 2021-07-29 LAB
CULTURE: ABNORMAL
Lab: ABNORMAL
SPECIMEN DESCRIPTION: ABNORMAL

## 2021-09-20 ENCOUNTER — HOSPITAL ENCOUNTER (OUTPATIENT)
Dept: GENERAL RADIOLOGY | Age: 71
Discharge: HOME | End: 2021-09-22
Payer: MEDICARE

## 2021-09-20 ENCOUNTER — HOSPITAL ENCOUNTER (OUTPATIENT)
Dept: NON INVASIVE DIAGNOSTICS | Age: 71
Discharge: HOME | End: 2021-09-20
Payer: MEDICARE

## 2021-09-20 ENCOUNTER — HOSPITAL ENCOUNTER (OUTPATIENT)
Age: 71
Discharge: HOME | End: 2021-09-20
Payer: MEDICARE

## 2021-09-20 ENCOUNTER — HOSPITAL ENCOUNTER (OUTPATIENT)
Age: 71
Discharge: HOME | End: 2021-09-22
Payer: MEDICARE

## 2021-09-20 DIAGNOSIS — R07.89 OTHER CHEST PAIN: ICD-10-CM

## 2021-09-20 LAB
ABSOLUTE EOS #: 0.2 K/UL (ref 0–0.4)
ABSOLUTE IMMATURE GRANULOCYTE: ABNORMAL K/UL (ref 0–0.3)
ABSOLUTE LYMPH #: 1.1 K/UL (ref 1–4.8)
ABSOLUTE MONO #: 0.4 K/UL (ref 0.1–1.3)
ALBUMIN SERPL-MCNC: 4 G/DL (ref 3.5–5.2)
ALBUMIN/GLOBULIN RATIO: ABNORMAL (ref 1–2.5)
ALP BLD-CCNC: 113 U/L (ref 35–104)
ALT SERPL-CCNC: 20 U/L (ref 5–33)
ANION GAP SERPL CALCULATED.3IONS-SCNC: 11 MMOL/L (ref 9–17)
AST SERPL-CCNC: 20 U/L
BASOPHILS # BLD: 1 % (ref 0–2)
BASOPHILS ABSOLUTE: 0 K/UL (ref 0–0.2)
BILIRUB SERPL-MCNC: 0.24 MG/DL (ref 0.3–1.2)
BUN BLDV-MCNC: 26 MG/DL (ref 8–23)
BUN/CREAT BLD: ABNORMAL (ref 9–20)
CALCIUM SERPL-MCNC: 9.8 MG/DL (ref 8.6–10.4)
CHLORIDE BLD-SCNC: 108 MMOL/L (ref 98–107)
CO2: 27 MMOL/L (ref 20–31)
CREAT SERPL-MCNC: 1.1 MG/DL (ref 0.5–0.9)
DIFFERENTIAL TYPE: ABNORMAL
EOSINOPHILS RELATIVE PERCENT: 3 % (ref 0–4)
GFR AFRICAN AMERICAN: 59 ML/MIN
GFR NON-AFRICAN AMERICAN: 49 ML/MIN
GFR SERPL CREATININE-BSD FRML MDRD: ABNORMAL ML/MIN/{1.73_M2}
GFR SERPL CREATININE-BSD FRML MDRD: ABNORMAL ML/MIN/{1.73_M2}
GLUCOSE BLD-MCNC: 103 MG/DL (ref 70–99)
HCT VFR BLD CALC: 37.7 % (ref 36–46)
HEMOGLOBIN: 12.4 G/DL (ref 12–16)
IMMATURE GRANULOCYTES: ABNORMAL %
LV EF: 55 %
LVEF MODALITY: NORMAL
LYMPHOCYTES # BLD: 17 % (ref 24–44)
MCH RBC QN AUTO: 32.1 PG (ref 26–34)
MCHC RBC AUTO-ENTMCNC: 32.9 G/DL (ref 31–37)
MCV RBC AUTO: 97.8 FL (ref 80–100)
MONOCYTES # BLD: 7 % (ref 1–7)
NRBC AUTOMATED: ABNORMAL PER 100 WBC
PDW BLD-RTO: 12.8 % (ref 11.5–14.9)
PLATELET # BLD: 189 K/UL (ref 150–450)
PLATELET ESTIMATE: ABNORMAL
PMV BLD AUTO: 8.3 FL (ref 6–12)
POTASSIUM SERPL-SCNC: 4 MMOL/L (ref 3.7–5.3)
RBC # BLD: 3.85 M/UL (ref 4–5.2)
RBC # BLD: ABNORMAL 10*6/UL
SEG NEUTROPHILS: 72 % (ref 36–66)
SEGMENTED NEUTROPHILS ABSOLUTE COUNT: 4.9 K/UL (ref 1.3–9.1)
SODIUM BLD-SCNC: 146 MMOL/L (ref 135–144)
TOTAL PROTEIN: 7.1 G/DL (ref 6.4–8.3)
WBC # BLD: 6.6 K/UL (ref 3.5–11)
WBC # BLD: ABNORMAL 10*3/UL

## 2021-09-20 PROCEDURE — 93306 TTE W/DOPPLER COMPLETE: CPT

## 2021-09-20 PROCEDURE — 36415 COLL VENOUS BLD VENIPUNCTURE: CPT

## 2021-09-20 PROCEDURE — 85025 COMPLETE CBC W/AUTO DIFF WBC: CPT

## 2021-09-20 PROCEDURE — 80053 COMPREHEN METABOLIC PANEL: CPT

## 2021-09-20 PROCEDURE — 71046 X-RAY EXAM CHEST 2 VIEWS: CPT

## 2021-09-30 ENCOUNTER — HOSPITAL ENCOUNTER (EMERGENCY)
Age: 71
Discharge: NURSING, REHAB OR LONG-TERM CARE | End: 2021-10-01
Attending: EMERGENCY MEDICINE
Payer: MEDICARE

## 2021-09-30 DIAGNOSIS — N30.00 ACUTE CYSTITIS WITHOUT HEMATURIA: Primary | ICD-10-CM

## 2021-09-30 DIAGNOSIS — R41.0 DELIRIUM: ICD-10-CM

## 2021-09-30 LAB
-: ABNORMAL
AMORPHOUS: ABNORMAL
BACTERIA: ABNORMAL
BILIRUBIN URINE: NEGATIVE
CASTS UA: ABNORMAL /LPF
COLOR: YELLOW
COMMENT UA: ABNORMAL
CRYSTALS, UA: ABNORMAL /HPF
EPITHELIAL CELLS UA: ABNORMAL /HPF
GLUCOSE URINE: NEGATIVE
KETONES, URINE: NEGATIVE
LEUKOCYTE ESTERASE, URINE: POSITIVE
MUCUS: ABNORMAL
NITRITE, URINE: POSITIVE
OTHER OBSERVATIONS UA: ABNORMAL
PH UA: 6 (ref 5–8)
PROTEIN UA: NEGATIVE
RBC UA: ABNORMAL /HPF
RENAL EPITHELIAL, UA: ABNORMAL /HPF
SPECIFIC GRAVITY UA: 1.01 (ref 1–1.03)
TRICHOMONAS: ABNORMAL
TURBIDITY: ABNORMAL
URINE HGB: ABNORMAL
UROBILINOGEN, URINE: NORMAL
WBC UA: ABNORMAL /HPF
YEAST: ABNORMAL

## 2021-09-30 PROCEDURE — 81001 URINALYSIS AUTO W/SCOPE: CPT

## 2021-09-30 PROCEDURE — 6370000000 HC RX 637 (ALT 250 FOR IP): Performed by: EMERGENCY MEDICINE

## 2021-09-30 PROCEDURE — 99284 EMERGENCY DEPT VISIT MOD MDM: CPT

## 2021-09-30 PROCEDURE — 87186 SC STD MICRODIL/AGAR DIL: CPT

## 2021-09-30 PROCEDURE — 87086 URINE CULTURE/COLONY COUNT: CPT

## 2021-09-30 PROCEDURE — 85025 COMPLETE CBC W/AUTO DIFF WBC: CPT

## 2021-09-30 PROCEDURE — 87077 CULTURE AEROBIC IDENTIFY: CPT

## 2021-09-30 RX ORDER — DICYCLOMINE HYDROCHLORIDE 10 MG/1
10 CAPSULE ORAL ONCE
Status: COMPLETED | OUTPATIENT
Start: 2021-09-30 | End: 2021-09-30

## 2021-09-30 RX ADMIN — DICYCLOMINE HYDROCHLORIDE 10 MG: 10 CAPSULE ORAL at 23:57

## 2021-09-30 ASSESSMENT — ENCOUNTER SYMPTOMS
WHEEZING: 0
FACIAL SWELLING: 0
SHORTNESS OF BREATH: 0
EYE REDNESS: 0
EYE PAIN: 0
CONSTIPATION: 0
EYE DISCHARGE: 0
SORE THROAT: 0
SINUS PRESSURE: 0
CHEST TIGHTNESS: 0
TROUBLE SWALLOWING: 0
BLOOD IN STOOL: 0
COLOR CHANGE: 0
RHINORRHEA: 0
BACK PAIN: 0
COUGH: 0
NAUSEA: 0
DIARRHEA: 1
VOMITING: 0
ABDOMINAL PAIN: 1

## 2021-09-30 ASSESSMENT — PAIN DESCRIPTION - LOCATION: LOCATION: ABDOMEN

## 2021-10-01 VITALS
DIASTOLIC BLOOD PRESSURE: 64 MMHG | SYSTOLIC BLOOD PRESSURE: 134 MMHG | HEART RATE: 102 BPM | WEIGHT: 129 LBS | TEMPERATURE: 97.8 F | HEIGHT: 61 IN | OXYGEN SATURATION: 97 % | BODY MASS INDEX: 24.35 KG/M2 | RESPIRATION RATE: 17 BRPM

## 2021-10-01 LAB
-: NORMAL
ABSOLUTE EOS #: 0.3 K/UL (ref 0–0.4)
ABSOLUTE IMMATURE GRANULOCYTE: ABNORMAL K/UL (ref 0–0.3)
ABSOLUTE LYMPH #: 1.1 K/UL (ref 1–4.8)
ABSOLUTE MONO #: 1.3 K/UL (ref 0.1–1.3)
ANION GAP SERPL CALCULATED.3IONS-SCNC: 11 MMOL/L (ref 9–17)
BASOPHILS # BLD: 1 % (ref 0–2)
BASOPHILS ABSOLUTE: 0.1 K/UL (ref 0–0.2)
BUN BLDV-MCNC: 29 MG/DL (ref 8–23)
BUN/CREAT BLD: ABNORMAL (ref 9–20)
CALCIUM SERPL-MCNC: 10.3 MG/DL (ref 8.6–10.4)
CHLORIDE BLD-SCNC: 107 MMOL/L (ref 98–107)
CO2: 27 MMOL/L (ref 20–31)
CREAT SERPL-MCNC: 0.98 MG/DL (ref 0.5–0.9)
DIFFERENTIAL TYPE: ABNORMAL
EOSINOPHILS RELATIVE PERCENT: 2 % (ref 0–4)
GFR AFRICAN AMERICAN: >60 ML/MIN
GFR NON-AFRICAN AMERICAN: 56 ML/MIN
GFR SERPL CREATININE-BSD FRML MDRD: ABNORMAL ML/MIN/{1.73_M2}
GFR SERPL CREATININE-BSD FRML MDRD: ABNORMAL ML/MIN/{1.73_M2}
GLUCOSE BLD-MCNC: 122 MG/DL (ref 70–99)
HCT VFR BLD CALC: 40.6 % (ref 36–46)
HEMOGLOBIN: 13.1 G/DL (ref 12–16)
IMMATURE GRANULOCYTES: ABNORMAL %
LYMPHOCYTES # BLD: 8 % (ref 24–44)
MCH RBC QN AUTO: 31.1 PG (ref 26–34)
MCHC RBC AUTO-ENTMCNC: 32.4 G/DL (ref 31–37)
MCV RBC AUTO: 96.1 FL (ref 80–100)
MONOCYTES # BLD: 9 % (ref 1–7)
NRBC AUTOMATED: ABNORMAL PER 100 WBC
PDW BLD-RTO: 13 % (ref 11.5–14.9)
PLATELET # BLD: 204 K/UL (ref 150–450)
PLATELET ESTIMATE: ABNORMAL
PMV BLD AUTO: 8.5 FL (ref 6–12)
POTASSIUM SERPL-SCNC: 4 MMOL/L (ref 3.7–5.3)
RBC # BLD: 4.22 M/UL (ref 4–5.2)
RBC # BLD: ABNORMAL 10*6/UL
REASON FOR REJECTION: NORMAL
SEG NEUTROPHILS: 80 % (ref 36–66)
SEGMENTED NEUTROPHILS ABSOLUTE COUNT: 11.8 K/UL (ref 1.3–9.1)
SODIUM BLD-SCNC: 145 MMOL/L (ref 135–144)
WBC # BLD: 14.5 K/UL (ref 3.5–11)
WBC # BLD: ABNORMAL 10*3/UL
ZZ NTE CLEAN UP: ORDERED TEST: NORMAL
ZZ NTE WITH NAME CLEAN UP: SPECIMEN SOURCE: NORMAL

## 2021-10-01 PROCEDURE — 80048 BASIC METABOLIC PNL TOTAL CA: CPT

## 2021-10-01 PROCEDURE — 36415 COLL VENOUS BLD VENIPUNCTURE: CPT

## 2021-10-01 PROCEDURE — 6370000000 HC RX 637 (ALT 250 FOR IP): Performed by: EMERGENCY MEDICINE

## 2021-10-01 RX ORDER — CEPHALEXIN 250 MG/1
500 CAPSULE ORAL ONCE
Status: COMPLETED | OUTPATIENT
Start: 2021-10-01 | End: 2021-10-01

## 2021-10-01 RX ORDER — DICYCLOMINE HYDROCHLORIDE 10 MG/1
10 CAPSULE ORAL EVERY 6 HOURS PRN
Qty: 20 CAPSULE | Refills: 0 | Status: SHIPPED | OUTPATIENT
Start: 2021-10-01 | End: 2022-05-05 | Stop reason: DRUGHIGH

## 2021-10-01 RX ORDER — CEPHALEXIN 500 MG/1
500 CAPSULE ORAL 4 TIMES DAILY
Qty: 28 CAPSULE | Refills: 0 | Status: SHIPPED | OUTPATIENT
Start: 2021-10-01 | End: 2021-10-08

## 2021-10-01 RX ADMIN — CEPHALEXIN 500 MG: 250 CAPSULE ORAL at 01:07

## 2021-10-01 NOTE — ED NOTES
Discharge papers reviewed with pt. Pt and pt's caregiver educated on medication and dosages. Pt and pt's caregiver instructed to follow-up with PCP/specialist and to return to ED if symptoms worsen or have any concerns. Pt's caregiver verbalizes understanding. Pt ambulated out of ED with steady gait and all belongings.        Nicky Retana RN  10/01/21 1573

## 2021-10-01 NOTE — ED NOTES
Multiple RN's for multiple IV attempt starts, unsuccessful. LIP notified. Ok to hold off on IV.  Medication switched to oral.     Zeus Chavarria RN  10/01/21 8086

## 2021-10-01 NOTE — ED PROVIDER NOTES
[address] ED  eMERGENCY dEPARTMENT eNCOUnter      Pt Name: Jim Caba  MRN: [id number]  [place] [birth year]  Date of evaluation: 9/30/21      CHIEF COMPLAINT       Chief Complaint   Patient presents with    Altered Mental Status    Diarrhea    Abdominal Pain    Urinary Frequency         HISTORY OF PRESENT ILLNESS    Jim Caba is a 66 y.o. female who presents complaining of agitation. Since yesterday patient evidently has been complaining of abdominal pain and diarrhea. Patient is also had urinary frequency. Diarrhea according to the patient has been a lot. Patient has had some dysuria along with her frequency. Patient is also had some increased aggression and agitation according to the caregiver that is here. Patient does live in an group home. They are concerned about the possibility of UTI. Patient has had no fevers. Patient denies cough or chest pain. REVIEW OF SYSTEMS       Review of Systems   Constitutional: Negative for activity change, appetite change, chills, diaphoresis and fever. HENT: Negative for congestion, ear pain, facial swelling, nosebleeds, rhinorrhea, sinus pressure, sore throat and trouble swallowing. Eyes: Negative for pain, discharge and redness. Respiratory: Negative for cough, chest tightness, shortness of breath and wheezing. Cardiovascular: Negative for chest pain, palpitations and leg swelling. Gastrointestinal: Positive for abdominal pain and diarrhea. Negative for blood in stool, constipation, nausea and vomiting. Genitourinary: Positive for dysuria and frequency. Negative for difficulty urinating, flank pain, genital sores and hematuria. Musculoskeletal: Negative for arthralgias, back pain, gait problem, joint swelling, myalgias and neck pain. Skin: Negative for color change, pallor, rash and wound. Neurological: Negative for dizziness, tremors, seizures, syncope, speech difficulty, weakness, numbness and headaches. Psychiatric/Behavioral: Positive for agitation and behavioral problems. Negative for confusion, decreased concentration, hallucinations, self-injury, sleep disturbance and suicidal ideas. PAST MEDICAL HISTORY     Past Medical History:   Diagnosis Date    Acid reflux     Bipolar affective ([address]     Chronic headaches     Hydrocephalus (HCC)        SURGICAL HISTORY       Past Surgical History:   Procedure Laterality Date    CHOLECYSTECTOMY      HIP FRACTURE SURGERY Left 06/09/2017    PINNING AND SCREW    HIP PINNING Left 6/9/2017    HIP PINNING 7.3 CANNULATED SCREW WITH [employer]TH[employer] PRODUCT APPLICATION AND INTRAOPERATIVE C-ARM performed by Tierney Miles MD at [address]      shunt x3 head    SHUNT REVISION      TONSILLECTOMY         CURRENT MEDICATIONS       Previous Medications    ACETAMINOPHEN (TYLENOL) 325 MG TABLET    Take 2 tablets by mouth every 6 hours as needed for Pain    ALENDRONATE (FOSAMAX) 70 MG TABLET    Take 70 mg by mouth every 7 days Every Wednesday    AMLODIPINE (NORVASC) 10 MG TABLET    Take 5 mg by mouth daily     ASPIRIN 81 MG TABLET    Take 81 mg by mouth daily. ATORVASTATIN (LIPITOR) 10 MG TABLET    Take 10 mg by mouth daily    BISMUTH SUBSALICYLATE (BISMATROL) 262 MG CHEWABLE TABLET    Take 524 mg by mouth as needed for Heartburn (8 times daily)    BUSPIRONE (BUSPAR) 5 MG TABLET    Take 5 mg by mouth 2 times daily    BUTALBITAL-APAP-CAFFEINE (FIORICET) -40 MG CAPS PER CAPSULE    Take 1 capsule by mouth every 6 hours as needed for Headaches    CA CARBONATE-MAG HYDROXIDE (ROLAIDS PO)    Take 2 tablets by mouth every 4 hours as needed (acid reflux)    CALCIUM CARBONATE (TUMS) 500 MG CHEWABLE TABLET    Take 1 tablet by mouth 4 times daily as needed for Heartburn    FERROUS SULFATE 325 (65 FE) MG EC TABLET    Take 325 mg by mouth daily (with breakfast).     FLUTICASONE (FLONASE) 50 MCG/ACT NASAL SPRAY    1 spray by Each Nostril route daily    IBUPROFEN (ADVIL;MOTRIN) 200 MG TABLET    Take 200 mg by mouth every 6 hours as needed for Pain (or 2 tabs)    LAMOTRIGINE (LAMICTAL) 100 MG TABLET    Take 100 mg by mouth daily     LAMOTRIGINE (LAMICTAL) 200 MG TABLET    Take 200 mg by mouth nightly    LIDOCAINE PTCH    Place 1 patch onto the skin daily    LOPERAMIDE (IMODIUM) 2 MG CAPSULE    Take 2 mg by mouth 4 times daily as needed for Diarrhea    LORATADINE (CLARITIN) 10 MG CAPSULE    Take 10 mg by mouth daily    MAGNESIUM HYDROXIDE (MILK OF MAGNESIA) 400 MG/5ML SUSPENSION    Take 5 mLs by mouth daily as needed for Constipation    MECLIZINE (ANTIVERT) 25 MG TABLET    Take 25 mg by mouth 3 times daily as needed for Dizziness    MELATONIN 5 MG TBDP DISINTEGRATING TABLET    Take 5 mg by mouth nightly    METOCLOPRAMIDE HCL (REGLAN PO)    Take 5 mg by mouth 3 times daily     MISC NATURAL PRODUCTS (GLUCOSAMINE CHONDROITIN ADV PO)    Take 1 tablet by mouth daily    MULTIPLE VITAMINS-MINERALS (THERAPEUTIC MULTIVITAMIN-MINERALS) TABLET    Take 1 tablet by mouth daily. OMEPRAZOLE (PRILOSEC) 20 MG DELAYED RELEASE CAPSULE    Take 20 mg by mouth daily    POLYETHYLENE GLYCOL (GLYCOLAX) 17 GM/SCOOP POWDER    Take 17 g by mouth daily    QUETIAPINE (SEROQUEL) 25 MG TABLET    Take 25 mg by mouth daily    QUETIAPINE (SEROQUEL) 25 MG TABLET    Take 50 mg by mouth nightly    SENNA-DOCUSATE (PERICOLACE) 8.6-50 MG PER TABLET    Take 1 tablet by mouth as needed for Constipation (in eveninig) Or 2 tabs    SUMATRIPTAN (IMITREX) 100 MG TABLET    Take 100 mg by mouth 2 times daily as needed for Migraine    TOPIRAMATE (TOPAMAX) 100 MG TABLET    Take 100 mg by mouth every evening    TOPIRAMATE (TOPAMAX) 50 MG TABLET    Take 1 tablet by mouth daily    VORTIOXETINE (TRINTELLIX) 5 MG TABLET    Take 5 mg by mouth daily       ALLERGIES     is allergic to levaquin [levofloxacin in d5w], adhesive tape, chocolate, indomethacin, and neurontin [gabapentin].     SOCIAL HISTORY      reports that she has never smoked. She has never used smokeless tobacco. She reports that she does not drink alcohol and does not use drugs. PHYSICAL EXAM     INITIAL VITALS: /71   Pulse 112   Temp 97.8 °F (36.6 °C) (Oral)   Resp 16   Ht 5' 1\" (1.549 m)   Wt 129 lb (58.5 kg)   SpO2 95%   BMI 24.37 kg/m²      Physical Exam  Vitals and nursing note reviewed. Constitutional:       General: She is not in acute distress. Appearance: She is well-developed. She is not diaphoretic. HENT:      Head: Normocephalic and atraumatic. Eyes:      General: No scleral icterus. Right eye: No discharge. Left eye: No discharge. Conjunctiva/sclera: Conjunctivae normal.      Pupils: Pupils are equal, round, and reactive to light. Cardiovascular:      Rate and Rhythm: Normal rate and regular rhythm. Heart sounds: Normal heart sounds. No murmur heard. No friction rub. No gallop. Pulmonary:      Effort: Pulmonary effort is normal. No respiratory distress. Breath sounds: Normal breath sounds. No wheezing or rales. Chest:      Chest wall: No tenderness. Abdominal:      General: Bowel sounds are normal. There is no distension. Palpations: Abdomen is soft. There is no mass. Tenderness: There is abdominal tenderness. There is no guarding or rebound. Musculoskeletal:         General: No tenderness. Normal range of motion. Skin:     General: Skin is warm and dry. Coloration: Skin is not pale. Findings: No erythema or rash. Neurological:      Mental Status: She is alert and oriented to person, place, and time. Mental status is at baseline. Cranial Nerves: No cranial nerve deficit. Sensory: No sensory deficit. Motor: No abnormal muscle tone. Coordination: Coordination normal.      Deep Tendon Reflexes: Reflexes normal.   Psychiatric:         Thought Content:  Thought content normal.         Judgment: Judgment normal.         DIAGNOSTIC RESULTS     RADIOLOGY:All plain Indications     Answer:   Urinary Tract Infection    dicyclomine (BENTYL) capsule 10 mg    cephALEXin (KEFLEX) capsule 500 mg     Order Specific Question:   Antimicrobial Indications     Answer:   Urinary Tract Infection    cephALEXin (KEFLEX) 500 MG capsule     Sig: Take 1 capsule by mouth 4 times daily for 7 days     Dispense:  28 capsule     Refill:  0    dicyclomine (BENTYL) 10 MG capsule     Sig: Take 1 capsule by mouth every 6 hours as needed (cramps)     Dispense:  20 capsule     Refill:  0       -------------------------  12:51 AM EDT  Patient was updated on results and plan of care. Patient be discharged back to the group home. CONSULTS:  None    PROCEDURES:  None    FINAL IMPRESSION      1. Acute cystitis without hematuria    2.  Delirium          DISPOSITION/PLAN   DISPOSITION Decision To Discharge 10/01/2021 12:50:41 AM      PATIENT REFERREDTO:  Federico Matthews MD  [address] [place] [address]  [phone]    In 1 week      [facility] ED  [place] [id number] [phone]    If symptoms worsen      DISCHARGEMEDICATIONS:  New Prescriptions    CEPHALEXIN (KEFLEX) 500 MG CAPSULE    Take 1 capsule by mouth 4 times daily for 7 days    DICYCLOMINE (BENTYL) 10 MG CAPSULE    Take 1 capsule by mouth every 6 hours as needed (cramps)       (Please note that portions of this note were completed with a voice recognition program.  Efforts were made to edit thedictations but occasionally words are mis-transcribed.)    Abbie Rushing MD  Attending Emergency Physician                        Abbie Rushing MD  10/01/21 6744

## 2021-10-01 NOTE — ED TRIAGE NOTES
Mode of arrival (squad #, walk in, police, etc) : walk in        Chief complaint(s): altered mental, abdominal pain, diarrhea, urinary urgency        Arrival Note (brief scenario, treatment PTA, etc). : Patients caregiver states these symptoms started yesterday but have gotten worse. Caregiver states patient has been confused about the time of day. Caregiver states patient has been having urinary frequency and abdominal pain. Patient alert and oriented, answers questions appropriately, patient is very hard of hearing. C= \"Have you ever felt that you should Cut down on your drinking? \"   A= \"Have people Annoyed you by criticizing your drinking? \"   G= \"Have you ever felt bad or Guilty about your drinking? \"   E= \"Have you ever had a drink as an Eye-opener first thing in the morning to steady your nerves or to help a hangover? \"      Deferred []      Reason for deferring: If yes to two or more: probable alcohol abuse.

## 2021-10-02 LAB
CULTURE: ABNORMAL
Lab: ABNORMAL
SPECIMEN DESCRIPTION: ABNORMAL

## 2021-10-26 ENCOUNTER — HOSPITAL ENCOUNTER (OUTPATIENT)
Age: 71
Setting detail: SPECIMEN
Discharge: HOME | End: 2021-10-26
Payer: MEDICARE

## 2021-10-26 LAB
-: ABNORMAL
AMORPHOUS: ABNORMAL
BACTERIA: ABNORMAL
BILIRUBIN URINE: NEGATIVE
CASTS UA: ABNORMAL /LPF
COLOR: YELLOW
COMMENT UA: ABNORMAL
CRYSTALS, UA: ABNORMAL /HPF
EPITHELIAL CELLS UA: ABNORMAL /HPF
GLUCOSE URINE: NEGATIVE
KETONES, URINE: NEGATIVE
LEUKOCYTE ESTERASE, URINE: ABNORMAL
MUCUS: ABNORMAL
NITRITE, URINE: NEGATIVE
OTHER OBSERVATIONS UA: ABNORMAL
PH UA: 7 (ref 5–8)
PROTEIN UA: NEGATIVE
RBC UA: ABNORMAL /HPF
RENAL EPITHELIAL, UA: ABNORMAL /HPF
SPECIFIC GRAVITY UA: 1.01 (ref 1–1.03)
TRICHOMONAS: ABNORMAL
TURBIDITY: ABNORMAL
URINE HGB: ABNORMAL
UROBILINOGEN, URINE: NORMAL
WBC UA: ABNORMAL /HPF
YEAST: ABNORMAL

## 2021-10-26 PROCEDURE — 87086 URINE CULTURE/COLONY COUNT: CPT

## 2021-10-26 PROCEDURE — 81001 URINALYSIS AUTO W/SCOPE: CPT

## 2021-10-26 PROCEDURE — 87077 CULTURE AEROBIC IDENTIFY: CPT

## 2021-10-26 PROCEDURE — 87186 SC STD MICRODIL/AGAR DIL: CPT

## 2021-10-28 LAB
CULTURE: ABNORMAL
Lab: ABNORMAL
SPECIMEN DESCRIPTION: ABNORMAL

## 2021-11-07 ENCOUNTER — ANESTHESIA EVENT (OUTPATIENT)
Dept: OPERATING ROOM | Age: 71
DRG: 853 | End: 2021-11-07
Payer: MEDICARE

## 2021-11-07 ENCOUNTER — APPOINTMENT (OUTPATIENT)
Dept: CT IMAGING | Age: 71
DRG: 853 | End: 2021-11-07
Payer: MEDICARE

## 2021-11-07 ENCOUNTER — ANESTHESIA (OUTPATIENT)
Dept: OPERATING ROOM | Age: 71
DRG: 853 | End: 2021-11-07
Payer: MEDICARE

## 2021-11-07 ENCOUNTER — HOSPITAL ENCOUNTER (INPATIENT)
Age: 71
LOS: 5 days | Discharge: NURSING, REHAB OR LONG-TERM CARE | DRG: 853 | End: 2021-11-12
Attending: EMERGENCY MEDICINE | Admitting: INTERNAL MEDICINE
Payer: MEDICARE

## 2021-11-07 ENCOUNTER — APPOINTMENT (OUTPATIENT)
Dept: GENERAL RADIOLOGY | Age: 71
DRG: 853 | End: 2021-11-07
Payer: MEDICARE

## 2021-11-07 VITALS
TEMPERATURE: 97 F | RESPIRATION RATE: 1 BRPM | DIASTOLIC BLOOD PRESSURE: 59 MMHG | SYSTOLIC BLOOD PRESSURE: 119 MMHG | OXYGEN SATURATION: 100 %

## 2021-11-07 DIAGNOSIS — E86.0 DEHYDRATION: ICD-10-CM

## 2021-11-07 DIAGNOSIS — N13.2 HYDRONEPHROSIS WITH RENAL AND URETERAL CALCULUS OBSTRUCTION: ICD-10-CM

## 2021-11-07 DIAGNOSIS — A41.9 SEVERE SEPSIS (HCC): Primary | ICD-10-CM

## 2021-11-07 DIAGNOSIS — R65.20 SEVERE SEPSIS (HCC): Primary | ICD-10-CM

## 2021-11-07 DIAGNOSIS — R41.82 ALTERED MENTAL STATUS, UNSPECIFIED ALTERED MENTAL STATUS TYPE: ICD-10-CM

## 2021-11-07 PROBLEM — N13.30 HYDRONEPHROSIS: Status: ACTIVE | Noted: 2021-11-07

## 2021-11-07 PROBLEM — F31.9 BIPOLAR MOOD DISORDER (HCC): Status: ACTIVE | Noted: 2021-11-07

## 2021-11-07 PROBLEM — N39.0 UTI (URINARY TRACT INFECTION): Status: ACTIVE | Noted: 2021-11-07

## 2021-11-07 LAB
-: ABNORMAL
ABSOLUTE BANDS #: 0.78 K/UL (ref 0–1)
ABSOLUTE EOS #: 0 K/UL (ref 0–0.4)
ABSOLUTE IMMATURE GRANULOCYTE: ABNORMAL K/UL (ref 0–0.3)
ABSOLUTE LYMPH #: 0.78 K/UL (ref 1–4.8)
ABSOLUTE MONO #: 0.59 K/UL (ref 0.1–1.3)
ALBUMIN SERPL-MCNC: 3.4 G/DL (ref 3.5–5.2)
ALBUMIN/GLOBULIN RATIO: ABNORMAL (ref 1–2.5)
ALP BLD-CCNC: 153 U/L (ref 35–104)
ALT SERPL-CCNC: 19 U/L (ref 5–33)
AMORPHOUS: ABNORMAL
ANION GAP SERPL CALCULATED.3IONS-SCNC: 11 MMOL/L (ref 9–17)
AST SERPL-CCNC: 20 U/L
BACTERIA: ABNORMAL
BANDS: 4 % (ref 0–10)
BASOPHILS # BLD: 0 % (ref 0–2)
BASOPHILS ABSOLUTE: 0 K/UL (ref 0–0.2)
BILIRUB SERPL-MCNC: 0.35 MG/DL (ref 0.3–1.2)
BILIRUBIN URINE: NEGATIVE
BUN BLDV-MCNC: 59 MG/DL (ref 8–23)
BUN/CREAT BLD: ABNORMAL (ref 9–20)
C-REACTIVE PROTEIN: 158.9 MG/L (ref 0–5)
CALCIUM SERPL-MCNC: 9.6 MG/DL (ref 8.6–10.4)
CASTS UA: ABNORMAL /LPF
CHLORIDE BLD-SCNC: 103 MMOL/L (ref 98–107)
CO2: 23 MMOL/L (ref 20–31)
COLOR: YELLOW
COMMENT UA: ABNORMAL
CREAT SERPL-MCNC: 2.91 MG/DL (ref 0.5–0.9)
CRYSTALS, UA: ABNORMAL /HPF
DIFFERENTIAL TYPE: ABNORMAL
EOSINOPHILS RELATIVE PERCENT: 0 % (ref 0–4)
EPITHELIAL CELLS UA: ABNORMAL /HPF
GFR AFRICAN AMERICAN: 19 ML/MIN
GFR NON-AFRICAN AMERICAN: 16 ML/MIN
GFR SERPL CREATININE-BSD FRML MDRD: ABNORMAL ML/MIN/{1.73_M2}
GFR SERPL CREATININE-BSD FRML MDRD: ABNORMAL ML/MIN/{1.73_M2}
GLUCOSE BLD-MCNC: 110 MG/DL (ref 70–99)
GLUCOSE URINE: NEGATIVE
HCT VFR BLD CALC: 36.8 % (ref 36–46)
HEMOGLOBIN: 11.7 G/DL (ref 12–16)
IMMATURE GRANULOCYTES: ABNORMAL %
INFLUENZA A: NOT DETECTED
INFLUENZA B: NOT DETECTED
KETONES, URINE: NEGATIVE
LACTIC ACID, SEPSIS WHOLE BLOOD: NORMAL MMOL/L (ref 0.5–1.9)
LACTIC ACID, SEPSIS WHOLE BLOOD: NORMAL MMOL/L (ref 0.5–1.9)
LACTIC ACID, SEPSIS: 0.9 MMOL/L (ref 0.5–1.9)
LACTIC ACID, SEPSIS: 1 MMOL/L (ref 0.5–1.9)
LEUKOCYTE ESTERASE, URINE: ABNORMAL
LIPASE: 12 U/L (ref 13–60)
LYMPHOCYTES # BLD: 4 % (ref 24–44)
MAGNESIUM: 2.2 MG/DL (ref 1.6–2.6)
MCH RBC QN AUTO: 31 PG (ref 26–34)
MCHC RBC AUTO-ENTMCNC: 31.7 G/DL (ref 31–37)
MCV RBC AUTO: 97.8 FL (ref 80–100)
MONOCYTES # BLD: 3 % (ref 1–7)
MORPHOLOGY: NORMAL
MUCUS: ABNORMAL
NITRITE, URINE: NEGATIVE
NRBC AUTOMATED: ABNORMAL PER 100 WBC
OTHER OBSERVATIONS UA: ABNORMAL
PDW BLD-RTO: 13.9 % (ref 11.5–14.9)
PH UA: 6.5 (ref 5–8)
PLATELET # BLD: 147 K/UL (ref 150–450)
PLATELET ESTIMATE: ABNORMAL
PMV BLD AUTO: 8 FL (ref 6–12)
POTASSIUM SERPL-SCNC: 3.6 MMOL/L (ref 3.7–5.3)
PROCALCITONIN: 11.55 NG/ML
PROTEIN UA: ABNORMAL
RBC # BLD: 3.76 M/UL (ref 4–5.2)
RBC # BLD: ABNORMAL 10*6/UL
RBC UA: ABNORMAL /HPF
RENAL EPITHELIAL, UA: ABNORMAL /HPF
SARS-COV-2 RNA, RT PCR: NOT DETECTED
SEG NEUTROPHILS: 89 % (ref 36–66)
SEGMENTED NEUTROPHILS ABSOLUTE COUNT: 17.45 K/UL (ref 1.3–9.1)
SODIUM BLD-SCNC: 137 MMOL/L (ref 135–144)
SOURCE: NORMAL
SPECIFIC GRAVITY UA: 1.01 (ref 1–1.03)
SPECIMEN DESCRIPTION: NORMAL
TOTAL PROTEIN: 6.9 G/DL (ref 6.4–8.3)
TRICHOMONAS: ABNORMAL
TROPONIN INTERP: ABNORMAL
TROPONIN INTERP: ABNORMAL
TROPONIN T: ABNORMAL NG/ML
TROPONIN T: ABNORMAL NG/ML
TROPONIN, HIGH SENSITIVITY: 26 NG/L (ref 0–14)
TROPONIN, HIGH SENSITIVITY: 28 NG/L (ref 0–14)
TURBIDITY: ABNORMAL
URINE HGB: ABNORMAL
UROBILINOGEN, URINE: NORMAL
WBC # BLD: 19.6 K/UL (ref 3.5–11)
WBC # BLD: ABNORMAL 10*3/UL
WBC UA: ABNORMAL /HPF
YEAST: ABNORMAL

## 2021-11-07 PROCEDURE — 0T778DZ DILATION OF LEFT URETER WITH INTRALUMINAL DEVICE, VIA NATURAL OR ARTIFICIAL OPENING ENDOSCOPIC: ICD-10-PCS | Performed by: UROLOGY

## 2021-11-07 PROCEDURE — 83690 ASSAY OF LIPASE: CPT

## 2021-11-07 PROCEDURE — 6360000002 HC RX W HCPCS: Performed by: EMERGENCY MEDICINE

## 2021-11-07 PROCEDURE — 93005 ELECTROCARDIOGRAM TRACING: CPT | Performed by: EMERGENCY MEDICINE

## 2021-11-07 PROCEDURE — 70450 CT HEAD/BRAIN W/O DYE: CPT

## 2021-11-07 PROCEDURE — 87040 BLOOD CULTURE FOR BACTERIA: CPT

## 2021-11-07 PROCEDURE — 87636 SARSCOV2 & INF A&B AMP PRB: CPT

## 2021-11-07 PROCEDURE — 84484 ASSAY OF TROPONIN QUANT: CPT

## 2021-11-07 PROCEDURE — 86140 C-REACTIVE PROTEIN: CPT

## 2021-11-07 PROCEDURE — 75809 NONVASCULAR SHUNT X-RAY: CPT

## 2021-11-07 PROCEDURE — 3600000002 HC SURGERY LEVEL 2 BASE: Performed by: UROLOGY

## 2021-11-07 PROCEDURE — 85025 COMPLETE CBC W/AUTO DIFF WBC: CPT

## 2021-11-07 PROCEDURE — 2060000000 HC ICU INTERMEDIATE R&B

## 2021-11-07 PROCEDURE — 81001 URINALYSIS AUTO W/SCOPE: CPT

## 2021-11-07 PROCEDURE — 87150 DNA/RNA AMPLIFIED PROBE: CPT

## 2021-11-07 PROCEDURE — 99223 1ST HOSP IP/OBS HIGH 75: CPT | Performed by: INTERNAL MEDICINE

## 2021-11-07 PROCEDURE — 6370000000 HC RX 637 (ALT 250 FOR IP): Performed by: EMERGENCY MEDICINE

## 2021-11-07 PROCEDURE — 3600000012 HC SURGERY LEVEL 2 ADDTL 15MIN: Performed by: UROLOGY

## 2021-11-07 PROCEDURE — 84145 PROCALCITONIN (PCT): CPT

## 2021-11-07 PROCEDURE — C2617 STENT, NON-COR, TEM W/O DEL: HCPCS | Performed by: UROLOGY

## 2021-11-07 PROCEDURE — C1769 GUIDE WIRE: HCPCS | Performed by: UROLOGY

## 2021-11-07 PROCEDURE — 2709999900 HC NON-CHARGEABLE SUPPLY: Performed by: UROLOGY

## 2021-11-07 PROCEDURE — 96365 THER/PROPH/DIAG IV INF INIT: CPT

## 2021-11-07 PROCEDURE — 2580000003 HC RX 258

## 2021-11-07 PROCEDURE — 3700000001 HC ADD 15 MINUTES (ANESTHESIA): Performed by: UROLOGY

## 2021-11-07 PROCEDURE — 87077 CULTURE AEROBIC IDENTIFY: CPT

## 2021-11-07 PROCEDURE — 2580000003 HC RX 258: Performed by: ANESTHESIOLOGY

## 2021-11-07 PROCEDURE — 36415 COLL VENOUS BLD VENIPUNCTURE: CPT

## 2021-11-07 PROCEDURE — 87086 URINE CULTURE/COLONY COUNT: CPT

## 2021-11-07 PROCEDURE — 2580000003 HC RX 258: Performed by: EMERGENCY MEDICINE

## 2021-11-07 PROCEDURE — 6370000000 HC RX 637 (ALT 250 FOR IP)

## 2021-11-07 PROCEDURE — 99284 EMERGENCY DEPT VISIT MOD MDM: CPT

## 2021-11-07 PROCEDURE — 74176 CT ABD & PELVIS W/O CONTRAST: CPT

## 2021-11-07 PROCEDURE — 87186 SC STD MICRODIL/AGAR DIL: CPT

## 2021-11-07 PROCEDURE — 3209999900 FLUORO FOR SURGICAL PROCEDURES

## 2021-11-07 PROCEDURE — 7100000000 HC PACU RECOVERY - FIRST 15 MIN: Performed by: UROLOGY

## 2021-11-07 PROCEDURE — 87205 SMEAR GRAM STAIN: CPT

## 2021-11-07 PROCEDURE — 6360000002 HC RX W HCPCS: Performed by: ANESTHESIOLOGY

## 2021-11-07 PROCEDURE — 96375 TX/PRO/DX INJ NEW DRUG ADDON: CPT

## 2021-11-07 PROCEDURE — 83605 ASSAY OF LACTIC ACID: CPT

## 2021-11-07 PROCEDURE — 80053 COMPREHEN METABOLIC PANEL: CPT

## 2021-11-07 PROCEDURE — 83735 ASSAY OF MAGNESIUM: CPT

## 2021-11-07 PROCEDURE — 7100000001 HC PACU RECOVERY - ADDTL 15 MIN: Performed by: UROLOGY

## 2021-11-07 PROCEDURE — 3700000000 HC ANESTHESIA ATTENDED CARE: Performed by: UROLOGY

## 2021-11-07 PROCEDURE — 2500000003 HC RX 250 WO HCPCS: Performed by: ANESTHESIOLOGY

## 2021-11-07 DEVICE — STENT URET 6FR L24CM PERCFLX HYDR+ DBL PGTL THRD 2
Type: IMPLANTABLE DEVICE | Site: URETER | Status: NON-FUNCTIONAL
Removed: 2022-01-18

## 2021-11-07 RX ORDER — SUMATRIPTAN 100 MG/1
100 TABLET, FILM COATED ORAL 2 TIMES DAILY PRN
Status: DISCONTINUED | OUTPATIENT
Start: 2021-11-07 | End: 2021-11-12 | Stop reason: HOSPADM

## 2021-11-07 RX ORDER — ONDANSETRON 4 MG/1
4 TABLET, ORALLY DISINTEGRATING ORAL EVERY 8 HOURS PRN
Status: DISCONTINUED | OUTPATIENT
Start: 2021-11-07 | End: 2021-11-12 | Stop reason: HOSPADM

## 2021-11-07 RX ORDER — SODIUM CHLORIDE 9 MG/ML
INJECTION, SOLUTION INTRAVENOUS CONTINUOUS
Status: DISCONTINUED | OUTPATIENT
Start: 2021-11-07 | End: 2021-11-08

## 2021-11-07 RX ORDER — NICOTINE POLACRILEX 4 MG
15 LOZENGE BUCCAL PRN
Status: DISCONTINUED | OUTPATIENT
Start: 2021-11-07 | End: 2021-11-12 | Stop reason: HOSPADM

## 2021-11-07 RX ORDER — DEXAMETHASONE SODIUM PHOSPHATE 4 MG/ML
INJECTION, SOLUTION INTRA-ARTICULAR; INTRALESIONAL; INTRAMUSCULAR; INTRAVENOUS; SOFT TISSUE PRN
Status: DISCONTINUED | OUTPATIENT
Start: 2021-11-07 | End: 2021-11-07 | Stop reason: SDUPTHER

## 2021-11-07 RX ORDER — BUSPIRONE HYDROCHLORIDE 5 MG/1
5 TABLET ORAL 2 TIMES DAILY
Status: DISCONTINUED | OUTPATIENT
Start: 2021-11-07 | End: 2021-11-12 | Stop reason: HOSPADM

## 2021-11-07 RX ORDER — BUTALBITAL, ACETAMINOPHEN AND CAFFEINE 300; 40; 50 MG/1; MG/1; MG/1
1 CAPSULE ORAL EVERY 6 HOURS PRN
Status: DISCONTINUED | OUTPATIENT
Start: 2021-11-07 | End: 2021-11-12 | Stop reason: HOSPADM

## 2021-11-07 RX ORDER — SODIUM CHLORIDE 0.9 % (FLUSH) 0.9 %
5-40 SYRINGE (ML) INJECTION PRN
Status: DISCONTINUED | OUTPATIENT
Start: 2021-11-07 | End: 2021-11-12 | Stop reason: HOSPADM

## 2021-11-07 RX ORDER — QUETIAPINE FUMARATE 50 MG/1
50 TABLET, FILM COATED ORAL NIGHTLY
Status: DISCONTINUED | OUTPATIENT
Start: 2021-11-07 | End: 2021-11-12 | Stop reason: HOSPADM

## 2021-11-07 RX ORDER — SODIUM CHLORIDE 9 MG/ML
25 INJECTION, SOLUTION INTRAVENOUS PRN
Status: DISCONTINUED | OUTPATIENT
Start: 2021-11-07 | End: 2021-11-12 | Stop reason: HOSPADM

## 2021-11-07 RX ORDER — POLYETHYLENE GLYCOL 3350 17 G/17G
17 POWDER, FOR SOLUTION ORAL DAILY PRN
Status: DISCONTINUED | OUTPATIENT
Start: 2021-11-07 | End: 2021-11-12 | Stop reason: HOSPADM

## 2021-11-07 RX ORDER — POTASSIUM CHLORIDE 7.45 MG/ML
10 INJECTION INTRAVENOUS PRN
Status: DISCONTINUED | OUTPATIENT
Start: 2021-11-07 | End: 2021-11-12 | Stop reason: HOSPADM

## 2021-11-07 RX ORDER — MORPHINE SULFATE 4 MG/ML
4 INJECTION, SOLUTION INTRAMUSCULAR; INTRAVENOUS ONCE
Status: COMPLETED | OUTPATIENT
Start: 2021-11-07 | End: 2021-11-07

## 2021-11-07 RX ORDER — AMLODIPINE BESYLATE 5 MG/1
5 TABLET ORAL DAILY
Status: CANCELLED | OUTPATIENT
Start: 2021-11-07

## 2021-11-07 RX ORDER — ONDANSETRON 2 MG/ML
4 INJECTION INTRAMUSCULAR; INTRAVENOUS EVERY 6 HOURS PRN
Status: DISCONTINUED | OUTPATIENT
Start: 2021-11-07 | End: 2021-11-12 | Stop reason: HOSPADM

## 2021-11-07 RX ORDER — POTASSIUM CHLORIDE 20 MEQ/1
40 TABLET, EXTENDED RELEASE ORAL PRN
Status: DISCONTINUED | OUTPATIENT
Start: 2021-11-07 | End: 2021-11-12 | Stop reason: HOSPADM

## 2021-11-07 RX ORDER — 0.9 % SODIUM CHLORIDE 0.9 %
2000 INTRAVENOUS SOLUTION INTRAVENOUS ONCE
Status: COMPLETED | OUTPATIENT
Start: 2021-11-07 | End: 2021-11-07

## 2021-11-07 RX ORDER — SODIUM CHLORIDE 450 MG/100ML
INJECTION, SOLUTION INTRAVENOUS CONTINUOUS PRN
Status: DISCONTINUED | OUTPATIENT
Start: 2021-11-07 | End: 2021-11-07 | Stop reason: SDUPTHER

## 2021-11-07 RX ORDER — TOPIRAMATE 50 MG/1
50 TABLET, FILM COATED ORAL DAILY
Status: DISCONTINUED | OUTPATIENT
Start: 2021-11-08 | End: 2021-11-11

## 2021-11-07 RX ORDER — ONDANSETRON 2 MG/ML
INJECTION INTRAMUSCULAR; INTRAVENOUS PRN
Status: DISCONTINUED | OUTPATIENT
Start: 2021-11-07 | End: 2021-11-07 | Stop reason: SDUPTHER

## 2021-11-07 RX ORDER — PROPOFOL 10 MG/ML
INJECTION, EMULSION INTRAVENOUS PRN
Status: DISCONTINUED | OUTPATIENT
Start: 2021-11-07 | End: 2021-11-07 | Stop reason: SDUPTHER

## 2021-11-07 RX ORDER — SODIUM CHLORIDE 0.9 % (FLUSH) 0.9 %
5-40 SYRINGE (ML) INJECTION EVERY 12 HOURS SCHEDULED
Status: DISCONTINUED | OUTPATIENT
Start: 2021-11-07 | End: 2021-11-08

## 2021-11-07 RX ORDER — LIDOCAINE HYDROCHLORIDE 10 MG/ML
INJECTION, SOLUTION EPIDURAL; INFILTRATION; INTRACAUDAL; PERINEURAL PRN
Status: DISCONTINUED | OUTPATIENT
Start: 2021-11-07 | End: 2021-11-07 | Stop reason: SDUPTHER

## 2021-11-07 RX ORDER — ACETAMINOPHEN 325 MG/1
650 TABLET ORAL EVERY 6 HOURS PRN
Status: DISCONTINUED | OUTPATIENT
Start: 2021-11-07 | End: 2021-11-09

## 2021-11-07 RX ORDER — RISPERIDONE 0.5 MG/1
0.5 TABLET, ORALLY DISINTEGRATING ORAL ONCE
Status: COMPLETED | OUTPATIENT
Start: 2021-11-07 | End: 2021-11-07

## 2021-11-07 RX ORDER — DEXTROSE MONOHYDRATE 50 MG/ML
100 INJECTION, SOLUTION INTRAVENOUS PRN
Status: DISCONTINUED | OUTPATIENT
Start: 2021-11-07 | End: 2021-11-12 | Stop reason: HOSPADM

## 2021-11-07 RX ORDER — QUETIAPINE FUMARATE 50 MG/1
25 TABLET, FILM COATED ORAL DAILY
Status: DISCONTINUED | OUTPATIENT
Start: 2021-11-08 | End: 2021-11-12 | Stop reason: HOSPADM

## 2021-11-07 RX ORDER — ACETAMINOPHEN 650 MG/1
650 SUPPOSITORY RECTAL EVERY 6 HOURS PRN
Status: DISCONTINUED | OUTPATIENT
Start: 2021-11-07 | End: 2021-11-09

## 2021-11-07 RX ORDER — TOPIRAMATE 100 MG/1
100 TABLET, FILM COATED ORAL EVERY EVENING
Status: DISCONTINUED | OUTPATIENT
Start: 2021-11-07 | End: 2021-11-12 | Stop reason: HOSPADM

## 2021-11-07 RX ORDER — LAMOTRIGINE 100 MG/1
200 TABLET ORAL NIGHTLY
Status: DISCONTINUED | OUTPATIENT
Start: 2021-11-07 | End: 2021-11-12 | Stop reason: HOSPADM

## 2021-11-07 RX ORDER — DEXTROSE MONOHYDRATE 25 G/50ML
12.5 INJECTION, SOLUTION INTRAVENOUS PRN
Status: DISCONTINUED | OUTPATIENT
Start: 2021-11-07 | End: 2021-11-12 | Stop reason: HOSPADM

## 2021-11-07 RX ORDER — LAMOTRIGINE 100 MG/1
100 TABLET ORAL DAILY
Status: DISCONTINUED | OUTPATIENT
Start: 2021-11-08 | End: 2021-11-12 | Stop reason: HOSPADM

## 2021-11-07 RX ORDER — ATORVASTATIN CALCIUM 10 MG/1
10 TABLET, FILM COATED ORAL DAILY
Status: DISCONTINUED | OUTPATIENT
Start: 2021-11-08 | End: 2021-11-12 | Stop reason: HOSPADM

## 2021-11-07 RX ORDER — FENTANYL CITRATE 50 UG/ML
INJECTION, SOLUTION INTRAMUSCULAR; INTRAVENOUS PRN
Status: DISCONTINUED | OUTPATIENT
Start: 2021-11-07 | End: 2021-11-07 | Stop reason: SDUPTHER

## 2021-11-07 RX ORDER — CIPROFLOXACIN 500 MG/1
500 TABLET, FILM COATED ORAL 2 TIMES DAILY
Status: ON HOLD | COMMUNITY
End: 2021-11-12 | Stop reason: HOSPADM

## 2021-11-07 RX ADMIN — SODIUM CHLORIDE: 4.5 INJECTION, SOLUTION INTRAVENOUS at 19:54

## 2021-11-07 RX ADMIN — RISPERIDONE 0.5 MG: 0.5 TABLET, ORALLY DISINTEGRATING ORAL at 16:52

## 2021-11-07 RX ADMIN — DEXTROSE MONOHYDRATE 2000 MG: 5 INJECTION INTRAVENOUS at 16:23

## 2021-11-07 RX ADMIN — SODIUM CHLORIDE 2000 ML: 9 INJECTION, SOLUTION INTRAVENOUS at 16:45

## 2021-11-07 RX ADMIN — BUSPIRONE HYDROCHLORIDE 5 MG: 5 TABLET ORAL at 23:30

## 2021-11-07 RX ADMIN — LIDOCAINE HYDROCHLORIDE 50 MG: 10 INJECTION, SOLUTION EPIDURAL; INFILTRATION; INTRACAUDAL; PERINEURAL at 20:02

## 2021-11-07 RX ADMIN — SODIUM CHLORIDE: 9 INJECTION, SOLUTION INTRAVENOUS at 16:28

## 2021-11-07 RX ADMIN — TOPIRAMATE 100 MG: 100 TABLET, FILM COATED ORAL at 23:28

## 2021-11-07 RX ADMIN — MORPHINE SULFATE 4 MG: 4 INJECTION INTRAVENOUS at 16:13

## 2021-11-07 RX ADMIN — SODIUM CHLORIDE: 9 INJECTION, SOLUTION INTRAVENOUS at 23:22

## 2021-11-07 RX ADMIN — ONDANSETRON 4 MG: 2 INJECTION, SOLUTION INTRAMUSCULAR; INTRAVENOUS at 20:12

## 2021-11-07 RX ADMIN — SODIUM CHLORIDE: 9 INJECTION, SOLUTION INTRAVENOUS at 21:04

## 2021-11-07 RX ADMIN — QUETIAPINE FUMARATE 50 MG: 50 TABLET ORAL at 23:29

## 2021-11-07 RX ADMIN — ACETAMINOPHEN 650 MG: 325 TABLET ORAL at 23:29

## 2021-11-07 RX ADMIN — PROPOFOL 150 MG: 10 INJECTION, EMULSION INTRAVENOUS at 20:02

## 2021-11-07 RX ADMIN — LAMOTRIGINE 200 MG: 100 TABLET ORAL at 23:28

## 2021-11-07 RX ADMIN — DEXAMETHASONE SODIUM PHOSPHATE 4 MG: 4 INJECTION, SOLUTION INTRAMUSCULAR; INTRAVENOUS at 20:12

## 2021-11-07 RX ADMIN — FENTANYL CITRATE 50 MCG: 50 INJECTION, SOLUTION INTRAMUSCULAR; INTRAVENOUS at 20:02

## 2021-11-07 ASSESSMENT — PULMONARY FUNCTION TESTS
PIF_VALUE: 3
PIF_VALUE: 0
PIF_VALUE: 4
PIF_VALUE: 5
PIF_VALUE: 3
PIF_VALUE: 4
PIF_VALUE: 3
PIF_VALUE: 0
PIF_VALUE: 5
PIF_VALUE: 5
PIF_VALUE: 3
PIF_VALUE: 4
PIF_VALUE: 3
PIF_VALUE: 9
PIF_VALUE: 2

## 2021-11-07 ASSESSMENT — PAIN SCALES - WONG BAKER: WONGBAKER_NUMERICALRESPONSE: 0

## 2021-11-07 ASSESSMENT — PAIN SCALES - GENERAL
PAINLEVEL_OUTOF10: 5
PAINLEVEL_OUTOF10: 0

## 2021-11-07 NOTE — ED NOTES
Bed: 03  Expected date:   Expected time:   Means of arrival: [facility]  Comments:     Rodolfo Prater RN  11/07/21 2281

## 2021-11-07 NOTE — ED NOTES
Patient resting, becomes agitated when disturbed. Caregiver is at bedside. Updated vital signs. Fluid bolus is finishing with about 400 ml remaining.         Erwin Hung RN  11/07/21 0866

## 2021-11-07 NOTE — H&P
[facility]     HISTORY AND PHYSICAL EXAMINATION            Date:   11/7/2021  Patient name: Jim Caba  Date of admission:  11/7/2021  2:33 PM  MRN:   [id number]  Account:  [de-identified]  YOB: 1950  PCP:    Irina Prieto MD  Room:   03/03  Code Status:    Prior    Chief Complaint:     Chief Complaint   Patient presents with    Altered Mental Status     Gradual decline in mental status. Had a fall, but was caught. Lives at a group home. Caregiver is here with her.  Fall       History Obtained From:     non-family caregiver, electronic medical record    History of Present Illness: The patient is a 66 y.o. Non- / non  female who presents withAltered Mental Status (Gradual decline in mental status. Had a fall, but was caught. Lives at a group home. Caregiver is here with her.  ) and Fall   and she is admitted to the hospital for the management of  Urosepsis     The patient is a poor historian, history was taken from caregiver, printed medical records caregiver was carrying, and EMR. This is a 65-year-old female patient past medical history of bipolar disease, chronic headache, hydrocephalus s/p  shunt presented to ED due to change in behavior, and falling down caregiver mentioned that the patient was having abdominal pain 3 days ago denies any vomiting or nausea, patient communicates well at her baseline, she usually presents with change in behavior whenever she has UTI. Denies chills or fever. Patient was vitally stable until she was having  Cr: 2.9  K 3.5  UA: Bacteruria, large leukocytes esterase   Urine culture were sent     X-ray shunt series: Multiple shunt catheters overlying the chest and abdomen have a grossly   stable radiographic appearance when compared to 06/03/2020.  Unchanged left tablet Take 100 mg by mouth every evening   Yes Historical Provider, MD   lamoTRIgine (LAMICTAL) 200 MG tablet Take 200 mg by mouth nightly   Yes Historical Provider, MD   melatonin 5 MG TBDP disintegrating tablet Take 5 mg by mouth nightly   Yes Historical Provider, MD   QUEtiapine (SEROQUEL) 25 MG tablet Take 50 mg by mouth nightly   Yes Historical Provider, MD   butalbital-APAP-caffeine (FIORICET) -40 MG CAPS per capsule Take 1 capsule by mouth every 6 hours as needed for Headaches   Yes Historical Provider, MD   Ca Carbonate-Mag Hydroxide (ROLAIDS PO) Take 2 tablets by mouth every 4 hours as needed (acid reflux)   Yes Historical Provider, MD   acetaminophen (TYLENOL) 325 MG tablet Take 2 tablets by mouth every 6 hours as needed for Pain 6/3/20  Yes Vick Tatum DO   topiramate (TOPAMAX) 50 MG tablet Take 1 tablet by mouth daily 12/13/18  Yes Ree Larry MD   Misc Natural Products (GLUCOSAMINE CHONDROITIN ADV PO) Take 1 tablet by mouth daily   Yes Historical Provider, MD   busPIRone (BUSPAR) 5 MG tablet Take 5 mg by mouth 2 times daily   Yes Historical Provider, MD   bismuth subsalicylate (BISMATROL) 262 MG chewable tablet Take 524 mg by mouth as needed for Heartburn (8 times daily)   Yes Historical Provider, MD   senna-docusate (PERICOLACE) 8.6-50 MG per tablet Take 1 tablet by mouth as needed for Constipation (in eveninig) Or 2 tabs   Yes Historical Provider, MD   ibuprofen (ADVIL;MOTRIN) 200 MG tablet Take 200 mg by mouth every 6 hours as needed for Pain (or 2 tabs)   Yes Historical Provider, MD   magnesium hydroxide (MILK OF MAGNESIA) 400 MG/5ML suspension Take 5 mLs by mouth daily as needed for Constipation   Yes Historical Provider, MD   calcium carbonate (TUMS) 500 MG chewable tablet Take 1 tablet by mouth 4 times daily as needed for Heartburn   Yes Historical Provider, MD   Multiple Vitamins-Minerals (THERAPEUTIC MULTIVITAMIN-MINERALS) tablet Take 1 tablet by mouth daily.    Yes Historical Provider, MD   lamoTRIgine (LAMICTAL) 100 MG tablet Take 100 mg by mouth daily    Yes Historical Provider, MD   aspirin 81 MG tablet Take 81 mg by mouth daily. Yes Historical Provider, MD   Metoclopramide HCl (REGLAN PO) Take 5 mg by mouth 3 times daily    Yes Historical Provider, MD   dicyclomine (BENTYL) 10 MG capsule Take 1 capsule by mouth every 6 hours as needed (cramps) 10/1/21   Cristina Roman MD   fluticasone (FLONASE) 50 MCG/ACT nasal spray 1 spray by Each Nostril route daily    Historical Provider, MD   VORTIoxetine (TRINTELLIX) 5 MG tablet Take 5 mg by mouth daily    Historical Provider, MD   meclizine (ANTIVERT) 25 MG tablet Take 25 mg by mouth 3 times daily as needed for Dizziness    Historical Provider, MD   SUMAtriptan (IMITREX) 100 MG tablet Take 100 mg by mouth 2 times daily as needed for Migraine    Historical Provider, MD   lidocaine PTCH Place 1 patch onto the skin daily 12/14/18   Chema Hopson MD   alendronate (FOSAMAX) 70 MG tablet Take 70 mg by mouth every 7 days Every Wednesday 12/12/18   Historical Provider, MD   atorvastatin (LIPITOR) 10 MG tablet Take 10 mg by mouth daily    Historical Provider, MD   loperamide (IMODIUM) 2 MG capsule Take 2 mg by mouth 4 times daily as needed for Diarrhea    Historical Provider, MD   amLODIPine (NORVASC) 10 MG tablet Take 5 mg by mouth daily     Historical Provider, MD   ferrous sulfate 325 (65 FE) MG EC tablet Take 325 mg by mouth daily (with breakfast). Historical Provider, MD        Allergies:     Levaquin [levofloxacin in d5w], Adhesive tape, Chocolate, Indomethacin, and Neurontin [gabapentin]    Social History:     Tobacco:    reports that she has never smoked. She has never used smokeless tobacco.  Alcohol:      reports no history of alcohol use. Drug Use:  reports no history of drug use. Family History:     History reviewed. No pertinent family history. Review of Systems:     Positive and Negative as described in HPI.     Review of Systems   Unable to perform ROS: Mental status change       Physical Exam:   /76   Pulse 115   Temp 97.8 °F (36.6 °C) (Oral)   Resp 22   Ht 5' 1\" (1.549 m)   Wt 125 lb (56.7 kg)   SpO2 93%   BMI 23.62 kg/m²   Temp (24hrs), Av.8 °F (36.6 °C), Min:97.8 °F (36.6 °C), Max:97.8 °F (36.6 °C)    No results for input(s): POCGLU in the last 72 hours. Intake/Output Summary (Last 24 hours) at 2021 1825  Last data filed at 2021 1710  Gross per 24 hour   Intake --   Output 225 ml   Net -225 ml       Physical Exam  Constitutional:       General: She is in acute distress. Appearance: She is ill-appearing. Cardiovascular:      Rate and Rhythm: Regular rhythm. Tachycardia present. Pulses: Normal pulses. Heart sounds: Normal heart sounds. No murmur heard. No friction rub. No gallop. Pulmonary:      Effort: Pulmonary effort is normal. No respiratory distress. Breath sounds: Normal breath sounds. No wheezing, rhonchi or rales. Abdominal:      General: Abdomen is flat. Palpations: Abdomen is soft. Tenderness: There is no abdominal tenderness. There is left CVA tenderness. There is no right CVA tenderness. Musculoskeletal:      Right lower leg: No edema. Left lower leg: No edema. Neurological:      Mental Status: She is disoriented. Psychiatric:         Mood and Affect: Mood is anxious. Speech: She is noncommunicative.          Investigations:     Laboratory Testing:  Recent Results (from the past 24 hour(s))   CBC Auto Differential    Collection Time: 21  3:58 PM   Result Value Ref Range    WBC 19.6 (H) 3.5 - 11.0 k/uL    RBC 3.76 (L) 4.0 - 5.2 m/uL    Hemoglobin 11.7 (L) 12.0 - 16.0 g/dL    Hematocrit 36.8 36 - 46 %    MCV 97.8 80 - 100 fL    MCH 31.0 26 - 34 pg    MCHC 31.7 31 - 37 g/dL    RDW 13.9 11.5 - 14.9 %    Platelets 904 (L) 542 - 450 k/uL    MPV 8.0 6.0 - 12.0 fL    NRBC Automated NOT REPORTED per 100 WBC    Differential Type NOT REPORTED     Immature Granulocytes NOT REPORTED 0 %    Absolute Immature Granulocyte NOT REPORTED 0.00 - 0.30 k/uL    WBC Morphology NOT REPORTED     RBC Morphology NOT REPORTED     Platelet Estimate NOT REPORTED     Seg Neutrophils 89 (H) 36 - 66 %    Lymphocytes 4 (L) 24 - 44 %    Monocytes 3 1 - 7 %    Eosinophils % 0 0 - 4 %    Basophils 0 0 - 2 %    Bands 4 0 - 10 %    Segs Absolute 17.45 (H) 1.3 - 9.1 k/uL    Absolute Lymph # 0.78 (L) 1.0 - 4.8 k/uL    Absolute Mono # 0.59 0.1 - 1.3 k/uL    Absolute Eos # 0.00 0.0 - 0.4 k/uL    Basophils Absolute 0.00 0.0 - 0.2 k/uL    Absolute Bands # 0.78 0.0 - 1.0 k/uL    Morphology Normal    Comprehensive Metabolic Panel    Collection Time: 11/07/21  3:58 PM   Result Value Ref Range    Glucose 110 (H) 70 - 99 mg/dL    BUN 59 (H) 8 - 23 mg/dL    CREATININE 2.91 (H) 0.50 - 0.90 mg/dL    Bun/Cre Ratio NOT REPORTED 9 - 20    Calcium 9.6 8.6 - 10.4 mg/dL    Sodium 137 135 - 144 mmol/L    Potassium 3.6 (L) 3.7 - 5.3 mmol/L    Chloride 103 98 - 107 mmol/L    CO2 23 20 - 31 mmol/L    Anion Gap 11 9 - 17 mmol/L    Alkaline Phosphatase 153 (H) 35 - 104 U/L    ALT 19 5 - 33 U/L    AST 20 <32 U/L    Total Bilirubin 0.35 0.3 - 1.2 mg/dL    Total Protein 6.9 6.4 - 8.3 g/dL    Albumin 3.4 (L) 3.5 - 5.2 g/dL    Albumin/Globulin Ratio NOT REPORTED 1.0 - 2.5    GFR Non- 16 (L) >60 mL/min    GFR  19 (L) >60 mL/min    GFR Comment          GFR Staging NOT REPORTED    Magnesium    Collection Time: 11/07/21  3:58 PM   Result Value Ref Range    Magnesium 2.2 1.6 - 2.6 mg/dL   Troponin    Collection Time: 11/07/21  3:58 PM   Result Value Ref Range    Troponin, High Sensitivity 28 (H) 0 - 14 ng/L    Troponin T NOT REPORTED <0.03 ng/mL    Troponin Interp NOT REPORTED    Lipase    Collection Time: 11/07/21  3:58 PM   Result Value Ref Range    Lipase 12 (L) 13 - 60 U/L   Lactate, Sepsis    Collection Time: 11/07/21  4:09 PM   Result Value Ref Range    Lactic Acid, Sepsis 1.0 0.5 - 1.9 mmol/L    Lactic Acid, Sepsis, Whole Blood NOT REPORTED 0.5 - 1.9 mmol/L   COVID-19 & Influenza Combo    Collection Time: 11/07/21  5:04 PM    Specimen: Nasopharyngeal Swab   Result Value Ref Range    Specimen Description . NASOPHARYNGEAL SWAB     Source . NASOPHARYNGEAL SWAB     SARS-CoV-2 RNA, RT PCR Not Detected Not Detected    INFLUENZA A Not Detected Not Detected    INFLUENZA B Not Detected Not Detected   Urinalysis Reflex to Culture    Collection Time: 11/07/21  5:21 PM    Specimen: Urine, clean catch   Result Value Ref Range    Color, UA Yellow Yellow    Turbidity UA SLIGHTLY CLOUDY (A) Clear    Glucose, Ur NEGATIVE NEGATIVE    Bilirubin Urine NEGATIVE NEGATIVE    Ketones, Urine NEGATIVE NEGATIVE    Specific [place], UA 1.015 1.000 - 1.030    Urine Hgb TRACE (A) NEGATIVE    pH, UA 6.5 5.0 - 8.0    Protein, UA 1+ (A) NEGATIVE    Urobilinogen, Urine Normal Normal    Nitrite, Urine NEGATIVE NEGATIVE    Leukocyte Esterase, Urine LARGE (A) NEGATIVE    Urinalysis Comments NOT REPORTED    Microscopic Urinalysis    Collection Time: 11/07/21  5:21 PM   Result Value Ref Range    -          WBC, UA 50  /HPF    RBC, UA 2 TO 5 /HPF    Casts UA NOT REPORTED /LPF    Crystals, UA NOT REPORTED None /HPF    Epithelial Cells UA 0 TO 2 /HPF    Renal Epithelial, UA NOT REPORTED 0 /HPF    Bacteria, UA MODERATE (A) None    Mucus, UA NOT REPORTED None    Trichomonas, UA NOT REPORTED None    Amorphous, UA NOT REPORTED None    Other Observations UA NOT REPORTED NOT REQ.     Yeast, UA NOT REPORTED None   EKG 12 Lead    Collection Time: 11/07/21  7:09 PM   Result Value Ref Range    Ventricular Rate 111 BPM    Atrial Rate 111 BPM    P-R Interval 154 ms    QRS Duration 94 ms    Q-T Interval 342 ms    QTc Calculation (Bazett) 465 ms    P Axis 71 degrees    R Axis 70 degrees    T Axis 53 degrees       Imaging/Diagnostics:  CT ABDOMEN PELVIS WO CONTRAST Additional Contrast? None    Result Date: 11/7/2021  EXAMINATION: CT OF THE ABDOMEN AND PELVIS WITHOUT CONTRAST 11/7/2021 12:16 pm TECHNIQUE: CT of the abdomen and pelvis was performed without the administration of intravenous contrast. Multiplanar reformatted images are provided for review. Dose modulation, iterative reconstruction, and/or weight based adjustment of the mA/kV was utilized to reduce the radiation dose to as low as reasonably achievable. COMPARISON: 02/07/2019 HISTORY: ORDERING SYSTEM PROVIDED HISTORY: ams, constipation TECHNOLOGIST PROVIDED HISTORY: ams, constipation Decision Support Exception - unselect if not a suspected or confirmed emergency medical condition->Emergency Medical Condition (MA) Reason for Exam: AMS, constipation Acuity: Unknown Type of Exam: Initial FINDINGS: Lower chest: Dependent atelectasis or consolidation in the left lower lobe is noted. Moderate size pleural effusion is noted with pleural thickening. Findings are chronic however stable [place] of old posterior left rib fractures and lateral right rib fractures. No acute osseous abnormality identified. ABDOMEN AND PELVIS: Organs: Evaluation of the abdominal and pelvic viscera is limited in the absence of contrast.  A calcification is noted in the posterior dome of the right hepatic lobe. The liver otherwise reveals no focal abnormality. The gallbladder is not visualized and may be surgically absent. The pancreas, spleen appear grossly unremarkable. 11 mm stable left adrenal adenoma . There is also a 6 mm right adrenal nodule, unchanged. Moderately severe left hydronephrosis with 10 mm stone in the left UPJ. Also multiple left intrarenal stones. Stable left parapelvic renal cysts. Atrophic right kidney renal stones and resolving hydronephrosis GI/Bowel: No bowel dilatation or wall thickening identified. Mild stool burden throughout the colon. Diverticulosis. Pelvis: The bladder is well distended. No wall thickening or inflammatory change identified.  Peritoneum/Retroperitoneum: A tunneled peritoneal catheter terminates in the left lower quadrant. No ascites or free air. The aorta is normal in caliber. Calcified atheromatous plaque is present. No enlarged or suspicious-appearing lymph nodes. Bones/Soft Tissues: Nondisplaced posterior left acetabular fracture. Comminuted nondisplaced medial acetabular fracture. There is also a minimally displaced comminuted left inferior pubic ramus fracture. Chronic appearing severe L4 compression fracture. Fracture of L2 vertebral body with less than 25% loss of normal height which also appears chronic however has developed from the prior exam.  Advanced facet arthropathy in the lower lumbar spine. Angular deformity in the inferior sacrum/coccyx is noted without a discrete fracture line. This also may be chronic. Pin fixation hardware is noted within the proximal femora. Moderately severe left hydronephrosis secondary to a 10 mm stone in the left UPJ. Also multiple left intrarenal stones. Stable left parapelvic renal cyst. Atrophic right kidney renal stones and resolving hydronephrosis No other significant changes are seen from the prior exam     CT HEAD WO CONTRAST    Result Date: 11/7/2021  EXAMINATION: CT OF THE HEAD WITHOUT CONTRAST  11/7/2021 3:16 pm TECHNIQUE: CT of the head was performed without the administration of intravenous contrast. Dose modulation, iterative reconstruction, and/or weight based adjustment of the mA/kV was utilized to reduce the radiation dose to as low as reasonably achievable.  COMPARISON: CT scan of the brain from 07/12/2021 HISTORY: ORDERING SYSTEM PROVIDED HISTORY: AMS,  shunt TECHNOLOGIST PROVIDED HISTORY: AMS,  shunt Decision Support Exception - unselect if not a suspected or confirmed emergency medical condition->Emergency Medical Condition (MA) Reason for Exam: AMS,  shunt Acuity: Unknown Type of Exam: Initial FINDINGS: BRAIN/VENTRICLES: Left parietal and frontal approach  shunts in unchanged symptoms as outlined, requirement for current medical therapies and most importantly because of direct risk to patient if care not provided in a hospital setting. Lobo Villa MD  11/7/2021  6:25 PM    Copy sent to Dr. Anita Moreno MD      Attestation and add on       I have discussed the care of [facility] , including pertinent history and exam findings,      11/7/21    with the resident. I have seen and examined the patient and the key elements of all parts of the encounter have been performed by me . I agree with the assessment, plan and orders as documented by the resident. Active Problems:    S/P  shunt    UTI (urinary tract infection)    Hydronephrosis of left kidney    Sepsis ([address]    Bipolar mood disorder ([facility] [address]    Acute kidney injury ([facility] [address]  Resolved Problems:    * No resolved hospital problems. *            ---- ;        Medications: Allergies: Allergies   Allergen Reactions    Levaquin [Levofloxacin In D5w] Other (See Comments)     The group home has this on their allergy list, but without what effects to the Pt. Pt doesn't know.     Adhesive Tape     Chocolate     Indomethacin     Neurontin [Gabapentin]        Current Meds:   Scheduled Meds:    cefTRIAXone (ROCEPHIN) IV  1,000 mg IntraVENous Q24H    influenza virus vaccine  0.5 mL IntraMUSCular Prior to discharge    aspirin  81 mg Oral Daily    heparin (porcine)  5,000 Units SubCUTAneous 3 times per day    atorvastatin  10 mg Oral Daily    busPIRone  5 mg Oral BID    lamoTRIgine  100 mg Oral Daily    lamoTRIgine  200 mg Oral Nightly    QUEtiapine  25 mg Oral Daily    QUEtiapine  50 mg Oral Nightly    topiramate  100 mg Oral QPM    topiramate  50 mg Oral Daily    VORTIoxetine  5 mg Oral Daily     Continuous Infusions:    sodium chloride 125 mL/hr at 11/08/21 1343    sodium chloride      dextrose       PRN Meds: butalbital-APAP-caffeine, SUMAtriptan, sodium chloride flush, sodium chloride, ondansetron **OR** ondansetron, polyethylene glycol, acetaminophen **OR** acetaminophen, potassium chloride **OR** potassium alternative oral replacement **OR** potassium chloride, magnesium sulfate, glucose, dextrose, glucagon (rDNA), dextrose        Cami Molina [address] [place] [address].    Phone [phone]   Fax: [phone]  Answering Service: [phone]

## 2021-11-07 NOTE — ED PROVIDER NOTES
APPLICATION AND INTRAOPERATIVE C-ARM performed by Rolo Reardon MD at [facility] 6      shunt x3 head    SHUNT REVISION      TONSILLECTOMY       CURRENT MEDICATIONS       Previous Medications    ACETAMINOPHEN (TYLENOL) 325 MG TABLET    Take 2 tablets by mouth every 6 hours as needed for Pain    ALENDRONATE (FOSAMAX) 70 MG TABLET    Take 70 mg by mouth every 7 days Every Wednesday    AMLODIPINE (NORVASC) 10 MG TABLET    Take 5 mg by mouth daily     ASPIRIN 81 MG TABLET    Take 81 mg by mouth daily. ATORVASTATIN (LIPITOR) 10 MG TABLET    Take 10 mg by mouth daily    BISMUTH SUBSALICYLATE (BISMATROL) 262 MG CHEWABLE TABLET    Take 524 mg by mouth as needed for Heartburn (8 times daily)    BUSPIRONE (BUSPAR) 5 MG TABLET    Take 5 mg by mouth 2 times daily    BUTALBITAL-APAP-CAFFEINE (FIORICET) -40 MG CAPS PER CAPSULE    Take 1 capsule by mouth every 6 hours as needed for Headaches    CA CARBONATE-MAG HYDROXIDE (ROLAIDS PO)    Take 2 tablets by mouth every 4 hours as needed (acid reflux)    CALCIUM CARBONATE (TUMS) 500 MG CHEWABLE TABLET    Take 1 tablet by mouth 4 times daily as needed for Heartburn    CIPROFLOXACIN (CIPRO) 500 MG TABLET    Take 500 mg by mouth 2 times daily    DICYCLOMINE (BENTYL) 10 MG CAPSULE    Take 1 capsule by mouth every 6 hours as needed (cramps)    FERROUS SULFATE 325 (65 FE) MG EC TABLET    Take 325 mg by mouth daily (with breakfast).     FLUTICASONE (FLONASE) 50 MCG/ACT NASAL SPRAY    1 spray by Each Nostril route daily    IBUPROFEN (ADVIL;MOTRIN) 200 MG TABLET    Take 200 mg by mouth every 6 hours as needed for Pain (or 2 tabs)    LAMOTRIGINE (LAMICTAL) 100 MG TABLET    Take 100 mg by mouth daily     LAMOTRIGINE (LAMICTAL) 200 MG TABLET    Take 200 mg by mouth nightly    LIDOCAINE PTCH    Place 1 patch onto the skin daily    LOPERAMIDE (IMODIUM) 2 MG CAPSULE    Take 2 mg by mouth 4 times daily as needed for Diarrhea    LORATADINE (CLARITIN) 10 MG CAPSULE Dr Nancy Chao, will take to the [facility] for septic stone and ARYAN [WM]   1645 Code sepsis protocol  Fluids  Iv abx  Admitting to pcu level of care [WM]   [id number] She is not in septic shock [WM]   1646 Patient is having delerium and some agitation  Giving Risperdal disolvable [WM]   56 DW Dr Paschal Sandhoff accepts admit  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  ST, nonspecific changes, no acute ischemia, normal intervals      RADIOLOGY:All plain film, CT, MRI, and formal ultrasound images (except ED bedside ultrasound) are read by the radiologist, see reports below, unless otherwisenoted in MDM or here. CT ABDOMEN PELVIS WO CONTRAST Additional Contrast? None   Final Result   Moderately severe left hydronephrosis secondary to a 10 mm stone in the left   UPJ. Also multiple left intrarenal stones. Stable left parapelvic renal cyst.      Atrophic right kidney renal stones and resolving hydronephrosis      No other significant changes are seen from the prior exam         CT HEAD WO CONTRAST   Final Result   No acute intracranial abnormality. 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 XR Shunt Placement Series   Preliminary Result   1. Multiple shunt catheters overlying the chest and abdomen have a grossly   stable radiographic appearance when compared to 06/03/2020.      2.  Unchanged left pleural effusion and left basilar opacity. LABS: All lab results were reviewed by myself, and all abnormals are listed below.   Labs Reviewed   CBC WITH AUTO DIFFERENTIAL - Abnormal; Notable for the following components:       Result Value    WBC 19.6 (*)     RBC 3.76 (*)     Hemoglobin 11.7 (*)     Platelets 865 (*)     Seg Neutrophils 89 (*)     Lymphocytes 4 (*)     Segs Absolute 17.45 (*)     Absolute Lymph # 0.78 (*)     All other components within normal limits   COMPREHENSIVE METABOLIC PANEL - Abnormal; Notable for the following components:    Glucose 110 (*)     BUN 59 (*)     CREATININE 2.91 (*)     Potassium 3.6 (*)     Alkaline Phosphatase 153 (*)     Albumin 3.4 (*)     GFR Non- 16 (*)     GFR  19 (*)     All other components within normal limits   URINE RT REFLEX TO CULTURE - Abnormal; Notable for the following components:    Turbidity UA SLIGHTLY CLOUDY (*)     Urine Hgb TRACE (*)     Protein, UA 1+ (*)     Leukocyte Esterase, Urine LARGE (*)     All other components within normal limits   TROPONIN - Abnormal; Notable for the following components:    Troponin, High Sensitivity 28 (*)     All other components within normal limits   LIPASE - Abnormal; Notable for the following components:    Lipase 12 (*)     All other components within normal limits   MICROSCOPIC URINALYSIS - Abnormal; Notable for the following components:    Bacteria, UA MODERATE (*)     All other components within normal limits   COVID-19 & INFLUENZA COMBO   CULTURE, BLOOD 1   CULTURE, BLOOD 1   CULTURE, URINE   MAGNESIUM   LACTATE, SEPSIS   TROPONIN   LACTATE, SEPSIS       EMERGENCY DEPARTMENTCOURSE:         Vitals:    Vitals:    11/07/21 1435 11/07/21 1628 11/07/21 1650   BP: 117/68 129/86 138/76   Pulse: 72 112 115   Resp: 16 17 22   Temp: 97.8 °F (36.6 °C)     TempSrc: Oral     SpO2: 96% 97% 93%   Weight: 125 lb (56.7 kg)     Height: 5' 1\" (1.549 m)         The patient was given the following medications while in the emergency department:  Orders Placed This Encounter   Medications    0.9 % sodium chloride infusion    cefTRIAXone (ROCEPHIN) 2000 mg IVPB in D5W 50ml minibag     Order Specific Question: Antimicrobial Indications     Answer:   Sepsis of Unknown Etiology    morphine sulfate (PF) injection 4 mg    0.9 % sodium chloride bolus    risperiDONE (RISPERDAL M-TABS) disintegrating tablet 0.5 mg     CONSULTS:  IP CONSULT TO UROLOGY  IP CONSULT TO INTERNAL MEDICINE    FINAL IMPRESSION      1. Severe sepsis (HCC)    2. Hydronephrosis with renal and ureteral calculus obstruction    3. Altered mental status, unspecified altered mental status type    4. Dehydration          DISPOSITION/PLAN   DISPOSITION Admitted 11/07/2021 05:05:23 PM      PATIENT REFERRED TO:  No follow-up provider specified. DISCHARGE MEDICATIONS:  New Prescriptions    No medications on file     The care is provided during an unprecedented national emergency due to the novel coronavirus, COVID 19.   Abdirahman Del Valle MD  Attending Emergency Physician                    Abdirahman Del Valle MD  11/07/21 [id number]

## 2021-11-08 PROBLEM — N17.9 ACUTE KIDNEY INJURY (HCC): Status: ACTIVE | Noted: 2021-11-08

## 2021-11-08 LAB
ABSOLUTE BANDS #: 8.09 K/UL (ref 0–1)
ABSOLUTE EOS #: 1.83 K/UL (ref 0–0.4)
ABSOLUTE IMMATURE GRANULOCYTE: ABNORMAL K/UL (ref 0–0.3)
ABSOLUTE LYMPH #: 0.52 K/UL (ref 1–4.8)
ABSOLUTE MONO #: 0 K/UL (ref 0.1–1.3)
ALBUMIN SERPL-MCNC: 2.4 G/DL (ref 3.5–5.2)
ALBUMIN/GLOBULIN RATIO: ABNORMAL (ref 1–2.5)
ALP BLD-CCNC: 190 U/L (ref 35–104)
ALT SERPL-CCNC: 20 U/L (ref 5–33)
ANION GAP SERPL CALCULATED.3IONS-SCNC: 8 MMOL/L (ref 9–17)
AST SERPL-CCNC: 26 U/L
BANDS: 31 % (ref 0–10)
BASOPHILS # BLD: 0 % (ref 0–2)
BASOPHILS ABSOLUTE: 0 K/UL (ref 0–0.2)
BILIRUB SERPL-MCNC: 0.3 MG/DL (ref 0.3–1.2)
BUN BLDV-MCNC: 47 MG/DL (ref 8–23)
BUN/CREAT BLD: ABNORMAL (ref 9–20)
CALCIUM SERPL-MCNC: 7.9 MG/DL (ref 8.6–10.4)
CHLORIDE BLD-SCNC: 116 MMOL/L (ref 98–107)
CO2: 19 MMOL/L (ref 20–31)
CREAT SERPL-MCNC: 2.62 MG/DL (ref 0.5–0.9)
DIFFERENTIAL TYPE: ABNORMAL
EKG ATRIAL RATE: 111 BPM
EKG P AXIS: 71 DEGREES
EKG P-R INTERVAL: 154 MS
EKG Q-T INTERVAL: 342 MS
EKG QRS DURATION: 94 MS
EKG QTC CALCULATION (BAZETT): 465 MS
EKG R AXIS: 70 DEGREES
EKG T AXIS: 53 DEGREES
EKG VENTRICULAR RATE: 111 BPM
EOSINOPHILS RELATIVE PERCENT: 7 % (ref 0–4)
GFR AFRICAN AMERICAN: 22 ML/MIN
GFR NON-AFRICAN AMERICAN: 18 ML/MIN
GFR SERPL CREATININE-BSD FRML MDRD: ABNORMAL ML/MIN/{1.73_M2}
GFR SERPL CREATININE-BSD FRML MDRD: ABNORMAL ML/MIN/{1.73_M2}
GLUCOSE BLD-MCNC: 136 MG/DL (ref 70–99)
HCT VFR BLD CALC: 34.2 % (ref 36–46)
HEMOGLOBIN: 10.9 G/DL (ref 12–16)
IMMATURE GRANULOCYTES: ABNORMAL %
LACTIC ACID: 1.8 MMOL/L (ref 0.5–2.2)
LYMPHOCYTES # BLD: 2 % (ref 24–44)
MAGNESIUM: 1.9 MG/DL (ref 1.6–2.6)
MCH RBC QN AUTO: 31.2 PG (ref 26–34)
MCHC RBC AUTO-ENTMCNC: 32 G/DL (ref 31–37)
MCV RBC AUTO: 97.6 FL (ref 80–100)
MONOCYTES # BLD: 0 % (ref 1–7)
MORPHOLOGY: NORMAL
MYELOCYTES ABSOLUTE COUNT: 0.52 K/UL
MYELOCYTES: 2 %
NRBC AUTOMATED: ABNORMAL PER 100 WBC
PDW BLD-RTO: 13.7 % (ref 11.5–14.9)
PLATELET # BLD: 115 K/UL (ref 150–450)
PLATELET ESTIMATE: ABNORMAL
PMV BLD AUTO: 7.7 FL (ref 6–12)
POTASSIUM SERPL-SCNC: 3.8 MMOL/L (ref 3.7–5.3)
RBC # BLD: 3.5 M/UL (ref 4–5.2)
RBC # BLD: ABNORMAL 10*6/UL
SEG NEUTROPHILS: 58 % (ref 36–66)
SEGMENTED NEUTROPHILS ABSOLUTE COUNT: 15.14 K/UL (ref 1.3–9.1)
SODIUM BLD-SCNC: 143 MMOL/L (ref 135–144)
TOTAL PROTEIN: 5.4 G/DL (ref 6.4–8.3)
WBC # BLD: 26.1 K/UL (ref 3.5–11)
WBC # BLD: ABNORMAL 10*3/UL

## 2021-11-08 PROCEDURE — 80053 COMPREHEN METABOLIC PANEL: CPT

## 2021-11-08 PROCEDURE — 83735 ASSAY OF MAGNESIUM: CPT

## 2021-11-08 PROCEDURE — 2580000003 HC RX 258: Performed by: NURSE PRACTITIONER

## 2021-11-08 PROCEDURE — 99233 SBSQ HOSP IP/OBS HIGH 50: CPT | Performed by: INTERNAL MEDICINE

## 2021-11-08 PROCEDURE — 2580000003 HC RX 258

## 2021-11-08 PROCEDURE — 83605 ASSAY OF LACTIC ACID: CPT

## 2021-11-08 PROCEDURE — 6370000000 HC RX 637 (ALT 250 FOR IP)

## 2021-11-08 PROCEDURE — 93010 ELECTROCARDIOGRAM REPORT: CPT | Performed by: INTERNAL MEDICINE

## 2021-11-08 PROCEDURE — 85025 COMPLETE CBC W/AUTO DIFF WBC: CPT

## 2021-11-08 PROCEDURE — 36415 COLL VENOUS BLD VENIPUNCTURE: CPT

## 2021-11-08 PROCEDURE — 2060000000 HC ICU INTERMEDIATE R&B

## 2021-11-08 PROCEDURE — 6360000002 HC RX W HCPCS

## 2021-11-08 RX ORDER — HEPARIN SODIUM 5000 [USP'U]/ML
5000 INJECTION, SOLUTION INTRAVENOUS; SUBCUTANEOUS EVERY 8 HOURS SCHEDULED
Status: DISCONTINUED | OUTPATIENT
Start: 2021-11-08 | End: 2021-11-12 | Stop reason: HOSPADM

## 2021-11-08 RX ORDER — ASPIRIN 81 MG/1
81 TABLET, CHEWABLE ORAL DAILY
Status: DISCONTINUED | OUTPATIENT
Start: 2021-11-08 | End: 2021-11-12 | Stop reason: HOSPADM

## 2021-11-08 RX ORDER — 0.9 % SODIUM CHLORIDE 0.9 %
500 INTRAVENOUS SOLUTION INTRAVENOUS ONCE
Status: COMPLETED | OUTPATIENT
Start: 2021-11-08 | End: 2021-11-08

## 2021-11-08 RX ORDER — SODIUM CHLORIDE 9 MG/ML
INJECTION, SOLUTION INTRAVENOUS CONTINUOUS
Status: DISCONTINUED | OUTPATIENT
Start: 2021-11-08 | End: 2021-11-12 | Stop reason: HOSPADM

## 2021-11-08 RX ADMIN — QUETIAPINE FUMARATE 25 MG: 50 TABLET ORAL at 08:58

## 2021-11-08 RX ADMIN — ATORVASTATIN CALCIUM 10 MG: 10 TABLET, FILM COATED ORAL at 08:58

## 2021-11-08 RX ADMIN — HEPARIN SODIUM 5000 UNITS: 5000 INJECTION INTRAVENOUS; SUBCUTANEOUS at 16:07

## 2021-11-08 RX ADMIN — TOPIRAMATE 100 MG: 100 TABLET, FILM COATED ORAL at 17:49

## 2021-11-08 RX ADMIN — VORTIOXETINE 5 MG: 5 TABLET, FILM COATED ORAL at 08:57

## 2021-11-08 RX ADMIN — SODIUM CHLORIDE: 9 INJECTION, SOLUTION INTRAVENOUS at 22:38

## 2021-11-08 RX ADMIN — LAMOTRIGINE 100 MG: 100 TABLET ORAL at 08:57

## 2021-11-08 RX ADMIN — QUETIAPINE FUMARATE 50 MG: 50 TABLET ORAL at 22:24

## 2021-11-08 RX ADMIN — BUSPIRONE HYDROCHLORIDE 5 MG: 5 TABLET ORAL at 08:58

## 2021-11-08 RX ADMIN — ASPIRIN 81 MG: 81 TABLET, CHEWABLE ORAL at 08:58

## 2021-11-08 RX ADMIN — SODIUM CHLORIDE: 9 INJECTION, SOLUTION INTRAVENOUS at 13:43

## 2021-11-08 RX ADMIN — HEPARIN SODIUM 5000 UNITS: 5000 INJECTION INTRAVENOUS; SUBCUTANEOUS at 22:24

## 2021-11-08 RX ADMIN — LAMOTRIGINE 200 MG: 100 TABLET ORAL at 22:25

## 2021-11-08 RX ADMIN — TOPIRAMATE 50 MG: 50 TABLET, FILM COATED ORAL at 08:58

## 2021-11-08 RX ADMIN — BUSPIRONE HYDROCHLORIDE 5 MG: 5 TABLET ORAL at 22:24

## 2021-11-08 RX ADMIN — CEFTRIAXONE SODIUM 1000 MG: 1 INJECTION, POWDER, FOR SOLUTION INTRAMUSCULAR; INTRAVENOUS at 16:07

## 2021-11-08 RX ADMIN — SODIUM CHLORIDE: 9 INJECTION, SOLUTION INTRAVENOUS at 01:20

## 2021-11-08 RX ADMIN — SODIUM CHLORIDE 500 ML: 9 INJECTION, SOLUTION INTRAVENOUS at 03:03

## 2021-11-08 ASSESSMENT — PAIN SCALES - GENERAL: PAINLEVEL_OUTOF10: 0

## 2021-11-08 NOTE — PROGRESS NOTES
[facility] of admission to floor from surgery and the abnormal VS: /59, , Resp 18, Temp 100.0 ax and 96% 2L NC. I also needed clarification on NS since there are 2 different orders for IVF. 0022: Navdeep Moise responded back and ordered NS at 125ml/hr.

## 2021-11-08 NOTE — PROGRESS NOTES
Positive blood cultures attempted to call to resident team unsuccessfully. Page sent. Waiting for reply. Will try again. 1/2 culture positive for gram negative rods. ID as [email or web address].

## 2021-11-08 NOTE — PROGRESS NOTES
NS bolus completed. VS: 120/80, 97.4ax, 98, 16, 96% 2L NC. Pt is slightly more awake and easier to arouse.

## 2021-11-08 NOTE — ED NOTES
Romi Damon from surgery here to transport patient to surgery. Report given. Patient leaving ER at this time.         Fritz Antonio RN  11/07/21 [id number]

## 2021-11-08 NOTE — OP NOTE
Brooks Lindsay Graham  [birth year]  [id number]    DATE:  11/07/21  SURGEON:   Dr. Trever Villanueva M.D, MD [facility]. PREOPERATIVE DIAGNOSIS: Left obstructing ureteral stone and UTI  POSTOPERATIVE DIAGNOSIS: Same  PROCEDURES PERFORMED:  1. Cystoscopy. 2. Left sided stent placement. 3.  Marmolejo placement. DRAINS: A Left sided 6 x 24 cm double-J ureteral stent  SPECIMEN: None  ANESTHESIA: General  ESTIMATED BLOOD LOSS:  None.   COMPLICATIONS:  None.     INDICATIONS FOR PROCEDURE:  Re Friedman is a 66 y.o. female presents for left obstructing ureteral stone and UTI, right atrophic kidney and kidney stones. After the risks, benefits, alternatives, of the procedure were discussed with the patient, informed consent was obtained. The patient elected to proceed.     DETAILS OF THE PROCEDURE:  The patient was brought back to the preoperative holding area to the operating suite, and was transferred to the operating table where the patient lay in supine position. General endotracheal anesthesia was induced, and patient was prepped and draped in standard surgical fashion after being placed in dorsolithotomy position. A proper timeout was performed, preoperative antibiotics were given. A rigid cystoscope with a 22 Afghan sheath with 30 degree lens was inserted in the patient's urethral and into the bladder with ease. We then focused our attention on the left ureteral orifice, which we cannulated with our glidewire. Over our glidewire we placed a 6 x 24 cm double-J ureteral stent and we noted appropriate placement in the upper collecting system using fluoroscopy. We then removed our wire. There was good proximal and distal curl. We did not leave the string at the end of the stent. We placed a Marmolejo catheter at the end of the case to drainage. Case was concluded. Plan:   Patient will be admitted for IV antibiotics and aggressive resuscitation. The patient will need to follow up for definitive stone management.

## 2021-11-08 NOTE — PROGRESS NOTES
[address] [facility]    OCCUPATIONAL THERAPY MISSED TREATMENT NOTE   INPATIENT   Date: 21  Patient Name: Amada Degroot       Room: 2531/9776-22  MRN: [id number]   Account #: [de-identified]    : 1950  (72 y.o.)  Gender: female                 REASON FOR MISSED TREATMENT:  Other   -   Pt sleeping upon arrival. OT provided verbal and tactile stimulation with patient continuing to keep eyes closed. Occupational therapy will attempt again if time allows.           Kyung Mendoza, OT

## 2021-11-08 NOTE — PROGRESS NOTES
Physical Therapy        Physical Therapy Cancel Note      DATE: 2021    NAME: Christina Henderson  MRN: [id number]   : 1950      Patient not seen this date for Physical Therapy due to: Other: Other   -   Pt sleeping upon arrival. PT provided verbal and tactile stimulation with patient continuing to keep eyes closed. Physical therapy will attempt again if time allows.        Electronically signed by Belem Hare PT on 2021 at 4:31 PM

## 2021-11-08 NOTE — PROGRESS NOTES
[address]    PROGRESS NOTE             11/8/2021    7:18 AM    Name:   Pacheco Arboleda  MRN:     [id number]     Acct:      [de-identified]   Room:   2123/2123-01  IP Day:  1  Admit Date:  11/7/2021  2:33 PM    PCP:  Margaret Kauffman MD  Code Status:  Full Code    Subjective:     C/C:   Chief Complaint   Patient presents with    Altered Mental Status     Gradual decline in mental status. Had a fall, but was caught. Lives at a group home. Caregiver is here with her.  Fall     Interval History Status: not changed. 78-year-old female patient presents with altered mental status, abdominal pain for 3 days, CT abdomen was significant for left UPJ kidney stone with hydronephrosis, tachycardia, tachypnea, was admitted for management of urosepsis. Patient was seen and examined at bedside, was having hypotensive readings overnight, was given 500 mL bolus, patient underwent stent placement through cystoscopy, Marmolejo catheter was inserted,  Repeat lactic acid pending  Blood culture, urine culture pending  Patient is on IV Rocephin, IV  ml/h       Brief History:     The patient is a 66 y.o. Non- / non  female who presents withAltered Mental Status (Gradual decline in mental status. Had a fall, but was caught. Lives at a group home. Caregiver is here with her.  ) and Fall   and she is admitted to the hospital for the management of  Urosepsis      The patient is a poor historian, history was taken from caregiver, printed medical records caregiver was carrying, and EMR.   This is a 78-year-old female patient past medical history of bipolar disease, chronic headache, hydrocephalus s/p  shunt presented to ED due to change in behavior, and falling down caregiver mentioned that the patient was having abdominal pain 3 days ago denies any vomiting or nausea, patient communicates well at her baseline, she usually presents with change in behavior whenever she has UTI. Denies chills or fever.      Patient was vitally stable until she was having  Cr: 2.9  K 3.5  UA: Bacteruria, large leukocytes esterase   Urine culture were sent      X-ray shunt series: Multiple shunt catheters overlying the chest and abdomen have a grossly   stable radiographic appearance when compared to 06/03/2020. Unchanged left pleural effusion and left basilar opacity.      Trop 28 --> EKG showed sinus tachycardia  CT head:No acute intracranial abnormality. 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      CT abdomen: Moderately severe left hydronephrosis secondary to a 10 mm stone in the left UPJ. Also multiple left intrarenal stones. Stable left parapelvic renal cyst.  Atrophic Right kidney     Patient then developed tachycardia , RR 22  Sepsis work-up was initiated   Was given 100 ml/h IV NS   IV Rocephin  Lactic acid 1  Blood cultures pending  Urine culture pending    Review of Systems:     Review of Systems   Unable to perform ROS: Mental status change         Medications: Allergies: Allergies   Allergen Reactions    Levaquin [Levofloxacin In D5w] Other (See Comments)     The group home has this on their allergy list, but without what effects to the Pt. Pt doesn't know.     Adhesive Tape     Chocolate     Indomethacin     Neurontin [Gabapentin]        Current Meds:   Scheduled Meds:    atorvastatin  10 mg Oral Daily    busPIRone  5 mg Oral BID    lamoTRIgine  100 mg Oral Daily    lamoTRIgine  200 mg Oral Nightly    QUEtiapine  25 mg Oral Daily    QUEtiapine  50 mg Oral Nightly    topiramate  100 mg Oral QPM    topiramate  50 mg Oral Daily    VORTIoxetine  5 mg Oral Daily     Continuous Infusions:    sodium chloride 125 mL/hr at 11/08/21 0120    sodium chloride      dextrose       PRN Meds: butalbital-APAP-caffeine, SUMAtriptan, sodium chloride flush, sodium chloride, ondansetron **OR** ondansetron, polyethylene glycol, acetaminophen **OR** acetaminophen, potassium chloride **OR** potassium alternative oral replacement **OR** potassium chloride, magnesium sulfate, glucose, dextrose, glucagon (rDNA), dextrose    Data:     Past Medical History:   has a past medical history of Acid reflux, Bipolar affective ([facility] [address] Chronic headaches, and Hydrocephalus ([facility] [address] Social History:   reports that she has never smoked. She has never used smokeless tobacco. She reports that she does not drink alcohol and does not use drugs. Family History: History reviewed. No pertinent family history. Vitals:  /80   Pulse 98   Temp 98 °F (36.7 °C) (Axillary)   Resp 18   Ht 5' 1\" (1.549 m)   Wt 125 lb (56.7 kg)   SpO2 96%   BMI 23.62 kg/m²   Temp (24hrs), Av.2 °F (36.8 °C), Min:96.5 °F (35.8 °C), Max:100 °F (37.8 °C)    No results for input(s): POCGLU in the last 72 hours. I/O(24Hr):     Intake/Output Summary (Last 24 hours) at 2021 0718  Last data filed at 2021 0646  Gross per 24 hour   Intake 4546 ml   Output 1125 ml   Net 3421 ml       Labs:    CBC with Differential:    Lab Results   Component Value Date    WBC 26.1 2021    RBC 3.50 2021    HGB 10.9 2021    HCT 34.2 2021     2021    MCV 97.6 2021    MCH 31.2 2021    MCHC 32.0 2021    RDW 13.7 2021    LYMPHOPCT 2 2021    MONOPCT 0 2021    MYELOPCT 2 2021    BASOPCT 0 2021    MONOSABS 0.00 2021    LYMPHSABS 0.52 2021    EOSABS 1.83 2021    BASOSABS 0.00 2021    DIFFTYPE NOT REPORTED 2021     BMP:    Lab Results   Component Value Date     2021    K 3.8 2021     2021    CO2 19 2021    BUN 47 2021    LABALBU 2.4 2021    CREATININE 2.62 2021    CALCIUM 7.9 2021    GFRAA 22 2021    LABGLOM 18 11/08/2021    GLUCOSE 136 11/08/2021       Lab Results   Component Value Date/Time    SPECIAL 5 ML GREEN 5 ML ORANGE RT WRIST 11/07/2021 04:29 PM     Lab Results   Component Value Date/Time    CULTURE NO GROWTH 2 HOURS 11/07/2021 04:29 PM         Radiology:    CT ABDOMEN PELVIS WO CONTRAST Additional Contrast? None    Result Date: 11/7/2021  EXAMINATION: CT OF THE ABDOMEN AND PELVIS WITHOUT CONTRAST 11/7/2021 12:16 pm TECHNIQUE: CT of the abdomen and pelvis was performed without the administration of intravenous contrast. Multiplanar reformatted images are provided for review. Dose modulation, iterative reconstruction, and/or weight based adjustment of the mA/kV was utilized to reduce the radiation dose to as low as reasonably achievable. COMPARISON: 02/07/2019 HISTORY: ORDERING SYSTEM PROVIDED HISTORY: ams, constipation TECHNOLOGIST PROVIDED HISTORY: ams, constipation Decision Support Exception - unselect if not a suspected or confirmed emergency medical condition->Emergency Medical Condition (MA) Reason for Exam: AMS, constipation Acuity: Unknown Type of Exam: Initial FINDINGS: Lower chest: Dependent atelectasis or consolidation in the left lower lobe is noted. Moderate size pleural effusion is noted with pleural thickening. Findings are chronic however stable Sequela of old posterior left rib fractures and lateral right rib fractures. No acute osseous abnormality identified. ABDOMEN AND PELVIS: Organs: Evaluation of the abdominal and pelvic viscera is limited in the absence of contrast.  A calcification is noted in the posterior dome of the right hepatic lobe. The liver otherwise reveals no focal abnormality. The gallbladder is not visualized and may be surgically absent. The pancreas, spleen appear grossly unremarkable. 11 mm stable left adrenal adenoma . There is also a 6 mm right adrenal nodule, unchanged. Moderately severe left hydronephrosis with 10 mm stone in the left UPJ.   Also multiple left intrarenal stones. Stable left parapelvic renal cysts. Atrophic right kidney renal stones and resolving hydronephrosis GI/Bowel: No bowel dilatation or wall thickening identified. Mild stool burden throughout the colon. Diverticulosis. Pelvis: The bladder is well distended. No wall thickening or inflammatory change identified. Peritoneum/Retroperitoneum: A tunneled peritoneal catheter terminates in the left lower quadrant. No ascites or free air. The aorta is normal in caliber. Calcified atheromatous plaque is present. No enlarged or suspicious-appearing lymph nodes. Bones/Soft Tissues: Nondisplaced posterior left acetabular fracture. Comminuted nondisplaced medial acetabular fracture. There is also a minimally displaced comminuted left inferior pubic ramus fracture. Chronic appearing severe L4 compression fracture. Fracture of L2 vertebral body with less than 25% loss of normal height which also appears chronic however has developed from the prior exam.  Advanced facet arthropathy in the lower lumbar spine. Angular deformity in the inferior sacrum/coccyx is noted without a discrete fracture line. This also may be chronic. Pin fixation hardware is noted within the proximal femora. Moderately severe left hydronephrosis secondary to a 10 mm stone in the left UPJ. Also multiple left intrarenal stones. Stable left parapelvic renal cyst. Atrophic right kidney renal stones and resolving hydronephrosis No other significant changes are seen from the prior exam     CT HEAD WO CONTRAST    Result Date: 11/7/2021  EXAMINATION: CT OF THE HEAD WITHOUT CONTRAST  11/7/2021 3:16 pm TECHNIQUE: CT of the head was performed without the administration of intravenous contrast. Dose modulation, iterative reconstruction, and/or weight based adjustment of the mA/kV was utilized to reduce the radiation dose to as low as reasonably achievable.  COMPARISON: CT scan of the brain from 07/12/2021 HISTORY: [address] Acute Type of Exam: Initial FINDINGS: There are multiple shunt catheters overlying the chest.  There is 1 extending from the lower left neck over the right chest with distal tip in the right upper quadrant, not significantly changed, likely abandoned. There are 2 additional shunt catheter radiodensities overlying the left chest, which are unchanged as well. 1 of these appears to terminate overlying a left pleural effusion and the other extends into the abdomen. Heart size is stable. There is persistent left pleural effusion and left basilar opacity. No evidence of pneumothorax. In the abdomen, distal tip of 1 of the left-sided shunt catheters is overlying the left lower quadrant, which is unchanged. There is a nonspecific, nonobstructed bowel gas pattern. Moderate fecal material in the right side of the colon. Fixation hardware in both hips. Multiple components of shunt catheters are seen overlying the left calvarium, unchanged. Hearing aid device is noted. 1.  Multiple shunt catheters overlying the chest and abdomen have a grossly stable radiographic appearance when compared to 06/03/2020. 2.  Unchanged left pleural effusion and left basilar opacity. Physical Examination:        Physical Exam  Constitutional:       General: She is not in acute distress. Appearance: She is ill-appearing. Cardiovascular:      Rate and Rhythm: Regular rhythm. Tachycardia present. Pulses: Normal pulses. Heart sounds: Normal heart sounds. No murmur heard. No friction rub. No gallop. Pulmonary:      Effort: Pulmonary effort is normal. No respiratory distress. Breath sounds: Normal breath sounds. No wheezing, rhonchi or rales. Abdominal:      General: Abdomen is flat. Palpations: Abdomen is soft. Tenderness: There is no abdominal tenderness. Musculoskeletal:      Right lower leg: No edema. Left lower leg: No edema. Neurological:      Mental Status: She is disoriented. Psychiatric:         Mood and Affect: Mood is anxious. Speech: She is noncommunicative. Assessment:        Primary Problem  <principal problem not specified>    Active Hospital Problems    Diagnosis Date Noted    UTI (urinary tract infection) [N39.0] 11/07/2021    Sepsis ([facility] [address] [A41.9] 11/07/2021    Hydronephrosis of left kidney [N13.30] 11/07/2021    Bipolar mood disorder ([facility] [address] [F31.9] 11/07/2021    S/P  shunt [Z98.2] 12/01/2020     65-year-old female patient presents with altered mental status, abdominal pain for 3 days, CT abdomen was significant for left UPJ kidney stone with hydronephrosis, tachycardia, tachypnea, was admitted for management of urosepsis. Patient had cystoscopy sten placement on the left side. Marmolejo cathter was inserted   Patient is on IV rochephin and 125 NS IV fluids     Plan:        Urosepsis   RR 22    WBC 19.6  Lactate 1  Pro-Mckinley 11.55,   UA: Bacteria, leukocyte esterase  Urine culture pending  Blood culture pending  125 mL/H NS  IV Rocephin      Left UPJ stone with hydronephrosis S/P ureteral stent   CT abdomen, pelvis: Hydronephrosis with in the left UPJ. Left intrarenal stones  Urology on board  double-J ureteral stent was placed yesterday     Acute kidney injury  Underlying sepsis and UTI   Cr: 2.26  Baseline 0.90  125 ml/h NS      Bipolar disorder  Quetiapine 25 mg p.o. daily  Quetiapine 50 mg p.o. nightly  Lamotrigine 100 mg p.o. daily  Lamotrigine 200 mg p.o. nightly  Buspirone 5 mg p.o. Vortioxetine 5 mg p.o.      Chronic headache  Sumatriptan 100 mg p.o. Topiramate 50 mg p.o.     DVT prophylaxis:  Heparin SubQ   GI prophylaxis: Pepcid     PT OT evaluation  SW discharge planning  Diet Easy to chew        Mimi Jason MD  11/8/2021  7:18 AM     Attestation and add on       I have discussed the care of [facility] , including pertinent history and exam findings,      11/8/21    with the resident.   I have seen and examined the patient and the key elements of all parts of the encounter have been performed by me . I agree with the assessment, plan and orders as documented by the resident. Active Problems:    S/P  shunt    UTI (urinary tract infection)    Hydronephrosis of left kidney    Sepsis ([facility] [address]    Bipolar mood disorder ([facility] [address]    Acute kidney injury ([facility] [address]  Resolved Problems:    * No resolved hospital problems. *            ---- ;        Medications: Allergies: Allergies   Allergen Reactions    Levaquin [Levofloxacin In D5w] Other (See Comments)     The group home has this on their allergy list, but without what effects to the Pt. Pt doesn't know.  Adhesive Tape     Chocolate     Indomethacin     Neurontin [Gabapentin]        Current Meds:   Scheduled Meds:    cefTRIAXone (ROCEPHIN) IV  1,000 mg IntraVENous Q24H    influenza virus vaccine  0.5 mL IntraMUSCular Prior to discharge    aspirin  81 mg Oral Daily    heparin (porcine)  5,000 Units SubCUTAneous 3 times per day    atorvastatin  10 mg Oral Daily    busPIRone  5 mg Oral BID    lamoTRIgine  100 mg Oral Daily    lamoTRIgine  200 mg Oral Nightly    QUEtiapine  25 mg Oral Daily    QUEtiapine  50 mg Oral Nightly    topiramate  100 mg Oral QPM    topiramate  50 mg Oral Daily    VORTIoxetine  5 mg Oral Daily     Continuous Infusions:    sodium chloride 125 mL/hr at 11/08/21 1343    sodium chloride      dextrose       PRN Meds: butalbital-APAP-caffeine, SUMAtriptan, sodium chloride flush, sodium chloride, ondansetron **OR** ondansetron, polyethylene glycol, acetaminophen **OR** acetaminophen, potassium chloride **OR** potassium alternative oral replacement **OR** potassium chloride, magnesium sulfate, glucose, dextrose, glucagon (rDNA), dextrose        MD BERNIE Juan[address], [address] [place] [address].    Phone [phone]   Fax: [phone]  Answering Service: [phone]

## 2021-11-08 NOTE — CARE COORDINATION
CASE MANAGEMENT NOTE:    Admission Date:  11/7/2021 Silvano Crotez is a 66 y.o.  female    Admitted for : Dehydration [E86.0]  UTI (urinary tract infection) [N39.0]  Hydronephrosis with renal and ureteral calculus obstruction [N13.2]  Severe sepsis ([facility] [address] [A41.9, R65.20]  Altered mental status, unspecified altered mental status type [R41.82]    Met with:  Called and spoke to sister    PCP:  Dr. Mann Bring:  Medicare      Is patient alert and oriented at time of discussion:  NA    Current Residence/ Living Arrangements: In a group home             Current Services PTA:  24/7 aide    Does patient go to outpatient dialysis: No  If yes, location and chair time:     Is patient agreeable to VNS: No    Freedom of choice provided:  No    List of [address] provided: No    VNS chosen:  No    DME:  walker    Home Oxygen: No    Nebulizer: No    CPAP/BIPAP: No    Supplier: N/A    Potential Assistance Needed: No    SNF needed: No    Freedom of choice and list provided: NA    Pharmacy:  [facility]       Does Patient want to use MEDS to BEDS? No    Is patient currently receiving oral anticoagulation therapy? No    Is the [facility] an [facility] with Readmission Risk Score greater than 14%? No  If yes, pt needs a follow up appointment made within 7 days. Family Members/Caregivers that pt would like involved in their care:    Yes    If yes, list name here:  Francisco    Transportation Provider:  Family           Discharge Plan:  11/8/21 Medicare Pt is from the [facility] in [place] DME walker and pt has a 24/7 caregiver spoke to her sister she would like a swallow study while she is here she has been having problems will follow for needs . //tv                  Electronically signed by:  Shelley Mederos RN on 11/8/2021 at 12:59 PM

## 2021-11-08 NOTE — ANESTHESIA PRE PROCEDURE
Department of Anesthesiology  Preprocedure Note       Name:  Elie Mai   Age:  66 y.o.  :  1950                                          MRN:  [id number]         Date:  2021      Surgeon: Kathleen White):  Valery Martínez MD    Procedure: Procedure(s):  CYSTOSCOPY URETERAL STENT INSERTION    Medications prior to admission:   Prior to Admission medications    Medication Sig Start Date End Date Taking?  Authorizing Provider   ciprofloxacin (CIPRO) 500 MG tablet Take 500 mg by mouth 2 times daily   Yes Historical Provider, MD   loratadine (CLARITIN) 10 MG capsule Take 10 mg by mouth daily   Yes Historical Provider, MD   omeprazole (PRILOSEC) 20 MG delayed release capsule Take 20 mg by mouth daily   Yes Historical Provider, MD   polyethylene glycol (GLYCOLAX) 17 GM/SCOOP powder Take 17 g by mouth daily   Yes Historical Provider, MD   QUEtiapine (SEROQUEL) 25 MG tablet Take 25 mg by mouth daily   Yes Historical Provider, MD   topiramate (TOPAMAX) 100 MG tablet Take 100 mg by mouth every evening   Yes Historical Provider, MD   lamoTRIgine (LAMICTAL) 200 MG tablet Take 200 mg by mouth nightly   Yes Historical Provider, MD   melatonin 5 MG TBDP disintegrating tablet Take 5 mg by mouth nightly   Yes Historical Provider, MD   QUEtiapine (SEROQUEL) 25 MG tablet Take 50 mg by mouth nightly   Yes Historical Provider, MD   butalbital-APAP-caffeine (FIORICET) -40 MG CAPS per capsule Take 1 capsule by mouth every 6 hours as needed for Headaches   Yes Historical Provider, MD   Ca Carbonate-Mag Hydroxide (ROLAIDS PO) Take 2 tablets by mouth every 4 hours as needed (acid reflux)   Yes Historical Provider, MD   acetaminophen (TYLENOL) 325 MG tablet Take 2 tablets by mouth every 6 hours as needed for Pain 6/3/20  Yes Bibiana Kong,    topiramate (TOPAMAX) 50 MG tablet Take 1 tablet by mouth daily 18  Yes Shalini Anthony MD   Misc Natural Products (GLUCOSAMINE CHONDROITIN ADV PO) Take 1 tablet by mouth daily Yes Historical Provider, MD   busPIRone (BUSPAR) 5 MG tablet Take 5 mg by mouth 2 times daily   Yes Historical Provider, MD   bismuth subsalicylate (BISMATROL) 262 MG chewable tablet Take 524 mg by mouth as needed for Heartburn (8 times daily)   Yes Historical Provider, MD   senna-docusate (PERICOLACE) 8.6-50 MG per tablet Take 1 tablet by mouth as needed for Constipation (in eveninig) Or 2 tabs   Yes Historical Provider, MD   ibuprofen (ADVIL;MOTRIN) 200 MG tablet Take 200 mg by mouth every 6 hours as needed for Pain (or 2 tabs)   Yes Historical Provider, MD   magnesium hydroxide (MILK OF MAGNESIA) 400 MG/5ML suspension Take 5 mLs by mouth daily as needed for Constipation   Yes Historical Provider, MD   calcium carbonate (TUMS) 500 MG chewable tablet Take 1 tablet by mouth 4 times daily as needed for Heartburn   Yes Historical Provider, MD   Multiple Vitamins-Minerals (THERAPEUTIC MULTIVITAMIN-MINERALS) tablet Take 1 tablet by mouth daily. Yes Historical Provider, MD   lamoTRIgine (LAMICTAL) 100 MG tablet Take 100 mg by mouth daily    Yes Historical Provider, MD   aspirin 81 MG tablet Take 81 mg by mouth daily.    Yes Historical Provider, MD   Metoclopramide HCl (REGLAN PO) Take 5 mg by mouth 3 times daily    Yes Historical Provider, MD   dicyclomine (BENTYL) 10 MG capsule Take 1 capsule by mouth every 6 hours as needed (cramps) 10/1/21   Nilda Macedo MD   fluticasone (FLONASE) 50 MCG/ACT nasal spray 1 spray by Each Nostril route daily    Historical Provider, MD   VORTIoxetine (TRINTELLIX) 5 MG tablet Take 5 mg by mouth daily    Historical Provider, MD   meclizine (ANTIVERT) 25 MG tablet Take 25 mg by mouth 3 times daily as needed for Dizziness    Historical Provider, MD   SUMAtriptan (IMITREX) 100 MG tablet Take 100 mg by mouth 2 times daily as needed for Migraine    Historical Provider, MD   lidocaine PTCH Place 1 patch onto the skin daily 12/14/18   Latisha Edmonds MD   alendronate (FOSAMAX) 70 MG tablet Take 70 mg by mouth every 7 days Every Wednesday 12/12/18   Historical Provider, MD   atorvastatin (LIPITOR) 10 MG tablet Take 10 mg by mouth daily    Historical Provider, MD   loperamide (IMODIUM) 2 MG capsule Take 2 mg by mouth 4 times daily as needed for Diarrhea    Historical Provider, MD   amLODIPine (NORVASC) 10 MG tablet Take 5 mg by mouth daily     Historical Provider, MD   ferrous sulfate 325 (65 FE) MG EC tablet Take 325 mg by mouth daily (with breakfast).     Historical Provider, MD       Current medications:    Current Facility-Administered Medications   Medication Dose Route Frequency Provider Last Rate Last Admin    0.9 % sodium chloride infusion   IntraVENous Continuous Connie MD Alejandro 150 mL/hr at 11/07/21 1628 New Bag at 11/07/21 1628    glucose (GLUTOSE) 40 % oral gel 15 g  15 g Oral PRN Nirmal Dewitt MD        dextrose 50 % IV solution  12.5 g IntraVENous PRN Nirmal Dewitt MD        glucagon (rDNA) injection 1 mg  1 mg IntraMUSCular PRN Nirmal Dewitt MD        dextrose 5 % solution  100 mL/hr IntraVENous PRN Nirmal Dewitt MD         Current Outpatient Medications   Medication Sig Dispense Refill    ciprofloxacin (CIPRO) 500 MG tablet Take 500 mg by mouth 2 times daily      loratadine (CLARITIN) 10 MG capsule Take 10 mg by mouth daily      omeprazole (PRILOSEC) 20 MG delayed release capsule Take 20 mg by mouth daily      polyethylene glycol (GLYCOLAX) 17 GM/SCOOP powder Take 17 g by mouth daily      QUEtiapine (SEROQUEL) 25 MG tablet Take 25 mg by mouth daily      topiramate (TOPAMAX) 100 MG tablet Take 100 mg by mouth every evening      lamoTRIgine (LAMICTAL) 200 MG tablet Take 200 mg by mouth nightly      melatonin 5 MG TBDP disintegrating tablet Take 5 mg by mouth nightly      QUEtiapine (SEROQUEL) 25 MG tablet Take 50 mg by mouth nightly      butalbital-APAP-caffeine (FIORICET) -40 MG CAPS per capsule Take 1 capsule by mouth every 6 hours as needed for Headaches  Ca Carbonate-Mag Hydroxide (ROLAIDS PO) Take 2 tablets by mouth every 4 hours as needed (acid reflux)      acetaminophen (TYLENOL) 325 MG tablet Take 2 tablets by mouth every 6 hours as needed for Pain 30 tablet 0    topiramate (TOPAMAX) 50 MG tablet Take 1 tablet by mouth daily 60 tablet 3    Misc Natural Products (GLUCOSAMINE CHONDROITIN ADV PO) Take 1 tablet by mouth daily      busPIRone (BUSPAR) 5 MG tablet Take 5 mg by mouth 2 times daily      bismuth subsalicylate (BISMATROL) 262 MG chewable tablet Take 524 mg by mouth as needed for Heartburn (8 times daily)      senna-docusate (PERICOLACE) 8.6-50 MG per tablet Take 1 tablet by mouth as needed for Constipation (in eveninig) Or 2 tabs      ibuprofen (ADVIL;MOTRIN) 200 MG tablet Take 200 mg by mouth every 6 hours as needed for Pain (or 2 tabs)      magnesium hydroxide (MILK OF MAGNESIA) 400 MG/5ML suspension Take 5 mLs by mouth daily as needed for Constipation      calcium carbonate (TUMS) 500 MG chewable tablet Take 1 tablet by mouth 4 times daily as needed for Heartburn      Multiple Vitamins-Minerals (THERAPEUTIC MULTIVITAMIN-MINERALS) tablet Take 1 tablet by mouth daily.  lamoTRIgine (LAMICTAL) 100 MG tablet Take 100 mg by mouth daily       aspirin 81 MG tablet Take 81 mg by mouth daily.       Metoclopramide HCl (REGLAN PO) Take 5 mg by mouth 3 times daily       dicyclomine (BENTYL) 10 MG capsule Take 1 capsule by mouth every 6 hours as needed (cramps) 20 capsule 0    fluticasone (FLONASE) 50 MCG/ACT nasal spray 1 spray by Each Nostril route daily      VORTIoxetine (TRINTELLIX) 5 MG tablet Take 5 mg by mouth daily      meclizine (ANTIVERT) 25 MG tablet Take 25 mg by mouth 3 times daily as needed for Dizziness      SUMAtriptan (IMITREX) 100 MG tablet Take 100 mg by mouth 2 times daily as needed for Migraine      lidocaine PTCH Place 1 patch onto the skin daily 3 patch 0    alendronate (FOSAMAX) 70 MG tablet Take 70 mg by mouth every 7 days Every Wednesday      atorvastatin (LIPITOR) 10 MG tablet Take 10 mg by mouth daily      loperamide (IMODIUM) 2 MG capsule Take 2 mg by mouth 4 times daily as needed for Diarrhea      amLODIPine (NORVASC) 10 MG tablet Take 5 mg by mouth daily       ferrous sulfate 325 (65 FE) MG EC tablet Take 325 mg by mouth daily (with breakfast). Allergies: Allergies   Allergen Reactions    Levaquin [Levofloxacin In D5w] Other (See Comments)     The group home has this on their allergy list, but without what effects to the Pt. Pt doesn't know.  Adhesive Tape     Chocolate     Indomethacin     Neurontin [Gabapentin]        Problem List:    Patient Active Problem List   Diagnosis Code    Contusion of hip S70.00XA    UTI (urinary tract infection), uncomplicated L90.7    Inability to ambulate due to hip R26.2    Closed fracture of left inferior pubic ramus ([facility]) S32.592A    Closed compression fracture of L4 lumbar vertebra S32.040A    Cellulitis of left leg L03. 116    Failure of outpatient treatment Z78.9    Hydrocephalus ([facility]) G91.9    S/P  shunt Z98.2    Leg edema R60.0    UTI (urinary tract infection) N39.0    Hydronephrosis of left kidney N13.30    Sepsis ([facility]) A41.9    Bipolar mood disorder ([facility]) F31.9       Past Medical History:        Diagnosis Date    Acid reflux     Bipolar affective ([facility])     Chronic headaches     Hydrocephalus ([facility])        Past Surgical History:        Procedure Laterality Date    CHOLECYSTECTOMY      HIP FRACTURE SURGERY Left 06/09/2017    PINNING AND SCREW    HIP PINNING Left 6/9/2017    HIP PINNING 7.3 CANNULATED SCREW WITH [employer] PRODUCT APPLICATION AND INTRAOPERATIVE C-ARM performed by Maria A Qureshi MD at [address] [place] [address]      shunt x3 head    SHUNT REVISION      TONSILLECTOMY         Social History:    Social History     Tobacco Use    Smoking status: Never Smoker    Smokeless tobacco: Never Used   Substance Use Topics    Alcohol use: No                                Counseling given: Not Answered      Vital Signs (Current):   Vitals:    11/07/21 1435 11/07/21 1628 11/07/21 1650 11/07/21 1845   BP: 117/68 129/86 138/76 124/82   Pulse: 72 112 115 102   Resp: 16 17 22 23   Temp: 97.8 °F (36.6 °C)      TempSrc: Oral      SpO2: 96% 97% 93% 94%   Weight: 125 lb (56.7 kg)      Height: 5' 1\" (1.549 m)                                                 BP Readings from Last 3 Encounters:   11/07/21 124/82   10/01/21 134/64   04/22/21 129/70       NPO Status:                                                                                 BMI:   Wt Readings from Last 3 Encounters:   11/07/21 125 lb (56.7 kg)   09/30/21 129 lb (58.5 kg)   04/22/21 121 lb (54.9 kg)     Body mass index is 23.62 kg/m². CBC:   Lab Results   Component Value Date    WBC 19.6 11/07/2021    RBC 3.76 11/07/2021    HGB 11.7 11/07/2021    HCT 36.8 11/07/2021    MCV 97.8 11/07/2021    RDW 13.9 11/07/2021     11/07/2021       CMP:   Lab Results   Component Value Date     11/07/2021    K 3.6 11/07/2021     11/07/2021    CO2 23 11/07/2021    BUN 59 11/07/2021    CREATININE 2.91 11/07/2021    GFRAA 19 11/07/2021    LABGLOM 16 11/07/2021    GLUCOSE 110 11/07/2021    PROT 6.9 11/07/2021    CALCIUM 9.6 11/07/2021    BILITOT 0.35 11/07/2021    ALKPHOS 153 11/07/2021    AST 20 11/07/2021    ALT 19 11/07/2021       POC Tests: No results for input(s): POCGLU, POCNA, POCK, POCCL, POCBUN, POCHEMO, POCHCT in the last 72 hours.     Coags:   Lab Results   Component Value Date    PROTIME 13.4 04/22/2021    INR 1.0 04/22/2021    APTT 35.6 04/22/2021       HCG (If Applicable): No results found for: PREGTESTUR, PREGSERUM, HCG, HCGQUANT     ABGs: No results found for: PHART, PO2ART, SEZ7FBC, JSB4VWF, BEART, J7VKHZVI     Type & Screen (If Applicable):  No results found for: LABABO, LABRH    Drug/Infectious Status (If Applicable):  No results found for: HIV, HEPCAB    COVID-19 Screening (If Applicable):   Lab Results   Component Value Date    COVID19 Not Detected 11/07/2021           Anesthesia Evaluation  Patient summary reviewed and Nursing notes reviewed no history of anesthetic complications:   Airway: Mallampati: Unable to assess / NA  TM distance: >3 FB   Neck ROM: full  Mouth opening: > = 3 FB Dental:          Pulmonary:Negative Pulmonary ROS breath sounds clear to auscultation                             Cardiovascular:          ECG reviewed  Rhythm: regular  Rate: normal  Echocardiogram reviewed               ROS comment: Left ventricle is normal in size and wall thickness. Global left ventricular systolic function is normal. Estimated ejection  fraction is > 55%. Grade I (mild) left ventricular diastolic dysfunction. Neuro/Psych:   (+) headaches:, psychiatric history:             ROS comment: Hydrocephalus   S/P  shunt     GI/Hepatic/Renal:   (+) GERD:, renal disease: kidney stones,          ROS comment: UTI. Endo/Other:                     Abdominal:             Vascular: negative vascular ROS. Other Findings:           Anesthesia Plan      MAC and general     ASA 3       Induction: intravenous. MIPS: Postoperative opioids intended and Prophylactic antiemetics administered. Anesthetic plan and risks discussed with patient.                       Jude Oliveira MD   11/7/2021

## 2021-11-08 NOTE — CONSULTS
Jason Kent, Alvaro Jha, [address] [facility], [address], & Last   Urology Consultation      Patient: Taj Carmona  MRN: [id number]  YOB: 1950    CHIEF COMPLAINT: Urosepsis    HISTORY OF PRESENT ILLNESS:   The patient is a 66 y.o. female who presents with gradual onset of altered mental status and urinary tract symptoms. Patient was noted to meet SIRS criteria in the emergency room. CT was performed and showed left-sided obstructing stone with resulting hydronephrosis. Urinalysis was positive for infection. Patient also has leukocytosis and acute kidney injury. Patient is developmentally delayed and history is difficult to obtain. Patient's old records, notes and chart reviewed and summarized above.     Past Medical History:    Past Medical History:   Diagnosis Date    Acid reflux     Bipolar affective (HCC)     Chronic headaches     Hydrocephalus (HCC)        Past Surgical History:    Past Surgical History:   Procedure Laterality Date    CHOLECYSTECTOMY      HIP FRACTURE SURGERY Left 06/09/2017    PINNING AND SCREW    HIP PINNING Left 6/9/2017    HIP PINNING 7.3 CANNULATED SCREW WITH SYNTHES PRODUCT APPLICATION AND INTRAOPERATIVE C-ARM performed by Guille Huang MD at [facility]      shunt x3 head    SHUNT REVISION      TONSILLECTOMY         Medications:      Current Facility-Administered Medications:     0.9 % sodium chloride infusion, , IntraVENous, Continuous, Dionne Loco MD, Last Rate: 150 mL/hr at 11/07/21 1628, New Bag at 11/07/21 1628    glucose (GLUTOSE) 40 % oral gel 15 g, 15 g, Oral, PRN, Andres Covarrubias MD    dextrose 50 % IV solution, 12.5 g, IntraVENous, PRN, Andres Covarrubias MD    glucagon (rDNA) injection 1 mg, 1 mg, IntraMUSCular, PRN, Andres Covarrubias MD    dextrose 5 % solution, 100 mL/hr, IntraVENous, PRN, Andres Covarrubias MD    Current Outpatient Medications:     ciprofloxacin (CIPRO) 500 MG tablet, Take 500 mg by mouth 2 times daily, Disp: , Rfl:     loratadine (CLARITIN) 10 MG capsule, Take 10 mg by mouth daily, Disp: , Rfl:     omeprazole (PRILOSEC) 20 MG delayed release capsule, Take 20 mg by mouth daily, Disp: , Rfl:     polyethylene glycol (GLYCOLAX) 17 GM/SCOOP powder, Take 17 g by mouth daily, Disp: , Rfl:     QUEtiapine (SEROQUEL) 25 MG tablet, Take 25 mg by mouth daily, Disp: , Rfl:     topiramate (TOPAMAX) 100 MG tablet, Take 100 mg by mouth every evening, Disp: , Rfl:     lamoTRIgine (LAMICTAL) 200 MG tablet, Take 200 mg by mouth nightly, Disp: , Rfl:     melatonin 5 MG TBDP disintegrating tablet, Take 5 mg by mouth nightly, Disp: , Rfl:     QUEtiapine (SEROQUEL) 25 MG tablet, Take 50 mg by mouth nightly, Disp: , Rfl:     butalbital-APAP-caffeine (FIORICET) -40 MG CAPS per capsule, Take 1 capsule by mouth every 6 hours as needed for Headaches, Disp: , Rfl:     Ca Carbonate-Mag Hydroxide (ROLAIDS PO), Take 2 tablets by mouth every 4 hours as needed (acid reflux), Disp: , Rfl:     acetaminophen (TYLENOL) 325 MG tablet, Take 2 tablets by mouth every 6 hours as needed for Pain, Disp: 30 tablet, Rfl: 0    topiramate (TOPAMAX) 50 MG tablet, Take 1 tablet by mouth daily, Disp: 60 tablet, Rfl: 3    Misc Natural Products (GLUCOSAMINE CHONDROITIN ADV PO), Take 1 tablet by mouth daily, Disp: , Rfl:     busPIRone (BUSPAR) 5 MG tablet, Take 5 mg by mouth 2 times daily, Disp: , Rfl:     bismuth subsalicylate (BISMATROL) 262 MG chewable tablet, Take 524 mg by mouth as needed for Heartburn (8 times daily), Disp: , Rfl:     senna-docusate (PERICOLACE) 8.6-50 MG per tablet, Take 1 tablet by mouth as needed for Constipation (in eveninig) Or 2 tabs, Disp: , Rfl:     ibuprofen (ADVIL;MOTRIN) 200 MG tablet, Take 200 mg by mouth every 6 hours as needed for Pain (or 2 tabs), Disp: , Rfl:     magnesium hydroxide (MILK OF MAGNESIA) 400 MG/5ML suspension, Take 5 mLs by mouth daily as needed for Constipation, Disp: , Rfl:    calcium carbonate (TUMS) 500 MG chewable tablet, Take 1 tablet by mouth 4 times daily as needed for Heartburn, Disp: , Rfl:     Multiple Vitamins-Minerals (THERAPEUTIC MULTIVITAMIN-MINERALS) tablet, Take 1 tablet by mouth daily. , Disp: , Rfl:     lamoTRIgine (LAMICTAL) 100 MG tablet, Take 100 mg by mouth daily , Disp: , Rfl:     aspirin 81 MG tablet, Take 81 mg by mouth daily. , Disp: , Rfl:     Metoclopramide HCl (REGLAN PO), Take 5 mg by mouth 3 times daily , Disp: , Rfl:     dicyclomine (BENTYL) 10 MG capsule, Take 1 capsule by mouth every 6 hours as needed (cramps), Disp: 20 capsule, Rfl: 0    fluticasone (FLONASE) 50 MCG/ACT nasal spray, 1 spray by Each Nostril route daily, Disp: , Rfl:     VORTIoxetine (TRINTELLIX) 5 MG tablet, Take 5 mg by mouth daily, Disp: , Rfl:     meclizine (ANTIVERT) 25 MG tablet, Take 25 mg by mouth 3 times daily as needed for Dizziness, Disp: , Rfl:     SUMAtriptan (IMITREX) 100 MG tablet, Take 100 mg by mouth 2 times daily as needed for Migraine, Disp: , Rfl:     lidocaine PTCH, Place 1 patch onto the skin daily, Disp: 3 patch, Rfl: 0    alendronate (FOSAMAX) 70 MG tablet, Take 70 mg by mouth every 7 days Every Wednesday, Disp: , Rfl:     atorvastatin (LIPITOR) 10 MG tablet, Take 10 mg by mouth daily, Disp: , Rfl:     loperamide (IMODIUM) 2 MG capsule, Take 2 mg by mouth 4 times daily as needed for Diarrhea, Disp: , Rfl:     amLODIPine (NORVASC) 10 MG tablet, Take 5 mg by mouth daily , Disp: , Rfl:     ferrous sulfate 325 (65 FE) MG EC tablet, Take 325 mg by mouth daily (with breakfast). , Disp: , Rfl:     Allergies: Allergies   Allergen Reactions    Levaquin [Levofloxacin In D5w] Other (See Comments)     The group home has this on their allergy list, but without what effects to the Pt. Pt doesn't know.     Adhesive Tape     Chocolate     Indomethacin     Neurontin [Gabapentin]        Social History:   Social History     Socioeconomic History    Marital status: Single     Spouse name: Not on file    Number of children: Not on file    Years of education: Not on file    Highest education level: Not on file   Occupational History    Not on file   Tobacco Use    Smoking status: Never Smoker    Smokeless tobacco: Never Used   Vaping Use    Vaping Use: Never used   Substance and Sexual Activity    Alcohol use: No    Drug use: No    Sexual activity: Not on file   Other Topics Concern    Not on file   Social History Narrative    Not on file     Social Determinants of Health     Financial Resource Strain:     Difficulty of Paying Living Expenses: Not on file   Food Insecurity:     Worried About Running Out of Food in the Last Year: Not on file    Myles of Food in the Last Year: Not on file   Transportation Needs:     Lack of Transportation (Medical): Not on file    Lack of Transportation (Non-Medical): Not on file   Physical Activity:     Days of Exercise per Week: Not on file    Minutes of Exercise per Session: Not on file   Stress:     Feeling of Stress : Not on file   Social Connections:     Frequency of Communication with Friends and Family: Not on file    Frequency of Social Gatherings with Friends and Family: Not on file    Attends Mandaeism Services: Not on file    Active Member of [address] [place] [address] or Organizations: Not on file    Attends Club or Organization Meetings: Not on file    Marital Status: Not on file   Intimate Partner Violence:     Fear of Current or Ex-Partner: Not on file    Emotionally Abused: Not on file    Physically Abused: Not on file    Sexually Abused: Not on file   Housing Stability:     Unable to Pay for Housing in the Last Year: Not on file    Number of [place] in the Last Year: Not on file    Unstable Housing in the Last Year: Not on file       Family History:  History reviewed. No pertinent family history. REVIEW OF SYSTEMS:  A comprehensive 14 point review of systems was obtained.   Constitutional: No fatigue  Eyes: No blurry vision  Ears, nose, mouth, throat, face: No ringing in the ears; no facial droop. Respiratory: No cough or cold. Cardiovascular: No palpitations  Gastrointestinal: No diarrhea or constipation. Genitourinary: No burning with urination  Integument/Skin: No rashes  Hematologic/Lymphatic: No easy bruising  Musculoskeletal: No muscle pains  Neurologic: No weakness in the extremities. Psychiatric: No depression or suicidal thoughts. Endocrine: No heat or cold intolerances. Allergic/Immunologic: No current seasonal allergies; no skin hives. Physical Exam:      This a 66 y.o. female   Vitals:    11/07/21 1845   BP: 124/82   Pulse: 102   Resp: 23   Temp:    SpO2: 94%     Constitutional: Patient in no acute distress. Neuro: alert and oriented to person place and time. Head: Atraumatic and normocephalic. Neck: Trachea midline. Ext: 2+ radial pulses bilaterally. Psych: Mood and affect normal.  Skin: No rashes or bruising present. Lungs: Respiratory effort normal.  Cardiovascular:  Regular rhythm. Abdomen: Soft, non-tender, non-distended  Bladder non-tender and not distended. Lymphatics: no palpable lymphadenopathy  Pelvic exam: deferred. Rectal exam not indicated.     Labs:  Recent Labs     11/07/21  1558   WBC 19.6*   HGB 11.7*   HCT 36.8   MCV 97.8   *     Recent Labs     11/07/21  1558      K 3.6*      CO2 23   BUN 59*   CREATININE 2.91*       Recent Labs     11/07/21  1721   COLORU Yellow   PHUR 6.5   WBCUA 50    RBCUA 2 TO 5   MUCUS NOT REPORTED   TRICHOMONAS NOT REPORTED   YEAST NOT REPORTED   BACTERIA MODERATE*   SPECGRAV 1.015   LEUKOCYTESUR LARGE*   UROBILINOGEN Normal   BILIRUBINUR NEGATIVE           -----------------------------------------------------------------  Imaging Results:  CT ABDOMEN PELVIS WO CONTRAST Additional Contrast? None    Result Date: 11/7/2021  EXAMINATION: CT OF THE ABDOMEN AND PELVIS WITHOUT CONTRAST 11/7/2021 12:16 pm TECHNIQUE: CT of the abdomen and pelvis was performed without the administration of intravenous contrast. Multiplanar reformatted images are provided for review. Dose modulation, iterative reconstruction, and/or weight based adjustment of the mA/kV was utilized to reduce the radiation dose to as low as reasonably achievable. COMPARISON: 02/07/2019 HISTORY: ORDERING SYSTEM PROVIDED HISTORY: ams, constipation TECHNOLOGIST PROVIDED HISTORY: ams, constipation Decision Support Exception - unselect if not a suspected or confirmed emergency medical condition->Emergency Medical Condition (MA) Reason for Exam: AMS, constipation Acuity: Unknown Type of Exam: Initial FINDINGS: Lower chest: Dependent atelectasis or consolidation in the left lower lobe is noted. Moderate size pleural effusion is noted with pleural thickening. Findings are chronic however stable Sequela of old posterior left rib fractures and lateral right rib fractures. No acute osseous abnormality identified. ABDOMEN AND PELVIS: Organs: Evaluation of the abdominal and pelvic viscera is limited in the absence of contrast.  A calcification is noted in the posterior dome of the right hepatic lobe. The liver otherwise reveals no focal abnormality. The gallbladder is not visualized and may be surgically absent. The pancreas, spleen appear grossly unremarkable. 11 mm stable left adrenal adenoma . There is also a 6 mm right adrenal nodule, unchanged. Moderately severe left hydronephrosis with 10 mm stone in the left UPJ. Also multiple left intrarenal stones. Stable left parapelvic renal cysts. Atrophic right kidney renal stones and resolving hydronephrosis GI/Bowel: No bowel dilatation or wall thickening identified. Mild stool burden throughout the colon. Diverticulosis. Pelvis: The bladder is well distended. No wall thickening or inflammatory change identified.  Peritoneum/Retroperitoneum: A tunneled peritoneal catheter terminates in the left lower quadrant. No ascites or free air. The aorta is normal in caliber. Calcified atheromatous plaque is present. No enlarged or suspicious-appearing lymph nodes. Bones/Soft Tissues: Nondisplaced posterior left acetabular fracture. Comminuted nondisplaced medial acetabular fracture. There is also a minimally displaced comminuted left inferior pubic ramus fracture. Chronic appearing severe L4 compression fracture. Fracture of L2 vertebral body with less than 25% loss of normal height which also appears chronic however has developed from the prior exam.  Advanced facet arthropathy in the lower lumbar spine. Angular deformity in the inferior sacrum/coccyx is noted without a discrete fracture line. This also may be chronic. Pin fixation hardware is noted within the proximal femora. Moderately severe left hydronephrosis secondary to a 10 mm stone in the left UPJ. Also multiple left intrarenal stones. Stable left parapelvic renal cyst. Atrophic right kidney renal stones and resolving hydronephrosis No other significant changes are seen from the prior exam     CT HEAD WO CONTRAST    Result Date: 11/7/2021  EXAMINATION: CT OF THE HEAD WITHOUT CONTRAST  11/7/2021 3:16 pm TECHNIQUE: CT of the head was performed without the administration of intravenous contrast. Dose modulation, iterative reconstruction, and/or weight based adjustment of the mA/kV was utilized to reduce the radiation dose to as low as reasonably achievable.  COMPARISON: CT scan of the brain from 07/12/2021 HISTORY: ORDERING SYSTEM PROVIDED HISTORY: AMS,  shunt TECHNOLOGIST PROVIDED HISTORY: AMS,  shunt Decision Support Exception - unselect if not a suspected or confirmed emergency medical condition->Emergency Medical Condition (MA) Reason for Exam: AMS,  shunt Acuity: Unknown Type of Exam: Initial FINDINGS: BRAIN/VENTRICLES: Left parietal and frontal approach  shunts in unchanged position with tip positions left anterior horn, unchanged; pneumothorax. In the abdomen, distal tip of 1 of the left-sided shunt catheters is overlying the left lower quadrant, which is unchanged. There is a nonspecific, nonobstructed bowel gas pattern. Moderate fecal material in the right side of the colon. Fixation hardware in both hips. Multiple components of shunt catheters are seen overlying the left calvarium, unchanged. Hearing aid device is noted. 1.  Multiple shunt catheters overlying the chest and abdomen have a grossly stable radiographic appearance when compared to 06/03/2020. 2.  Unchanged left pleural effusion and left basilar opacity. Assessment and Plan   Impression:    Patient Active Problem List   Diagnosis    Contusion of hip    UTI (urinary tract infection), uncomplicated    Inability to ambulate due to hip    Closed fracture of left inferior pubic ramus (HCC)    Closed compression fracture of L4 lumbar vertebra    Cellulitis of left leg    Failure of outpatient treatment    Hydrocephalus ([address]    S/P  shunt    Leg edema    UTI (urinary tract infection)    Hydronephrosis of left kidney    Sepsis ([address]    Bipolar mood disorder (HCC)   Ureteral stone and kidney stones on the left  Right atrophic kidney with kidney stones        Plan:     Given infectious picture and septic clinical scenario we will arrange for emergent cystoscopy left ureteral stent placement and Marmolejo catheter placement. Patient will need broad-spectrum antibiotics postoperatively and supportive care. We will follow along with you. Please do not hesitate to call us with any questions.       Nanette Mccartney M.D, MD  7:10 PM 11/7/2021

## 2021-11-08 NOTE — PROGRESS NOTES
Department of Urology  Urology Progress Note    Patient: Virginie Marti  MRN: [id number]  YOB: 1950    Subjective:  Patient had fever (T Max 100) and low BP last night requiring 500mL fluid bolus. Feeling ok today, BP has been in 110s/80s, afebrile today. Blood culture +ecoli on IV rocephin. Final urine culture pending.        Patient Vitals for the past 24 hrs:   BP Temp Temp src Pulse Resp SpO2   11/08/21 1300 111/81 96.7 °F (35.9 °C) Axillary 98 20 100 %   11/08/21 0715 119/82 96.5 °F (35.8 °C) Axillary 95 20 99 %   11/08/21 0515 120/80 98 °F (36.7 °C) Axillary 98 18 --   11/08/21 0243 (!) 92/56 98.1 °F (36.7 °C) Axillary 124 18 96 %   11/08/21 0000 (!) 110/59 100 °F (37.8 °C) Axillary 132 20 96 %   11/07/21 2300 (!) 114/48 99.7 °F (37.6 °C) Axillary 132 20 (!) 88 %   11/07/21 2115 (!) 120/58 98.7 °F (37.1 °C) Axillary 130 20 92 %   11/07/21 2110 129/60 -- -- 131 25 92 %   11/07/21 2100 130/64 98 °F (36.7 °C) Infrared 134 25 92 %   11/07/21 2050 132/62 -- -- 130 28 95 %   11/07/21 2040 128/64 -- -- 131 26 99 %   11/07/21 2030 133/61 -- -- 132 25 100 %   11/07/21 2024 136/61 98 °F (36.7 °C) Infrared 131 23 100 %   11/07/21 1845 124/82 -- -- 102 23 94 %   11/07/21 1650 138/76 -- -- 115 22 93 %   11/07/21 1628 129/86 -- -- 112 17 97 %       Intake/Output Summary (Last 24 hours) at 11/8/2021 1606  Last data filed at 11/8/2021 0918  Gross per 24 hour   Intake 4606 ml   Output 1950 ml   Net 2656 ml       Recent Labs     11/07/21  1558 11/08/21  0518   WBC 19.6* 26.1*   HGB 11.7* 10.9*   HCT 36.8 34.2*   MCV 97.8 97.6   * 115*     Recent Labs     11/07/21  1558 11/08/21  0518    143   K 3.6* 3.8    116*   CO2 23 19*   BUN 59* 47*   CREATININE 2.91* 2.62*       Recent Labs     11/07/21  1721   COLORU Yellow   PHUR 6.5   WBCUA 50    RBCUA 2 TO 5   MUCUS NOT REPORTED   TRICHOMONAS NOT REPORTED   YEAST NOT REPORTED   BACTERIA MODERATE*   SPECGRAV 1.015   LEUKOCYTESUR LARGE* UROBILINOGEN Normal   BILIRUBINUR NEGATIVE       Additional Lab/culture results:    Physical Exam:  Constitutional: Patient in no acute distress; Neuro: alert    Psych: Mood and affect normal.   Lungs: Respiratory effort normal  Abdomen: Soft, non-tender, non-distended with no CVA, no flank pain  Bladder non-tender and not distended. Marmolejo cath in place draining clear, yellow urine. Interval Imaging Findings:   CT ABDOMEN PELVIS WO CONTRAST Additional Contrast? None    Result Date: 11/7/2021  EXAMINATION: CT OF THE ABDOMEN AND PELVIS WITHOUT CONTRAST 11/7/2021 12:16 pm TECHNIQUE: CT of the abdomen and pelvis was performed without the administration of intravenous contrast. Multiplanar reformatted images are provided for review. Dose modulation, iterative reconstruction, and/or weight based adjustment of the mA/kV was utilized to reduce the radiation dose to as low as reasonably achievable. COMPARISON: 02/07/2019 HISTORY: ORDERING SYSTEM PROVIDED HISTORY: ams, constipation TECHNOLOGIST PROVIDED HISTORY: ams, constipation Decision Support Exception - unselect if not a suspected or confirmed emergency medical condition->Emergency Medical Condition (MA) Reason for Exam: AMS, constipation Acuity: Unknown Type of Exam: Initial FINDINGS: Lower chest: Dependent atelectasis or consolidation in the left lower lobe is noted. Moderate size pleural effusion is noted with pleural thickening. Findings are chronic however stable Sequela of old posterior left rib fractures and lateral right rib fractures. No acute osseous abnormality identified. ABDOMEN AND PELVIS: Organs: Evaluation of the abdominal and pelvic viscera is limited in the absence of contrast.  A calcification is noted in the posterior dome of the right hepatic lobe. The liver otherwise reveals no focal abnormality. The gallbladder is not visualized and may be surgically absent. The pancreas, spleen appear grossly unremarkable.   11 mm stable left adrenal adenoma . There is also a 6 mm right adrenal nodule, unchanged. Moderately severe left hydronephrosis with 10 mm stone in the left UPJ. Also multiple left intrarenal stones. Stable left parapelvic renal cysts. Atrophic right kidney renal stones and resolving hydronephrosis GI/Bowel: No bowel dilatation or wall thickening identified. Mild stool burden throughout the colon. Diverticulosis. Pelvis: The bladder is well distended. No wall thickening or inflammatory change identified. Peritoneum/Retroperitoneum: A tunneled peritoneal catheter terminates in the left lower quadrant. No ascites or free air. The aorta is normal in caliber. Calcified atheromatous plaque is present. No enlarged or suspicious-appearing lymph nodes. Bones/Soft Tissues: Nondisplaced posterior left acetabular fracture. Comminuted nondisplaced medial acetabular fracture. There is also a minimally displaced comminuted left inferior pubic ramus fracture. Chronic appearing severe L4 compression fracture. Fracture of L2 vertebral body with less than 25% loss of normal height which also appears chronic however has developed from the prior exam.  Advanced facet arthropathy in the lower lumbar spine. Angular deformity in the inferior sacrum/coccyx is noted without a discrete fracture line. This also may be chronic. Pin fixation hardware is noted within the proximal femora. Moderately severe left hydronephrosis secondary to a 10 mm stone in the left UPJ. Also multiple left intrarenal stones.  Stable left parapelvic renal cyst. Atrophic right kidney renal stones and resolving hydronephrosis No other significant changes are seen from the prior exam     CT HEAD WO CONTRAST    Result Date: 11/7/2021  EXAMINATION: CT OF THE HEAD WITHOUT CONTRAST  11/7/2021 3:16 pm TECHNIQUE: CT of the head was performed without the administration of intravenous contrast. Dose modulation, iterative reconstruction, and/or weight based adjustment of the mA/kV was utilized to reduce the radiation dose to as low as reasonably achievable. COMPARISON: CT scan of the brain from 07/12/2021 HISTORY: ORDERING SYSTEM PROVIDED HISTORY: AMS,  shunt TECHNOLOGIST PROVIDED HISTORY: AMS,  shunt Decision Support Exception - unselect if not a suspected or confirmed emergency medical condition->Emergency Medical Condition (MA) Reason for Exam: AMS,  shunt Acuity: Unknown Type of Exam: Initial FINDINGS: BRAIN/VENTRICLES: Left parietal and frontal approach  shunts in unchanged position with tip positions left anterior horn, unchanged; orphaned retained right parietal approach shunt also again seen, with its tip in left frontal lobe. Unchanged right frontal/parietal encephalomalacia with ex vacuo dilatation right lateral ventricle, also unchanged. No acute intracranial hemorrhage, mass effect or midline shift. No new/abnormal extra-axial fluid collection. Mild periventricular WM low-attenuation changes; gray-white differentiation maintained; no obvious acute infarct. ORBITS: The visualized portion of the orbits demonstrate no acute abnormality. SINUSES: The visualized paranasal sinuses and mastoid air cells demonstrate no acute abnormality; mild mucosal thickening noted left sphenoid sinus involving anterior left ethmoid air cells. No air-fluid level. Pinky Angles SOFT TISSUES/SKULL:  No acute abnormality of the visualized skull or soft tissues. No acute intracranial abnormality. 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 FLUORO FOR SURGICAL PROCEDURES    Result Date: 11/7/2021  Radiology exam is complete. No Radiologist dictation. Please follow up with ordering provider. XR Shunt Placement Series    Result Date: 11/7/2021  EXAMINATION: SHUNT SERIES 11/7/2021 3:02 pm COMPARISON: Chest x-ray dated 09/20/2021. Chest series dated 06/03/2020.   Skull x-ray dated yesterday for left obstructing ureteral stone and UTI. ARYAN improving- 2.62 (previously 2.9 yesterday), continuous IV fluids. Blood culture + ecoli. WBC 26.1 today (up from 19.6 yesterday), Lactic acid was normal at 1.8. She is on rocephin.   Will need definitive stone treatment after d/c from hospital  Will continue to follow  Call with questions or concerns.         Electronically signed by ANNALISA Dial CNP on 11/8/2021 at 4:06 PM

## 2021-11-08 NOTE — PROGRESS NOTES
56Lermarnie Argueta (NP) notified of VS: BP 92/56, , Resp 16, Temp 98.1, O2 sat 96% 2L NC and that patient remains very drowsy. 0245Lermarnie Argueta responded back and ordered NS 500ml bolus over 2hours.  0315: NS Bolus started

## 2021-11-09 ENCOUNTER — APPOINTMENT (OUTPATIENT)
Dept: GENERAL RADIOLOGY | Age: 71
DRG: 853 | End: 2021-11-09
Payer: MEDICARE

## 2021-11-09 LAB
ABSOLUTE EOS #: 0.22 K/UL (ref 0–0.4)
ABSOLUTE IMMATURE GRANULOCYTE: ABNORMAL K/UL (ref 0–0.3)
ABSOLUTE LYMPH #: 2.18 K/UL (ref 1–4.8)
ABSOLUTE MONO #: 0.65 K/UL (ref 0.1–1.3)
ALBUMIN SERPL-MCNC: 2.3 G/DL (ref 3.5–5.2)
ALBUMIN/GLOBULIN RATIO: ABNORMAL (ref 1–2.5)
ALP BLD-CCNC: 193 U/L (ref 35–104)
ALT SERPL-CCNC: 23 U/L (ref 5–33)
ANION GAP SERPL CALCULATED.3IONS-SCNC: 9 MMOL/L (ref 9–17)
AST SERPL-CCNC: 26 U/L
BASOPHILS # BLD: 0 % (ref 0–2)
BASOPHILS ABSOLUTE: 0 K/UL (ref 0–0.2)
BILIRUB SERPL-MCNC: <0.15 MG/DL (ref 0.3–1.2)
BUN BLDV-MCNC: 40 MG/DL (ref 8–23)
BUN/CREAT BLD: ABNORMAL (ref 9–20)
CALCIUM SERPL-MCNC: 8 MG/DL (ref 8.6–10.4)
CHLORIDE BLD-SCNC: 120 MMOL/L (ref 98–107)
CO2: 19 MMOL/L (ref 20–31)
CREAT SERPL-MCNC: 1.68 MG/DL (ref 0.5–0.9)
CULTURE: ABNORMAL
DIFFERENTIAL TYPE: ABNORMAL
EOSINOPHILS RELATIVE PERCENT: 1 % (ref 0–4)
GFR AFRICAN AMERICAN: 36 ML/MIN
GFR NON-AFRICAN AMERICAN: 30 ML/MIN
GFR SERPL CREATININE-BSD FRML MDRD: ABNORMAL ML/MIN/{1.73_M2}
GFR SERPL CREATININE-BSD FRML MDRD: ABNORMAL ML/MIN/{1.73_M2}
GLUCOSE BLD-MCNC: 143 MG/DL (ref 70–99)
HCT VFR BLD CALC: 36 % (ref 36–46)
HEMOGLOBIN: 11.5 G/DL (ref 12–16)
IMMATURE GRANULOCYTES: ABNORMAL %
LYMPHOCYTES # BLD: 10 % (ref 24–44)
Lab: ABNORMAL
MAGNESIUM: 1.9 MG/DL (ref 1.6–2.6)
MCH RBC QN AUTO: 31.4 PG (ref 26–34)
MCHC RBC AUTO-ENTMCNC: 31.8 G/DL (ref 31–37)
MCV RBC AUTO: 98.5 FL (ref 80–100)
MONOCYTES # BLD: 3 % (ref 1–7)
MORPHOLOGY: NORMAL
NRBC AUTOMATED: ABNORMAL PER 100 WBC
PDW BLD-RTO: 14 % (ref 11.5–14.9)
PLATELET # BLD: 134 K/UL (ref 150–450)
PLATELET ESTIMATE: ABNORMAL
PMV BLD AUTO: 8.4 FL (ref 6–12)
POTASSIUM SERPL-SCNC: 3.4 MMOL/L (ref 3.7–5.3)
RBC # BLD: 3.65 M/UL (ref 4–5.2)
RBC # BLD: ABNORMAL 10*6/UL
SEG NEUTROPHILS: 86 % (ref 36–66)
SEGMENTED NEUTROPHILS ABSOLUTE COUNT: 18.75 K/UL (ref 1.3–9.1)
SODIUM BLD-SCNC: 148 MMOL/L (ref 135–144)
SPECIMEN DESCRIPTION: ABNORMAL
TOTAL PROTEIN: 5.8 G/DL (ref 6.4–8.3)
WBC # BLD: 21.8 K/UL (ref 3.5–11)
WBC # BLD: ABNORMAL 10*3/UL

## 2021-11-09 PROCEDURE — 36415 COLL VENOUS BLD VENIPUNCTURE: CPT

## 2021-11-09 PROCEDURE — 99233 SBSQ HOSP IP/OBS HIGH 50: CPT | Performed by: INTERNAL MEDICINE

## 2021-11-09 PROCEDURE — 97162 PT EVAL MOD COMPLEX 30 MIN: CPT

## 2021-11-09 PROCEDURE — 6370000000 HC RX 637 (ALT 250 FOR IP)

## 2021-11-09 PROCEDURE — 74230 X-RAY XM SWLNG FUNCJ C+: CPT

## 2021-11-09 PROCEDURE — 92611 MOTION FLUOROSCOPY/SWALLOW: CPT

## 2021-11-09 PROCEDURE — 6360000002 HC RX W HCPCS

## 2021-11-09 PROCEDURE — 2580000003 HC RX 258: Performed by: NURSE PRACTITIONER

## 2021-11-09 PROCEDURE — 2060000000 HC ICU INTERMEDIATE R&B

## 2021-11-09 PROCEDURE — 83735 ASSAY OF MAGNESIUM: CPT

## 2021-11-09 PROCEDURE — 85025 COMPLETE CBC W/AUTO DIFF WBC: CPT

## 2021-11-09 PROCEDURE — 6360000002 HC RX W HCPCS: Performed by: NURSE PRACTITIONER

## 2021-11-09 PROCEDURE — 97530 THERAPEUTIC ACTIVITIES: CPT

## 2021-11-09 PROCEDURE — 2580000003 HC RX 258

## 2021-11-09 PROCEDURE — 80053 COMPREHEN METABOLIC PANEL: CPT

## 2021-11-09 RX ORDER — ACETAMINOPHEN 650 MG/1
650 SUPPOSITORY RECTAL EVERY 4 HOURS PRN
Status: DISCONTINUED | OUTPATIENT
Start: 2021-11-09 | End: 2021-11-12 | Stop reason: HOSPADM

## 2021-11-09 RX ORDER — ACETAMINOPHEN 325 MG/1
650 TABLET ORAL EVERY 4 HOURS PRN
Status: DISCONTINUED | OUTPATIENT
Start: 2021-11-09 | End: 2021-11-09

## 2021-11-09 RX ORDER — ACETAMINOPHEN 325 MG/1
650 TABLET ORAL EVERY 4 HOURS PRN
Status: DISCONTINUED | OUTPATIENT
Start: 2021-11-09 | End: 2021-11-12 | Stop reason: HOSPADM

## 2021-11-09 RX ORDER — ACETAMINOPHEN 650 MG/1
650 SUPPOSITORY RECTAL EVERY 6 HOURS PRN
Status: DISCONTINUED | OUTPATIENT
Start: 2021-11-09 | End: 2021-11-09

## 2021-11-09 RX ORDER — FENTANYL CITRATE 50 UG/ML
25 INJECTION, SOLUTION INTRAMUSCULAR; INTRAVENOUS ONCE
Status: COMPLETED | OUTPATIENT
Start: 2021-11-09 | End: 2021-11-09

## 2021-11-09 RX ADMIN — VORTIOXETINE 5 MG: 5 TABLET, FILM COATED ORAL at 10:44

## 2021-11-09 RX ADMIN — ATORVASTATIN CALCIUM 10 MG: 10 TABLET, FILM COATED ORAL at 10:42

## 2021-11-09 RX ADMIN — FENTANYL CITRATE 25 MCG: 50 INJECTION INTRAMUSCULAR; INTRAVENOUS at 22:52

## 2021-11-09 RX ADMIN — SODIUM CHLORIDE: 9 INJECTION, SOLUTION INTRAVENOUS at 06:06

## 2021-11-09 RX ADMIN — HEPARIN SODIUM 5000 UNITS: 5000 INJECTION INTRAVENOUS; SUBCUTANEOUS at 16:22

## 2021-11-09 RX ADMIN — CEFTRIAXONE SODIUM 1000 MG: 1 INJECTION, POWDER, FOR SOLUTION INTRAMUSCULAR; INTRAVENOUS at 16:22

## 2021-11-09 RX ADMIN — LAMOTRIGINE 100 MG: 100 TABLET ORAL at 10:44

## 2021-11-09 RX ADMIN — HEPARIN SODIUM 5000 UNITS: 5000 INJECTION INTRAVENOUS; SUBCUTANEOUS at 05:45

## 2021-11-09 RX ADMIN — BUSPIRONE HYDROCHLORIDE 5 MG: 5 TABLET ORAL at 10:42

## 2021-11-09 RX ADMIN — ACETAMINOPHEN 650 MG: 325 TABLET ORAL at 03:17

## 2021-11-09 RX ADMIN — TOPIRAMATE 50 MG: 50 TABLET, FILM COATED ORAL at 10:44

## 2021-11-09 RX ADMIN — QUETIAPINE FUMARATE 25 MG: 50 TABLET ORAL at 10:42

## 2021-11-09 RX ADMIN — ASPIRIN 81 MG: 81 TABLET, CHEWABLE ORAL at 10:43

## 2021-11-09 RX ADMIN — POTASSIUM CHLORIDE 40 MEQ: 1500 TABLET, EXTENDED RELEASE ORAL at 10:42

## 2021-11-09 RX ADMIN — HEPARIN SODIUM 5000 UNITS: 5000 INJECTION INTRAVENOUS; SUBCUTANEOUS at 22:27

## 2021-11-09 ASSESSMENT — PAIN DESCRIPTION - PROGRESSION: CLINICAL_PROGRESSION: GRADUALLY WORSENING

## 2021-11-09 ASSESSMENT — PAIN DESCRIPTION - PAIN TYPE: TYPE: CHRONIC PAIN

## 2021-11-09 ASSESSMENT — PAIN DESCRIPTION - FREQUENCY: FREQUENCY: INTERMITTENT

## 2021-11-09 ASSESSMENT — PAIN SCALES - GENERAL
PAINLEVEL_OUTOF10: 6
PAINLEVEL_OUTOF10: 0
PAINLEVEL_OUTOF10: 3
PAINLEVEL_OUTOF10: 3
PAINLEVEL_OUTOF10: 0

## 2021-11-09 ASSESSMENT — PAIN DESCRIPTION - LOCATION: LOCATION: NECK;SHOULDER

## 2021-11-09 ASSESSMENT — PAIN DESCRIPTION - DESCRIPTORS: DESCRIPTORS: ACHING

## 2021-11-09 ASSESSMENT — PAIN DESCRIPTION - ONSET: ONSET: SUDDEN

## 2021-11-09 ASSESSMENT — PAIN DESCRIPTION - ORIENTATION: ORIENTATION: RIGHT;LEFT

## 2021-11-09 ASSESSMENT — PAIN - FUNCTIONAL ASSESSMENT: PAIN_FUNCTIONAL_ASSESSMENT: PREVENTS OR INTERFERES SOME ACTIVE ACTIVITIES AND ADLS

## 2021-11-09 NOTE — PROGRESS NOTES
Physical Therapy    Facility/Department: [facility] PROGRESSIVE CARE  Initial Assessment    NAME: Malia Hinds  : [id number]  MRN: [id number]    Date of Service: 2021    Discharge Recommendations:  Patient would benefit from continued therapy after discharge   PT Equipment Recommendations  Equipment Needed: No    Assessment   Body structures, Functions, Activity limitations: Decreased functional mobility ; Decreased balance; Decreased cognition; Decreased safe awareness  Assessment: Impaired mobility due to safety issues of decreased balance, cognition and safety awareness  Decision Making: Medium Complexity  History: Hydrocephalus  Exam: decreased balance, cognition, safety awareness  Clinical Presentation: evolving  REQUIRES PT FOLLOW UP: Yes  Activity Tolerance  Activity Tolerance: Patient limited by cognitive status       Patient Diagnosis(es): The primary encounter diagnosis was Severe sepsis ([facility] [address] Diagnoses of Hydronephrosis with renal and ureteral calculus obstruction, Altered mental status, unspecified altered mental status type, and Dehydration were also pertinent to this visit. has a past medical history of Acid reflux, Bipolar affective ([address] Chronic headaches, and Hydrocephalus ([address] has a past surgical history that includes other surgical history; shunt revision; Cholecystectomy; Tonsillectomy; Hip fracture surgery (Left, 2017); hip pinning (Left, 2017); and Cystoscopy (Left, 2021).     Restrictions  Restrictions/Precautions  Restrictions/Precautions: Fall Risk        Subjective  General  Family / Caregiver Present: Yes (sister)  Follows Commands: Impaired (pt is able to follow simple directions when she is able to pay attension)  Subjective  Subjective: pt repeats things over and over and it is difficult to keep her focused on the task at hand  Pain Screening  Patient Currently in Pain: Denies  Vital Signs  Patient Currently in Pain: Denies        Social/Functional History  Social/Functional History  Type of Home: House (Group home)  Home Layout: One level  Home Access: Level entry  Bathroom Shower/Tub: Walk-in shower  Bathroom Toilet: Standard  Bathroom Equipment: Grab bars in shower, Shower chair, Hand-held shower  Home Equipment: Rolling walker  Receives Help From:  (staff)  Ambulation Assistance: Independent  Transfer Assistance: Independent     Objective     AROM RLE (degrees)  RLE AROM: WFL  AROM LLE (degrees)  LLE AROM : WFL  Strength RLE  Comment: grossly 3/5  Strength LLE  Comment: grossly 3/5  Strength Other  Other: difficult to fully assess strength due to pt having difficulty following directions        Bed mobility  Supine to Sit: Moderate assistance  Sit to Supine:  (pt left up in chair)  Scooting: Minimal assistance  Transfers  Sit to Stand: Moderate Assistance; 2 Person Assistance  Stand to sit: Moderate Assistance; 2 Person Assistance  Bed to Chair: Moderate assistance; 2 Person Assistance (with RW)  Comment: second assist for SBA/CGA  Ambulation  Ambulation? :  (pt took few steps from bed to chair with RW and modA x1 with second assist for safety)     Balance  Sitting - Static: Fair (CGA to Clive)  Sitting - Dynamic: Fair; - (Keith Castano)  Standing - Static: Poor (modA x1-2)  Standing - Dynamic: Poor (modA x1-2)        Plan   Plan  Times per week: 6-7x/wk  Current Treatment Recommendations: Balance Training, Gait Training, Transfer Training  Safety Devices  Type of devices: Patient at risk for falls, Gait belt, Left in chair, Call light within reach (sister present)      Goals  Short term goals  Time Frame for Short term goals: 4-5 days  Short term goal 1: bed mobility with Clive/CGA  Short term goal 2: transfers with Clive  Short term goal 3: ambulate with RW and Clive x 50ft       Therapy Time   Individual Concurrent Group Co-treatment   Time In 1300         Time Out 1327         Minutes 27         Timed Code Treatment Minutes: [address], PT

## 2021-11-09 NOTE — CARE COORDINATION
ONGOING DISCHARGE PLANNING NOTE:    Writer reviewed notes, and discharge plan is to discharge back to her group home   Patient has a 24/7 caregiver   Patient had a video swallow study done today   UTI   On IV atb     Electronically signed by Eleazar Bryant RN on 11/9/2021 at 2:03 PM

## 2021-11-09 NOTE — PLAN OF CARE
Problem: Falls - Risk of:  Goal: Will remain free from falls  Description: Will remain free from falls  11/8/2021 1924 by Brittany Sorto RN  Outcome: Ongoing     Problem: Skin Integrity:  Goal: Will show no infection signs and symptoms  Description: Will show no infection signs and symptoms  11/8/2021 1924 by Brittany Sorto RN  Outcome: Ongoing

## 2021-11-09 NOTE — PROCEDURES
INSTRUMENTAL SWALLOW REPORT  MODIFIED BARIUM SWALLOW    NAME: Joss Dent   :   MRN: [id number]       Date of Eval: 2021              Referring Diagnosis(es):  Dysphagia    Past Medical History:  has a past medical history of Acid reflux, Bipolar affective ([facility] [address] Chronic headaches, and Hydrocephalus ([facility] [address] Past Surgical History:  has a past surgical history that includes other surgical history; shunt revision; Cholecystectomy; Tonsillectomy; Hip fracture surgery (Left, 2017); hip pinning (Left, 2017); and Cystoscopy (Left, 2021). Current Diet Solid Consistency: Dental Soft  Current Diet Liquid Consistency: Thin       Type of Study: Initial MBS       Recent CXR/CT of Chest: n/a    Patient Complaints/Reason for Referral:  Joss Dent was referred for a MBS to assess the efficiency of his/her swallow function, assess for aspiration, and to make recommendations regarding safe dietary consistencies, effective compensatory strategies, and safe eating environment. Patient complaints: Pt. c/o choking on solids and liquids. Pt. reporting had \"surgery where they stretched my throat\" after trouble swallowing in the past, noticed temporary improvement, and has now worsened again. Onset of problem:        General Comment  Comments: Per chart, Pt. is on easy to chew diet. Per RN, Pt. was coughing with thin liquid and frequently refuses to take meds via applesauce. MBS ordered to r/o or confirm aspiration. Subjective  Subjective: Pt. frustrated, complaining about staff and positioning throughout.     Behavior/Cognition/Vision/Hearing:  Behavior/Cognition: Alert; Agitated  Vision: Impaired  Vision Exceptions: Wears glasses at all times  Hearing: Exceptions to [facility]  Hearing Exceptions: Hard of hearing/hearing concerns    Impressions:   Patient Position: Lateral    Consistencies Administered: Dysphagia Pureed (Dysphagia I) (Pt. only taking 1 bite of puree, refusing additional trials and all other consistencies)              Oral Phase: Prolonged oral manipulation and A-P transit with 1 trial of puree, could be associated with Pt. speaking during oral phase. Premature vallecular spillage present with puree. Unable to assess advanced textures or liquids d/t Pt. refusal.    Pharyngeal: Only trialed 1 bite of puree. No swallow initiation present, despite max cues to initiate. Pt. speaking during bite and refusing to participate while bolus in valleculea/pyriforms. No swallow observed during test.  Bolus remained in valleculae/pyriform sinus. Pt. adamantly refused additional trials of puree or other consistencies. Test discontinued d/t Pt. refusal, despite education and encouragement provided. Dysphagia Outcome Severity Scale: Level 1: Severe dysphagia- NPO. Unable to tolerate any PO safely       Recommended Diet:  Solid consistency: Other (comment) (SLP cannot make diet recommendation as no swallow initiation was observed during test)  Liquid consistency: Other (comments) (SLP cannot make diet recommendation as no swallow initiation was observed during test)       Medication administration: Via NG/PEG        Recommendations/Treatment  Requires SLP Intervention: Yes     Recommendations comment: repeat MBS, if Pt. agreeable to participate            Recommended Exercises:    Therapeutic Interventions: Patient/Family education; Therapeutic PO trials with SLP (repeat MBS or clinical bedside swallowing evaluation, if Pt. agreeable to participate)    Referral To: Dysphagia evaluation (if able to participate)    Education: Recommendations were reviewed with Pt. and RN following this exam.   Patient Education: Yes  Patient Education Response: Refused teaching    Prognosis  Barriers to reach goals: behavior  Barriers/Prognosis Comment: Participation      Goals:    Goal 1: Pt. will tolerate therapeutic feeding trials with SLP without overt s/s of aspiration  Dysphagia Goals:  The patient will tolerate instrumental swallowing procedure OR clinical bedside swallowing evaluation; The patient/caregiver will demonstrate understanding of compensatory strategies for improved swallowing safety. Oral Preparation / Oral Phase  Oral Phase: Impaired  Oral Phase - Major Contributing Deficits  Reduced Posterior Propulsion: Puree  Holding Of Bolus: Puree        Pharyngeal Phase  Pharyngeal Phase: Impaired  Pharyngeal Phase - Major Contributing Deficits  Delayed Swallow Initiation: Puree (NO swallow initiation, despite cueing.   Pt. speaking during trial and refusing to participate while bolus in throat.)  Premature Spillage to Valleculae: Puree  Premature Spillage to Pyriform: Puree  Reduced Epiglottic Distention: Puree (no epiglotic inversion)  Reduced Laryngeal Elevation:  (no laryngeal elevation)  Reduced Anterior Laryngeal Movement:  (no laryngeal movement)  Reduced Airway/laryngeal Closure: Puree  Reduced Tongue Base: Puree  Pharyngeal Residue - Valleculae: Puree  Pharyngeal Residue - Pyriform: Puree      Esophageal Phase           Pain   Patient Currently in Pain: Denies  Pain Level: 3  Pain Type: Chronic pain  Pain Location: Neck, Shoulder      Therapy Time:   Individual Concurrent Group Co-treatment   Time In 1400         Time Out 1415         Minutes Oliverio Us M.A., DESTINY-SLP, 11/9/2021, 3:03 PM

## 2021-11-09 NOTE — PLAN OF CARE
Problem: Falls - Risk of:  Goal: Will remain free from falls  Description: Will remain free from falls  11/9/2021 1748 by Aida Nolan RN  Outcome: Ongoing     Problem: Skin Integrity:  Goal: Will show no infection signs and symptoms  Description: Will show no infection signs and symptoms  11/9/2021 1748 by Aida Nolan RN  Outcome: Ongoing

## 2021-11-09 NOTE — PROGRESS NOTES
Rhett Fonsec[address] [employer]    PROGRESS NOTE             11/9/2021    7:20 AM    Name:   Jennifer Shukla  MRN:     [id number]     Acct:      [de-identified]   Room:   [facility], [facility]  IP Day:  2  Admit Date:  11/7/2021  2:33 PM    PCP:  Agustina Piper MD  Code Status:  Full Code    Subjective:     C/C:   Chief Complaint   Patient presents with    Altered Mental Status     Gradual decline in mental status. Had a fall, but was caught. Lives at a group home. Caregiver is here with her.  Fall     Interval History Status: improved.     Patient was seen and examined at bedside, no acute events overnight, patient is able to hold a conversation, she is oriented, vital signs are stable, she is on easy to chew diet RN notes that she was choking on liquid diet, SLP evaluation and barium swallow study was ordered  Potassium today 3.5 will replace per protocol  Will decrease IV fluid to 100ml/h  Patient  is on IV Rocephin, will need outpatient follow-up with urology      Brief History:     The patient is a 65 y.o.  Non- / non  female who presents withAltered Mental Status (Gradual decline in mental status.  Had a fall, but was caught. Marlene Santoro at a group home. Annamarie Eller is here with her.  ) and Fall   and she is admitted to the hospital for the management of  Urosepsis      The patient is a poor historian, history was taken from caregiver, printed medical records caregiver was carrying, and EMR.  This is a 50-year-old female patient past medical history of bipolar disease, chronic headache, hydrocephalus s/p  shunt presented to ED due to change in behavior, and falling down caregiver mentioned that the patient was having abdominal pain 3 days ago prior to admission  denies any vomiting or nausea, patient communicates well at her baseline, she usually presents with change in behavior whenever she has UTI. Denies chills or fever.        UA: Bacteruria, large leukocytes esterase   Urine culture  Positive for lactose fermenting gram-negative rods  Blood culture positive for E. Coli    X-ray shunt series: Multiple shunt catheters overlying the chest and abdomen have a grossly   stable radiographic appearance when compared to 06/03/2020. Unchanged left pleural effusion and left basilar opacity.      CT abdomen: Moderately severe left hydronephrosis secondary to a 10 mm stone in the left UPJ. Also multiple left intrarenal stones. Stable left parapelvic renal cyst.  Atrophic Right kidney       Review of Systems:     Review of Systems   Unable to perform ROS: Mental status change         Medications: Allergies: Allergies   Allergen Reactions    Levaquin [Levofloxacin In D5w] Other (See Comments)     The group home has this on their allergy list, but without what effects to the Pt. Pt doesn't know.     Adhesive Tape     Chocolate     Indomethacin     Neurontin [Gabapentin]        Current Meds:   Scheduled Meds:    cefTRIAXone (ROCEPHIN) IV  1,000 mg IntraVENous Q24H    influenza virus vaccine  0.5 mL IntraMUSCular Prior to discharge    aspirin  81 mg Oral Daily    heparin (porcine)  5,000 Units SubCUTAneous 3 times per day    atorvastatin  10 mg Oral Daily    busPIRone  5 mg Oral BID    lamoTRIgine  100 mg Oral Daily    lamoTRIgine  200 mg Oral Nightly    QUEtiapine  25 mg Oral Daily    QUEtiapine  50 mg Oral Nightly    topiramate  100 mg Oral QPM    topiramate  50 mg Oral Daily    VORTIoxetine  5 mg Oral Daily     Continuous Infusions:    sodium chloride 125 mL/hr at 11/09/21 0606    sodium chloride      dextrose       PRN Meds: butalbital-APAP-caffeine, SUMAtriptan, sodium chloride flush, sodium chloride, ondansetron **OR** ondansetron, polyethylene glycol, acetaminophen **OR** acetaminophen, potassium chloride **OR** potassium alternative oral replacement **OR** potassium chloride, magnesium sulfate, glucose, dextrose, glucagon (rDNA), dextrose    Data:     Past Medical History:   has a past medical history of Acid reflux, Bipolar affective ([facility] Chronic headaches, and Hydrocephalus ([facility] Social History:   reports that she has never smoked. She has never used smokeless tobacco. She reports that she does not drink alcohol and does not use drugs. Family History: History reviewed. No pertinent family history. Vitals:  /80   Pulse 92   Temp 98.2 °F (36.8 °C) (Oral)   Resp 20   Ht 5' 1\" (1.549 m)   Wt 147 lb 3.2 oz (66.8 kg)   SpO2 96%   BMI 27.81 kg/m²   Temp (24hrs), Av.5 °F (36.4 °C), Min:96.7 °F (35.9 °C), Max:98.2 °F (36.8 °C)    No results for input(s): POCGLU in the last 72 hours. I/O(24Hr):     Intake/Output Summary (Last 24 hours) at 2021 0720  Last data filed at 2021 0603  Gross per 24 hour   Intake 3323 ml   Output 2600 ml   Net 723 ml       Labs:    CBC with Differential:    Lab Results   Component Value Date    WBC 21.8 2021    RBC 3.65 2021    HGB 11.5 2021    HCT 36.0 2021     2021    MCV 98.5 2021    MCH 31.4 2021    MCHC 31.8 2021    RDW 14.0 2021    LYMPHOPCT 10 2021    MONOPCT 3 2021    MYELOPCT 2 2021    BASOPCT 0 2021    MONOSABS 0.65 2021    LYMPHSABS 2.18 2021    EOSABS 0.22 2021    BASOSABS 0.00 2021    DIFFTYPE NOT REPORTED 2021     CMP:    Lab Results   Component Value Date     2021    K 3.4 2021     2021    CO2 19 2021    BUN 40 2021    CREATININE 1.68 2021    GFRAA 36 2021    LABGLOM 30 2021    GLUCOSE 143 2021    PROT 5.8 2021    LABALBU 2.3 2021    CALCIUM 8.0 2021    BILITOT <0.15 2021    ALKPHOS 193 2021    AST 26 2021    ALT 23 2021     Magnesium:    Lab Results   Component Value Date    MG 1.9 2021       Lab Results   Component Value Date/Time    SPECIAL NOT REPORTED 11/07/2021 05:21 PM     Lab Results   Component Value Date/Time    CULTURE (A) 11/07/2021 05:21 PM     LACTOSE FERMENTING GRAM NEGATIVE RODS >[id number] CFU/ML         Radiology:    CT ABDOMEN PELVIS WO CONTRAST Additional Contrast? None    Result Date: 11/7/2021  EXAMINATION: CT OF THE ABDOMEN AND PELVIS WITHOUT CONTRAST 11/7/2021 12:16 pm TECHNIQUE: CT of the abdomen and pelvis was performed without the administration of intravenous contrast. Multiplanar reformatted images are provided for review. Dose modulation, iterative reconstruction, and/or weight based adjustment of the mA/kV was utilized to reduce the radiation dose to as low as reasonably achievable. COMPARISON: 02/07/2019 HISTORY: ORDERING SYSTEM PROVIDED HISTORY: ams, constipation TECHNOLOGIST PROVIDED HISTORY: ams, constipation Decision Support Exception - unselect if not a suspected or confirmed emergency medical condition->Emergency Medical Condition (MA) Reason for Exam: AMS, constipation Acuity: Unknown Type of Exam: Initial FINDINGS: Lower chest: Dependent atelectasis or consolidation in the left lower lobe is noted. Moderate size pleural effusion is noted with pleural thickening. Findings are chronic however stable Sequela of old posterior left rib fractures and lateral right rib fractures. No acute osseous abnormality identified. ABDOMEN AND PELVIS: Organs: Evaluation of the abdominal and pelvic viscera is limited in the absence of contrast.  A calcification is noted in the posterior dome of the right hepatic lobe. The liver otherwise reveals no focal abnormality. The gallbladder is not visualized and may be surgically absent. The pancreas, spleen appear grossly unremarkable. 11 mm stable left adrenal adenoma . There is also a 6 mm right adrenal nodule, unchanged. Moderately severe left hydronephrosis with 10 mm stone in the left UPJ. Also multiple left intrarenal stones. Stable left parapelvic renal cysts.   Atrophic right kidney renal stones and resolving hydronephrosis GI/Bowel: No bowel dilatation or wall thickening identified. Mild stool burden throughout the colon. Diverticulosis. Pelvis: The bladder is well distended. No wall thickening or inflammatory change identified. Peritoneum/Retroperitoneum: A tunneled peritoneal catheter terminates in the left lower quadrant. No ascites or free air. The aorta is normal in caliber. Calcified atheromatous plaque is present. No enlarged or suspicious-appearing lymph nodes. Bones/Soft Tissues: Nondisplaced posterior left acetabular fracture. Comminuted nondisplaced medial acetabular fracture. There is also a minimally displaced comminuted left inferior pubic ramus fracture. Chronic appearing severe L4 compression fracture. Fracture of L2 vertebral body with less than 25% loss of normal height which also appears chronic however has developed from the prior exam.  Advanced facet arthropathy in the lower lumbar spine. Angular deformity in the inferior sacrum/coccyx is noted without a discrete fracture line. This also may be chronic. Pin fixation hardware is noted within the proximal femora. Moderately severe left hydronephrosis secondary to a 10 mm stone in the left UPJ. Also multiple left intrarenal stones. Stable left parapelvic renal cyst. Atrophic right kidney renal stones and resolving hydronephrosis No other significant changes are seen from the prior exam     CT HEAD WO CONTRAST    Result Date: 11/7/2021  EXAMINATION: CT OF THE HEAD WITHOUT CONTRAST  11/7/2021 3:16 pm TECHNIQUE: CT of the head was performed without the administration of intravenous contrast. Dose modulation, iterative reconstruction, and/or weight based adjustment of the mA/kV was utilized to reduce the radiation dose to as low as reasonably achievable.  COMPARISON: CT scan of the brain from 07/12/2021 HISTORY: ORDERING SYSTEM PROVIDED HISTORY: AMS,  shunt TECHNOLOGIST PROVIDED HISTORY: AMS,  shunt Decision Support Exception - unselect if not a suspected or confirmed emergency medical condition->Emergency Medical Condition (MA) Reason for Exam: AMS,  shunt Acuity: Unknown Type of Exam: Initial FINDINGS: BRAIN/VENTRICLES: Left parietal and frontal approach  shunts in unchanged position with tip positions left anterior horn, unchanged; orphaned retained right parietal approach shunt also again seen, with its tip in left frontal lobe. Unchanged right frontal/parietal encephalomalacia with ex vacuo dilatation right lateral ventricle, also unchanged. No acute intracranial hemorrhage, mass effect or midline shift. No new/abnormal extra-axial fluid collection. Mild periventricular WM low-attenuation changes; gray-white differentiation maintained; no obvious acute infarct. ORBITS: The visualized portion of the orbits demonstrate no acute abnormality. SINUSES: The visualized paranasal sinuses and mastoid air cells demonstrate no acute abnormality; mild mucosal thickening noted left sphenoid sinus involving anterior left ethmoid air cells. No air-fluid level. Calista Karst SOFT TISSUES/SKULL:  No acute abnormality of the visualized skull or soft tissues. No acute intracranial abnormality. 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 FLUORO FOR SURGICAL PROCEDURES    Result Date: 11/7/2021  Radiology exam is complete. No Radiologist dictation. Please follow up with ordering provider. XR Shunt Placement Series    Result Date: 11/7/2021  EXAMINATION: SHUNT SERIES 11/7/2021 3:02 pm COMPARISON: Chest x-ray dated 09/20/2021. Chest series dated 06/03/2020. Skull x-ray dated 06/08/2017.  HISTORY: ORDERING SYSTEM PROVIDED HISTORY: AMS,  shunt TECHNOLOGIST PROVIDED HISTORY: AMS,  shunt Reason for Exam: AMS,  shunt Acuity: Acute Type of Exam: Initial FINDINGS: There are multiple shunt catheters overlying the chest.  There is 1 extending from the lower left neck over the right chest with distal tip in the right upper quadrant, not significantly changed, likely abandoned. There are 2 additional shunt catheter radiodensities overlying the left chest, which are unchanged as well. 1 of these appears to terminate overlying a left pleural effusion and the other extends into the abdomen. Heart size is stable. There is persistent left pleural effusion and left basilar opacity. No evidence of pneumothorax. In the abdomen, distal tip of 1 of the left-sided shunt catheters is overlying the left lower quadrant, which is unchanged. There is a nonspecific, nonobstructed bowel gas pattern. Moderate fecal material in the right side of the colon. Fixation hardware in both hips. Multiple components of shunt catheters are seen overlying the left calvarium, unchanged. Hearing aid device is noted. 1.  Multiple shunt catheters overlying the chest and abdomen have a grossly stable radiographic appearance when compared to 06/03/2020. 2.  Unchanged left pleural effusion and left basilar opacity. Physical Examination:        Physical Exam  Constitutional:       General: She is not in acute distress. Appearance: She is not ill-appearing. Cardiovascular:      Rate and Rhythm: Regular rhythm. Tachycardia present. Pulses: Normal pulses. Heart sounds: Normal heart sounds. No murmur heard. No friction rub. No gallop. Pulmonary:      Effort: Pulmonary effort is normal. No respiratory distress. Breath sounds: Normal breath sounds. No wheezing, rhonchi or rales. Abdominal:      General: Abdomen is flat. Palpations: Abdomen is soft. Tenderness: There is no abdominal tenderness. Musculoskeletal:      Right lower leg: No edema. Left lower leg: No edema. Neurological:      Mental Status: She is disoriented. Psychiatric:         Mood and Affect: Mood is anxious.          Speech: She is noncommunicative. Assessment:        Primary Problem  <principal problem not specified>    Active Hospital Problems    Diagnosis Date Noted    Acute kidney injury ([facility] [N17.9] 11/08/2021    UTI (urinary tract infection) [N39.0] 11/07/2021    Sepsis ([facility] [A41.9] 11/07/2021    Hydronephrosis of left kidney [N13.30] 11/07/2021    Bipolar mood disorder ([facility] [F31.9] 11/07/2021    S/P  shunt [Z98.2] 12/01/2020       Plan:        Urosepsis   Initial vitals: RR 22,   WBC 19.6>26>21  Lactate 1  Pro-Mckinley 11.55,   UA: Bacteria, leukocyte esterase  Urine culture Lactose fermenting gram negative rods   Blood culture E. coli  125 mL/H NS  IV Rocephin day 2      Left UPJ stone with hydronephrosis S/P ureteral stent   CT abdomen, pelvis: Hydronephrosis with in the left UPJ  Left intrarenal stones  Urology on board  double-J ureteral stent was placed 11/7   Will need out patient follow up with urology      Acute kidney injury  Underlying sepsis and UTI   Cr: 2.26  Baseline 0.90  K 3.4 will replace   125 ml/h NS      Bipolar disorder  Quetiapine 25 mg p.o. daily  Quetiapine 50 mg p.o. nightly  Lamotrigine 100 mg p.o. daily  Lamotrigine 200 mg p.o. nightly  Buspirone 5 mg p.o. Vortioxetine 5 mg p.o.      Chronic headache  Sumatriptan 100 mg p.o. Topiramate 50 mg p.o.     DVT prophylaxis:  Heparin SubQ   GI prophylaxis: Pepcid     PT OT evaluation  SW discharge planning  Diet Easy to chew     Ximena Alexandra MD  11/9/2021  7:20 AM     Attestation and add on       I have discussed the care of Hedy Bartlett , including pertinent history and exam findings,      11/09/21    with the resident. I have seen and examined the patient and the key elements of all parts of the encounter have been performed by me . I agree with the assessment, plan and orders as documented by the resident.      Active Problems:    S/P  shunt    UTI (urinary tract infection)    Hydronephrosis of left kidney [facility] ([facility]    Bipolar mood disorder ([facility]    Acute kidney injury superimposed on CKD ([facility]  Resolved Problems:    * No resolved hospital problems. *            ---- ;        Medications: Allergies: Allergies   Allergen Reactions    Levaquin [Levofloxacin In D5w] Other (See Comments)     The group home has this on their allergy list, but without what effects to the Pt. Pt doesn't know.  Adhesive Tape     Chocolate     Indomethacin     Neurontin [Gabapentin]        Current Meds:   Scheduled Meds:    cefTRIAXone (ROCEPHIN) IV  2,000 mg IntraVENous Q24H    influenza virus vaccine  0.5 mL IntraMUSCular Prior to discharge    aspirin  81 mg Oral Daily    heparin (porcine)  5,000 Units SubCUTAneous 3 times per day    atorvastatin  10 mg Oral Daily    busPIRone  5 mg Oral BID    lamoTRIgine  100 mg Oral Daily    lamoTRIgine  200 mg Oral Nightly    QUEtiapine  25 mg Oral Daily    QUEtiapine  50 mg Oral Nightly    topiramate  100 mg Oral QPM    topiramate  50 mg Oral Daily    VORTIoxetine  5 mg Oral Daily     Continuous Infusions:    sodium chloride 100 mL/hr at 11/10/21 0241    sodium chloride      dextrose       PRN Meds: acetaminophen **OR** acetaminophen, butalbital-APAP-caffeine, SUMAtriptan, sodium chloride flush, sodium chloride, ondansetron **OR** ondansetron, polyethylene glycol, potassium chloride **OR** potassium alternative oral replacement **OR** potassium chloride, magnesium sulfate, glucose, dextrose, glucagon (rDNA), dextrose        Nate Syed MD      [address], [address] [place] [address].    Phone [phone]   Fax: [phone]  Answering Service: [phone]

## 2021-11-09 NOTE — FLOWSHEET NOTE
11/09/21 1504   Encounter Summary   Services provided to: Patient   Referral/Consult From: Rounding   Continue Visiting   (11-9-21)   Complexity of Encounter Low   Length of Encounter 15 minutes   Spiritual/Faith   Type Spiritual support   Intervention Anointing   Sacraments   Sacrament of Sick-Anointing Anointed  ([place] 11-9-21)

## 2021-11-09 NOTE — PLAN OF CARE
Problem: Falls - Risk of:  Goal: Will remain free from falls  Description: Will remain free from falls  11/9/2021 0359 by Sruthi Mtz RN  Outcome: Ongoing     Problem: Falls - Risk of:  Goal: Absence of physical injury  Description: Absence of physical injury  11/9/2021 0359 by Sruthi Mtz RN  Outcome: Ongoing     Problem: Skin Integrity:  Goal: Will show no infection signs and symptoms  Description: Will show no infection signs and symptoms  11/9/2021 0359 by Sruthi Mtz RN  Outcome: Ongoing     Problem: Skin Integrity:  Goal: Absence of new skin breakdown  Description: Absence of new skin breakdown  11/9/2021 0359 by Sruthi Mtz RN  Outcome: Ongoing

## 2021-11-09 NOTE — FLOWSHEET NOTE
11/09/21 1418   Encounter Summary   Services provided to: Patient not available  (patient at swallow study)

## 2021-11-09 NOTE — PROGRESS NOTES
Physician Progress Note      David Evans  CSN #:                  [id number]  :                       1950  ADMIT DATE:       2021 2:33 PM  [address] [place] DATE:  RESPONDING  PROVIDER #:        Levi Yi MD          QUERY TEXT:    Patient admitted with sepsis related to UTI, noted to have ARYAN with multiple   prior GFRs less than 60 since 2017. If possible, please document in progress   notes and discharge summary if you are evaluating and/or treating any of the   following: The medical record reflects the following:  Risk Factors: 66 y.o. female with significant PMH including hydrocephalus with   developmental delay. No documented diagnosis of CKD. Clinical Indicators: Prior to this admission, since 2017 pt has only had   3 out of 11 GFR readings > 60. GFR ranges between 49 and 57 with Creatinine   ranging between 0.74 and 1.10. This visit: BUN/Cr/GFR: 59/2.91/16 (),   47/2.62/18 (). Pt has had 4,606 ml of fluid in with only 1,950 ml out   since admission. Treatment: 2.5 L 0.9%NS with maintenance fluids at 150 ml/ hr then decreased   to 75 ml/ hr then increased back up to 125 ml/ hr on     Defined by Kidney Disease Improving Global Outcomes (KDIGO) clinical practice   guideline for acute kidney injury:?  -Increase in?SCr?by greater than or equal to 0.3 mg/dl within 48?hours;?or?  -Increase or decrease in?SCr?to greater than or equal to 1.5 times baseline,   which is known or presumed to have occurred within the prior 7?days;?or?  -Urine volume < 0.5ml/kg/h for 6 hours  Options provided:  -- ARYAN on CKD Stage 3a GFR 45-59  -- Other - I will add my own diagnosis  -- Disagree - Not applicable / Not valid  -- Disagree - Clinically unable to determine / Unknown  -- Refer to Clinical Documentation Reviewer    PROVIDER RESPONSE TEXT:    This patient has ARYAN on CKD Stage 3a.     Query created by: Jayson Oh on 2021 3:10 PM      Electronically signed by: Elsa Forrester MD 11/9/2021 4:08 PM

## 2021-11-09 NOTE — PROGRESS NOTES
Resident team updated on patient refusing swallow study. Speech therapy talked to RN in hallway and stated that they can't make a recommendation on patient's diet due to refusal.   RN updated Francisco, patient legal guardian.

## 2021-11-10 ENCOUNTER — APPOINTMENT (OUTPATIENT)
Dept: INTERVENTIONAL RADIOLOGY/VASCULAR | Age: 71
DRG: 853 | End: 2021-11-10
Payer: MEDICARE

## 2021-11-10 ENCOUNTER — APPOINTMENT (OUTPATIENT)
Dept: GENERAL RADIOLOGY | Age: 71
DRG: 853 | End: 2021-11-10
Payer: MEDICARE

## 2021-11-10 PROBLEM — N18.9 ACUTE KIDNEY INJURY SUPERIMPOSED ON CKD (HCC): Status: ACTIVE | Noted: 2021-11-08

## 2021-11-10 LAB
CULTURE: ABNORMAL
Lab: ABNORMAL
Lab: ABNORMAL
SPECIMEN DESCRIPTION: ABNORMAL
SPECIMEN DESCRIPTION: ABNORMAL

## 2021-11-10 PROCEDURE — 99222 1ST HOSP IP/OBS MODERATE 55: CPT | Performed by: INTERNAL MEDICINE

## 2021-11-10 PROCEDURE — 6370000000 HC RX 637 (ALT 250 FOR IP)

## 2021-11-10 PROCEDURE — 1200000000 HC SEMI PRIVATE

## 2021-11-10 PROCEDURE — 97116 GAIT TRAINING THERAPY: CPT

## 2021-11-10 PROCEDURE — 74230 X-RAY XM SWLNG FUNCJ C+: CPT

## 2021-11-10 PROCEDURE — 97166 OT EVAL MOD COMPLEX 45 MIN: CPT

## 2021-11-10 PROCEDURE — 97530 THERAPEUTIC ACTIVITIES: CPT

## 2021-11-10 PROCEDURE — 92611 MOTION FLUOROSCOPY/SWALLOW: CPT

## 2021-11-10 PROCEDURE — 99233 SBSQ HOSP IP/OBS HIGH 50: CPT | Performed by: INTERNAL MEDICINE

## 2021-11-10 PROCEDURE — 71045 X-RAY EXAM CHEST 1 VIEW: CPT

## 2021-11-10 PROCEDURE — 6360000002 HC RX W HCPCS

## 2021-11-10 PROCEDURE — 05HB33Z INSERTION OF INFUSION DEVICE INTO RIGHT BASILIC VEIN, PERCUTANEOUS APPROACH: ICD-10-PCS | Performed by: INTERNAL MEDICINE

## 2021-11-10 PROCEDURE — 2580000003 HC RX 258

## 2021-11-10 RX ADMIN — BUSPIRONE HYDROCHLORIDE 5 MG: 5 TABLET ORAL at 20:30

## 2021-11-10 RX ADMIN — SODIUM CHLORIDE: 9 INJECTION, SOLUTION INTRAVENOUS at 02:41

## 2021-11-10 RX ADMIN — HEPARIN SODIUM 5000 UNITS: 5000 INJECTION INTRAVENOUS; SUBCUTANEOUS at 06:13

## 2021-11-10 RX ADMIN — BUTALBITA,ACETAMINOPHEN AND CAFFEINE 1 CAPSULE: 50; 300; 40 CAPSULE ORAL at 17:31

## 2021-11-10 RX ADMIN — LAMOTRIGINE 200 MG: 100 TABLET ORAL at 20:30

## 2021-11-10 RX ADMIN — HEPARIN SODIUM 5000 UNITS: 5000 INJECTION INTRAVENOUS; SUBCUTANEOUS at 21:58

## 2021-11-10 RX ADMIN — HEPARIN SODIUM 5000 UNITS: 5000 INJECTION INTRAVENOUS; SUBCUTANEOUS at 17:20

## 2021-11-10 RX ADMIN — CEFTRIAXONE SODIUM 2000 MG: 2 INJECTION, POWDER, FOR SOLUTION INTRAMUSCULAR; INTRAVENOUS at 17:20

## 2021-11-10 RX ADMIN — QUETIAPINE FUMARATE 50 MG: 50 TABLET ORAL at 20:29

## 2021-11-10 RX ADMIN — TOPIRAMATE 100 MG: 100 TABLET, FILM COATED ORAL at 17:20

## 2021-11-10 ASSESSMENT — ENCOUNTER SYMPTOMS
SHORTNESS OF BREATH: 0
BACK PAIN: 0
COUGH: 0
ABDOMINAL PAIN: 1

## 2021-11-10 ASSESSMENT — PAIN DESCRIPTION - LOCATION
LOCATION: BACK
LOCATION: BACK

## 2021-11-10 ASSESSMENT — PAIN DESCRIPTION - PAIN TYPE
TYPE: CHRONIC PAIN
TYPE: CHRONIC PAIN

## 2021-11-10 ASSESSMENT — PAIN SCALES - GENERAL
PAINLEVEL_OUTOF10: 0
PAINLEVEL_OUTOF10: 6
PAINLEVEL_OUTOF10: 6

## 2021-11-10 NOTE — PROGRESS NOTES
[email or web address]    PROGRESS NOTE             11/10/2021    7:32 AM    Name:   Malia Hinds  MRN:     [id number]     Acct:      [de-identified]   Room:   [address] [place] [address]  IP Day:  3  Admit Date:  11/7/2021  2:33 PM    PCP:  Lucy Hu MD  Code Status:  Full Code    Subjective:     C/C:   Chief Complaint   Patient presents with    Altered Mental Status     Gradual decline in mental status. Had a fall, but was caught. Lives at a group home. Caregiver is here with her.  Fall     Interval History Status: improved. Patient was seen and examined at bedside, no acute events overnight, patient states that she is having abdominal pain, patient was not cooperative in the barium study, will repeat today at 1:31 once family member, or caregiver reaches to evaluate for aspiration, will get a chest x-ray. Patient still on M[place]jo catheter, draining 100 mL this morning.    Patient is on 100 mL/H normal saline  IV Rocephin      Brief History:        The patient is a 71 y.o.  Non- / non  female who presents withAltered Mental Status (Gradual decline in mental status.  Had a fall, but was caught. Gary Laird at a group home. Bertram Sanabria is here with her.  ) and Fall   and she is admitted to the hospital for the management of  Urosepsis      The patient is a poor historian, history was taken from caregiver, printed medical records caregiver was carrying, and EMR.  This is a 76-year-old female patient past medical history of bipolar disease, chronic headache, hydrocephalus s/p  shunt presented to ED due to change in behavior, and falling down caregiver mentioned that the patient was having abdominal pain 3 days ago prior to admission  denies any vomiting or nausea, patient communicates well at her baseline, she usually presents with change in behavior whenever she has UTI. Denies chills or fever.         UA: Bacteruria, large leukocytes esterase   Urine culture  Positive for lactose fermenting gram-negative rods  Blood culture positive for E. Coli     X-ray shunt series: Multiple shunt catheters overlying the chest and abdomen have a grossly   stable radiographic appearance when compared to 06/03/2020. Unchanged left pleural effusion and left basilar opacity.      CT abdomen: Moderately severe left hydronephrosis secondary to a 10 mm stone in the left UPJ. Also multiple left intrarenal stones. Stable left parapelvic renal cyst.  Atrophic Right kidney    Review of Systems:     Review of Systems   Constitutional: Positive for fatigue. Negative for chills and fever. Eyes: Negative for visual disturbance. Respiratory: Negative for cough and shortness of breath. Cardiovascular: Negative for chest pain. Gastrointestinal: Positive for abdominal pain. Genitourinary: Negative for dysuria. Musculoskeletal: Negative for back pain. Neurological: Positive for dizziness. Psychiatric/Behavioral: Negative for confusion. Medications: Allergies: Allergies   Allergen Reactions    Levaquin [Levofloxacin In D5w] Other (See Comments)     The group home has this on their allergy list, but without what effects to the Pt. Pt doesn't know.     Adhesive Tape     Chocolate     Indomethacin     Neurontin [Gabapentin]        Current Meds:   Scheduled Meds:    cefTRIAXone (ROCEPHIN) IV  1,000 mg IntraVENous Q24H    influenza virus vaccine  0.5 mL IntraMUSCular Prior to discharge    aspirin  81 mg Oral Daily    heparin (porcine)  5,000 Units SubCUTAneous 3 times per day    atorvastatin  10 mg Oral Daily    busPIRone  5 mg Oral BID    lamoTRIgine  100 mg Oral Daily    lamoTRIgine  200 mg Oral Nightly    QUEtiapine  25 mg Oral Daily    QUEtiapine  50 mg Oral Nightly    topiramate  100 mg Oral QPM    topiramate  50 mg Oral Daily    VORTIoxetine  5 mg Oral Daily     Continuous Infusions:    sodium chloride 100 mL/hr at 11/10/21 3969  sodium chloride      dextrose       PRN Meds: acetaminophen **OR** acetaminophen, butalbital-APAP-caffeine, SUMAtriptan, sodium chloride flush, sodium chloride, ondansetron **OR** ondansetron, polyethylene glycol, potassium chloride **OR** potassium alternative oral replacement **OR** potassium chloride, magnesium sulfate, glucose, dextrose, glucagon (rDNA), dextrose    Data:     Past Medical History:   has a past medical history of Acid reflux, Bipolar affective ([facility] [address] Chronic headaches, and Hydrocephalus ([facility] [address] Social History:   reports that she has never smoked. She has never used smokeless tobacco. She reports that she does not drink alcohol and does not use drugs. Family History: History reviewed. No pertinent family history. Vitals:  /65   Pulse 79   Temp 98.2 °F (36.8 °C) (Oral)   Resp 16   Ht 5' 1\" (1.549 m)   Wt 147 lb 3.2 oz (66.8 kg)   SpO2 97%   BMI 27.81 kg/m²   Temp (24hrs), Av.2 °F (36.8 °C), Min:97.9 °F (36.6 °C), Max:98.4 °F (36.9 °C)    No results for input(s): POCGLU in the last 72 hours. I/O(24Hr):     Intake/Output Summary (Last 24 hours) at 11/10/2021 0732  Last data filed at 11/10/2021 0615  Gross per 24 hour   Intake --   Output 3000 ml   Net -3000 ml       Labs:    CBC with Differential:    Lab Results   Component Value Date    WBC 21.8 2021    RBC 3.65 2021    HGB 11.5 2021    HCT 36.0 2021     2021    MCV 98.5 2021    MCH 31.4 2021    MCHC 31.8 2021    RDW 14.0 2021    LYMPHOPCT 10 2021    MONOPCT 3 2021    MYELOPCT 2 2021    BASOPCT 0 2021    MONOSABS 0.65 2021    LYMPHSABS 2.18 2021    EOSABS 0.22 2021    BASOSABS 0.00 2021    DIFFTYPE NOT REPORTED 2021     BMP:    Lab Results   Component Value Date     2021    K 3.4 2021     2021    CO2 19 2021    BUN 40 2021    LABALBU 2.3 2021    CREATININE 1.68 11/09/2021    CALCIUM 8.0 11/09/2021    GFRAA 36 11/09/2021    LABGLOM 30 11/09/2021    GLUCOSE 143 11/09/2021       Lab Results   Component Value Date/Time    SPECIAL NOT REPORTED 11/07/2021 05:21 PM     Lab Results   Component Value Date/Time    CULTURE ESCHERICHIA COLI >[id number] CFU/ML (A) 11/07/2021 05:21 PM         Radiology:    CT ABDOMEN PELVIS WO CONTRAST Additional Contrast? None    Result Date: 11/7/2021  EXAMINATION: CT OF THE ABDOMEN AND PELVIS WITHOUT CONTRAST 11/7/2021 12:16 pm TECHNIQUE: CT of the abdomen and pelvis was performed without the administration of intravenous contrast. Multiplanar reformatted images are provided for review. Dose modulation, iterative reconstruction, and/or weight based adjustment of the mA/kV was utilized to reduce the radiation dose to as low as reasonably achievable. COMPARISON: 02/07/2019 HISTORY: ORDERING SYSTEM PROVIDED HISTORY: ams, constipation TECHNOLOGIST PROVIDED HISTORY: ams, constipation Decision Support Exception - unselect if not a suspected or confirmed emergency medical condition->Emergency Medical Condition (MA) Reason for Exam: AMS, constipation Acuity: Unknown Type of Exam: Initial FINDINGS: Lower chest: Dependent atelectasis or consolidation in the left lower lobe is noted. Moderate size pleural effusion is noted with pleural thickening. Findings are chronic however stable Sequela of old posterior left rib fractures and lateral right rib fractures. No acute osseous abnormality identified. ABDOMEN AND PELVIS: Organs: Evaluation of the abdominal and pelvic viscera is limited in the absence of contrast.  A calcification is noted in the posterior dome of the right hepatic lobe. The liver otherwise reveals no focal abnormality. The gallbladder is not visualized and may be surgically absent. The pancreas, spleen appear grossly unremarkable. 11 mm stable left adrenal adenoma . There is also a 6 mm right adrenal nodule, unchanged.  Moderately severe left hydronephrosis with 10 mm stone in the left UPJ. Also multiple left intrarenal stones. Stable left parapelvic renal cysts. Atrophic right kidney renal stones and resolving hydronephrosis GI/Bowel: No bowel dilatation or wall thickening identified. Mild stool burden throughout the colon. Diverticulosis. Pelvis: The bladder is well distended. No wall thickening or inflammatory change identified. Peritoneum/Retroperitoneum: A tunneled peritoneal catheter terminates in the left lower quadrant. No ascites or free air. The aorta is normal in caliber. Calcified atheromatous plaque is present. No enlarged or suspicious-appearing lymph nodes. Bones/Soft Tissues: Nondisplaced posterior left acetabular fracture. Comminuted nondisplaced medial acetabular fracture. There is also a minimally displaced comminuted left inferior pubic ramus fracture. Chronic appearing severe L4 compression fracture. Fracture of L2 vertebral body with less than 25% loss of normal height which also appears chronic however has developed from the prior exam.  Advanced facet arthropathy in the lower lumbar spine. Angular deformity in the inferior sacrum/coccyx is noted without a discrete fracture line. This also may be chronic. Pin fixation hardware is noted within the proximal femora. Moderately severe left hydronephrosis secondary to a 10 mm stone in the left UPJ. Also multiple left intrarenal stones. Stable left parapelvic renal cyst. Atrophic right kidney renal stones and resolving hydronephrosis No other significant changes are seen from the prior exam     CT HEAD WO CONTRAST    Result Date: 11/7/2021  EXAMINATION: CT OF THE HEAD WITHOUT CONTRAST  11/7/2021 3:16 pm TECHNIQUE: CT of the head was performed without the administration of intravenous contrast. Dose modulation, iterative reconstruction, and/or weight based adjustment of the mA/kV was utilized to reduce the radiation dose to as low as reasonably achievable. COMPARISON: CT scan of the brain from 07/12/2021 HISTORY: ORDERING SYSTEM PROVIDED HISTORY: AMS,  shunt TECHNOLOGIST PROVIDED HISTORY: AMS,  shunt Decision Support Exception - unselect if not a suspected or confirmed emergency medical condition->Emergency Medical Condition (MA) Reason for Exam: AMS,  shunt Acuity: Unknown Type of Exam: Initial FINDINGS: BRAIN/VENTRICLES: Left parietal and frontal approach  shunts in unchanged position with tip positions left anterior horn, unchanged; orphaned retained right parietal approach shunt also again seen, with its tip in left frontal lobe. Unchanged right frontal/parietal encephalomalacia with ex vacuo dilatation right lateral ventricle, also unchanged. No acute intracranial hemorrhage, mass effect or midline shift. No new/abnormal extra-axial fluid collection. Mild periventricular WM low-attenuation changes; gray-white differentiation maintained; no obvious acute infarct. ORBITS: The visualized portion of the orbits demonstrate no acute abnormality. SINUSES: The visualized paranasal sinuses and mastoid air cells demonstrate no acute abnormality; mild mucosal thickening noted left sphenoid sinus involving anterior left ethmoid air cells. No air-fluid level. Mimi Stands SOFT TISSUES/SKULL:  No acute abnormality of the visualized skull or soft tissues. No acute intracranial abnormality. 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 FLUORO FOR SURGICAL PROCEDURES    Result Date: 11/7/2021  Radiology exam is complete. No Radiologist dictation. Please follow up with ordering provider. XR Shunt Placement Series    Result Date: 11/7/2021  EXAMINATION: SHUNT SERIES 11/7/2021 3:02 pm COMPARISON: Chest x-ray dated 09/20/2021. Chest series dated 06/03/2020. Skull x-ray dated 06/08/2017.  HISTORY: ORDERING SYSTEM PROVIDED HISTORY: AMS,  shunt TECHNOLOGIST PROVIDED HISTORY: AMS,  shunt Reason for Exam: AMS,  shunt Acuity: Acute Type of Exam: Initial FINDINGS: There are multiple shunt catheters overlying the chest.  There is 1 extending from the lower left neck over the right chest with distal tip in the right upper quadrant, not significantly changed, likely abandoned. There are 2 additional shunt catheter radiodensities overlying the left chest, which are unchanged as well. 1 of these appears to terminate overlying a left pleural effusion and the other extends into the abdomen. Heart size is stable. There is persistent left pleural effusion and left basilar opacity. No evidence of pneumothorax. In the abdomen, distal tip of 1 of the left-sided shunt catheters is overlying the left lower quadrant, which is unchanged. There is a nonspecific, nonobstructed bowel gas pattern. Moderate fecal material in the right side of the colon. Fixation hardware in both hips. Multiple components of shunt catheters are seen overlying the left calvarium, unchanged. Hearing aid device is noted. 1.  Multiple shunt catheters overlying the chest and abdomen have a grossly stable radiographic appearance when compared to 06/03/2020. 2.  Unchanged left pleural effusion and left basilar opacity. FL MODIFIED BARIUM SWALLOW W VIDEO    Result Date: 11/9/2021  EXAMINATION: MODIFIED BARIUM SWALLOW WAS PERFORMED IN CONJUNCTION WITH SPEECH PATHOLOGY SERVICES 11/09/2021 TECHNIQUE: Fluoroscopic evaluation of the swallowing mechanism was performed using cineradiography with multiple consistency of barium product in conjunction with speech pathology services.  FLUOROSCOPY DOSE AND TYPE OR TIME AND EXPOSURES: 1 minute 19 seconds; D AP 82.5 dGy cm2 COMPARISON: None HISTORY: ORDERING SYSTEM PROVIDED HISTORY: Chocking on liquid diet TECHNOLOGIST PROVIDED HISTORY: Chocking on liquid diet Reason for Exam: Chocking on liquid diet Acuity: Acute Type of Exam: Ongoing Additional signs and symptoms: Chocking on liquid diet FINDINGS: Very limited exam as the patient refused to participate. The patient ingested a small quantity of pudding consistency material with premature vallecular spillage and residue; swallowing mechanism was not initiated over the course of the study. No definite aspiration is seen although the patient did not initiate a swallow. Recommend repeat exam when the patient is more cooperative. Nondiagnostic exam as described above. The patient requested termination of the procedure. Recommend repeat study when the patient is willing to cooperate. Physical Examination:        Physical Exam      Assessment:        Primary Problem  <principal problem not specified>    Active Hospital Problems    Diagnosis Date Noted    Acute kidney injury ([facility] [address] [N17.9] 11/08/2021    UTI (urinary tract infection) [N39.0] 11/07/2021    Sepsis ([facility] [address] [A41.9] 11/07/2021    Hydronephrosis of left kidney [N13.30] 11/07/2021    Bipolar mood disorder ([facility] [address] [F31.9] 11/07/2021    S/P  shunt [Z98.2] 12/01/2020       Plan:        Urosepsis   Initial vitals: RR 22,   WBC 19.6>26>21  Lactate 1  Pro-Mckinley 11.55, RMT 745  UA: Bacteria, leukocyte esterase  Urine culture Lactose fermenting gram negative rods   Blood culture E. coli  125 mL/H NS  IV Rocephin day 3     Left UPJ stone with hydronephrosis S/P ureteral stent   CT abdomen, pelvis: Hydronephrosis with in the left UPJ  Left intrarenal stones  Urology on board  double-J ureteral stent was placed 11/7   Will need out patient follow up with urology      Acute kidney injury on CKD Stage 3a GFR 45-59  Underlying sepsis and UTI   Cr: 2.26  Baseline 0.90  K 3.4 will replace   125 ml/h NS      Bipolar disorder  Quetiapine 25 mg p.o. daily  Quetiapine 50 mg p.o. nightly  Lamotrigine 100 mg p.o. daily  Lamotrigine 200 mg p.o. nightly  Buspirone 5 mg p.o. Vortioxetine 5 mg p.o.      Chronic headache  Sumatriptan 100 mg p.o.   Topiramate 50 mg p.o.     DVT prophylaxis:  Heparin SubQ   GI prophylaxis: Pepcid     PT OT evaluation  SW discharge planning  Diet Easy to chew          Nirmal Dewitt MD  11/10/2021  7:32 AM       Attestation and add on       I have discussed the care of [facility] , including pertinent history and exam findings,      11/10/21    with the resident. I have seen and examined the patient and the key elements of all parts of the encounter have been performed by me . I agree with the assessment, plan and orders as documented by the resident. Active Problems:    S/P  shunt    UTI (urinary tract infection)    Hydronephrosis of left kidney    Sepsis ([address]    Bipolar mood disorder ([facility] [address]    Acute kidney injury superimposed on CKD ([address]  Resolved Problems:    * No resolved hospital problems. *       Barium study today     -  Vitals:    11/09/21 1230 11/09/21 1916 11/10/21 0018 11/10/21 0730   BP: 139/75 (!) 142/77 127/65 (!) 140/77   Pulse: 88 90 79 69   Resp: 20 16 16 16   Temp: 98.4 °F (36.9 °C) 97.9 °F (36.6 °C) 98.2 °F (36.8 °C) 98.5 °F (36.9 °C)   TempSrc: Oral Oral Oral Oral   SpO2: 96% 97% 97% 99%   Weight:       Height:         --- ;        Medications: Allergies: Allergies   Allergen Reactions    Levaquin [Levofloxacin In D5w] Other (See Comments)     The group home has this on their allergy list, but without what effects to the Pt. Pt doesn't know.     Adhesive Tape     Chocolate     Indomethacin     Neurontin [Gabapentin]        Current Meds:   Scheduled Meds:    cefTRIAXone (ROCEPHIN) IV  2,000 mg IntraVENous Q24H    influenza virus vaccine  0.5 mL IntraMUSCular Prior to discharge    aspirin  81 mg Oral Daily    heparin (porcine)  5,000 Units SubCUTAneous 3 times per day    atorvastatin  10 mg Oral Daily    busPIRone  5 mg Oral BID    lamoTRIgine  100 mg Oral Daily    lamoTRIgine  200 mg Oral Nightly    QUEtiapine  25 mg Oral Daily    QUEtiapine  50 mg Oral Nightly    topiramate 100 mg Oral QPM    topiramate  50 mg Oral Daily    VORTIoxetine  5 mg Oral Daily     Continuous Infusions:    sodium chloride 100 mL/hr at 11/10/21 0241    sodium chloride      dextrose       PRN Meds: acetaminophen **OR** acetaminophen, butalbital-APAP-caffeine, SUMAtriptan, sodium chloride flush, sodium chloride, ondansetron **OR** ondansetron, polyethylene glycol, potassium chloride **OR** potassium alternative oral replacement **OR** potassium chloride, magnesium sulfate, glucose, dextrose, glucagon (rDNA), dextrose        Cami Jeter [address] [place] [address].    Phone [phone]   Fax: [phone]  Answering Service: [phone]

## 2021-11-10 NOTE — CARE COORDINATION
ONGOING DISCHARGE PLAN:    Patient is alert and more oriented per [employer]. Awaiting pt's sister to visit to discuss needs due to patient from Group home . Spoke with patient regarding discharge plan and patient confirms that plan is still return to group [facility]. Pt to have swallow study (pt refused yesterday) and PT/OT eval. ON IV rocephin for UTI. Will continue to follow for additional discharge needs.     Electronically signed by Colleen Jaimes RN on 11/10/2021 at 11:25 AM

## 2021-11-10 NOTE — PROCEDURES
INSTRUMENTAL SWALLOW REPORT  MODIFIED BARIUM SWALLOW    NAME: Jamia Vargas   : 1918  MRN: [id number]       Date of Eval: 11/10/2021              Referring Diagnosis(es):  dysphagia    Past Medical History:  has a past medical history of Acid reflux, Bipolar affective ([facility] [address] Chronic headaches, and Hydrocephalus ([facility] [address] Past Surgical History:  has a past surgical history that includes other surgical history; shunt revision; Cholecystectomy; Tonsillectomy; Hip fracture surgery (Left, 2017); hip pinning (Left, 2017); and Cystoscopy (Left, 2021). Pt. NPO at this time pending results of swallow assessment    Date of Prior Study:   Type of Study: Repeat MBS  Results of Prior Study: Inconclusive results d/t reduced cooperation from patient, unable to determine safe diet. Recent CXR/CT of Chest: Date 11/10- CXR-   New parenchymal opacity in the medial right lower chest compatible with   atelectasis and/or pneumonia.  Chronic left pleural effusion and mild volume   loss. Patient Complaints/Reason for Referral:  Jamia Vargas was referred for a MBS to assess the efficiency of his/her swallow function, assess for aspiration, and to make recommendations regarding safe dietary consistencies, effective compensatory strategies, and safe eating environment. Onset of problem:    Per IM H&P: The patient is a 66 y.o. Non- / non  female who presents withAltered Mental Status (Gradual decline in mental status. Had a fall, but was caught. Lives at a group home. Caregiver is here with her.  ) and Fall   and she is admitted to the hospital for the management of  Urosepsis      The patient is a poor historian, history was taken from caregiver, printed medical records caregiver was carrying, and EMR.   This is a 74-year-old female patient past medical history of bipolar disease, chronic headache, hydrocephalus s/p  shunt presented to ED due to change in behavior, and falling down caregiver mentioned that the patient was having abdominal pain 3 days ago denies any vomiting or nausea, patient communicates well at her baseline, she usually presents with change in behavior whenever she has UTI. Denies chills or fever. Behavior/Cognition/Vision/Hearing:  Behavior/Cognition: Alert; Cooperative; Confused; Requires cueing  Vision: Impaired  Vision Exceptions: Wears glasses at all times  Hearing: Exceptions to [facility]  Hearing Exceptions: Hard of hearing/hearing concerns; Bilateral hearing aid  Pt. Very [place] and with decreased comprehension, frequent repetition and restating was required. Impressions:     Patient Position: Lateral     Consistencies Administered: Dysphagia Pureed (Dysphagia I); Dysphagia Soft and Bite-Sized (Dysphagia III); Honey teaspoon; Nectar cup; Thin cup; Thin straw              Oral Phase: Decreased mastication of soft solids, pt. unable to initiate A-P transit, bolus manually removed as pt. stating \"I can't swallow this\". Premature vallecular spillage of all consistencies. Dry solids not assessed. Pharyngeal: Pt. demonstrated mod amt of vallecular and min amt of pyriform sinus cavity residue with thin, nectar and honey thick consistencies. Pt. demonstrated max amt of vallecular residue and min amt of pyriform sinus cavity residue that pt. partially cleared with subsequent swallows. Transient penetration was noted on all consistencies, did not enter laryngeal vestibule. Upper Esophageal Screen: Pt. belching throughout assessment. Dysphagia Outcome Severity Scale: Level 3: Moderate dysphagia- Total assisstance, supervision or strategies. Two or more diet consistencies restricted  Penetration-Aspiration Scale (PAS): 1 - Material does not enter the airway    Recommended Diet:  Solid consistency: Dysphagia Minced and Moist (Dysphagia II)  Liquid consistency:  Thin            Safe Swallow Protocol:     Compensatory Swallowing Strategies: Eat/Feed slowly; Upright as possible for all oral intake; Small bites/sips              Recommendations/Treatment  Requires SLP Intervention: Yes                  Recommended Exercises:    Therapeutic Interventions: Diet tolerance monitoring   Compensatory strategy training         Education: Images and recommendations were reviewed with pt. And her sister following this exam.   Patient Education: Pt. sister present, assisted in clarifying educ. for pt. Patient Education Response: Needs reinforcement           Goals:                                        Dysphagia Goals: The patient will tolerate recommended diet without observed clinical signs of aspiration; The patient will recall and perform compensatory strategies, with min cues. Oral Preparation / Oral Phase  Oral Phase: Impaired        Pharyngeal Phase  Pharyngeal Phase: Impaired      Esophageal Phase  Esophageal Screen: WNL        Pain   None reported      Therapy Time:   Individual Concurrent Group Co-treatment   Time In 1435         Time Out 1505         Minutes 30               Leti PINEDO A.CCC/DENIS     11/10/2021, 3:38 PM

## 2021-11-10 NOTE — PROGRESS NOTES
[address]   Occupational Therapy Evaluation  Date: 11/10/21  Patient Name: Ryder Lu       Room: 3540/1733-66  MRN: [id number]  Account: [de-identified]   : 1950  (72 y.o.) Gender: female     Discharge Recommendations:  Further Occupational Therapy is recommended upon facility discharge. Referring Practitioner: Dali Granados MD  Diagnosis: Dehydration, UTI  Additional Pertinent Hx: The patient is a poor historian, history was taken from caregiver, printed medical records caregiver was carrying, and EMR. This is a 76-year-old female patient past medical history of bipolar disease, chronic headache, hydrocephalus s/p  shunt presented to ED due to change in behavior, and falling down caregiver mentioned that the patient was having abdominal pain 3 days ago prior to admission  denies any vomiting or nausea, patient communicates well at her baseline, she usually presents with change in behavior whenever she has UTI. Denies chills or fever. Treatment Diagnosis: Impaired self-care status. Past Medical History:  has a past medical history of Acid reflux, Bipolar affective ([address] Chronic headaches, and Hydrocephalus ([address] Past Surgical History:   has a past surgical history that includes other surgical history; shunt revision; Cholecystectomy; Tonsillectomy; Hip fracture surgery (Left, 2017); hip pinning (Left, 2017); and Cystoscopy (Left, 2021).     Restrictions  Restrictions/Precautions: Fall Risk  Implants present? : Metal implants (left hip fracture surgery)  Required Braces or Orthoses?: No     Vitals  Temp: 97.7 °F (36.5 °C)  Pulse: 74  Resp: 16  BP: (!) 132/96  Height: 5' 1\" (154.9 cm)  Weight: 147 lb 3.2 oz (66.8 kg)  BMI (Calculated): 27.9  Oxygen Therapy  SpO2: 97 %  Pulse Oximeter Device Mode: Intermittent  Pulse Oximeter Device Location: Finger  O2 Device: None (Room air)  O2 Flow Rate (L/min): 2 L/min  Level of Consciousness: Alert (0)    Subjective  Subjective: \"I'm in a different box, honey\" patient repeats x 5 upon OT entry to room. \"I did it once, they got so angry, you would think I killed the president\" Patient states in regard to whether or not she takes a shower by herself. Patient states her staff provide SUP/SBA for safety with shower. Vision  Vision: Impaired  Vision Exceptions: Wears glasses at all times  Hearing  Hearing: Exceptions to [facility]  Hearing Exceptions: Hard of hearing/hearing concerns, Bilateral hearing aid  Social/Functional History  Type of Home: House (Group home)  Home Layout: One level  Home Access: Level entry  Bathroom Shower/Tub: Walk-in shower  Bathroom Toilet: Standard  Bathroom Equipment: Grab bars in shower, Shower chair, Hand-held shower, Grab bars around toilet  Home Equipment: Rolling walker  Receives Help From:  (staff)  ADL Assistance: Independent (SUP/SBA with shower; MOD IND with dressing/toileting)  [address]:  (staff performs)  Ambulation Assistance: Independent (with walker)  Transfer Assistance: Independent  Additional Comments: Patient reports that she has staff present at [facility] 24/7. Patient states staff will provide assist for self-care, transfers, and mobility if needed. Patient is a questionable historian with no family present to verify above information. Patient reports a history of therapy, however unclear how recent the therapy was. Pain Assessment  Pain Assessment: 0-10  Pain Level: 6  Pain Type: Chronic pain  Pain Location: Back (\"above the waist\")    Objective  Vision - Basic Assessment  Prior Vision: Wears glasses all the time   Cognition  Overall Cognitive Status: Impaired  Arousal/Alertness: Delayed responses to stimuli  Attention Span: Difficulty attending to directions  Memory: Decreased short term memory, Decreased recall of recent events  Following Commands:  Follows one step commands with repetition  Safety Judgement: Decreased awareness of need for safety  Awareness of Errors: Decreased awareness of errors  Insights: Decreased awareness of deficits  Sequencing and Organization: Assistance required to implement solutions, Assistance required to generate solutions, Assistance required to identify errors made   Perception  Overall Perceptual Status: Impaired  Initiation: Cues to initiate tasks  Motor Planning: Cues to use objects appropriately   ADL  Feeding: NPO  Grooming: Stand by assistance  UE Bathing: Minimal assistance  LE Bathing: Maximum assistance  UE Dressing: Moderate assistance  LE Dressing: Maximum assistance  Toileting: Dependent/Total  Additional Comments: ADL scores based on skilled observations and clinical reasoning unless noted. Patient requires significant assist this date due to cognitive status as well as weakness. UE Function           LUE Strength  Gross LUE Strength: WFL  L Hand General: 4/5  LUE Strength Comment: Grossly 4/5     LUE Tone: Normotonic     LUE AROM (degrees)  LUE AROM : WFL     Left Hand AROM (degrees)  Left Hand AROM: WFL  RUE Strength  Gross RUE Strength: WFL  R Hand General: 4/5  RUE Strength Comment: Grossly 4/5      RUE Tone: Normotonic     RUE AROM (degrees)  RUE AROM : WFL     Right Hand AROM (degrees)  Right Hand AROM: WFL    Fine Motor Skills  Coordination  Movements Are Fluid And Coordinated: No  Coordination and Movement description: Left UE, Right UE, Fine motor impairments                           Mobility  Supine to Sit: 2 Person assistance, Moderate assistance (due to confusion)       Balance  Sitting Balance: Minimal assistance  Standing Balance:  (Minimal assist x 2)     Functional Mobility  Functional - Mobility Device: Rolling Walker  Activity:  (from EOB to bedside chair)  Assist Level:  (Minimal assist x 2)  Functional Mobility Comments: with Moderate verbal cues for safety  Bed mobility  Supine to Sit: 2 Person assistance;  Moderate assistance (due to confusion)  Scooting: Minimal assistance (to EOB)     Transfers  Sit to stand: 2 Person assistance, Minimal assistance  Stand to sit: 2 Person assistance, Minimal assistance  Transfer Comments: with Moderate verbal cues for hand placement and safety  Functional Activity Tolerance  Functional Activity Tolerance: Tolerates 30 min exercise with multiple rests     Assessment  Assessment  Performance deficits / Impairments: Decreased functional mobility , Decreased ADL status, Decreased strength, Decreased safe awareness, Decreased cognition, Decreased endurance, Decreased balance, Decreased high-level IADLs, Decreased fine motor control, Decreased coordination  Treatment Diagnosis: Impaired self-care status. Prognosis: Good  Decision Making: Medium Complexity  REQUIRES OT FOLLOW UP: Yes  Discharge Recommendations: Patient would benefit from continued therapy after discharge  Activity Tolerance: Patient Tolerated treatment well         Functional Outcome Measures  AM-PAC Daily Activity Inpatient   How much help for putting on and taking off regular lower body clothing?: A Lot  How much help for Bathing?: A Lot  How much help for Toileting?: Total  How much help for putting on and taking off regular upper body clothing?: A Lot  How much help for taking care of personal grooming?: A Little  How much help for eating meals?: None (NPO)  AM-PAC Inpatient Daily Activity Raw Score: 14  AM-PAC Inpatient ADL T-Scale Score : 33.39  ADL Inpatient CMS 0-100% Score: 59.67  ADL Inpatient CMS G-Code Modifier : CK       Goals  Patient Goals   Patient goals : To return home  Short term goals  Time Frame for Short term goals: By discharge  Short term goal 1: Patient will verbalize/demonstrate Good understanding of Fall Prevention Strategies for increased safety and IND with self-care and mobility. Short term goal 2: Patient will perform BADLs with SBA and Fair safety. Short term goal 3: Patient will perform functional mobility and transfers with SBA, RW, and Fair safety.   Short term goal 4: Patient will actively participate in 15+ minutes of self-care   Short term goal 5: Patient will actively participate in 15-25 minutes of therapeutic exercise/functional activities to promote increased IND with self-care and mobility. Plan  Safety Devices  Safety Devices in place: Yes  Type of devices:  All fall risk precautions in place, Call light within reach, Gait belt, Patient at risk for falls, Left in chair, Nurse notified, Chair alarm in place     Plan  Times per week: 5-7  Times per day: Twice a day  Current Treatment Recommendations: Self-Care / ADL, Home Management Training, Strengthening, Balance Training, Functional Mobility Training, Endurance Training, Pain Management, Safety Education & Training, Patient/Caregiver Education & Training, Equipment Evaluation, Education, & procurement, Cognitive/Perceptual Training          OT Individual Minutes  Time In: Jackie Chang  Time Out: 4987  Minutes: 35    Electronically signed by Travis Garcia OT on 11/10/21 at 3:21 PM EST

## 2021-11-10 NOTE — PROGRESS NOTES
SW spoke to pt's caregiver and sister.  Referral sent to [address] in [facility] per sister's request.

## 2021-11-10 NOTE — PROGRESS NOTES
Speech Language Pathology  [address] [place] [address]  Speech Language Pathology    Date: 11/10/2021  Patient Name: Vernon Sylvester  YOB: 1950   AGE: 66 y.o. MRN: [id number]        Patient Not Available for Speech Therapy     Due to:  [] Testing  [] Hemodialysis  [] Cancelled by RN  [] Surgery   [] Intubation/Sedation/Pain Medication  [] Medical instability  [x] Other:  Per physician note, ST to repeat pt. Video swallow study today at 80 as family member will be present to assist with pt. Cooperation. There is currently not an order for video swallow study. ST unable to schedule test with radiology for this afternoon until order is placed. Spoke c RN, [facility], who stated she will obtain order. ST to coordinate time with radiology for this pm once order is placed. ADDENDUM:   Video swallow study order received and test is scheduled for 230p today. RN notified. Completed by: Jani Hess. MENDY/SLP

## 2021-11-10 NOTE — CONSULTS
[facility] -   Infectious diseases evaluation  admission date 11/7/2021    reason for consultation:   Bacteremia/UTI    Impression :   Current:  · UTI  · E. coli bacteremia secondary to above  · Nephrolithiasis  · Possible pneumonia  · Dementia      Recommendations   · IV Ceftriaxone 2 g daily through 11/17/21  · Midline   · Swallow study planned   · P.o. doxycycline if no aspiration on swallow study  · Marmolejo not to be removed by urology   · Discussed with with primary team resident        History of Present Illness:   Initial history: Pacheco Arboleda is a 66y.o.-year-old female who lives in a group home was brought to the hospital with worsening confusion, constant, severe for 3 days prior to admission associated with decreased appetite. No alleviating or aggravating factors. The patient reportedly fell at the group home. The patient had recent UTI was started on Cipro with no improvement. CT was performed and showed left-sided obstructing stone with resulting hydronephrosis. Urinalysis suggestive of UTI  Urine and blood cultures grew E. Coli  She was evaluated by urology  Marmolejo catheter in place  Interval changes  11/10/2021   Confused, unable to provide history  Chest x-ray from earlier today reviewed showed new opacity right side. Patient Vitals for the past 8 hrs:   BP Temp Temp src Pulse Resp SpO2   11/10/21 1215 (!) 132/96 97.7 °F (36.5 °C) Oral 74 16 97 %   11/10/21 0730 (!) 140/77 98.5 °F (36.9 °C) Oral 69 16 99 %           I have personally reviewed the past medical history, past surgical history, medications, social history, and family history, and I haveupdated the database accordingly.       Allergies:   Levaquin [levofloxacin in d5w], Adhesive tape, Chocolate, Indomethacin, and Neurontin [gabapentin]     Review of Systems:     Review of Systems  Confused, unable to provide  Physical Examination :       Physical Exam  Constitutional: General: She is not in acute distress. HENT:      Head: Normocephalic and atraumatic. Right Ear: External ear normal.      Left Ear: External ear normal.      Mouth/Throat:      Mouth: Mucous membranes are moist.      Pharynx: Oropharynx is clear. Eyes:      General: No scleral icterus. Conjunctiva/sclera: Conjunctivae normal.   Cardiovascular:      Rate and Rhythm: Normal rate and regular rhythm. Heart sounds: No murmur heard. Pulmonary:      Effort: Pulmonary effort is normal. No respiratory distress. Breath sounds: Rhonchi present. Abdominal:      General: There is no distension. Palpations: Abdomen is soft. Tenderness: There is no abdominal tenderness. Musculoskeletal:      Cervical back: Neck supple. No rigidity. Right lower leg: No edema. Left lower leg: No edema. Skin:     Findings: No bruising or erythema. Neurological:      Mental Status: She is alert.       Comments: Confused         Past Medical History:     Past Medical History:   Diagnosis Date    Acid reflux     Bipolar affective ([address]     Chronic headaches     Hydrocephalus (HCC)        Past Surgical  History:     Past Surgical History:   Procedure Laterality Date    CHOLECYSTECTOMY      CYSTOSCOPY Left 11/7/2021    CYSTOSCOPY URETERAL STENT INSERTION performed by Alejandro Lopez MD at [address] Left 06/09/2017    PINNING AND SCREW    HIP PINNING Left 6/9/2017    HIP PINNING 7.3 CANNULATED SCREW WITH SYNTHES PRODUCT APPLICATION AND INTRAOPERATIVE C-ARM performed by Aleshia Corral MD at [facility]      shunt x3 head    SHUNT REVISION      TONSILLECTOMY         Medications:      cefTRIAXone (ROCEPHIN) IV  2,000 mg IntraVENous Q24H    influenza virus vaccine  0.5 mL IntraMUSCular Prior to discharge    aspirin  81 mg Oral Daily    heparin (porcine)  5,000 Units SubCUTAneous 3 times per day    atorvastatin  10 mg Oral Daily    busPIRone  5 mg Oral BID    lamoTRIgine  100 mg Oral Daily    lamoTRIgine  200 mg Oral Nightly    QUEtiapine  25 mg Oral Daily    QUEtiapine  50 mg Oral Nightly    topiramate  100 mg Oral QPM    topiramate  50 mg Oral Daily    VORTIoxetine  5 mg Oral Daily       Social History:     Social History     Socioeconomic History    Marital status: Single     Spouse name: Not on file    Number of children: Not on file    Years of education: Not on file    Highest education level: Not on file   Occupational History    Not on file   Tobacco Use    Smoking status: Never Smoker    Smokeless tobacco: Never Used   Vaping Use    Vaping Use: Never used   Substance and Sexual Activity    Alcohol use: No    Drug use: No    Sexual activity: Not on file   Other Topics Concern    Not on file   Social History Narrative    Not on file     Social Determinants of Health     Financial Resource Strain:     Difficulty of Paying Living Expenses: Not on file   Food Insecurity:     Worried About Running Out of Food in the Last Year: Not on file    Myles of Food in the Last Year: Not on file   Transportation Needs:     Lack of Transportation (Medical): Not on file    Lack of Transportation (Non-Medical):  Not on file   Physical Activity:     Days of Exercise per Week: Not on file    Minutes of Exercise per Session: Not on file   Stress:     Feeling of Stress : Not on file   Social Connections:     Frequency of Communication with Friends and Family: Not on file    Frequency of Social Gatherings with Friends and Family: Not on file    Attends Baptism Services: Not on file    Active Member of Clubs or Organizations: Not on file    Attends Club or Organization Meetings: Not on file    Marital Status: Not on file   Intimate Partner Violence:     Fear of Current or Ex-Partner: Not on file    Emotionally Abused: Not on file    Physically Abused: Not on file    Sexually Abused: Not on file   Housing Stability:     Unable to Pay for Housing in the Last Year: Not on file    Number of Places Lived in the Last Year: Not on file    Unstable Housing in the Last Year: Not on file       Family History:   History reviewed. No pertinent family history. Medical Decision Making:   I have independently reviewed/ordered the following labs:    CBC with Differential:   Recent Labs     11/08/21 0518 11/09/21 0432   WBC 26.1* 21.8*   HGB 10.9* 11.5*   HCT 34.2* 36.0   * 134*   LYMPHOPCT 2* 10*   MONOPCT 0* 3     BMP:  Recent Labs     11/07/21  1558 11/08/21 0518 11/08/21 2052 11/09/21 0432   NA  --  143  --  148*   K  --  3.8  --  3.4*   CL  --  116*  --  120*   CO2  --  19*  --  19*   BUN  --  47*  --  40*   CREATININE  --  2.62*  --  1.68*   MG   < >  --  1.9 1.9    < > = values in this interval not displayed. Hepatic Function Panel:   Recent Labs     11/08/21 0518 11/09/21 0432   PROT 5.4* 5.8*   LABALBU 2.4* 2.3*   BILITOT 0.30 <0.15*   ALKPHOS 190* 193*   ALT 20 23   AST 26 26     No results for input(s): RPR in the last 72 hours. No results for input(s): HIV in the last 72 hours. No results for input(s): BC in the last 72 hours. Lab Results   Component Value Date    CREATININE 1.68 11/09/2021    GLUCOSE 143 11/09/2021       Detailed results: Thank you for allowing us to participate in the care of this patient. Please call with questions. This note is created with the assistance of a speech recognition program.  While intending to generate adocument that actually reflects the content of the visit, the document can still have some errors including those of syntax and sound a like substitutions which may escape proof reading. It such instances, actual meaningcan be extrapolated by contextual diversion.     Mariza Lira MD  Office: [phone]  Perfect serve / office [phone]

## 2021-11-10 NOTE — PLAN OF CARE
Problem: Falls - Risk of:  Goal: Will remain free from falls  Description: Will remain free from falls  Outcome: Ongoing  Note: No falls noted this shift. Patient ambulates with x1 staff assistance without difficulty. Bed kept in low position. Safe environment maintained. Bedside table & call light in reach. Uses call light appropriately when needing assistance. Goal: Absence of physical injury  Description: Absence of physical injury  Outcome: Ongoing     Problem: Skin Integrity:  Goal: Will show no infection signs and symptoms  Description: Will show no infection signs and symptoms  Outcome: Ongoing  Note: Skin is clean dry and intact. Goal: Absence of new skin breakdown  Description: Absence of new skin breakdown  Outcome: Ongoing     Problem: Pain:  Goal: Pain level will decrease  Description: Pain level will decrease  Outcome: Ongoing  Note: No pain at this time. 0/10 pain scale.     Goal: Control of acute pain  Description: Control of acute pain  Outcome: Ongoing  Goal: Control of chronic pain  Description: Control of chronic pain  Outcome: Ongoing     Problem: Musculor/Skeletal Functional Status  Goal: Highest potential functional level  Outcome: Ongoing  Goal: Absence of falls  Outcome: Ongoing

## 2021-11-10 NOTE — PROGRESS NOTES
[facility]    Physical Therapy Progress Note    Date: 11/10/21  Patient Name: Prabhu Polk       Room: 0062/5115-92  MRN: [id number]   Account: [de-identified]   : 1950  (72 y.o.)   Gender: female     Discharge Recommendations   Patient would benefit from continued therapy after discharge  Equipment Needed: No          Restrictions/Precautions: Fall Risk  Implants present? : Metal implants (left hip fracture surgery)   Past Medical History:  has a past medical history of Acid reflux, Bipolar affective ([address] Chronic headaches, and Hydro[address]us ([facility] Past Surgical History:   has a past surgical history that includes other surgical history; shunt revision; Cholecystectomy; Tonsillectomy; Hip fracture surgery (Left, 2017); hip pinning (Left, 2017); and Cystoscopy (Left, 2021). Restrictions/Precautions  Restrictions/Precautions: Fall Risk  Required Braces or Orthoses?: No  Implants present? : Metal implants (left hip fracture surgery)    Subjective: Pt perseverates frequently and needs frequently redirected. Comments: Pt is very challenging to keep on task. Per OMAIRA Red pt is MRDD and is from a group home. Pt is pleasant and very complimentary however pt does pose challenges to Plan of Care at times. Frequent re-wording to encourage pt to get up to bedside chair. 2 assist for safety. Vital Signs  Patient Currently in Pain: Yes  Pain Assessment: 0-10  Pain Level: 6  Pain Type: Chronic pain  Pain Location: Back  Non-Pharmaceutical Pain Intervention(s): Ambulation/Increased Activity; Distraction; Repositioned  Response to Pain Intervention: Patient Satisfied                Bed Mobility:   Rolling:  Moderate assistance (x2)  Supine to Sit: Moderate assistance (x2)  Sit to Supine: Unable to assess (pt left in bedside chair)  Scooting: Minimal assistance (to EOB)  Comment: HOB flat      Transfers:  Sit to Stand: 2 Person Assistance; Minimal Assistance  Stand to sit: 2 Person Assistance; Minimal Assistance  Bed to Chair: 2 Person Assistance; Minimal assistance (with RW)              Ambulation 1  Surface: level tile  Device: Rolling Walker  Assistance: Minimal assistance; 2 Person assistance  Quality of Gait: short step length, slightly forward flexed posture, unsteady movements  Gait Deviations: Slow Xenia; Decreased step length; Shuffles  Distance: 4ft  Comments: from bed to bedside chair, cues for sequencing, pt is pleasant and cooperative however needs extra encouragement to stay on task, easily distracted        Stairs/Curb  Stairs?: No (Pt does not have steps at group home)                                                     Posture: Fair  Sitting - Static: Fair  Sitting - Dynamic: Fair; -  Standing - Static: Poor; +  Standing - Dynamic: Poor  Comments: Seated EOB, standing with RW     Other exercises?: Yes  Other exercises 1: Dangle EOB 10-15 min. Other exercises 2: Bed Mobility x1  Other exercises 3: Transfer from bed to bedside chair with education           Activity Tolerance: Patient limited by cognitive status  PT Equipment Recommendations  Equipment Needed: No       Assessment  Activity Tolerance: Patient limited by cognitive status   Body structures, Functions, Activity limitations: Decreased functional mobility ;  Decreased balance; Decreased cognition; Decreased safe awareness  Assessment: Impaired mobility due to safety issues of decreased balance, cognition and safety awareness  Discharge Recommendations: Patient would benefit from continued therapy after discharge     Type of devices: Patient at risk for falls; Gait belt; Left in chair; Call light within reach (sister present)     Plan  Times per week: 6-7x/wk  Current Treatment Recommendations: Balance Training, Gait Training, Transfer Training    Patient Education  New Education Provided:  Plan of Care  Learner:patient  Method: demonstration and explanation       Outcome: needs reinforcement Goals  Short term goals  Time Frame for Short term goals: 4-5 days  Short term goal 1: bed mobility with Clive/CGA  Short term goal 2: transfers with Clive  Short term goal 3: ambulate with RW and Clive x 50ft    PT Individual Minutes  Time In: 1382  Time Out: 6858  Minutes: 30    Electronically signed by Supa Giles PTA on 11/10/21 at 10:38 AM EST

## 2021-11-11 PROBLEM — R65.20 SEVERE SEPSIS (HCC): Status: ACTIVE | Noted: 2021-11-07

## 2021-11-11 PROBLEM — J69.0 ASPIRATION PNEUMONITIS (HCC): Status: ACTIVE | Noted: 2021-11-11

## 2021-11-11 LAB
SARS-COV-2, RAPID: NOT DETECTED
SPECIMEN DESCRIPTION: NORMAL

## 2021-11-11 PROCEDURE — 92526 ORAL FUNCTION THERAPY: CPT

## 2021-11-11 PROCEDURE — 6360000002 HC RX W HCPCS

## 2021-11-11 PROCEDURE — 99222 1ST HOSP IP/OBS MODERATE 55: CPT | Performed by: NURSE PRACTITIONER

## 2021-11-11 PROCEDURE — 2580000003 HC RX 258

## 2021-11-11 PROCEDURE — 1200000000 HC SEMI PRIVATE

## 2021-11-11 PROCEDURE — 99233 SBSQ HOSP IP/OBS HIGH 50: CPT | Performed by: INTERNAL MEDICINE

## 2021-11-11 PROCEDURE — 6370000000 HC RX 637 (ALT 250 FOR IP)

## 2021-11-11 PROCEDURE — 99232 SBSQ HOSP IP/OBS MODERATE 35: CPT | Performed by: INTERNAL MEDICINE

## 2021-11-11 PROCEDURE — 97110 THERAPEUTIC EXERCISES: CPT

## 2021-11-11 PROCEDURE — 87635 SARS-COV-2 COVID-19 AMP PRB: CPT

## 2021-11-11 PROCEDURE — 97530 THERAPEUTIC ACTIVITIES: CPT

## 2021-11-11 PROCEDURE — 97116 GAIT TRAINING THERAPY: CPT

## 2021-11-11 RX ADMIN — QUETIAPINE FUMARATE 25 MG: 50 TABLET ORAL at 10:16

## 2021-11-11 RX ADMIN — HEPARIN SODIUM 5000 UNITS: 5000 INJECTION INTRAVENOUS; SUBCUTANEOUS at 23:04

## 2021-11-11 RX ADMIN — QUETIAPINE FUMARATE 50 MG: 50 TABLET ORAL at 20:52

## 2021-11-11 RX ADMIN — ASPIRIN 81 MG: 81 TABLET, CHEWABLE ORAL at 10:16

## 2021-11-11 RX ADMIN — VORTIOXETINE 5 MG: 5 TABLET, FILM COATED ORAL at 16:19

## 2021-11-11 RX ADMIN — BUSPIRONE HYDROCHLORIDE 5 MG: 5 TABLET ORAL at 10:16

## 2021-11-11 RX ADMIN — HEPARIN SODIUM 5000 UNITS: 5000 INJECTION INTRAVENOUS; SUBCUTANEOUS at 18:35

## 2021-11-11 RX ADMIN — TOPIRAMATE 100 MG: 100 TABLET, FILM COATED ORAL at 18:35

## 2021-11-11 RX ADMIN — LAMOTRIGINE 200 MG: 100 TABLET ORAL at 20:46

## 2021-11-11 RX ADMIN — BUSPIRONE HYDROCHLORIDE 5 MG: 5 TABLET ORAL at 20:46

## 2021-11-11 RX ADMIN — ATORVASTATIN CALCIUM 10 MG: 10 TABLET, FILM COATED ORAL at 10:16

## 2021-11-11 RX ADMIN — SODIUM CHLORIDE: 9 INJECTION, SOLUTION INTRAVENOUS at 10:48

## 2021-11-11 RX ADMIN — SODIUM CHLORIDE: 9 INJECTION, SOLUTION INTRAVENOUS at 20:52

## 2021-11-11 RX ADMIN — CEFTRIAXONE SODIUM 2000 MG: 2 INJECTION, POWDER, FOR SOLUTION INTRAMUSCULAR; INTRAVENOUS at 16:18

## 2021-11-11 ASSESSMENT — PAIN DESCRIPTION - FREQUENCY: FREQUENCY: CONTINUOUS

## 2021-11-11 ASSESSMENT — PAIN DESCRIPTION - PAIN TYPE
TYPE: CHRONIC PAIN
TYPE: CHRONIC PAIN

## 2021-11-11 ASSESSMENT — ENCOUNTER SYMPTOMS
ABDOMINAL PAIN: 1
SHORTNESS OF BREATH: 0

## 2021-11-11 ASSESSMENT — PAIN DESCRIPTION - DESCRIPTORS: DESCRIPTORS: CONSTANT

## 2021-11-11 ASSESSMENT — PAIN DESCRIPTION - PROGRESSION: CLINICAL_PROGRESSION: NOT CHANGED

## 2021-11-11 ASSESSMENT — PAIN SCALES - GENERAL
PAINLEVEL_OUTOF10: 3
PAINLEVEL_OUTOF10: 3

## 2021-11-11 NOTE — PROGRESS NOTES
[facility]: [facility]   INPATIENT OCCUPATIONAL THERAPY  PROGRESS NOTE  Date: 2021  Patient Name: Re Friedman      Room:   MRN: [id number]    : 1950  (72 y.o.) Gender: female     Discharge Recommendations:  Further Occupational Therapy is recommended upon facility discharge. Referring Practitioner: Tamera Londono MD  Diagnosis: Dehydration, UTI  General  Chart Reviewed: Yes, Progress Notes  Patient assessed for rehabilitation services?: Yes  Additional Pertinent Hx: The patient is a poor historian, history was taken from caregiver, printed medical records caregiver was carrying, and EMR. This is a 65-year-old female patient past medical history of bipolar disease, chronic headache, hydrocephalus s/p  shunt presented to ED due to change in behavior, and falling down caregiver mentioned that the patient was having abdominal pain 3 days ago prior to admission  denies any vomiting or nausea, patient communicates well at her baseline, she usually presents with change in behavior whenever she has UTI. Denies chills or fever. Response to previous treatment: Patient with no complaints from previous session  Family / Caregiver Present: No  Referring Practitioner: Tamera Londono MD  Diagnosis: Dehydration, UTI    Restrictions  Restrictions/Precautions: Fall Risk, Modified Diet, Swallowing - Thickened Liquids (Dysphagia Minced & Moist; Mildly thick liquids)  Implants present? : Metal implants (left hip fracture surgery)  Required Braces or Orthoses?: No      Subjective  Subjective: \"Because of the shunts, I have to sleep elevated\" Patient states to therapists.   Patient Currently in Pain: Yes  Pain Level: 3  Pain Location: Hand; Face; Neck        Pain Assessment  Pain Assessment: 0-10  Pain Level: 3  Pain Type: Chronic pain  Pain Location: Hand, Face, Neck  Pain Descriptors: Constant  Pain Frequency: Continuous  Clinical Progression: Not changed  Response to Pain Intervention: Patient Satisfied    Objective  Cognition  Overall Cognitive Status: Impaired  Arousal/Alertness: Delayed responses to stimuli  Attention Span: Difficulty attending to directions  Memory: Decreased short term memory; Decreased recall of recent events  Following Commands: Follows one step commands with repetition  Safety Judgement: Decreased awareness of need for safety  Awareness of Errors: Decreased awareness of errors  Insights: Decreased awareness of deficits  Sequencing and Organization: Assistance required to implement solutions; Assistance required to generate solutions; Assistance required to identify errors made  Bed mobility  Comment: Patient seated in chair at start and end of session  Balance  Sitting Balance: Stand by assistance  Standing Balance: Contact guard assistance     Functional Mobility  Functional - Mobility Device: Rolling Walker  Activity:  (to/from hallway and back)  Assist Level: Contact guard assistance  Functional Mobility Comments: with Minimal verbal cues for safety. Co-treat with PTA as patient required 2 Assist for safety yesterday, however patient demonstrates noted improvement this date   ADL  Feeding: Setup; Thickened liquids (minced & moist)  Grooming: Stand by assistance  UE Bathing: Stand by assistance  LE Bathing: Moderate assistance  UE Dressing: Minimal assistance  LE Dressing: Moderate assistance  Toileting: Dependent/Total (hoff catheter)  Additional Comments: ADL scores based on skilled observations and clinical reasoning unless noted. Patient demonstrates improved attention to task this date. Transfers  Sit to stand: Minimal assistance  Stand to sit: Contact guard assistance  Transfer Comments: with Minimal verbal cues for hand placement and safety                             Assessment  Performance deficits / Impairments: Decreased functional mobility ; Decreased ADL status;  Decreased strength; Decreased safe awareness; Decreased cognition; Decreased endurance; Decreased balance; Decreased high-level IADLs; Decreased fine motor control; Decreased coordination  Prognosis: Good  Discharge Recommendations: Patient would benefit from continued therapy after discharge  Activity Tolerance: Patient Tolerated treatment well  Safety Devices in place: Yes  Type of devices: All fall risk precautions in place; Call light within reach; Gait belt; Patient at risk for falls; Left in chair; Nurse notified; Chair alarm in place             Patient Education:  Patient Education: OT POC, progress toward POC, safety with self-care tasks  Learner:patient  Method: demonstration and explanation       Outcome: needs reinforcement and asked questions     Plan  Safety Devices  Safety Devices in place: Yes  Type of devices: All fall risk precautions in place, Call light within reach, Gait belt, Patient at risk for falls, Left in chair, Nurse notified, Chair alarm in place  Plan  Times per week: 5-7  Times per day: Twice a day  Current Treatment Recommendations: Self-Care / ADL, Home Management Training, Strengthening, Balance Training, Functional Mobility Training, Endurance Training, Pain Management, Safety Education & Training, Patient/Caregiver Education & Training, Equipment Evaluation, Education, & procurement, Cognitive/Perceptual Training      Goals  Short term goals  Time Frame for Short term goals: By discharge  Short term goal 1: Patient will verbalize/demonstrate Good understanding of Fall Prevention Strategies for increased safety and IND with self-care and mobility. Short term goal 2: Patient will perform BADLs with SBA and Fair safety. Short term goal 3: Patient will perform functional mobility and transfers with SBA, RW, and Fair safety.   Short term goal 4: Patient will actively participate in 15+ minutes of self-care   Short term goal 5: Patient will actively participate in 15-25 minutes of therapeutic exercise/functional activities to promote increased IND with self-care and mobility.     OT Individual Minutes  Time In: 5551  Time Out: 8996  Minutes: 17      Electronically signed by Mariaelena Nolasco OT on 11/11/21 at 3:37 PM EST

## 2021-11-11 NOTE — PROGRESS NOTES
Pt is accepted at [facility] for the remaining days of IV antibiotics. SW informed Shanti Phelan [phone] from the group home. She would like pt's discharge papers faxed to them as well as [address] [place] [address] [place]. [phone]. Upon discharge call Vivienne Tehuacana at [address] [place] [address]. [phone]. Fax orders to [phone]. The 7000 is started in [place]. Please call pt's sister (also her guardian) [phone]. Pt is agreeable to the above plan. (Pt has a negative covid test).

## 2021-11-11 NOTE — FLOWSHEET NOTE
11/11/21 1112   Encounter Summary   Services provided to: Patient   Referral/Consult From: Palliative Care   Complexity of Encounter Low   Length of Encounter 15 minutes   Spiritual/Church   Type Spiritual support   Assessment Sleeping   Intervention Prayer   Outcome Did not respond

## 2021-11-11 NOTE — PROGRESS NOTES
[facility] -   Infectious diseases evaluation  admission date 11/7/2021    reason for consultation:   Bacteremia/UTI    Impression :   Current:  · UTI  · E. coli bacteremia secondary to above  · Nephrolithiasis  · Hydronephrosis  · Possible pneumonia  · Dementia      Recommendations   · IV Ceftriaxone 2 g daily through 11/17/21  · Midline   · Swallow study yesterday showed no aspiration  · P.o. doxycycline through 11/17/2021  · Marmolejo not to be removed by urology   · No objection for discharge from infectious disease point of view          History of Present Illness:   Initial history: Hedy Bartlett is a 66y.o.-year-old female who lives in a group home was brought to the hospital with worsening confusion, constant, severe for 3 days prior to admission associated with decreased appetite. No alleviating or aggravating factors. The patient reportedly fell at the group home. The patient had recent UTI was started on Cipro with no improvement. CT was performed and showed left-sided obstructing stone with resulting hydronephrosis. Urinalysis suggestive of UTI  Urine and blood cultures grew E. Coli  She was evaluated by urology  Marmolejo catheter in place  Interval changes  11/11/2021   Confused, unable to provide history, urine catheter in place with clear urine, tolerating diet, no reported fever. The patient has been refusing labs draw  Chest x-ray from yesterday reviewed showed new opacity right side. Patient Vitals for the past 8 hrs:   BP Temp Temp src Pulse Resp SpO2   11/11/21 0702 (!) 148/73 98.1 °F (36.7 °C) Oral 84 16 100 %           I have personally reviewed the past medical history, past surgical history, medications, social history, and family history, and I haveupdated the database accordingly.       Allergies:   Levaquin [levofloxacin in d5w], Adhesive tape, Chocolate, Indomethacin, and Neurontin [gabapentin]     Review of Systems:     Review of Systems  Confused, unable to provide  Physical Examination :       Physical Exam  Constitutional:       General: She is not in acute distress. HENT:      Head: Normocephalic and atraumatic. Right Ear: External ear normal.      Left Ear: External ear normal.      Mouth/Throat:      Mouth: Mucous membranes are moist.      Pharynx: Oropharynx is clear. Eyes:      General: No scleral icterus. Conjunctiva/sclera: Conjunctivae normal.   Cardiovascular:      Rate and Rhythm: Normal rate and regular rhythm. Heart sounds: No murmur heard. Pulmonary:      Effort: Pulmonary effort is normal. No respiratory distress. Breath sounds: Rhonchi present. Abdominal:      General: There is no distension. Palpations: Abdomen is soft. Tenderness: There is no abdominal tenderness. Musculoskeletal:      Cervical back: Neck supple. No rigidity. Right lower leg: No edema. Left lower leg: No edema. Skin:     Findings: No bruising or erythema. Neurological:      Mental Status: She is alert.       Comments: Confused         Past Medical History:     Past Medical History:   Diagnosis Date    Acid reflux     Bipolar affective ([address]     Chronic headaches     Hydrocephalus (HCC)        Past Surgical  History:     Past Surgical History:   Procedure Laterality Date    CHOLECYSTECTOMY      CYSTOSCOPY Left 11/7/2021    CYSTOSCOPY URETERAL STENT INSERTION performed by Deven Pop MD at [address] Left 06/09/2017    PINNING AND SCREW    HIP PINNING Left 6/9/2017    HIP PINNING 7.3 CANNULATED SCREW WITH SYNTHES PRODUCT APPLICATION AND INTRAOPERATIVE C-ARM performed by Meredith Albright MD at [facility] 6      shunt x3 head    SHUNT REVISION      TONSILLECTOMY         Medications:      cefTRIAXone (ROCEPHIN) IV  2,000 mg IntraVENous Q24H    influenza virus vaccine  0.5 mL IntraMUSCular Prior to discharge    aspirin  81 mg Oral Daily    heparin (porcine)  5,000 Units SubCUTAneous 3 times per day    atorvastatin  10 mg Oral Daily    busPIRone  5 mg Oral BID    lamoTRIgine  100 mg Oral Daily    lamoTRIgine  200 mg Oral Nightly    QUEtiapine  25 mg Oral Daily    QUEtiapine  50 mg Oral Nightly    topiramate  100 mg Oral QPM    VORTIoxetine  5 mg Oral Daily       Social History:     Social History     Socioeconomic History    Marital status: Single     Spouse name: Not on file    Number of children: Not on file    Years of education: Not on file    Highest education level: Not on file   Occupational History    Not on file   Tobacco Use    Smoking status: Never Smoker    Smokeless tobacco: Never Used   Vaping Use    Vaping Use: Never used   Substance and Sexual Activity    Alcohol use: No    Drug use: No    Sexual activity: Not on file   Other Topics Concern    Not on file   Social History Narrative    Not on file     Social Determinants of Health     Financial Resource Strain:     Difficulty of Paying Living Expenses: Not on file   Food Insecurity:     Worried About Running Out of Food in the Last Year: Not on file    Myles of Food in the Last Year: Not on file   Transportation Needs:     Lack of Transportation (Medical): Not on file    Lack of Transportation (Non-Medical):  Not on file   Physical Activity:     Days of Exercise per Week: Not on file    Minutes of Exercise per Session: Not on file   Stress:     Feeling of Stress : Not on file   Social Connections:     Frequency of Communication with Friends and Family: Not on file    Frequency of Social Gatherings with Friends and Family: Not on file    Attends Protestant Services: Not on file    Active Member of Clubs or Organizations: Not on file    Attends Club or Organization Meetings: Not on file    Marital Status: Not on file   Intimate Partner Violence:     Fear of Current or Ex-Partner: Not on file    Emotionally Abused: Not on file    Physically Abused: Not on file  Sexually Abused: Not on file   Housing Stability:     Unable to Pay for Housing in the Last Year: Not on file    Number of Places Lived in the Last Year: Not on file    Unstable Housing in the Last Year: Not on file       Family History:   History reviewed. No pertinent family history. Medical Decision Making:   I have independently reviewed/ordered the following labs:    CBC with Differential:   Recent Labs     11/09/21 0432   WBC 21.8*   HGB 11.5*   HCT 36.0   *   LYMPHOPCT 10*   MONOPCT 3     BMP:  Recent Labs     11/08/21 2052 11/09/21 0432   NA  --  148*   K  --  3.4*   CL  --  120*   CO2  --  19*   BUN  --  40*   CREATININE  --  1.68*   MG 1.9 1.9     Hepatic Function Panel:   Recent Labs     11/09/21 0432   PROT 5.8*   LABALBU 2.3*   BILITOT <0.15*   ALKPHOS 193*   ALT 23   AST 26     No results for input(s): RPR in the last 72 hours. No results for input(s): HIV in the last 72 hours. No results for input(s): BC in the last 72 hours. Lab Results   Component Value Date    CREATININE 1.68 11/09/2021    GLUCOSE 143 11/09/2021       Detailed results: Thank you for allowing us to participate in the care of this patient. Please call with questions. This note is created with the assistance of a speech recognition program.  While intending to generate adocument that actually reflects the content of the visit, the document can still have some errors including those of syntax and sound a like substitutions which may escape proof reading. It such instances, actual meaningcan be extrapolated by contextual diversion.     Lakia Gardner MD  Office: [phone]  Perfect [employer] / office [phone]

## 2021-11-11 NOTE — CARE COORDINATION
ONGOING DISCHARGE PLAN:    Patient is alert and oriented to self. Sitting up in chair. Per RN patient did pass swallow study today. Spoke with patient regarding discharge plan and patient confirms that plan is to go to SNF got a week or so for IV ATB Ceftriaxone 2 gm IV Daily until 11/17/2021 per ID rec. . Pt had midline placed. LSW notified. Will continue to follow for additional discharge needs.     Electronically signed by Yuly Garcia RN on 11/11/2021 at 2:36 PM

## 2021-11-11 NOTE — PROGRESS NOTES
[facility]    PROGRESS NOTE             11/11/2021    7:48 AM    Name:   Prabhu Polk  MRN:     [id number]     Acct:      [de-identified]   Room:   2060/2060-01  IP Day:  4  Admit Date:  11/7/2021  2:33 PM    PCP:  Federico Matthews MD  Code Status:  Full Code    Subjective:     C/C:   Chief Complaint   Patient presents with    Altered Mental Status     Gradual decline in mental status. Had a fall, but was caught. Lives at a group home. Caregiver is here with her.  Fall     Interval History Status: improved. Patient was seen and examined, doing better, was not oriented to place, urine cath draining 100 ml, labs were not drown today as the patient was a recent transfer from [facility]  Patient tolerates thin liquid, moist and minced food. Vital signs are stable  Voiding trial was not done, awaiting urology input about the hoff cathter. ID saw patient yesterday recommended a PICC line, was inserted in right basilic  Patient is on IV Rocephin might require p.o.   CXR yesterday: New parenchymal opacity in the medial right lower chest, pro-kandice is pending   might add doxycycline for 5 days for aspiration pneumonia     Brief History:     The patient is a 71 y.o.  Non- / non  female who presents withAltered Mental Status (Gradual decline in mental status.  Had a fall, but was caught. Bobbi Cuellar at a group home. Solomon Slider is here with her.  ) and Fall   and she is admitted to the hospital for the management of  Urosepsis      The patient is a poor historian, history was taken from caregiver, printed medical records caregiver was carrying, and EMR.  This is a 76-year-old female patient past medical history of bipolar disease, chronic headache, hydrocephalus s/p  shunt presented to ED due to change in behavior, and falling down caregiver mentioned that the patient was having abdominal pain 3 days ago prior to admission  denies any vomiting or nausea, patient communicates well at her baseline, she usually presents with change in behavior whenever she has UTI. Denies chills or fever.         UA: Bacteruria, large leukocytes esterase   Urine culture  Positive for lactose fermenting gram-negative rods  Blood culture positive for E. Coli     X-ray shunt series: Multiple shunt catheters overlying the chest and abdomen have a grossly   stable radiographic appearance when compared to 06/03/2020. Unchanged left pleural effusion and left basilar opacity.      CT abdomen: Moderately severe left hydronephrosis secondary to a 10 mm stone in the left UPJ. Also multiple left intrarenal stones. Stable left parapelvic renal cyst.  Atrophic Right kidney    Review of Systems:     Review of Systems   Unable to perform ROS: Other   Respiratory: Negative for shortness of breath. Gastrointestinal: Positive for abdominal pain. Medications: Allergies: Allergies   Allergen Reactions    Levaquin [Levofloxacin In D5w] Other (See Comments)     The group home has this on their allergy list, but without what effects to the Pt. Pt doesn't know.     Adhesive Tape     Chocolate     Indomethacin     Neurontin [Gabapentin]        Current Meds:   Scheduled Meds:    cefTRIAXone (ROCEPHIN) IV  2,000 mg IntraVENous Q24H    influenza virus vaccine  0.5 mL IntraMUSCular Prior to discharge    aspirin  81 mg Oral Daily    heparin (porcine)  5,000 Units SubCUTAneous 3 times per day    atorvastatin  10 mg Oral Daily    busPIRone  5 mg Oral BID    lamoTRIgine  100 mg Oral Daily    lamoTRIgine  200 mg Oral Nightly    QUEtiapine  25 mg Oral Daily    QUEtiapine  50 mg Oral Nightly    topiramate  100 mg Oral QPM    topiramate  50 mg Oral Daily    VORTIoxetine  5 mg Oral Daily     Continuous Infusions:    sodium chloride 100 mL/hr at 11/10/21 0241    sodium chloride      dextrose       PRN Meds: acetaminophen **OR** acetaminophen, butalbital-APAP-caffeine, SUMAtriptan, sodium chloride flush, sodium chloride, ondansetron **OR** ondansetron, polyethylene glycol, potassium chloride **OR** potassium alternative oral replacement **OR** potassium chloride, magnesium sulfate, glucose, dextrose, glucagon (rDNA), dextrose    Data:     Past Medical History:   has a past medical history of Acid reflux, Bipolar affective ([facility] [address] Chronic headaches, and Hydrocephalus ([facility] [address] Social History:   reports that she has never smoked. She has never used smokeless tobacco. She reports that she does not drink alcohol and does not use drugs. Family History: History reviewed. No pertinent family history. Vitals:  BP (!) 148/73   Pulse 84   Temp 98.1 °F (36.7 °C) (Oral)   Resp 16   Ht 5' 1\" (1.549 m)   Wt 147 lb 3.2 oz (66.8 kg)   SpO2 100%   BMI 27.81 kg/m²   Temp (24hrs), Av.7 °F (36.5 °C), Min:97.5 °F (36.4 °C), Max:98.1 °F (36.7 °C)    No results for input(s): POCGLU in the last 72 hours. I/O(24Hr): Intake/Output Summary (Last 24 hours) at 2021 0748  Last data filed at 2021 0630  Gross per 24 hour   Intake 240 ml   Output 1725 ml   Net -1485 ml       Labs:  No labs yet     Lab Results   Component Value Date/Time    SPECIAL NOT REPORTED 2021 05:21 PM     Lab Results   Component Value Date/Time    CULTURE ESCHERICHIA COLI >[id number] CFU/ML (A) 2021 05:21 PM         Radiology:    CT ABDOMEN PELVIS WO CONTRAST Additional Contrast? None    Result Date: 2021  EXAMINATION: CT OF THE ABDOMEN AND PELVIS WITHOUT CONTRAST 2021 12:16 pm TECHNIQUE: CT of the abdomen and pelvis was performed without the administration of intravenous contrast. Multiplanar reformatted images are provided for review. Dose modulation, iterative reconstruction, and/or weight based adjustment of the mA/kV was utilized to reduce the radiation dose to as low as reasonably achievable.  COMPARISON: 2019 HISTORY: ORDERING SYSTEM PROVIDED HISTORY: ams, constipation TECHNOLOGIST PROVIDED HISTORY: ams, constipation Decision Support Exception - unselect if not a suspected or confirmed emergency medical condition->Emergency Medical Condition (MA) Reason for Exam: AMS, constipation Acuity: Unknown Type of Exam: Initial FINDINGS: Lower chest: Dependent atelectasis or consolidation in the left lower lobe is noted. Moderate size pleural effusion is noted with pleural thickening. Findings are chronic however stable Sequela of old posterior left rib fractures and lateral right rib fractures. No acute osseous abnormality identified. ABDOMEN AND PELVIS: Organs: Evaluation of the abdominal and pelvic viscera is limited in the absence of contrast.  A calcification is noted in the posterior dome of the right hepatic lobe. The liver otherwise reveals no focal abnormality. The gallbladder is not visualized and may be surgically absent. The pancreas, spleen appear grossly unremarkable. 11 mm stable left adrenal adenoma . There is also a 6 mm right adrenal nodule, unchanged. Moderately severe left hydronephrosis with 10 mm stone in the left UPJ. Also multiple left intrarenal stones. Stable left parapelvic renal cysts. Atrophic right kidney renal stones and resolving hydronephrosis GI/Bowel: No bowel dilatation or wall thickening identified. Mild stool burden throughout the colon. Diverticulosis. Pelvis: The bladder is well distended. No wall thickening or inflammatory change identified. Peritoneum/Retroperitoneum: A tunneled peritoneal catheter terminates in the left lower quadrant. No ascites or free air. The aorta is normal in caliber. Calcified atheromatous plaque is present. No enlarged or suspicious-appearing lymph nodes. Bones/Soft Tissues: Nondisplaced posterior left acetabular fracture. Comminuted nondisplaced medial acetabular fracture. There is also a minimally displaced comminuted left inferior pubic ramus fracture.   Chronic appearing severe L4 differentiation maintained; no obvious acute infarct. ORBITS: The visualized portion of the orbits demonstrate no acute abnormality. SINUSES: The visualized paranasal sinuses and mastoid air cells demonstrate no acute abnormality; mild mucosal thickening noted left sphenoid sinus involving anterior left ethmoid air cells. No air-fluid level. Phylicia Vergara SOFT TISSUES/SKULL:  No acute abnormality of the visualized skull or soft tissues. No acute intracranial abnormality. 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 XR CHEST PORTABLE    Result Date: 11/10/2021  EXAMINATION: ONE XRAY VIEW OF THE CHEST 11/10/2021 10:39 am COMPARISON: None. HISTORY: ORDERING SYSTEM PROVIDED HISTORY: Aspiration TECHNOLOGIST PROVIDED HISTORY: Aspiration Reason for Exam: Aspiration Acuity: Unknown Type of Exam: Unknown FINDINGS: Heart appears upper limits of normal in size. Chronic left pleural effusion with blunting of the left costophrenic angle. Patient is slightly rotated to the left. Hazy parenchymal opacity is noted in the medial right lower lobe compatible with atelectasis or pneumonia. Generalized osteopenia. Stable left pleural catheter,  shunt tube. New parenchymal opacity in the medial right lower chest compatible with atelectasis and/or pneumonia. Chronic left pleural effusion and mild volume loss. FLUORO FOR SURGICAL PROCEDURES    Result Date: 11/7/2021  Radiology exam is complete. No Radiologist dictation. Please follow up with ordering provider. XR Shunt Placement Series    Result Date: 11/7/2021  EXAMINATION: SHUNT SERIES 11/7/2021 3:02 pm COMPARISON: Chest x-ray dated 09/20/2021. Chest series dated 06/03/2020. Skull x-ray dated 06/08/2017.  HISTORY: ORDERING SYSTEM PROVIDED HISTORY: AMS,  shunt TECHNOLOGIST PROVIDED HISTORY: AMS,  shunt Reason for Exam: AMS,  shunt Acuity: Acute Type of Exam: Initial FINDINGS: There are multiple shunt catheters overlying the chest.  There is 1 extending from the lower left neck over the right chest with distal tip in the right upper quadrant, not significantly changed, likely abandoned. There are 2 additional shunt catheter radiodensities overlying the left chest, which are unchanged as well. 1 of these appears to terminate overlying a left pleural effusion and the other extends into the abdomen. Heart size is stable. There is persistent left pleural effusion and left basilar opacity. No evidence of pneumothorax. In the abdomen, distal tip of 1 of the left-sided shunt catheters is overlying the left lower quadrant, which is unchanged. There is a nonspecific, nonobstructed bowel gas pattern. Moderate fecal material in the right side of the colon. Fixation hardware in both hips. Multiple components of shunt catheters are seen overlying the left calvarium, unchanged. Hearing aid device is noted. 1.  Multiple shunt catheters overlying the chest and abdomen have a grossly stable radiographic appearance when compared to 06/03/2020. 2.  Unchanged left pleural effusion and left basilar opacity. FL MODIFIED BARIUM SWALLOW W VIDEO    Result Date: 11/11/2021  EXAMINATION: MODIFIED BARIUM SWALLOW WAS PERFORMED IN CONJUNCTION WITH SPEECH PATHOLOGY SERVICES TECHNIQUE: Fluoroscopic evaluation of the swallowing mechanism was performed using cineradiography with multiple consistency of barium product in conjunction with speech pathology services. FLUOROSCOPY DOSE AND TYPE OR TIME AND EXPOSURES: Fluoro time 3:23 .1dgy. cm2 AIR KERMA 49. 1mGy COMPARISON: None HISTORY: ORDERING SYSTEM PROVIDED HISTORY: Aspiration on liquid diet FINDINGS: Premature vallecular spillage and moderate vallecular and piriform stasis with multiple consistencies. Transient penetration is seen with pudding as well as honey thick, nectar thick and thin liquid consistencies.  No evidence of aspiration. No evidence of aspiration. Transient penetration with multiple consistencies. Please see separate speech pathology report for full discussion of findings and recommendations. FL MODIFIED BARIUM SWALLOW W VIDEO    Result Date: 11/9/2021  EXAMINATION: MODIFIED BARIUM SWALLOW WAS PERFORMED IN CONJUNCTION WITH SPEECH PATHOLOGY SERVICES 11/09/2021 TECHNIQUE: Fluoroscopic evaluation of the swallowing mechanism was performed using cineradiography with multiple consistency of barium product in conjunction with speech pathology services. FLUOROSCOPY DOSE AND TYPE OR TIME AND EXPOSURES: 1 minute 19 seconds; D AP 82.5 dGy cm2 COMPARISON: None HISTORY: ORDERING SYSTEM PROVIDED HISTORY: Chocking on liquid diet TECHNOLOGIST PROVIDED HISTORY: Chocking on liquid diet Reason for Exam: Chocking on liquid diet Acuity: Acute Type of Exam: Ongoing Additional signs and symptoms: Chocking on liquid diet FINDINGS: Very limited exam as the patient refused to participate. The patient ingested a small quantity of pudding consistency material with premature vallecular spillage and residue; swallowing mechanism was not initiated over the course of the study. No definite aspiration is seen although the patient did not initiate a swallow. Recommend repeat exam when the patient is more cooperative. Nondiagnostic exam as described above. The patient requested termination of the procedure. Recommend repeat study when the patient is willing to cooperate. Physical Examination:        Physical Exam  Constitutional:       General: She is not in acute distress. Appearance: She is not ill-appearing. Cardiovascular:      Rate and Rhythm: Regular rhythm. Tachycardia present. Pulses: Normal pulses. Heart sounds: Normal heart sounds. No murmur heard. No friction rub. No gallop. Pulmonary:      Effort: Pulmonary effort is normal. No respiratory distress. Breath sounds: Rales present.  No wheezing or rhonchi. Abdominal:      General: Abdomen is flat. Palpations: Abdomen is soft. Tenderness: There is no abdominal tenderness. Musculoskeletal:      Right lower leg: No edema. Left lower leg: No edema. Neurological:      Mental Status: Mental status is at baseline. Psychiatric:         Mood and Affect: Mood is anxious. Speech: She is noncommunicative. Assessment:        Primary Problem  <principal problem not specified>    Active Hospital Problems    Diagnosis Date Noted    Acute kidney injury superimposed on CKD ([facility] [address] [N17.9, N18.9] 11/08/2021    UTI (urinary tract infection) [N39.0] 11/07/2021    Sepsis ([facility] [address] [A41.9] 11/07/2021    Hydronephrosis of left kidney [N13.30] 11/07/2021    Bipolar mood disorder ([facility] [address] [F31.9] 11/07/2021    S/P  shunt [Z98.2] 12/01/2020       Plan:          Duanne Hallmark- resolving   Initial vitals: RR 22,   WBC 19.6>26>21>  Lactate 1  Pro-Mckinley 11.55, XEW 244  UA: Bacteria, leukocyte esterase  Urine culture Lactose fermenting gram negative rods   Blood culture E. coli  125 mL/H NS  IV Rocephin day 4   ID on board  PICC line on right basilic vein     Aspiration pneumonitis   Vitals stable  Pro mckinley pending  CXR: New parenchymal opacity in the medial right lower chest.    Left UPJ stone with hydronephrosis S/P ureteral stent   CT abdomen, pelvis: Hydronephrosis with in the left UPJ  Left intrarenal stones  Urology on board  double-J ureteral stent was placed 11/7   Will need out patient follow up with urology   Voiding trial not done yet  Awaiting urology recommendations       Acute kidney injury on CKD Stage 3a GFR 45-59  Underlying sepsis and UTI   Cr: 2.26  Baseline 0.90  125 ml/h NS      Bipolar disorder  Quetiapine 25 mg p.o. daily  Quetiapine 50 mg p.o. nightly  Lamotrigine 100 mg p.o. daily  Lamotrigine 200 mg p.o. nightly  Buspirone 5 mg p.o. Vortioxetine 5 mg p.o.      Chronic headache  Sumatriptan 100 mg p.o.   Topiramate 50 mg

## 2021-11-11 NOTE — PROGRESS NOTES
level tile  Device: Rolling Walker  Assistance: Contact guard assistance  Quality of Gait: slow pace, good posture, narrow NADIYA  Gait Deviations: Slow Xenia; Decreased step length  Distance: 25ftx2  Comments: cues to stay inside RW for safety        Stairs/Curb  Stairs?: No (Pt does not have steps at group home)                                                     Posture: Fair  Sitting - Static: Fair  Sitting - Dynamic: Fair; -  Standing - Static: Fair; +  Standing - Dynamic: Fair; -  Comments: Seated bedside chair, standing with RW     Other exercises?: Yes  Other exercises 1: Seated bilat LE exercise, x15           Activity Tolerance: Patient limited by cognitive status  PT Equipment Recommendations  Equipment Needed: No       Assessment  Activity Tolerance: Patient limited by cognitive status   Body structures, Functions, Activity limitations: Decreased functional mobility ; Decreased balance; Decreased cognition; Decreased safe awareness  Assessment: Impaired mobility due to safety issues of decreased balance, cognition and safety awareness  Discharge Recommendations: Patient would benefit from continued therapy after discharge     Type of devices: Patient at risk for falls; Gait belt; Left in chair; Call light within reach;  Chair alarm in place     Plan  Times per week: 6-7x/wk  Current Treatment Recommendations: Balance Training, Gait Training, Transfer Training    Patient Education  New Education Provided:  Plan of Care  Learner:patient  Method: demonstration and explanation       Outcome: needs reinforcement     Goals  Short term goals  Time Frame for Short term goals: 4-5 days  Short term goal 1: bed mobility with Clive/CGA  Short term goal 2: transfers with Clive  Short term goal 3: ambulate with RW and Clive x 50ft    PT Individual Minutes  Time In: 1400  Time Out: 0669  Minutes: 25    Electronically signed by Kiarra Corley PTA on 11/11/21 at 4:32 PM EST

## 2021-11-11 NOTE — PLAN OF CARE
Note to follow- Patient reports having hard stools and poor appetite. Sister is Legal Guardian, and reports patient will go to SNF x1 week for ATB's at [place] then back to group home. She reports that patient would want to be a full code, but would never want to live if had a anoxic brain injury ect. We discussed palliative services outpatient, and will follow.

## 2021-11-11 NOTE — PROGRESS NOTES
5 days or 15 hours over 7 days. [] Further therapy recommended at discharge. [] No therapy recommended at discharge. Treatment completed by:  DENIS Dee, M.A., [id number] [facility]

## 2021-11-11 NOTE — CONSULTS
..    Palliative Care Inpatient Consult    NAME:  Bernardino Bob  MEDICAL RECORD NUMBER:  [id number]  AGE: 66 y.o. GENDER: female  : 1950  TODAY'S DATE:  2021    Reasons for Consultation:    Symptom and/or pain management  Provision of information regarding PC and/or hospice philosophies  Complex, time-intensive communication and interdisciplinary psychosocial support  Clarification of goals of care and/or assistance with difficult decision-making  Guidance in regards to resources and transition(s)    Members of PC team contributing to this consultation are :  Pedro Grajeda, Palliative Care APRN- CNP  History of Present Illness     The patient is a 66 y.o. Non- / non  female who presents with Altered Mental Status (Gradual decline in mental status. Had a fall, but was caught. Lives at a group home. Caregiver is here with her.  ) and Fall      Referred to Palliative Care by   [x] Physician   [] Nursing  [] Family Request   [] Other:       She was admitted to the primary service for Dehydration [E86.0]  UTI (urinary tract infection) [N39.0]  Hydronephrosis with renal and ureteral calculus obstruction [N13.2]  Severe sepsis ([address] [A41.9, R65.20]  Altered mental status, unspecified altered mental status type [R41.82]. Her hospital course has been associated with UTI (urinary tract infection), E. coli bacteremia, dehydration, nephrolithiasis, ARYAN, hydronephrosis, possible aspiration pneumonia, and dementia. The patient has a complicated medical history and has been hospitalized since 2021  2:33 PM.  I reviewed patient's EMR, spoke to RN, and patient/patient's sister to collect history. Patient has a history of hydrocephalus s/p shunt, chronic headaches, GERD, and bipolar affective disorder. Patient presented to the emergency room with complaints of altered mental status, and a fall. Caregiver was with her, and reported that she was caught when falling.   Patient lives in group home.  Patient's admitting pertinent labs included; WBC 19.6, hemoglobin 11.7, platelets 844, BUN 59, creatinine 2.91, potassium 3.6, albumin 3.4, troponin 28, .9, procalcitonin 11.55, and UA with large leukocytes/trace hemoglobin/moderate bacteria. Shunt x-ray revealed multiple shunt catheters overlying the chest and abdomen have a grossly stable radiographic appearance. Unchanged left pleural effusion and left basilar opacity. CT head revealed no acute intracranial abnormality. Multiple unchanged intraventricular shunts with stable ventricular size, old right side infarct changes, and white matter abnormalities. CT abdomen/pelvis revealed moderately severe left hydronephrosis secondary to a 10 mm stone in the left UPJ. Also multiple left intrarenal stones, and stable left parapelvic renal cyst.  Atrophic right kidney renal stones and resolving hydronephrosis. EKG revealed ST. Protocol with fluids and IV antibiotics. Urology consulted, and patient went for cystoscopy with left-sided stent, and Marmolejo placement. ID consulted due to positive blood cultures. Urine culture revealed E. coli >100,000. Blood cultures were positive for E. Coli. Chest x-ray on the 10th revealed new parenchymal opacity in the right medial lower chest.  Swallow study did not show aspiration. Patient is on thin liquids and moist and minced dysphagia diet. Patient is a full code. Palliative care consulted for review of goals, and support. Covid swab negative.     Active Hospital Problems    Diagnosis Date Noted    Aspiration pneumonitis ([address] [J69.0] 11/11/2021    Acute kidney injury superimposed on CKD ([address] [N17.9, N18.9] 11/08/2021    UTI (urinary tract infection) [N39.0] 11/07/2021    Sepsis ([address] [A41.9] 11/07/2021    Hydronephrosis of left kidney [N13.30] 11/07/2021    Bipolar mood disorder ([address] [F31.9] 11/07/2021    S/P  shunt [Z98.2] 12/01/2020       PAST MEDICAL HISTORY      Diagnosis Date    Acid reflux  Bipolar affective [facility]     Chronic headaches     Hydrocephalus ([facility]        PAST SURGICAL HISTORY  Past Surgical History:   Procedure Laterality Date    CHOLECYSTECTOMY      CYSTOSCOPY Left 11/7/2021    CYSTOSCOPY URETERAL STENT INSERTION performed by Landen Peralta MD at [address] [address] Left 06/09/2017    PINNING AND SCREW    HIP PINNING Left 6/9/2017    HIP PINNING 7.3 CANNULATED SCREW WITH SYNTHES PRODUCT APPLICATION AND INTRAOPERATIVE C-ARM performed by Salma Rubio MD at [address]      shunt x3 head    SHUNT REVISION      TONSILLECTOMY         SOCIAL HISTORY  Social History     Tobacco Use    Smoking status: Never Smoker    Smokeless tobacco: Never Used   Vaping Use    Vaping Use: Never used   Substance Use Topics    Alcohol use: No    Drug use: No       ALLERGIES  Allergies   Allergen Reactions    Levaquin [Levofloxacin In D5w] Other (See Comments)     The group home has this on their allergy list, but without what effects to the Pt. Pt doesn't know.     Adhesive Tape     Chocolate     Indomethacin     Neurontin [Gabapentin]          MEDICATIONS  Current Medications    cefTRIAXone (ROCEPHIN) IV  2,000 mg IntraVENous Q24H    influenza virus vaccine  0.5 mL IntraMUSCular Prior to discharge    aspirin  81 mg Oral Daily    heparin (porcine)  5,000 Units SubCUTAneous 3 times per day    atorvastatin  10 mg Oral Daily    busPIRone  5 mg Oral BID    lamoTRIgine  100 mg Oral Daily    lamoTRIgine  200 mg Oral Nightly    QUEtiapine  25 mg Oral Daily    QUEtiapine  50 mg Oral Nightly    topiramate  100 mg Oral QPM    VORTIoxetine  5 mg Oral Daily     acetaminophen **OR** acetaminophen, butalbital-APAP-caffeine, SUMAtriptan, sodium chloride flush, sodium chloride, ondansetron **OR** ondansetron, polyethylene glycol, potassium chloride **OR** potassium alternative oral replacement **OR** potassium chloride, magnesium sulfate, glucose, dextrose, glucagon (rDNA), dextrose  IV Drips/Infusions   sodium chloride 100 mL/hr at 11/11/21 1048    sodium chloride      dextrose       Home Medications  No current facility-administered medications on file prior to encounter. Current Outpatient Medications on File Prior to Encounter   Medication Sig Dispense Refill    ciprofloxacin (CIPRO) 500 MG tablet Take 500 mg by mouth 2 times daily      fluticasone (FLONASE) 50 MCG/ACT nasal spray 1 spray by Each Nostril route daily      loratadine (CLARITIN) 10 MG capsule Take 10 mg by mouth daily      omeprazole (PRILOSEC) 20 MG delayed release capsule Take 20 mg by mouth daily      QUEtiapine (SEROQUEL) 25 MG tablet Take 25 mg by mouth daily      topiramate (TOPAMAX) 100 MG tablet Take 100 mg by mouth every evening      lamoTRIgine (LAMICTAL) 200 MG tablet Take 200 mg by mouth nightly      melatonin 5 MG TBDP disintegrating tablet Take 5 mg by mouth nightly      QUEtiapine (SEROQUEL) 25 MG tablet Take 50 mg by mouth nightly      topiramate (TOPAMAX) 50 MG tablet Take 1 tablet by mouth daily 60 tablet 3    Misc Natural Products (GLUCOSAMINE CHONDROITIN ADV PO) Take 1 tablet by mouth daily      busPIRone (BUSPAR) 5 MG tablet Take 5 mg by mouth 2 times daily      alendronate (FOSAMAX) 70 MG tablet Take 70 mg by mouth every 7 days Every Wednesday      atorvastatin (LIPITOR) 10 MG tablet Take 10 mg by mouth daily      amLODIPine (NORVASC) 10 MG tablet Take 5 mg by mouth daily       Multiple Vitamins-Minerals (THERAPEUTIC MULTIVITAMIN-MINERALS) tablet Take 1 tablet by mouth daily.  ferrous sulfate 325 (65 FE) MG EC tablet Take 325 mg by mouth daily (with breakfast).  lamoTRIgine (LAMICTAL) 100 MG tablet Take 100 mg by mouth daily       aspirin 81 MG tablet Take 81 mg by mouth daily.       Metoclopramide HCl (REGLAN PO) Take 5 mg by mouth 3 times daily       dicyclomine (BENTYL) 10 MG capsule Take 1 capsule by mouth every 6 hours as needed (cramps) 20 capsule 0    polyethylene glycol (GLYCOLAX) 17 GM/SCOOP powder Take 17 g by mouth daily      VORTIoxetine (TRINTELLIX) 5 MG tablet Take 5 mg by mouth daily      butalbital-APAP-caffeine (FIORICET) -40 MG CAPS per capsule Take 1 capsule by mouth every 6 hours as needed for Headaches      meclizine (ANTIVERT) 25 MG tablet Take 25 mg by mouth 3 times daily as needed for Dizziness      Ca Carbonate-Mag Hydroxide (ROLAIDS PO) Take 2 tablets by mouth every 4 hours as needed (acid reflux)      SUMAtriptan (IMITREX) 100 MG tablet Take 100 mg by mouth 2 times daily as needed for Migraine      acetaminophen (TYLENOL) 325 MG tablet Take 2 tablets by mouth every 6 hours as needed for Pain 30 tablet 0    lidocaine PTCH Place 1 patch onto the skin daily 3 patch 0    bismuth subsalicylate (BISMATROL) 262 MG chewable tablet Take 524 mg by mouth as needed for Heartburn (8 times daily)      senna-docusate (PERICOLACE) 8.6-50 MG per tablet Take 1 tablet by mouth as needed for Constipation (in eveninig) Or 2 tabs      ibuprofen (ADVIL;MOTRIN) 200 MG tablet Take 200 mg by mouth every 6 hours as needed for Pain (or 2 tabs)      magnesium hydroxide (MILK OF MAGNESIA) 400 MG/5ML suspension Take 5 mLs by mouth daily as needed for Constipation      calcium carbonate (TUMS) 500 MG chewable tablet Take 1 tablet by mouth 4 times daily as needed for Heartburn      loperamide (IMODIUM) 2 MG capsule Take 2 mg by mouth 4 times daily as needed for Diarrhea         Data         BP (!) 148/73   Pulse 84   Temp 98.1 °F (36.7 °C) (Oral)   Resp 16   Ht 5' 1\" (1.549 m)   Wt 147 lb 3.2 oz (66.8 kg)   SpO2 100%   BMI 27.81 kg/m²     Wt Readings from Last 3 Encounters:   11/09/21 147 lb 3.2 oz (66.8 kg)   09/30/21 129 lb (58.5 kg)   04/22/21 121 lb (54.9 kg)        Code Status: Full Code     ADVANCED CARE PLANNING:  Patient has capacity for medical decisions: no  Health Care Power of : Guardian-Sister Francisco.  Living Will: not asked     Personal, Social, and Family History  Marital Status: single  Living situation: Group home  Importance of ron/Sikhism/spiritual beliefs: [] Very [] Somewhat [] Not   Psychological Distress: mild  Does patient understand diagnosis/treatment? not asked  Does caregiver understand diagnosis/treatment? yes      Assessment        REVIEW OF SYSTEMS  Constitutional: no fever, no chills or weight loss  Eyes: no eye pain or blurred vision  ENT: no hearing loss, congestion, or difficulty swallowing   Respiratory: no wheezing, chest tightness, or shortness of breath   Cardiovascular: no chest pain or pressure, no palpitations, no diaphoresis   Gastrointestinal: no nausea, vomiting,abdominal pain, diarrhea or constipation, no melena +poor appetite   Genitourinary: no dysuria, frequency, hematuria, or nocturia   Musculoskeletal: no myalgias or arthralgias, no back pain   Skin: no rashes or sores   Neurological: no focal weakness, numbness, tingling, or headache, no seizures    PHYSICAL ASSESSMENT:  Constitutional: Alert and oriented to person, and place. +disoriented to time and circumstances at times. Head: Normocephalic and atraumatic. Eyes: EOM are normal. Pupils are equal, round    Neck: Normal range of motion. Neck supple. No tracheal deviation present. Cardiovascular: Normal rate and regular rhythm, S1, S2, no murmur   Pulmonary/Chest: Effort normal and breath sounds normal. No rales or wheezes. Abdomen: Soft. No tenderness, not distended, no ascites, no organomegaly   Musculoskeletal: Normal range of motion. No edema lower ext. Neurological: CN II-XII grossly intact, no focal neurological deficits   Skin: Normal turgor, no bleeding, no bruising     Palliative Performance Scale:  _x__60%  Ambulation reduced; Significant disease; Can't do hobbies/housework; intake normal or reduced; occasional assist; LOC full/confusion  ___50%  Mainly sit/lie;  Extensive disease; Can't do any work; Considerable assist; intake normal or reduced; LOC full/confusion  ___40%  Mainly in bed; Extensive disease; Mainly assist; intake normal or reduced; LOC full/confusion   ___30%  Bed Bound; Extensive disease; Total care; intake reduced; LOCfull/confusion  ___20%  Bed Bound; Extensive disease; Total care; intake minimal; Drowsy/coma  ___10%  Bed Bound; Extensive disease; Total care; Mouth care only; Drowsy/coma  ___0       Death      Plan      Palliative Interaction: I went to see patient, and she was sitting up in chair starting to eat lunch. I introduced myself to her, and the palliative role. I attempted to discuss patients chronic history, and hospitalized problems with her, but she was repetitive with her answers, and unable to follow conversations well. I asked about patients symptoms, and she reports still not having much of an appetite, but states she is not very active here either. I asked her about her Guardian Francisco, and she reports this being her sister, and reports to be very close to her. I reached out to patients Guardian Francisco, and left voicemail. Francisco called writer back, and I introduced myself to her and the palliative role. I discussed patients chronic history and current hospitalized problems with her. She reports patient lives in group home, and is up with walker. She reports that she can dress herself, but gets assist with meds and shower. We discussed goals, and code status. She reports that patient would want everything done, up until she did not have brain activity. Sister reports that discharge plan is to go to SNF x1 week for IV antibiotics. ID reports ceftriaxone 2 g daily through 11/17, n.p.o. doxycycline through 11/17. I offered her support, and informed her that we will continue to follow.      Education/support to staff  Education/support to family  Discharge planning/helping to coordinate care  Communications with primary service  Providing support for coping/adaptation/distress of family  Discussing meaning/purpose   Decisional capacity assessed  Continue with current plan of care  Clarification of medical condition to patient and family  Code status clarified: Full Code  Code status clarified: [facility]  Code status clarified: [facility]  Palliative care orders introduced  Validating patient/family distress  Spoke with patient's sister who is guardian, and reports that patient's discharge plan is to go to SNF x1 week for antibiotics and then back to group home. She reports that patient would want to be a full code. Principle Problem/Diagnosis:  UTI (urinary tract infection)    Additional Assessments:   Principal Problem:    UTI (urinary tract infection)  Active Problems:    S/P  shunt    Hydronephrosis of left kidney    Severe sepsis (HCC)    Bipolar mood disorder (HCC)    Acute kidney injury superimposed on CKD ([address]    Aspiration pneumonitis (HCC)  Resolved Problems:    * No resolved hospital problems. *    1- Symptom management/ pain control     Pain Assessment:  The patient is not having any pain. Anxiety:  none                          Dyspnea:  none                          Fatigue:  none    Other: We feel the patient symptoms are being controlled. her current regimen is reviewed by myself and discussed with the staff. 2- Goals of care evaluation   The patient goals of care are live longer, improve or maintain function/quality of life and support for family/caregiver   Goals of care discussed with:    [] Patient independently    [x] Patient and Family     [] Family or Healthcare DPOA independently    [] Unable to discuss with patient, family/DPOA not present    3- Code Status  Full Code    4- Other recommendations   - We will continue to provide comfort and support to the patient and the family  Please call with any palliative questions or concerns. Palliative Care Team is available via perfect serve or via phone.      Palliative Care will

## 2021-11-12 VITALS
BODY MASS INDEX: 27.79 KG/M2 | RESPIRATION RATE: 18 BRPM | HEART RATE: 80 BPM | OXYGEN SATURATION: 98 % | SYSTOLIC BLOOD PRESSURE: 133 MMHG | HEIGHT: 61 IN | WEIGHT: 147.2 LBS | TEMPERATURE: 97.5 F | DIASTOLIC BLOOD PRESSURE: 77 MMHG

## 2021-11-12 PROCEDURE — 6370000000 HC RX 637 (ALT 250 FOR IP)

## 2021-11-12 PROCEDURE — 6360000002 HC RX W HCPCS

## 2021-11-12 PROCEDURE — 2580000003 HC RX 258

## 2021-11-12 PROCEDURE — 99232 SBSQ HOSP IP/OBS MODERATE 35: CPT | Performed by: INTERNAL MEDICINE

## 2021-11-12 PROCEDURE — 99239 HOSP IP/OBS DSCHRG MGMT >30: CPT | Performed by: INTERNAL MEDICINE

## 2021-11-12 PROCEDURE — 92526 ORAL FUNCTION THERAPY: CPT

## 2021-11-12 RX ADMIN — QUETIAPINE FUMARATE 25 MG: 50 TABLET ORAL at 09:06

## 2021-11-12 RX ADMIN — ASPIRIN 81 MG: 81 TABLET, CHEWABLE ORAL at 09:06

## 2021-11-12 RX ADMIN — SODIUM CHLORIDE: 9 INJECTION, SOLUTION INTRAVENOUS at 08:26

## 2021-11-12 RX ADMIN — HEPARIN SODIUM 5000 UNITS: 5000 INJECTION INTRAVENOUS; SUBCUTANEOUS at 09:08

## 2021-11-12 RX ADMIN — ATORVASTATIN CALCIUM 10 MG: 10 TABLET, FILM COATED ORAL at 09:06

## 2021-11-12 RX ADMIN — LAMOTRIGINE 100 MG: 100 TABLET ORAL at 09:08

## 2021-11-12 RX ADMIN — BUSPIRONE HYDROCHLORIDE 5 MG: 5 TABLET ORAL at 09:06

## 2021-11-12 ASSESSMENT — ENCOUNTER SYMPTOMS
WHEEZING: 0
EYES NEGATIVE: 1
DIARRHEA: 0
RESPIRATORY NEGATIVE: 1
CONSTIPATION: 0
COUGH: 0
SHORTNESS OF BREATH: 0
GASTROINTESTINAL NEGATIVE: 1
ABDOMINAL PAIN: 0
ABDOMINAL DISTENTION: 0
VOMITING: 0
NAUSEA: 0

## 2021-11-12 ASSESSMENT — PAIN SCALES - GENERAL: PAINLEVEL_OUTOF10: 0

## 2021-11-12 NOTE — PROGRESS NOTES
[facility] -   Infectious diseases evaluation  admission date 11/7/2021    reason for consultation:   Bacteremia/UTI    Impression :   Current:  · UTI  · E. coli bacteremia secondary to above  · Nephrolithiasis  · Hydronephrosis  · Possible pneumonia  · Dementia      Recommendations   · IV Ceftriaxone 2 g daily through 11/17/21  · Midline was placed  · Swallow study showed no aspiration  · P.o. doxycycline through 11/17/2021  · Marmolejo not to be removed by urology   · No objection for discharge from infectious disease point of view          History of Present Illness:   Initial history: Shell Kang is a 66y.o.-year-old female who lives in a group home was brought to the hospital with worsening confusion, constant, severe for 3 days prior to admission associated with decreased appetite. No alleviating or aggravating factors. The patient reportedly fell at the group home. The patient had recent UTI was started on Cipro with no improvement. CT was performed and showed left-sided obstructing stone with resulting hydronephrosis. Urinalysis suggestive of UTI  Urine and blood cultures grew E. Coli  She was evaluated by urology  Marmolejo catheter in place  Interval changes  11/12/2021   The patient is up to the chair, tolerating diet, although seems less confused, oriented to place, denied vomiting or diarrhea, denied cough. Chest x-ray from yesterday reviewed showed new opacity right side. Patient Vitals for the past 8 hrs:   BP Temp Pulse Resp SpO2   11/12/21 0732 133/77 97.5 °F (36.4 °C) 80 18 98 %           I have personally reviewed the past medical history, past surgical history, medications, social history, and family history, and I haveupdated the database accordingly.       Allergies:   Levaquin [levofloxacin in d5w], Adhesive tape, Chocolate, Indomethacin, and Neurontin [gabapentin]     Review of Systems:     Review of Systems  Confused, unable to provide  Physical Examination :       Physical Exam  Constitutional:       General: She is not in acute distress. HENT:      Head: Normocephalic and atraumatic. Right Ear: External ear normal.      Left Ear: External ear normal.      Mouth/Throat:      Mouth: Mucous membranes are moist.      Pharynx: Oropharynx is clear. Eyes:      General: No scleral icterus. Conjunctiva/sclera: Conjunctivae normal.   Cardiovascular:      Rate and Rhythm: Normal rate and regular rhythm. Heart sounds: No murmur heard. Pulmonary:      Effort: Pulmonary effort is normal. No respiratory distress. Breath sounds: Rhonchi present. Abdominal:      General: There is no distension. Palpations: Abdomen is soft. Tenderness: There is no abdominal tenderness. Musculoskeletal:      Cervical back: Neck supple. No rigidity. Right lower leg: No edema. Left lower leg: No edema. Skin:     Findings: No bruising or erythema. Neurological:      Mental Status: She is alert.       Comments: Confused         Past Medical History:     Past Medical History:   Diagnosis Date    Acid reflux     Bipolar affective ([address]     Chronic headaches     Hydrocephalus (HCC)        Past Surgical  History:     Past Surgical History:   Procedure Laterality Date    CHOLECYSTECTOMY      CYSTOSCOPY Left 11/7/2021    CYSTOSCOPY URETERAL STENT INSERTION performed by Silvia Harris MD at [address] [place] [address] 9 Left 06/09/2017    PINNING AND SCREW    HIP PINNING Left 6/9/2017    HIP PINNING 7.3 CANNULATED SCREW WITH SYNTHES PRODUCT APPLICATION AND INTRAOPERATIVE C-ARM performed by Farzad Gay MD at [facility]      shunt x3 head    SHUNT REVISION      TONSILLECTOMY         Medications:      cefTRIAXone (ROCEPHIN) IV  2,000 mg IntraVENous Q24H    influenza virus vaccine  0.5 mL IntraMUSCular Prior to discharge    aspirin  81 mg Oral Daily    heparin (porcine)  5,000 Units SubCUTAneous 3 times per day    atorvastatin  10 mg Oral Daily    busPIRone  5 mg Oral BID    lamoTRIgine  100 mg Oral Daily    lamoTRIgine  200 mg Oral Nightly    QUEtiapine  25 mg Oral Daily    QUEtiapine  50 mg Oral Nightly    topiramate  100 mg Oral QPM    VORTIoxetine  5 mg Oral Daily       Social History:     Social History     Socioeconomic History    Marital status: Single     Spouse name: Not on file    Number of children: Not on file    Years of education: Not on file    Highest education level: Not on file   Occupational History    Not on file   Tobacco Use    Smoking status: Never Smoker    Smokeless tobacco: Never Used   Vaping Use    Vaping Use: Never used   Substance and Sexual Activity    Alcohol use: No    Drug use: No    Sexual activity: Not on file   Other Topics Concern    Not on file   Social History Narrative    Not on file     Social Determinants of Health     Financial Resource Strain:     Difficulty of Paying Living Expenses: Not on file   Food Insecurity:     Worried About Running Out of Food in the Last Year: Not on file    Myles of Food in the Last Year: Not on file   Transportation Needs:     Lack of Transportation (Medical): Not on file    Lack of Transportation (Non-Medical):  Not on file   Physical Activity:     Days of Exercise per Week: Not on file    Minutes of Exercise per Session: Not on file   Stress:     Feeling of Stress : Not on file   Social Connections:     Frequency of Communication with Friends and Family: Not on file    Frequency of Social Gatherings with Friends and Family: Not on file    Attends [employer] Services: Not on file    Active Member of Clubs or Organizations: Not on file    Attends Club or Organization Meetings: Not on file    Marital Status: Not on file   Intimate Partner Violence:     Fear of Current or Ex-Partner: Not on file    Emotionally Abused: Not on file    Physically Abused: Not on file    Sexually Abused: Not on file   Housing Stability:     Unable to Pay for Housing in the Last Year: Not on file    Number of Places Lived in the Last Year: Not on file    Unstable Housing in the Last Year: Not on file       Family History:   History reviewed. No pertinent family history. Medical Decision Making:   I have independently reviewed/ordered the following labs:      Lab Results   Component Value Date    CREATININE 1.68 11/09/2021    GLUCOSE 143 11/09/2021       Detailed results: Thank you for allowing us to participate in the care of this patient. Please call with questions. This note is created with the assistance of a speech recognition program.  While intending to generate adocument that actually reflects the content of the visit, the document can still have some errors including those of syntax and sound a like substitutions which may escape proof reading. It such instances, actual meaningcan be extrapolated by contextual diversion.     Ap Machado MD  Office: [phone]  Perfect serve / office [phone]

## 2021-11-12 NOTE — PROGRESS NOTES
[address]    PROGRESS NOTE             11/12/2021    12:56 PM    Name:   Tyrell Vaz  MRN:     [id number]     Tejas Garryowen:      [de-identified]   Room:   2060/2060-01  IP Day:  5  Admit Date:  11/7/2021  2:33 PM    PCP:  Eden Hood MD  Code Status:  Full Code    Subjective:     C/C:   Chief Complaint   Patient presents with    Altered Mental Status     Gradual decline in mental status. Had a fall, but was caught. Lives at a group home. Caregiver is here with her.  Fall     Interval History Status: improved. Seen and examined at bedside. No acute events overnight. Patient is scheduled to leave for [employer] today. Patient reports she is feeling well. She enjoys drinking the Ensure shakes. Per urology via [employer], patient does keep Marmolejo catheter in until follow-up with Dr. June Josue next week. Brief History:     The patient is a 66 y.o. Non- / non  female who presents withAltered Mental Status (Gradual decline in mental status. Had a fall, but was caught. Lives at a group home. Caregiver is here with her.  ) and Fall   and she is admitted to the hospital for the management of  Urosepsis      The patient is a poor historian, history was taken from caregiver, printed medical records caregiver was carrying, and EMR. This is a 74-year-old female patient past medical history of bipolar disease, chronic headache, hydrocephalus s/p  shunt presented to ED due to change in behavior, and falling down caregiver mentioned that the patient was having abdominal pain 3 days ago prior to admission  denies any vomiting or nausea, patient communicates well at her baseline, she usually presents with change in behavior whenever she has UTI. Denies chills or fever.          UA: Bacteruria, large leukocytes esterase   Urine culture  Positive for lactose fermenting gram-negative rods  Blood culture positive for E. Coli     X-ray shunt series: Multiple shunt catheters overlying the chest and abdomen have a grossly   stable radiographic appearance when compared to 06/03/2020. Unchanged left pleural effusion and left basilar opacity. CT abdomen from 11/7/2021: Moderately severe left hydronephrosis secondary to a 10 mm stone in the left UPJ. Also multiple left intrarenal stones. Stable left parapelvic renal cyst.  Atrophic Right kidney. Cystoscopy, left-sided stent placement, Marmolejo placement by urology. 11/8: Patient had some low blood pressures in the hours following surgery of 92/56 with improvement in mentation and arousability. IV rocephin started. 11/9: IV rocephin continued; patient scheduled for swallow study, refused; rescheduled for 11/10    11/10: MBSS performed, recommended diet dysphagia minced and moist (dysphagia II), thin liquids    11/11: PICC line placed for continued IV antibiotics per ID through 11/17; ID recommended doxycycline through 63/75; pre-cert for [facility] for rehab before returning to group [facility]. 11/12: urology confirmed maintain Marmolejo catheter until appointment next week    Review of Systems:     Review of Systems   Constitutional: Negative for chills, fatigue and fever. HENT: Negative. Eyes: Negative. Respiratory: Negative. Negative for cough, shortness of breath and wheezing. Cardiovascular: Negative. Negative for chest pain, palpitations and leg swelling. Gastrointestinal: Negative. Negative for abdominal distention, abdominal pain, constipation, diarrhea, nausea and vomiting. Endocrine: Negative. Genitourinary: Negative. Musculoskeletal: Negative. Skin: Negative. Neurological: Negative. Medications: Allergies: Allergies   Allergen Reactions    Levaquin [Levofloxacin In D5w] Other (See Comments)     The group home has this on their allergy list, but without what effects to the Pt. Pt doesn't know.     Adhesive Tape     Chocolate  Indomethacin     Neurontin [Gabapentin]        Current Meds:   Scheduled Meds:    cefTRIAXone (ROCEPHIN) IV  2,000 mg IntraVENous Q24H    aspirin  81 mg Oral Daily    heparin (porcine)  5,000 Units SubCUTAneous 3 times per day    atorvastatin  10 mg Oral Daily    busPIRone  5 mg Oral BID    lamoTRIgine  100 mg Oral Daily    lamoTRIgine  200 mg Oral Nightly    QUEtiapine  25 mg Oral Daily    QUEtiapine  50 mg Oral Nightly    topiramate  100 mg Oral QPM    VORTIoxetine  5 mg Oral Daily     Continuous Infusions:    sodium chloride 100 mL/hr at 21 4002    sodium chloride      dextrose       PRN Meds: acetaminophen **OR** acetaminophen, butalbital-APAP-caffeine, SUMAtriptan, sodium chloride flush, sodium chloride, ondansetron **OR** ondansetron, polyethylene glycol, potassium chloride **OR** potassium alternative oral replacement **OR** potassium chloride, magnesium sulfate, glucose, dextrose, glucagon (rDNA), dextrose    Data:     Past Medical History:   has a past medical history of Acid reflux, Bipolar affective ([facility] [address] Chronic headaches, and Hydrocephalus ([facility] [address] Social History:   reports that she has never smoked. She has never used smokeless tobacco. She reports that she does not drink alcohol and does not use drugs. Family History: History reviewed. No pertinent family history. Vitals:  /77   Pulse 80   Temp 97.5 °F (36.4 °C) (Oral)   Resp 18   Ht 5' 1\" (1.549 m)   Wt 147 lb 3.2 oz (66.8 kg)   SpO2 98%   BMI 27.81 kg/m²   Temp (24hrs), Av.2 °F (36.8 °C), Min:97.5 °F (36.4 °C), Max:98.6 °F (37 °C)    No results for input(s): POCGLU in the last 72 hours. I/O(24Hr):     Intake/Output Summary (Last 24 hours) at 2021 1256  Last data filed at 2021 1129  Gross per 24 hour   Intake 1130 ml   Output 2426 ml   Net -1296 ml       Labs:  [unfilled]    Lab Results   Component Value Date/Time    SPECIAL NOT REPORTED 2021 05:21 PM     Lab Results   Component Value Date/Time    CULTURE ESCHERICHIA COLI >[id number] CFU/ML (A) 11/07/2021 05:21 PM       [unfilled]    Radiology:    CT ABDOMEN PELVIS WO CONTRAST Additional Contrast? None    Result Date: 11/7/2021  EXAMINATION: CT OF THE ABDOMEN AND PELVIS WITHOUT CONTRAST 11/7/2021 12:16 pm TECHNIQUE: CT of the abdomen and pelvis was performed without the administration of intravenous contrast. Multiplanar reformatted images are provided for review. Dose modulation, iterative reconstruction, and/or weight based adjustment of the mA/kV was utilized to reduce the radiation dose to as low as reasonably achievable. COMPARISON: 02/07/2019 HISTORY: ORDERING SYSTEM PROVIDED HISTORY: ams, constipation TECHNOLOGIST PROVIDED HISTORY: ams, constipation Decision Support Exception - unselect if not a suspected or confirmed emergency medical condition->Emergency Medical Condition (MA) Reason for Exam: AMS, constipation Acuity: Unknown Type of Exam: Initial FINDINGS: Lower chest: Dependent atelectasis or consolidation in the left lower lobe is noted. Moderate size pleural effusion is noted with pleural thickening. Findings are chronic however stable Sequela of old posterior left rib fractures and lateral right rib fractures. No acute osseous abnormality identified. ABDOMEN AND PELVIS: Organs: Evaluation of the abdominal and pelvic viscera is limited in the absence of contrast.  A calcification is noted in the posterior dome of the right hepatic lobe. The liver otherwise reveals no focal abnormality. The gallbladder is not visualized and may be surgically absent. The pancreas, spleen appear grossly unremarkable. 11 mm stable left adrenal adenoma . There is also a 6 mm right adrenal nodule, unchanged. Moderately severe left hydronephrosis with 10 mm stone in the left UPJ. Also multiple left intrarenal stones. Stable left parapelvic renal cysts.   Atrophic right kidney renal stones and resolving hydronephrosis GI/Bowel: No bowel dilatation or wall thickening identified. Mild stool burden throughout the colon. Diverticulosis. Pelvis: The bladder is well distended. No wall thickening or inflammatory change identified. Peritoneum/Retroperitoneum: A tunneled peritoneal catheter terminates in the left lower quadrant. No ascites or free air. The aorta is normal in caliber. Calcified atheromatous plaque is present. No enlarged or suspicious-appearing lymph nodes. Bones/Soft Tissues: Nondisplaced posterior left acetabular fracture. Comminuted nondisplaced medial acetabular fracture. There is also a minimally displaced comminuted left inferior pubic ramus fracture. Chronic appearing severe L4 compression fracture. Fracture of L2 vertebral body with less than 25% loss of normal height which also appears chronic however has developed from the prior exam.  Advanced facet arthropathy in the lower lumbar spine. Angular deformity in the inferior sacrum/coccyx is noted without a discrete fracture line. This also may be chronic. Pin fixation hardware is noted within the proximal femora. Moderately severe left hydronephrosis secondary to a 10 mm stone in the left UPJ. Also multiple left intrarenal stones. Stable left parapelvic renal cyst. Atrophic right kidney renal stones and resolving hydronephrosis No other significant changes are seen from the prior exam     CT HEAD WO CONTRAST    Result Date: 11/7/2021  EXAMINATION: CT OF THE HEAD WITHOUT CONTRAST  11/7/2021 3:16 pm TECHNIQUE: CT of the head was performed without the administration of intravenous contrast. Dose modulation, iterative reconstruction, and/or weight based adjustment of the mA/kV was utilized to reduce the radiation dose to as low as reasonably achievable.  COMPARISON: CT scan of the brain from 07/12/2021 HISTORY: ORDERING SYSTEM PROVIDED HISTORY: AMS,  shunt TECHNOLOGIST PROVIDED HISTORY: AMS,  shunt Decision Support Exception - unselect if not a suspected or confirmed emergency medical condition->Emergency Medical Condition (MA) Reason for Exam: AMS,  shunt Acuity: Unknown Type of Exam: Initial FINDINGS: BRAIN/VENTRICLES: Left parietal and frontal approach  shunts in unchanged position with tip positions left anterior horn, unchanged; orphaned retained right parietal approach shunt also again seen, with its tip in left frontal lobe. Unchanged right frontal/parietal encephalomalacia with ex vacuo dilatation right lateral ventricle, also unchanged. No acute intracranial hemorrhage, mass effect or midline shift. No new/abnormal extra-axial fluid collection. Mild periventricular WM low-attenuation changes; gray-white differentiation maintained; no obvious acute infarct. ORBITS: The visualized portion of the orbits demonstrate no acute abnormality. SINUSES: The visualized paranasal sinuses and mastoid air cells demonstrate no acute abnormality; mild mucosal thickening noted left sphenoid sinus involving anterior left ethmoid air cells. No air-fluid level. Paullette Rakes SOFT TISSUES/SKULL:  No acute abnormality of the visualized skull or soft tissues. No acute intracranial abnormality. 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 XR CHEST PORTABLE    Result Date: 11/10/2021  EXAMINATION: ONE XRAY VIEW OF THE CHEST 11/10/2021 10:39 am COMPARISON: None. HISTORY: ORDERING SYSTEM PROVIDED HISTORY: Aspiration TECHNOLOGIST PROVIDED HISTORY: Aspiration Reason for Exam: Aspiration Acuity: Unknown Type of Exam: Unknown FINDINGS: Heart appears upper limits of normal in size. Chronic left pleural effusion with blunting of the left costophrenic angle. Patient is slightly rotated to the left. Hazy parenchymal opacity is noted in the medial right lower lobe compatible with atelectasis or pneumonia. Generalized osteopenia. Stable left pleural catheter,  shunt tube.      New parenchymal opacity in the medial right lower chest compatible with atelectasis and/or pneumonia. Chronic left pleural effusion and mild volume loss. FLUORO FOR SURGICAL PROCEDURES    Result Date: 11/7/2021  Radiology exam is complete. No Radiologist dictation. Please follow up with ordering provider. XR Shunt Placement Series    Result Date: 11/7/2021  EXAMINATION: SHUNT SERIES 11/7/2021 3:02 pm COMPARISON: Chest x-ray dated 09/20/2021. Chest series dated 06/03/2020. Skull x-ray dated 06/08/2017. HISTORY: ORDERING SYSTEM PROVIDED HISTORY: AMS,  shunt TECHNOLOGIST PROVIDED HISTORY: AMS,  shunt Reason for Exam: AMS,  shunt Acuity: Acute Type of Exam: Initial FINDINGS: There are multiple shunt catheters overlying the chest.  There is 1 extending from the lower left neck over the right chest with distal tip in the right upper quadrant, not significantly changed, likely abandoned. There are 2 additional shunt catheter radiodensities overlying the left chest, which are unchanged as well. 1 of these appears to terminate overlying a left pleural effusion and the other extends into the abdomen. Heart size is stable. There is persistent left pleural effusion and left basilar opacity. No evidence of pneumothorax. In the abdomen, distal tip of 1 of the left-sided shunt catheters is overlying the left lower quadrant, which is unchanged. There is a nonspecific, nonobstructed bowel gas pattern. Moderate fecal material in the right side of the colon. Fixation hardware in both hips. Multiple components of shunt catheters are seen overlying the left calvarium, unchanged. Hearing aid device is noted. 1.  Multiple shunt catheters overlying the chest and abdomen have a grossly stable radiographic appearance when compared to 06/03/2020. 2.  Unchanged left pleural effusion and left basilar opacity.      FL MODIFIED BARIUM SWALLOW W VIDEO    Result Date: 11/11/2021  EXAMINATION: MODIFIED BARIUM SWALLOW WAS PERFORMED IN CONJUNCTION WITH SPEECH PATHOLOGY SERVICES TECHNIQUE: Fluoroscopic evaluation of the swallowing mechanism was performed using cineradiography with multiple consistency of barium product in conjunction with speech pathology services. FLUOROSCOPY DOSE AND TYPE OR TIME AND EXPOSURES: Fluoro time 3:23 .1dgy. cm2 AIR KERMA 49. 1mGy COMPARISON: None HISTORY: ORDERING SYSTEM PROVIDED HISTORY: Aspiration on liquid diet FINDINGS: Premature vallecular spillage and moderate vallecular and piriform stasis with multiple consistencies. Transient penetration is seen with pudding as well as honey thick, nectar thick and thin liquid consistencies. No evidence of aspiration. No evidence of aspiration. Transient penetration with multiple consistencies. Please see separate speech pathology report for full discussion of findings and recommendations. FL MODIFIED BARIUM SWALLOW W VIDEO    Result Date: 11/9/2021  EXAMINATION: MODIFIED BARIUM SWALLOW WAS PERFORMED IN CONJUNCTION WITH SPEECH PATHOLOGY SERVICES 11/09/2021 TECHNIQUE: Fluoroscopic evaluation of the swallowing mechanism was performed using cineradiography with multiple consistency of barium product in conjunction with speech pathology services. FLUOROSCOPY DOSE AND TYPE OR TIME AND EXPOSURES: 1 minute 19 seconds; D AP 82.5 dGy cm2 COMPARISON: None HISTORY: ORDERING SYSTEM PROVIDED HISTORY: Chocking on liquid diet TECHNOLOGIST PROVIDED HISTORY: Chocking on liquid diet Reason for Exam: Chocking on liquid diet Acuity: Acute Type of Exam: Ongoing Additional signs and symptoms: Chocking on liquid diet FINDINGS: Very limited exam as the patient refused to participate. The patient ingested a small quantity of pudding consistency material with premature vallecular spillage and residue; swallowing mechanism was not initiated over the course of the study. No definite aspiration is seen although the patient did not initiate a swallow.   Recommend repeat exam when the patient is more cooperative. Nondiagnostic exam as described above. The patient requested termination of the procedure. Recommend repeat study when the patient is willing to cooperate. Physical Examination:        Physical Exam  Vitals and nursing note reviewed. Constitutional:       General: She is not in acute distress. Appearance: Normal appearance. She is normal weight. She is not ill-appearing, toxic-appearing or diaphoretic. HENT:      Head: Normocephalic and atraumatic. Nose: Nose normal.      Mouth/Throat:      Mouth: Mucous membranes are moist.   Eyes:      Extraocular Movements: Extraocular movements intact. Cardiovascular:      Rate and Rhythm: Normal rate and regular rhythm. Heart sounds: No murmur heard. No friction rub. No gallop. Pulmonary:      Effort: No respiratory distress. Breath sounds: Normal breath sounds. No stridor. No wheezing, rhonchi or rales. Chest:      Chest wall: No tenderness. Abdominal:      General: Abdomen is flat. Musculoskeletal:         General: Normal range of motion. Cervical back: Normal range of motion. Right lower leg: Edema (Mild swelling) present. Left lower leg: Edema (Mild swelling) present. Skin:     General: Skin is warm and dry. Neurological:      General: No focal deficit present. Mental Status: She is alert.            Assessment:        Primary Problem  UTI (urinary tract infection)    Active Hospital Problems    Diagnosis Date Noted    Aspiration pneumonitis ([facility] [address] [J69.0] 11/11/2021    Acute kidney injury superimposed on CKD ([address] [N17.9, N18.9] 11/08/2021    UTI (urinary tract infection) [N39.0] 11/07/2021    Severe sepsis ([address] [A41.9, R65.20] 11/07/2021    Hydronephrosis of left kidney [N13.30] 11/07/2021    Bipolar mood disorder ([address] [F31.9] 11/07/2021    S/P  shunt [Z98.2] 12/01/2020       Plan:        Charu Olguin- resolving   Urine culture Lactose fermenting gram negative rods   Blood culture E. coli  125 mL/H NS  Continue Rocephin through 11/17/2021  ID on board, appreciate recommendations  PICC line on right basilic     Aspiration pneumonitis   Vitals stable  CXR: New parenchymal opacity in the medial right lower chest.  -Friday, discharge patient with doxycycline p.o. through 11/17/2021     Left UPJ stone with hydronephrosis S/P ureteral stent   CT abdomen, pelvis 11/7/2021: Hydronephrosis with in the left UPJ  Left intrarenal stones  Urology on board, appreciate recommendations  double-J ureteral stent was placed 11/7   out patient follow up with urology, appointment within 1 week  Maintain Marmolejo catheter until appointment next week        Acute kidney injury on CKD Stage 3a GFR 45-59  Underlying sepsis and UTI   Cr: 1.68  Baseline 0.90  125 ml/h NS      Bipolar disorder  Quetiapine 25 mg p.o. daily  Quetiapine 50 mg p.o. nightly  Lamotrigine 100 mg p.o. daily  Lamotrigine 200 mg p.o. nightly  Buspirone 5 mg p.o. twice daily  Vortioxetine 5 mg p.o.      Chronic headache  Sumatriptan 100 mg p.o. twice daily as needed  Topiramate 100 mg p.o.     DVT prophylaxis:  Heparin SubQ   GI prophylaxis: Pepcid     PT OT evaluation  SW discharge planning, patient with pre-CERT to [facility]  Diet thin liquids, moist, and minced food     Disposition: Patient will be discharged to [facility] later today, with follow-up with urology in 1 week.     Terrell Dozier MD  11/12/2021  12:56 PM

## 2021-11-12 NOTE — PROGRESS NOTES
The patient is being treated for left-sided hydronephrosis and her clinical condition has stabilized and she can be discharged to [facility] the patient was interviewed and examined by me please refer to notes by the resident which will be cosigned by me later

## 2021-11-12 NOTE — PROGRESS NOTES
[facility]: WENDY WALDROP   OCCUPATIONAL THERAPY MISSED TREATMENT NOTE   INPATIENT   Date: 21  Patient Name: Lavelle Villegas       Room:   MRN: [id number]   Account #: [de-identified]    : 1950  (66 y.o.)  Gender: female   Referring Practitioner: Chichi Smith MD  Diagnosis: Dehydration, UTI             REASON FOR MISSED TREATMENT:  Patient with another [address] in room with pt at this time. will continue to follow.        SARIAH Erickson

## 2021-11-12 NOTE — DISCHARGE INSTR - COC
Continuity of Care Form    Patient Name: Ryder Lu   :    MRN:  [id number]    Admit date:  2021  Discharge date:  2021    Code Status Order: Full Code   Advance Directives:      Admitting Physician:  Long Mahajan MD  PCP: Melodie Liu MD    Discharging Nurse: [facility] Unit/Room#:   Discharging Unit Phone Number: [phone]    Emergency Contact:   Extended Emergency Contact Information  Primary Emergency Contact: Francisco Ignacio  Address: [address] [place] [address], Jorge Luis Thomas [address] Phone: [phone]  Relation: Brother/Sister  Hearing or visual needs: None  Other needs: None  Preferred language: English   needed? No  Secondary Emergency Contact: Grzegorz Orantes, [facility] [place] [address] Phone: [phone]  Relation: Lay Caregiver    Past Surgical History:  Past Surgical History:   Procedure Laterality Date    CHOLECYSTECTOMY      CYSTOSCOPY Left 2021    CYSTOSCOPY URETERAL STENT INSERTION performed by Puma Carey MD at [address] [place] [address] Left 2017    PINNING AND SCREW    HIP PINNING Left 2017    HIP PINNING 7.3 CANNULATED SCREW WITH [employer] PRODUCT APPLICATION AND INTRAOPERATIVE C-ARM performed by Chuyita Silverio MD at [place] [address]      shunt x3 head    SHUNT REVISION      TONSILLECTOMY         Immunization History: There is no immunization history for the selected administration types on file for this patient.     Active Problems:  Patient Active Problem List   Diagnosis Code    Contusion of hip S70.00XA    UTI (urinary tract infection), uncomplicated Q62.6    Inability to ambulate due to hip R26.2    Closed fracture of left inferior pubic ramus (HCC) S32.592A    Closed compression fracture of L4 lumbar vertebra S32.040A    Cellulitis of left leg L03.116    Failure of outpatient treatment Z78.9    Hydrocephalus (HCC) G91.9    S/P  shunt Z98.2    Leg edema R60.0    UTI Thick Liquids  Daily Fluid Restriction: no  Last Modified Barium Swallow with Video (Video Swallowing Test): 11/10/2021    Treatments at the Time of Hospital Discharge:   Respiratory Treatments:   Oxygen Therapy:  is not on home oxygen therapy. Ventilator:    - No ventilator support    Rehab Therapies: Physical Therapy and Occupational Therapy  Weight Bearing Status/Restrictions: No weight bearing restirctions  Other Medical Equipment (for information only, NOT a DME order):  {EQUIPMENT:[id number]}  Other Treatments:     Patient's personal belongings (please select all that are sent with patient):  None    RN SIGNATURE:  Electronically signed by Amy Lima RN on 11/12/21 at 11:28 AM EST    CASE MANAGEMENT/SOCIAL WORK SECTION    Inpatient Status Date: 11/7/2021    Readmission Risk Assessment Score:  Readmission Risk              Risk of Unplanned Readmission:  19           Discharging to Facility/ Nancy Ward, [address] [place] [address]  [phone]  After Hours [phone] [facility]     / signature: Electronically signed by ROOPA Marion on 11/12/21 at 11:08 AM EST    PHYSICIAN SECTION    Prognosis: Good    Condition at Discharge: Stable    Rehab Potential (if transferring to Rehab): Good    Recommended Labs or Other Treatments After Discharge:     Physician Certification: I certify the above information and transfer of Re Friedman  is necessary for the continuing treatment of the diagnosis listed and that she requires [facility] for greater 30 days.      Update Admission H&P: No change in H&P    PHYSICIAN SIGNATURE:  Electronically signed by Noelle García MD on 11/12/21 at 11:00 AM EST

## 2021-11-12 NOTE — CARE COORDINATION
ANTONIA received info that this patient is going to [facility] on this date. ANTONIA completed and submitted the 7000 form via [employer]. ANTONIA set up transportation and informed the staff here, the facility and ANTONIA also spoke with the patient's sister/Guardian and informed her of the DC/transport time. The patient was scheduled to be picked up at about 11:45 AM via wheelchair by Maile Alicea. The number for report is [phone].

## 2021-11-12 NOTE — PROGRESS NOTES
Speech Language Pathology  Speech Language Pathology  [address] [place] [address]    Dysphagia Treatment Note    Date: 11/12/2021  Patients Name: Yolanda Churchill  MRN: [id number]  Diagnosis: dysphagia   Patient Active Problem List   Diagnosis Code    Contusion of hip S70.00XA    UTI (urinary tract infection), uncomplicated W66.2    Inability to ambulate due to hip R26.2    Closed fracture of left inferior pubic ramus ([facility]) S32.592A    Closed compression fracture of L4 lumbar vertebra S32.040A    Cellulitis of left leg L03. 116    Failure of outpatient treatment Z78.9    Hydrocephalus ([facility] G91.9    S/P  shunt Z98.2    Leg edema R60.0    UTI (urinary tract infection) N39.0    Hydronephrosis of left kidney N13.30    Severe sepsis ([facility]) A41.9, R65.20    Bipolar mood disorder ([facility]) F31.9    Acute kidney injury superimposed on CKD ([facility] [address] N17.9, N18.9    Aspiration pneumonitis ([facility]) J69.0       Pain: denies    Dysphagia Treatment  Treatment time: [id number]    Subjective: [x] Alert [x] Cooperative     [] Confused     [] Agitated    [] Lethargic    Objective/Assessment:    Pt. Seen for diet tolerance monitoring this am. Full breakfast tray observed on bedside table, untouched. Pt refused trials of solid food, stating \"Im still nauseated, I can't eat that stuff, this is my breakfast (points to Ensure bottle)\". Pt. Demonstrated no overt s/s aspiration on sips of Ensure via straw x5 trials. Prompt swallow response observed with all trials. ST will reattempt diet tolerance monitoring when pt agreeable to eating. Continue current diet recommendation. Plan:  [x] Continue ST services    [] Discharge from ST:        Discharge recommendations: []  Further therapy recommended at discharge. The patient should be able to tolerate at least 3 hours of therapy per day over 5 days or 15 hours over 7 days. [] Further therapy recommended at discharge. [] No therapy recommended at discharge.          Treatment completed by: Elan Duvall M.A., [address] [facility]

## 2021-11-13 NOTE — DISCHARGE SUMMARY
[address]    Discharge Summary     Patient ID: Hedy Bartlett  :  [id number]   MRN: [id number]     ACCOUNT:  [de-identified]   Patient's PCP: José Luis Pascual MD  Admit Date: 2021   Discharge Date: 2021    Length of Stay: 5  Code Status:  Prior  Admitting Physician: Blake Guthrie MD  Discharge Physician: Joseph Beal MD     Active Discharge Diagnoses:       Primary Problem  UTI (urinary tract infection)      [place] Problems    Diagnosis Date Noted    Aspiration pneumonitis ([facility] [address] [J69.0] 2021    Acute kidney injury superimposed on CKD ([address] [N17.9, N18.9] 2021    UTI (urinary tract infection) [N39.0] 2021    Severe sepsis ([address] [A41.9, R65.20] 2021    Hydronephrosis of left kidney [N13.30] 2021    Bipolar mood disorder ([facility] [address] [F31.9] 2021    S/P  shunt [Z98.2] 2020       Admission Condition:  fair     Discharged Condition: stable    Hospital Stay:       Hospital Course: Hedy Bartlett is a 66 y.o. female who was admitted for the management of  UTI (urinary tract infection) , presented to ER with Altered Mental Status (Gradual decline in mental status. Had a fall, but was caught. Lives at a group home.   Caregiver is here with her.  ) and Fall    The patient is a 65 y.o.  Non- / non  female who presents withAltered Mental Status (Gradual decline in mental status.  Had a fall, but was caught. Craige Deal at a group home. Theresa Hood is here with her.  ) and Fall   and she is admitted to the hospital for the management of  Urosepsis      The patient is a poor historian, history was taken from caregiver, printed medical records caregiver was carrying, and EMR.  This is a 27-year-old female patient past medical history of bipolar disease, chronic headache, hydrocephalus s/p  shunt presented to ED due to change in behavior, and falling down caregiver mentioned that the patient was having abdominal pain 3 days ago prior to admission  denies any vomiting or nausea, patient communicates well at her baseline, she usually presents with change in behavior whenever she has UTI. Denies chills or fever.         UA: Bacteruria, large leukocytes esterase   Urine culture  Positive for lactose fermenting gram-negative rods  Blood culture positive for E. Coli     X-ray shunt series: Multiple shunt catheters overlying the chest and abdomen have a grossly   stable radiographic appearance when compared to 06/03/2020. Unchanged left pleural effusion and left basilar opacity.      CT abdomen from 11/7/2021: Moderately severe left hydronephrosis secondary to a 10 mm stone in the left UPJ. Also multiple left intrarenal stones. Stable left parapelvic renal cyst.  Atrophic Right kidney. Cystoscopy, left-sided stent placement, Marmolejo placement by urology.     11/8: Patient had some low blood pressures in the hours following surgery of 92/56 with improvement in mentation and arousability. IV rocephin started.     11/9: IV rocephin continued; patient scheduled for swallow study, refused; rescheduled for 11/10     11/10: MBSS performed, recommended diet dysphagia minced and moist (dysphagia II), thin liquids     11/11: PICC line placed for continued IV antibiotics per ID through 11/17; ID recommended doxycycline through 66/54; pre-cert for [facility] for rehab before returning to [facility] home.     11/12: urology confirmed maintain Marmolejo catheter until appointment next week, patient okay to discharge back to [facility].       Significant therapeutic interventions:   Cystoscopy with stent placement  PICC line placement  Modified barium swallow study performed  IV antibiotics    Significant Diagnostic Studies:   Labs / Micro:  CBC:   Lab Results   Component Value Date    WBC 21.8 11/09/2021    RBC 3.65 11/09/2021    HGB 11.5 11/09/2021    HCT 36.0 11/09/2021    MCV 98.5 11/09/2021    MCH 31.4 11/09/2021    MCHC 31.8 11/09/2021    RDW 14.0 11/09/2021     11/09/2021     BMP:    Lab Results   Component Value Date    GLUCOSE 143 11/09/2021     11/09/2021    K 3.4 11/09/2021     11/09/2021    CO2 19 11/09/2021    ANIONGAP 9 11/09/2021    BUN 40 11/09/2021    CREATININE 1.68 11/09/2021    BUNCRER NOT REPORTED 11/09/2021    CALCIUM 8.0 11/09/2021    LABGLOM 30 11/09/2021    GFRAA 36 11/09/2021    GFR      11/09/2021    GFR NOT REPORTED 11/09/2021     U/A:    Lab Results   Component Value Date    COLORU Yellow 11/07/2021    TURBIDITY SLIGHTLY CLOUDY 11/07/2021    SPECGRAV 1.015 11/07/2021    HGBUR TRACE 11/07/2021    PHUR 6.5 11/07/2021    PROTEINU 1+ 11/07/2021    GLUCOSEU NEGATIVE 11/07/2021    KETUA NEGATIVE 11/07/2021    BILIRUBINUR NEGATIVE 11/07/2021    UROBILINOGEN Normal 11/07/2021    NITRU NEGATIVE 11/07/2021    LEUKOCYTESUR LARGE 11/07/2021       Radiology:    CT ABDOMEN PELVIS WO CONTRAST Additional Contrast? None    Result Date: 11/7/2021  EXAMINATION: CT OF THE ABDOMEN AND PELVIS WITHOUT CONTRAST 11/7/2021 12:16 pm TECHNIQUE: CT of the abdomen and pelvis was performed without the administration of intravenous contrast. Multiplanar reformatted images are provided for review. Dose modulation, iterative reconstruction, and/or weight based adjustment of the mA/kV was utilized to reduce the radiation dose to as low as reasonably achievable. COMPARISON: 02/07/2019 HISTORY: ORDERING SYSTEM PROVIDED HISTORY: ams, constipation TECHNOLOGIST PROVIDED HISTORY: ams, constipation Decision Support Exception - unselect if not a suspected or confirmed emergency medical condition->Emergency Medical Condition (MA) Reason for Exam: AMS, constipation Acuity: Unknown Type of Exam: Initial FINDINGS: Lower chest: Dependent atelectasis or consolidation in the left lower lobe is noted. Moderate size pleural effusion is noted with pleural thickening.   Findings are chronic however stable Sequela of old posterior left rib fractures and lateral right rib fractures. No acute osseous abnormality identified. ABDOMEN AND PELVIS: Organs: Evaluation of the abdominal and pelvic viscera is limited in the absence of contrast.  A calcification is noted in the posterior dome of the right hepatic lobe. The liver otherwise reveals no focal abnormality. The gallbladder is not visualized and may be surgically absent. The pancreas, spleen appear grossly unremarkable. 11 mm stable left adrenal adenoma . There is also a 6 mm right adrenal nodule, unchanged. Moderately severe left hydronephrosis with 10 mm stone in the left UPJ. Also multiple left intrarenal stones. Stable left parapelvic renal cysts. Atrophic right kidney renal stones and resolving hydronephrosis GI/Bowel: No bowel dilatation or wall thickening identified. Mild stool burden throughout the colon. Diverticulosis. Pelvis: The bladder is well distended. No wall thickening or inflammatory change identified. Peritoneum/Retroperitoneum: A tunneled peritoneal catheter terminates in the left lower quadrant. No ascites or free air. The aorta is normal in caliber. Calcified atheromatous plaque is present. No enlarged or suspicious-appearing lymph nodes. Bones/Soft Tissues: Nondisplaced posterior left acetabular fracture. Comminuted nondisplaced medial acetabular fracture. There is also a minimally displaced comminuted left inferior pubic ramus fracture. Chronic appearing severe L4 compression fracture. Fracture of L2 vertebral body with less than 25% loss of normal height which also appears chronic however has developed from the prior exam.  Advanced facet arthropathy in the lower lumbar spine. Angular deformity in the inferior sacrum/coccyx is noted without a discrete fracture line. This also may be chronic. Pin fixation hardware is noted within the proximal femora.      Moderately severe left hydronephrosis secondary to a 10 mm stone in the left UPJ. Also multiple left intrarenal stones. Stable left parapelvic renal cyst. Atrophic right kidney renal stones and resolving hydronephrosis No other significant changes are seen from the prior exam     CT HEAD WO CONTRAST    Result Date: 11/7/2021  EXAMINATION: CT OF THE HEAD WITHOUT CONTRAST  11/7/2021 3:16 pm TECHNIQUE: CT of the head was performed without the administration of intravenous contrast. Dose modulation, iterative reconstruction, and/or weight based adjustment of the mA/kV was utilized to reduce the radiation dose to as low as reasonably achievable. COMPARISON: CT scan of the brain from 07/12/2021 HISTORY: ORDERING SYSTEM PROVIDED HISTORY: AMS,  shunt TECHNOLOGIST PROVIDED HISTORY: AMS,  shunt Decision Support Exception - unselect if not a suspected or confirmed emergency medical condition->Emergency Medical Condition (MA) Reason for Exam: AMS,  shunt Acuity: Unknown Type of Exam: Initial FINDINGS: BRAIN/VENTRICLES: Left parietal and frontal approach  shunts in unchanged position with tip positions left anterior horn, unchanged; orphaned retained right parietal approach shunt also again seen, with its tip in left frontal lobe. Unchanged right frontal/parietal encephalomalacia with ex vacuo dilatation right lateral ventricle, also unchanged. No acute intracranial hemorrhage, mass effect or midline shift. No new/abnormal extra-axial fluid collection. Mild periventricular WM low-attenuation changes; gray-white differentiation maintained; no obvious acute infarct. ORBITS: The visualized portion of the orbits demonstrate no acute abnormality. SINUSES: The visualized paranasal sinuses and mastoid air cells demonstrate no acute abnormality; mild mucosal thickening noted left sphenoid sinus involving anterior left ethmoid air cells. No air-fluid level. Fantasma Chris SOFT TISSUES/SKULL:  No acute abnormality of the visualized skull or soft tissues. No acute intracranial abnormality.  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 XR CHEST PORTABLE    Result Date: 11/10/2021  EXAMINATION: ONE XRAY VIEW OF THE CHEST 11/10/2021 10:39 am COMPARISON: None. HISTORY: ORDERING SYSTEM PROVIDED HISTORY: Aspiration TECHNOLOGIST PROVIDED HISTORY: Aspiration Reason for Exam: Aspiration Acuity: Unknown Type of Exam: Unknown FINDINGS: Heart appears upper limits of normal in size. Chronic left pleural effusion with blunting of the left costophrenic angle. Patient is slightly rotated to the left. Hazy parenchymal opacity is noted in the medial right lower lobe compatible with atelectasis or pneumonia. Generalized osteopenia. Stable left pleural catheter,  shunt tube. New parenchymal opacity in the medial right lower chest compatible with atelectasis and/or pneumonia. Chronic left pleural effusion and mild volume loss. FLUORO FOR SURGICAL PROCEDURES    Result Date: 11/7/2021  Radiology exam is complete. No Radiologist dictation. Please follow up with ordering provider. XR Shunt Placement Series    Result Date: 11/7/2021  EXAMINATION: SHUNT SERIES 11/7/2021 3:02 pm COMPARISON: Chest x-ray dated 09/20/2021. Chest series dated 06/03/2020. Skull x-ray dated 06/08/2017. HISTORY: ORDERING SYSTEM PROVIDED HISTORY: AMS,  shunt TECHNOLOGIST PROVIDED HISTORY: AMS,  shunt Reason for Exam: AMS,  shunt Acuity: Acute Type of Exam: Initial FINDINGS: There are multiple shunt catheters overlying the chest.  There is 1 extending from the lower left neck over the right chest with distal tip in the right upper quadrant, not significantly changed, likely abandoned. There are 2 additional shunt catheter radiodensities overlying the left chest, which are unchanged as well. 1 of these appears to terminate overlying a left pleural effusion and the other extends into the abdomen.   Heart size is stable. There is persistent left pleural effusion and left basilar opacity. No evidence of pneumothorax. In the abdomen, distal tip of 1 of the left-sided shunt catheters is overlying the left lower quadrant, which is unchanged. There is a nonspecific, nonobstructed bowel gas pattern. Moderate fecal material in the right side of the colon. Fixation hardware in both hips. Multiple components of shunt catheters are seen overlying the left calvarium, unchanged. Hearing aid device is noted. 1.  Multiple shunt catheters overlying the chest and abdomen have a grossly stable radiographic appearance when compared to 06/03/2020. 2.  Unchanged left pleural effusion and left basilar opacity. FL MODIFIED BARIUM SWALLOW W VIDEO    Result Date: 11/11/2021  EXAMINATION: MODIFIED BARIUM SWALLOW WAS PERFORMED IN CONJUNCTION WITH SPEECH PATHOLOGY SERVICES TECHNIQUE: Fluoroscopic evaluation of the swallowing mechanism was performed using cineradiography with multiple consistency of barium product in conjunction with speech pathology services. FLUOROSCOPY DOSE AND TYPE OR TIME AND EXPOSURES: Fluoro time 3:23 .1dgy. cm2 AIR KERMA 49. 1mGy COMPARISON: None HISTORY: ORDERING SYSTEM PROVIDED HISTORY: Aspiration on liquid diet FINDINGS: Premature vallecular spillage and moderate vallecular and piriform stasis with multiple consistencies. Transient penetration is seen with pudding as well as honey thick, nectar thick and thin liquid consistencies. No evidence of aspiration. No evidence of aspiration. Transient penetration with multiple consistencies. Please see separate speech pathology report for full discussion of findings and recommendations.      FL MODIFIED BARIUM SWALLOW W VIDEO    Result Date: 11/9/2021  EXAMINATION: MODIFIED BARIUM SWALLOW WAS PERFORMED IN CONJUNCTION WITH SPEECH PATHOLOGY SERVICES 11/09/2021 TECHNIQUE: Fluoroscopic evaluation of the swallowing mechanism was performed using cineradiography with multiple consistency of barium product in conjunction with speech pathology services. FLUOROSCOPY DOSE AND TYPE OR TIME AND EXPOSURES: 1 minute 19 seconds; D AP 82.5 dGy cm2 COMPARISON: None HISTORY: ORDERING SYSTEM PROVIDED HISTORY: Chocking on liquid diet TECHNOLOGIST PROVIDED HISTORY: Chocking on liquid diet Reason for Exam: Chocking on liquid diet Acuity: Acute Type of Exam: Ongoing Additional signs and symptoms: Chocking on liquid diet FINDINGS: Very limited exam as the patient refused to participate. The patient ingested a small quantity of pudding consistency material with premature vallecular spillage and residue; swallowing mechanism was not initiated over the course of the study. No definite aspiration is seen although the patient did not initiate a swallow. Recommend repeat exam when the patient is more cooperative. Nondiagnostic exam as described above. The patient requested termination of the procedure. Recommend repeat study when the patient is willing to cooperate. Consultations:    Consults:     Final Specialist Recommendations/Findings:   IP CONSULT TO UROLOGY  IP CONSULT TO INTERNAL MEDICINE  IP CONSULT TO SOCIAL WORK  IP CONSULT TO PALLIATIVE CARE  IP CONSULT TO INFECTIOUS DISEASES      The patient was seen and examined on day of discharge and this discharge summary is in conjunction with any daily progress note from day of discharge. Discharge plan:       Disposition:  To [facility]    Physician Follow Up:     Glenys Lopez MD  [address] [place] [address] [address] [place] [address]    In 1 week      [place] [place], [address] [place] [address]  [phone]    In 1 week         Requiring Further Evaluation/Follow Up POST HOSPITALIZATION/Incidental Findings:   -Follow with IV antibiotics for 5 more days  -Follow-up appointment with urology to remove the Marmolejo catheter in approximately 1 week    Diet: regular diet    Activity: As tolerated    Instructions to Patient: Discharge Medications:      Medication List      START taking these medications    cefTRIAXone  infusion  Commonly known as: ROCEPHIN  Infuse 2,000 mg intravenously every 12 hours for 5 days Compound per protocol        CONTINUE taking these medications    acetaminophen 325 MG tablet  Commonly known as: Tylenol  Take 2 tablets by mouth every 6 hours as needed for Pain     alendronate 70 MG tablet  Commonly known as: FOSAMAX     amLODIPine 10 MG tablet  Commonly known as: NORVASC     aspirin 81 MG tablet     atorvastatin 10 MG tablet  Commonly known as: LIPITOR     Bismatrol 262 MG chewable tablet  Generic drug: bismuth subsalicylate     busPIRone 5 MG tablet  Commonly known as: BUSPAR     calcium carbonate 500 MG chewable tablet  Commonly known as: TUMS     dicyclomine 10 MG capsule  Commonly known as: Bentyl  Take 1 capsule by mouth every 6 hours as needed (cramps)     ferrous sulfate 325 (65 Fe) MG EC tablet  Commonly known as: FE TABS 325     Fioricet -40 MG Caps per capsule  Generic drug: butalbital-APAP-caffeine     fluticasone 50 MCG/ACT nasal spray  Commonly known as: FLONASE     GLUCOSAMINE CHONDROITIN ADV PO     ibuprofen 200 MG tablet  Commonly known as: ADVIL;MOTRIN     * lamoTRIgine 200 MG tablet  Commonly known as: LAMICTAL     * lamoTRIgine 100 MG tablet  Commonly known as: LAMICTAL     lidocaine 4 % external patch  Place 1 patch onto the skin daily     loperamide 2 MG capsule  Commonly known as: IMODIUM     loratadine 10 MG capsule  Commonly known as: CLARITIN     magnesium hydroxide 400 MG/5ML suspension  Commonly known as: MILK OF MAGNESIA     meclizine 25 MG tablet  Commonly known as: ANTIVERT     melatonin 5 MG Tbdp disintegrating tablet     omeprazole 20 MG delayed release capsule  Commonly known as: PRILOSEC     polyethylene glycol 17 GM/SCOOP powder  Commonly known as: GLYCOLAX     * QUEtiapine 25 MG tablet  Commonly known as: SEROQUEL     * SEROquel 25 MG tablet  Generic drug: QUEtiapine     REGLAN PO     ROLAIDS PO     senna-docusate 8.6-50 MG per tablet  Commonly known as: PERICOLACE     SUMAtriptan 100 MG tablet  Commonly known as: IMITREX     therapeutic multivitamin-minerals tablet     * topiramate 100 MG tablet  Commonly known as: TOPAMAX     * Topamax 50 MG tablet  Generic drug: topiramate     Trintellix 5 MG tablet  Generic drug: VORTIoxetine         * This list has 6 medication(s) that are the same as other medications prescribed for you. Read the directions carefully, and ask your doctor or other care provider to review them with you. STOP taking these medications    ciprofloxacin 500 MG tablet  Commonly known as: CIPRO           Where to Get Your Medications      You can get these medications from any pharmacy    Bring a paper prescription for each of these medications  · cefTRIAXone  infusion         Electronically signed by   Feng Donaldson MD  11/13/2021  4:25 PM      Thank you Dr. Charo Macias MD for the opportunity to be involved in this patient's care.

## 2021-11-16 ENCOUNTER — HOSPITAL ENCOUNTER (OUTPATIENT)
Age: 71
Setting detail: SPECIMEN
Discharge: HOME | End: 2021-11-16

## 2021-11-19 ENCOUNTER — HOSPITAL ENCOUNTER (OUTPATIENT)
Age: 71
Setting detail: SPECIMEN
Discharge: HOME | End: 2021-11-19

## 2021-12-12 PROBLEM — N39.0 UTI (URINARY TRACT INFECTION): Status: RESOLVED | Noted: 2021-11-07 | Resolved: 2021-12-12

## 2021-12-17 ENCOUNTER — HOSPITAL ENCOUNTER (OUTPATIENT)
Age: 71
Setting detail: SPECIMEN
Discharge: HOME | End: 2021-12-17
Payer: MEDICARE

## 2021-12-17 LAB
-: ABNORMAL
AMORPHOUS: ABNORMAL
ANION GAP SERPL CALCULATED.3IONS-SCNC: 13 MMOL/L (ref 9–17)
BACTERIA: ABNORMAL
BILIRUBIN URINE: NEGATIVE
BUN BLDV-MCNC: 22 MG/DL (ref 8–23)
BUN/CREAT BLD: 22 (ref 9–20)
CALCIUM SERPL-MCNC: 9.3 MG/DL (ref 8.6–10.4)
CASTS UA: ABNORMAL /LPF
CHLORIDE BLD-SCNC: 109 MMOL/L (ref 98–107)
CO2: 22 MMOL/L (ref 20–31)
COLOR: YELLOW
COMMENT UA: ABNORMAL
CREAT SERPL-MCNC: 0.99 MG/DL (ref 0.5–0.9)
CRYSTALS, UA: ABNORMAL /HPF
EPITHELIAL CELLS UA: ABNORMAL /HPF (ref 0–5)
GFR AFRICAN AMERICAN: >60 ML/MIN
GFR NON-AFRICAN AMERICAN: 55 ML/MIN
GFR SERPL CREATININE-BSD FRML MDRD: ABNORMAL ML/MIN/{1.73_M2}
GFR SERPL CREATININE-BSD FRML MDRD: ABNORMAL ML/MIN/{1.73_M2}
GLUCOSE BLD-MCNC: 88 MG/DL (ref 70–99)
GLUCOSE URINE: NEGATIVE
HCT VFR BLD CALC: 35 % (ref 36.3–47.1)
HEMOGLOBIN: 10.6 G/DL (ref 11.9–15.1)
KETONES, URINE: NEGATIVE
LEUKOCYTE ESTERASE, URINE: ABNORMAL
MCH RBC QN AUTO: 30.3 PG (ref 25.2–33.5)
MCHC RBC AUTO-ENTMCNC: 30.3 G/DL (ref 28.4–34.8)
MCV RBC AUTO: 100 FL (ref 82.6–102.9)
MUCUS: ABNORMAL
NITRITE, URINE: NEGATIVE
NRBC AUTOMATED: 0 PER 100 WBC
OTHER OBSERVATIONS UA: ABNORMAL
PDW BLD-RTO: 13.1 % (ref 11.8–14.4)
PH UA: 6 (ref 5–8)
PLATELET # BLD: 274 K/UL (ref 138–453)
PMV BLD AUTO: 10.5 FL (ref 8.1–13.5)
POTASSIUM SERPL-SCNC: 3.9 MMOL/L (ref 3.7–5.3)
PROTEIN UA: ABNORMAL
RBC # BLD: 3.5 M/UL (ref 3.95–5.11)
RBC UA: ABNORMAL /HPF (ref 0–2)
RENAL EPITHELIAL, UA: ABNORMAL /HPF
SODIUM BLD-SCNC: 144 MMOL/L (ref 135–144)
SPECIFIC GRAVITY UA: 1.02 (ref 1–1.03)
TRICHOMONAS: ABNORMAL
TURBIDITY: ABNORMAL
URINE HGB: ABNORMAL
UROBILINOGEN, URINE: NORMAL
WBC # BLD: 9.5 K/UL (ref 3.5–11.3)
WBC UA: ABNORMAL /HPF (ref 0–5)
YEAST: ABNORMAL

## 2021-12-17 PROCEDURE — 36415 COLL VENOUS BLD VENIPUNCTURE: CPT

## 2021-12-17 PROCEDURE — 85027 COMPLETE CBC AUTOMATED: CPT

## 2021-12-17 PROCEDURE — P9603 ONE-WAY ALLOW PRORATED MILES: HCPCS

## 2021-12-17 PROCEDURE — 87077 CULTURE AEROBIC IDENTIFY: CPT

## 2021-12-17 PROCEDURE — 81001 URINALYSIS AUTO W/SCOPE: CPT

## 2021-12-17 PROCEDURE — 80048 BASIC METABOLIC PNL TOTAL CA: CPT

## 2021-12-17 PROCEDURE — 87086 URINE CULTURE/COLONY COUNT: CPT

## 2021-12-17 PROCEDURE — 87186 SC STD MICRODIL/AGAR DIL: CPT

## 2021-12-19 LAB
CULTURE: ABNORMAL
CULTURE: ABNORMAL
Lab: ABNORMAL
SPECIMEN DESCRIPTION: ABNORMAL

## 2022-01-08 ENCOUNTER — APPOINTMENT (OUTPATIENT)
Dept: GENERAL RADIOLOGY | Age: 72
End: 2022-01-08
Payer: MEDICARE

## 2022-01-08 ENCOUNTER — HOSPITAL ENCOUNTER (EMERGENCY)
Age: 72
Discharge: HOME | End: 2022-01-08
Attending: STUDENT IN AN ORGANIZED HEALTH CARE EDUCATION/TRAINING PROGRAM
Payer: MEDICARE

## 2022-01-08 ENCOUNTER — APPOINTMENT (OUTPATIENT)
Dept: CT IMAGING | Age: 72
End: 2022-01-08
Payer: MEDICARE

## 2022-01-08 VITALS
WEIGHT: 148 LBS | BODY MASS INDEX: 27.96 KG/M2 | HEART RATE: 83 BPM | SYSTOLIC BLOOD PRESSURE: 146 MMHG | OXYGEN SATURATION: 96 % | DIASTOLIC BLOOD PRESSURE: 79 MMHG | RESPIRATION RATE: 18 BRPM | TEMPERATURE: 97.7 F

## 2022-01-08 DIAGNOSIS — S09.90XA INJURY OF HEAD, INITIAL ENCOUNTER: Primary | ICD-10-CM

## 2022-01-08 DIAGNOSIS — S01.01XA LACERATION OF SCALP, INITIAL ENCOUNTER: ICD-10-CM

## 2022-01-08 PROCEDURE — 12001 RPR S/N/AX/GEN/TRNK 2.5CM/<: CPT

## 2022-01-08 PROCEDURE — 70450 CT HEAD/BRAIN W/O DYE: CPT

## 2022-01-08 PROCEDURE — 99284 EMERGENCY DEPT VISIT MOD MDM: CPT

## 2022-01-08 PROCEDURE — 70360 X-RAY EXAM OF NECK: CPT

## 2022-01-08 PROCEDURE — 73502 X-RAY EXAM HIP UNI 2-3 VIEWS: CPT

## 2022-01-08 PROCEDURE — 72125 CT NECK SPINE W/O DYE: CPT

## 2022-01-08 ASSESSMENT — PAIN DESCRIPTION - ORIENTATION: ORIENTATION: LEFT

## 2022-01-08 ASSESSMENT — ENCOUNTER SYMPTOMS
NAUSEA: 0
RHINORRHEA: 0
PHOTOPHOBIA: 0
VOMITING: 0
COLOR CHANGE: 0
COUGH: 0
EYE ITCHING: 0
FACIAL SWELLING: 0
ABDOMINAL PAIN: 0
DIARRHEA: 0
SHORTNESS OF BREATH: 0

## 2022-01-08 ASSESSMENT — PAIN SCALES - GENERAL: PAINLEVEL_OUTOF10: 8

## 2022-01-08 ASSESSMENT — PAIN DESCRIPTION - PAIN TYPE: TYPE: ACUTE PAIN

## 2022-01-08 ASSESSMENT — PAIN DESCRIPTION - LOCATION: LOCATION: LEG;HEAD;NECK

## 2022-01-08 NOTE — ED NOTES
Mode of arrival (squad #, walk in, police, etc) : [address]        Chief complaint(s): [employer] Note (brief scenario, treatment PTA, etc). : Pt arrives to ED c/o head, neck and left leg pain following a fall. Patient is from [facility] [address] where she fell after her walker got caught. Patient denies LOC. Patient is alert and oriented x4 and answering all questions appropriately. Pt speaks in complete sentences without difficulty. Pupils PERRLA and EOMI noted. No visible horizontal or vertical nystagmus. Patient arrives with cervical collar in place. C= \"Have you ever felt that you should Cut down on your drinking? \"  No  A= \"Have people Annoyed you by criticizing your drinking? \"  No  G= \"Have you ever felt bad or Guilty about your drinking? \"  No  E= \"Have you ever had a drink as an Eye-opener first thing in the morning to steady your nerves or to help a hangover? \"  No      Deferred []      Reason for deferring: N/A    *If yes to two or more: probable alcohol abuse. Forrestine Sacks, RN  01/08/22 1348

## 2022-01-08 NOTE — ED PROVIDER NOTES
EMERGENCY DEPARTMENT ENCOUNTER    Pt Name: Cheli Luna  MRN: [id number]  [place] 1950  Date of evaluation: 1/8/22  CHIEF COMPLAINT       Chief Complaint   Patient presents with    Fall    Head Injury    Leg Pain    Neck Pain     HISTORY OF PRESENT ILLNESS   HPI  78-year-old female history of hydrocephalus with shunt in place, bipolar presents for evaluation after a fall at nursing facility. Reportedly stood up a little bit dizzy and then fell hitting the back of her head. Did not lose consciousness. No chest pain or shortness of breath or palpitations. No nausea vomiting. Having some mild right hip pain and then posterior head pain. Has daily headaches is having the same headache that she always has. No other associated injuries or symptoms. Symptoms are mild and constant. REVIEW OF SYSTEMS     Review of Systems   Constitutional: Negative for chills and fatigue. HENT: Negative for facial swelling, postnasal drip and rhinorrhea. Eyes: Negative for photophobia and itching. Respiratory: Negative for cough and shortness of breath. Cardiovascular: Negative for chest pain and leg swelling. Gastrointestinal: Negative for abdominal pain, diarrhea, nausea and vomiting. Genitourinary: Negative for dysuria, flank pain and hematuria. Musculoskeletal: Negative for arthralgias and joint swelling. Skin: Negative for color change and rash. Neurological: Positive for headaches. Negative for dizziness and numbness.      PASTMEDICAL HISTORY     Past Medical History:   Diagnosis Date    Acid reflux     Bipolar affective ([address]     Chronic headaches     Hydrocephalus ([address]     Lives in nursing home 11/15/2021    currently at [place], [place] # [phone], fax # [phone]     Past Problem List  Patient Active Problem List   Diagnosis Code    Contusion of hip S70.00XA    UTI (urinary tract infection), uncomplicated G79.6    Inability to ambulate due to hip R26.2    Closed fracture of left inferior pubic ramus ([facility]) S32.592A    Closed compression fracture of L4 lumbar vertebra S32.040A    Cellulitis of left leg L03. 116    Failure of outpatient treatment Z78.9    Hydrocephalus ([address] G91.9    S/P  shunt Z98.2    Leg edema R60.0    Hydronephrosis of left kidney N13.30    Severe sepsis ([facility]) A41.9, R65.20    Bipolar mood disorder ([facility]) F31.9    Acute kidney injury superimposed on CKD ([address] N17.9, N18.9    Aspiration pneumonitis ([address] J69.0     SURGICAL HISTORY       Past Surgical History:   Procedure Laterality Date    CHOLECYSTECTOMY      CYSTOSCOPY Left 11/7/2021    CYSTOSCOPY URETERAL STENT INSERTION performed by Gina Juarez MD at [address] Left 06/09/2017    PINNING AND SCREW    HIP PINNING Left 6/9/2017    HIP PINNING 7.3 CANNULATED SCREW WITH SYNTHES PRODUCT APPLICATION AND INTRAOPERATIVE C-ARM performed by Anju Shukla MD at [facility] [place] [facility] [place] [facility]      shunt x3 head    SHUNT REVISION      TONSILLECTOMY       CURRENT MEDICATIONS       Previous Medications    ACETAMINOPHEN (TYLENOL) 325 MG TABLET    Take 2 tablets by mouth every 6 hours as needed for Pain    ALENDRONATE (FOSAMAX) 70 MG TABLET    Take 70 mg by mouth every 7 days Every Wednesday    AMLODIPINE (NORVASC) 10 MG TABLET    Take 5 mg by mouth daily     ASPIRIN 81 MG TABLET    Take 81 mg by mouth daily.     ATORVASTATIN (LIPITOR) 10 MG TABLET    Take 10 mg by mouth daily    BISMUTH SUBSALICYLATE (BISMATROL) 262 MG CHEWABLE TABLET    Take 524 mg by mouth as needed for Heartburn (8 times daily)    BUSPIRONE (BUSPAR) 5 MG TABLET    Take 5 mg by mouth 2 times daily    BUTALBITAL-APAP-CAFFEINE (FIORICET) -40 MG CAPS PER CAPSULE    Take 1 capsule by mouth every 6 hours as needed for Headaches    CA CARBONATE-MAG HYDROXIDE (ROLAIDS PO)    Take 2 tablets by mouth every 4 hours as needed (acid reflux)    CALCIUM CARBONATE (TUMS) 500 MG CHEWABLE TABLET    Take 1 tablet every evening    TOPIRAMATE (TOPAMAX) 50 MG TABLET    Take 1 tablet by mouth daily    VORTIOXETINE (TRINTELLIX) 5 MG TABLET    Take 5 mg by mouth daily     ALLERGIES     is allergic to levaquin [levofloxacin in d5w], adhesive tape, chocolate, indomethacin, and neurontin [gabapentin]. FAMILY HISTORY     She indicated that her mother is . She indicated that her father is . SOCIAL HISTORY       Social History     Tobacco Use    Smoking status: Never Smoker    Smokeless tobacco: Never Used   Vaping Use    Vaping Use: Never used   Substance Use Topics    Alcohol use: No    Drug use: No     PHYSICAL EXAM     INITIAL VITALS: BP (!) 146/79   Pulse 83   Temp 97.7 °F (36.5 °C) (Oral)   Resp 18   Wt 148 lb (67.1 kg)   SpO2 96%   BMI 27.96 kg/m²    Physical Exam  Vitals and nursing note reviewed. Constitutional:       Appearance: She is normal weight. HENT:      Head: Normocephalic and atraumatic. Comments: Dried blood posterior occipital scalp no active bleeding  Eyes:      Extraocular Movements: Extraocular movements intact. Pupils: Pupils are equal, round, and reactive to light. Neck:      Comments: C-spine tenderness no deformity  Cardiovascular:      Rate and Rhythm: Normal rate and regular rhythm. Pulmonary:      Effort: Pulmonary effort is normal.      Breath sounds: Normal breath sounds. Abdominal:      General: Abdomen is flat. There is no distension. Palpations: There is no mass. Musculoskeletal:         General: No swelling. Normal range of motion. Cervical back: Normal range of motion and neck supple. Comments: Mild discomfort with logroll of the right hip no significant deformity   Skin:     General: Skin is warm and dry. Neurological:      General: No focal deficit present. Mental Status: She is alert. Mental status is at baseline.          MEDICAL DECISION MAKIN-year-old female presents for evaluation after a fall at long-term care facility. Patient does have a history of a  shunt and so will image her CNS, C-spine, do a shunt series to evaluate. Imaging is negative. Small scalp laceration repaired with staples. Will discharge back to facility. CRITICAL CARE:       PROCEDURES:    Lac Repair    Date/Time: 1/8/2022 8:12 PM  Performed by: Cooper Hager MD  Authorized by: Cooper Hager MD     Consent:     Consent obtained:  Verbal    Consent given by:  Patient    Risks discussed:  Infection and pain    Alternatives discussed:  No treatment  Anesthesia (see MAR for exact dosages): Anesthesia method:  Local infiltration    Local anesthetic:  Lidocaine 1% w/o epi  Laceration details:     Location:  Scalp    Scalp location:  Occipital    Length (cm):  2  Repair type:     Repair type:  Simple  Pre-procedure details:     Preparation:  Patient was prepped and draped in usual sterile fashion  Treatment:     Area cleansed with:  Saline    Amount of cleaning:  Standard    Irrigation solution:  Sterile saline    Irrigation method:  Pressure wash  Skin repair:     Repair method:  Staples    Number of staples:  2  Approximation:     Approximation:  Close  Post-procedure details:     Dressing:  Open (no dressing)    Patient tolerance of procedure: Tolerated well, no immediate complications        DIAGNOSTIC RESULTS   EKG:All EKG's are interpreted by the Emergency Department Physician who either signs or Co-signs this chart in the absence of a cardiologist.        RADIOLOGY:All plain film, CT, MRI, and formal ultrasound images (except ED bedside ultrasound) are read by the radiologist, see reports below, unless otherwisenoted in MDM or here. CT Cervical Spine WO Contrast   Final Result   Multilevel degenerate change without acute fracture traumatic malalignment. RECOMMENDATIONS:   Unavailable         CT Head WO Contrast   Final Result   No acute intracranial abnormality.       Stable shunted ventricular system/mention raquel. RECOMMENDATIONS:   Unavailable         XR SHUNT SERIES PLACEMENT (<4 VIEWS)   Final Result   1. Unchanged left frontal parietal approach ventriculopleural shunt catheter   in expected position with no evident catheter kinking nor fracture. 2. Unchanged abandoned bilateral posterior parietal approach   ventriculoperitoneal shunt catheters. 3. Persistence of likely loculated moderate to large left pleural effusion   with underlying left basilar passive atelectasis. Superimposed pneumonia and   aspiration are not excluded. 4. Pulmonary vascular congestion. 5. Nonobstructive bowel gas pattern with a moderate stool volume. XR HIP 2-3 VW W PELVIS RIGHT   Final Result   No evidence of acute displaced right hip fracture      Bilateral renal calculi partially visualized. [facility] LABS: All lab results were reviewed by myself, and all abnormals are listed below. Labs Reviewed - No data to display    EMERGENCY DEPARTMENTCOURSE:         Vitals:    Vitals:    01/08/22 1733 01/08/22 1930   BP: (!) 146/79    Pulse: 83    Resp: 18    Temp: 97.7 °F (36.5 °C)    TempSrc: Oral    SpO2: 96%    Weight:  148 lb (67.1 kg)       The patient was given the following medications while in the emergency department:  No orders of the defined types were placed in this encounter. CONSULTS:  None    FINAL IMPRESSION      1. Injury of head, initial encounter    2. Laceration of scalp, initial encounter          DISPOSITION/PLAN   DISPOSITION Decision To Discharge 01/08/2022 08:00:09 PM      PATIENT REFERRED TO:  Nati Lundberg MD  [address] [place] [id number]  [phone]    Call   As needed    DISCHARGE MEDICATIONS:  New Prescriptions    No medications on file     The care is provided during an unprecedented national emergency due to the novel coronavirus, COVID 19.   Marrion Schwab, MD  Attending Emergency Physician                    Marrion Schwab, MD  01/08/22 2012

## 2022-01-09 NOTE — ED NOTES
[facility] notified of staples and to instruct nurses they come out in 10 days. Pt also informed and states she understands.       Lory Barney RN  01/08/22 9421

## 2022-01-11 RX ORDER — SODIUM CHLORIDE, SODIUM LACTATE, POTASSIUM CHLORIDE, CALCIUM CHLORIDE 600; 310; 30; 20 MG/100ML; MG/100ML; MG/100ML; MG/100ML
1000 INJECTION, SOLUTION INTRAVENOUS CONTINUOUS
Status: CANCELLED | OUTPATIENT
Start: 2022-01-11

## 2022-01-13 ENCOUNTER — HOSPITAL ENCOUNTER (OUTPATIENT)
Dept: PREADMISSION TESTING | Age: 72
Discharge: HOME | End: 2022-01-17
Payer: MEDICARE

## 2022-01-13 ENCOUNTER — HOSPITAL ENCOUNTER (OUTPATIENT)
Age: 72
Setting detail: SPECIMEN
Discharge: HOME | End: 2022-01-13
Payer: MEDICARE

## 2022-01-13 VITALS
RESPIRATION RATE: 16 BRPM | DIASTOLIC BLOOD PRESSURE: 77 MMHG | OXYGEN SATURATION: 99 % | HEART RATE: 82 BPM | TEMPERATURE: 97.3 F | HEIGHT: 61 IN | BODY MASS INDEX: 25.13 KG/M2 | WEIGHT: 133.1 LBS | SYSTOLIC BLOOD PRESSURE: 137 MMHG

## 2022-01-13 LAB
ANION GAP SERPL CALCULATED.3IONS-SCNC: 15 MMOL/L (ref 9–17)
BUN BLDV-MCNC: 27 MG/DL (ref 8–23)
BUN/CREAT BLD: ABNORMAL (ref 9–20)
CALCIUM SERPL-MCNC: 8.8 MG/DL (ref 8.6–10.4)
CHLORIDE BLD-SCNC: 104 MMOL/L (ref 98–107)
CO2: 17 MMOL/L (ref 20–31)
CREAT SERPL-MCNC: 1.57 MG/DL (ref 0.5–0.9)
GFR AFRICAN AMERICAN: 39 ML/MIN
GFR NON-AFRICAN AMERICAN: 32 ML/MIN
GFR SERPL CREATININE-BSD FRML MDRD: ABNORMAL ML/MIN/{1.73_M2}
GFR SERPL CREATININE-BSD FRML MDRD: ABNORMAL ML/MIN/{1.73_M2}
GLUCOSE BLD-MCNC: 111 MG/DL (ref 70–99)
HCT VFR BLD CALC: 33.7 % (ref 36.3–47.1)
HEMOGLOBIN: 10.8 G/DL (ref 11.9–15.1)
LAMOTRIGINE LEVEL: 4.5 UG/ML (ref 3–15)
MCH RBC QN AUTO: 31.3 PG (ref 25.2–33.5)
MCHC RBC AUTO-ENTMCNC: 32 G/DL (ref 28.4–34.8)
MCV RBC AUTO: 97.7 FL (ref 82.6–102.9)
NRBC AUTOMATED: 0 PER 100 WBC
PDW BLD-RTO: 13.4 % (ref 11.8–14.4)
PLATELET # BLD: 310 K/UL (ref 138–453)
PMV BLD AUTO: 10.2 FL (ref 8.1–13.5)
POTASSIUM SERPL-SCNC: 4.5 MMOL/L (ref 3.7–5.3)
RBC # BLD: 3.45 M/UL (ref 3.95–5.11)
SODIUM BLD-SCNC: 136 MMOL/L (ref 135–144)
WBC # BLD: 10.9 K/UL (ref 3.5–11.3)

## 2022-01-13 PROCEDURE — 93005 ELECTROCARDIOGRAM TRACING: CPT | Performed by: NURSE PRACTITIONER

## 2022-01-13 PROCEDURE — 87086 URINE CULTURE/COLONY COUNT: CPT

## 2022-01-13 PROCEDURE — 87186 SC STD MICRODIL/AGAR DIL: CPT

## 2022-01-13 PROCEDURE — 36415 COLL VENOUS BLD VENIPUNCTURE: CPT

## 2022-01-13 PROCEDURE — 80048 BASIC METABOLIC PNL TOTAL CA: CPT

## 2022-01-13 PROCEDURE — 87077 CULTURE AEROBIC IDENTIFY: CPT

## 2022-01-13 PROCEDURE — P9603 ONE-WAY ALLOW PRORATED MILES: HCPCS

## 2022-01-13 PROCEDURE — 85027 COMPLETE CBC AUTOMATED: CPT

## 2022-01-13 PROCEDURE — 80175 DRUG SCREEN QUAN LAMOTRIGINE: CPT

## 2022-01-13 RX ORDER — ALBUTEROL SULFATE 90 UG/1
2 AEROSOL, METERED RESPIRATORY (INHALATION) EVERY 6 HOURS PRN
COMMUNITY
End: 2022-05-05

## 2022-01-13 RX ORDER — GUAIFENESIN 600 MG/1
1200 TABLET, EXTENDED RELEASE ORAL 2 TIMES DAILY
COMMUNITY
End: 2022-05-05

## 2022-01-13 RX ORDER — BUDESONIDE AND FORMOTEROL FUMARATE DIHYDRATE 160; 4.5 UG/1; UG/1
2 AEROSOL RESPIRATORY (INHALATION) 2 TIMES DAILY
COMMUNITY

## 2022-01-13 RX ORDER — ONDANSETRON 4 MG/1
4 TABLET, FILM COATED ORAL EVERY 8 HOURS PRN
COMMUNITY
End: 2022-05-05

## 2022-01-13 ASSESSMENT — PAIN DESCRIPTION - ORIENTATION: ORIENTATION: LEFT

## 2022-01-13 ASSESSMENT — PAIN DESCRIPTION - PAIN TYPE: TYPE: CHRONIC PAIN

## 2022-01-13 ASSESSMENT — PAIN SCALES - GENERAL: PAINLEVEL_OUTOF10: 8

## 2022-01-13 ASSESSMENT — PAIN DESCRIPTION - LOCATION: LOCATION: HEAD;NECK;LEG

## 2022-01-13 NOTE — PROGRESS NOTES
Anesthesia Focused Assessment    Hx of anesthesia complications:  no  Family hx of anesthesia complications:  no      Prior + Covid-19 test? yes  Date: 11/29/2022  Symptoms: unknown  Complications: none  Hospitalization required? no      STOP-BANG Sleep Apnea Questionnaire    SNORE loudly (heard through closed doors)? Yes  TIRED, fatigued, sleepy during daytime? No  OBSERVED stopping breathing during sleep? No  High blood PRESSURE or being treated? Yes    BMI over 35? No  AGE over 48? Yes  NECK circumference over 16\"? No  GENDER (male)? No             Total 2  High risk 5-8  Intermediate risk 3-4  Low risk 0-2    ----------------------------------------------------------------------------------------------------------------------  XOCHILT                              No  If yes, machine? DM1                                            No  DM2                   No    Coronary Artery Disease      No  HTN         Yes  Defib/AICD/Pacemaker               No             Renal Failure                   No  If yes, on dialysis           Active smoker? No  Drinks alcohol? No  Illicit drugs?         No  Dentition?        benign      Past Medical History:   Diagnosis Date    Acid reflux     Atrophy of kidney     Bipolar affective (HCC)     Chronic headaches     Chronic kidney disease     Dehydration     Difficulty in walking     Dysphagia     Fall 01/08/2022    head laceration, taken to ER from facility    GERD (gastroesophageal reflux disease)     H/O recurrent pneumonia     Hard of hearing     wears bilateral aides    Hearing loss     [facility]     Hydronephrosis     Hypertension     Lives in nursing home 11/15/2021    currently at [place], [place] # [phone], fax # [phone]    Migraine     Schizo affective schizophrenia ([facility]     Sepsis ([facility]     Tachycardia     UTI (urinary tract infection)     Weakness          Patient was evaluated in PAT & anesthesia guidelines were applied. NPO guidelines, medication instructions and scheduled arrival time were reviewed with patient. Patient was sent for post PAT anesthesia interview for covid + history and evaluated by Dr. Richie Marie.                                                                                   Patient with history of mental decline, falls, lives in group home. She has a history of hydrocephalus, has shunt in place. Patient is unsure if she follows with neurology, unable to locate any outpatient notes. Patient reports daily headaches but states this is chronic. She fell on 1/8/2022, evaluated in ED (see attached notes and imaging studies). A head laceration was repaired with staples. Clearance needed?     Medical or cardiac clearance ordered: neurology    Zelalem Ordonez, ANNALISA - CNP   1/13/22  3:17 PM

## 2022-01-13 NOTE — H&P
History and Physical    Pt Name: Sheldon Acosta  MRN: [id number]  YOB: 1950  Date of evaluation: 1/13/2022  Primary Care Physician: Abby Carrel, MD    SUBJECTIVE:   History of Chief Complaint:    Sheldon Acosta is a 66 y.o. female who presents for PAT appointment. Patient is a poor historian and presents today with Fernando Porter, a representative from [facility], where the patient resides. Patient reports she has been having abdominal pain, urinary frequency, and has a history of UTIs. She denies any hematuria. Patient has been scheduled for HOLMIUM, CYSTO, URETEROSCOPY, STENT PLACEMENT - Left. Allergies  is allergic to levaquin [levofloxacin in d5w], adhesive tape, chocolate, indomethacin, and neurontin [gabapentin]. Medications  Prior to Admission medications    Medication Sig Start Date End Date Taking?  Authorizing Provider   fluticasone (FLONASE) 50 MCG/ACT nasal spray 1 spray by Each Nostril route daily   Yes Historical Provider, MD   loratadine (CLARITIN) 10 MG capsule Take 10 mg by mouth daily   Yes Historical Provider, MD   polyethylene glycol (GLYCOLAX) 17 GM/SCOOP powder Take 17 g by mouth daily   Yes Historical Provider, MD   QUEtiapine (SEROQUEL) 25 MG tablet Take 25 mg by mouth daily   Yes Historical Provider, MD   VORTIoxetine (TRINTELLIX) 5 MG tablet Take 5 mg by mouth daily   Yes Historical Provider, MD   topiramate (TOPAMAX) 100 MG tablet Take 100 mg by mouth every evening   Yes Historical Provider, MD   lamoTRIgine (LAMICTAL) 200 MG tablet Take 200 mg by mouth nightly   Yes Historical Provider, MD   melatonin 5 MG TBDP disintegrating tablet Take 5 mg by mouth nightly   Yes Historical Provider, MD   QUEtiapine (SEROQUEL) 25 MG tablet Take 50 mg by mouth nightly   Yes Historical Provider, MD   meclizine (ANTIVERT) 25 MG tablet Take 25 mg by mouth 3 times daily as needed for Dizziness   Yes Historical Provider, MD   Ca Carbonate-Mag Hydroxide (ROLAIDS PO) Take 2 tablets by mouth every 4 hours as needed (acid reflux)   Yes Historical Provider, MD   SUMAtriptan (IMITREX) 100 MG tablet Take 100 mg by mouth 2 times daily as needed for Migraine   Yes Historical Provider, MD   topiramate (TOPAMAX) 50 MG tablet Take 1 tablet by mouth daily 12/13/18  Yes Lucio Heard MD   Misc Natural Products (GLUCOSAMINE CHONDROITIN ADV PO) Take 1 tablet by mouth daily   Yes Historical Provider, MD   busPIRone (BUSPAR) 5 MG tablet Take 5 mg by mouth 2 times daily   Yes Historical Provider, MD   bismuth subsalicylate (BISMATROL) 262 MG chewable tablet Take 524 mg by mouth as needed for Heartburn (8 times daily)   Yes Historical Provider, MD   senna-docusate (PERICOLACE) 8.6-50 MG per tablet Take 1 tablet by mouth as needed for Constipation (in eveninig) Or 2 tabs   Yes Historical Provider, MD   magnesium hydroxide (MILK OF MAGNESIA) 400 MG/5ML suspension Take 5 mLs by mouth daily as needed for Constipation   Yes Historical Provider, MD   alendronate (FOSAMAX) 70 MG tablet Take 70 mg by mouth every 7 days Every Wednesday 12/12/18  Yes Historical Provider, MD   atorvastatin (LIPITOR) 10 MG tablet Take 10 mg by mouth daily   Yes Historical Provider, MD   amLODIPine (NORVASC) 10 MG tablet Take 5 mg by mouth daily    Yes Historical Provider, MD   ferrous sulfate 325 (65 FE) MG EC tablet Take 325 mg by mouth daily (with breakfast). Yes Historical Provider, MD   lamoTRIgine (LAMICTAL) 100 MG tablet Take 100 mg by mouth daily    Yes Historical Provider, MD   aspirin 81 MG tablet Take 81 mg by mouth daily.    Yes Historical Provider, MD   dicyclomine (BENTYL) 10 MG capsule Take 1 capsule by mouth every 6 hours as needed (cramps) 10/1/21   Gurvinder Rahman MD   omeprazole (PRILOSEC) 20 MG delayed release capsule Take 20 mg by mouth daily    Historical Provider, MD   butalbital-APAP-caffeine (FIORICET) -40 MG CAPS per capsule Take 1 capsule by mouth every 6 hours as needed for Headaches    Historical Provider, MD acetaminophen (TYLENOL) 325 MG tablet Take 2 tablets by mouth every 6 hours as needed for Pain 6/3/20   Cathi Haro DO   calcium carbonate (TUMS) 500 MG chewable tablet Take 1 tablet by mouth 4 times daily as needed for Heartburn    Historical Provider, MD   loperamide (IMODIUM) 2 MG capsule Take 2 mg by mouth 4 times daily as needed for Diarrhea    Historical Provider, MD   Multiple Vitamins-Minerals (THERAPEUTIC MULTIVITAMIN-MINERALS) tablet Take 1 tablet by mouth daily. Historical Provider, MD   Metoclopramide HCl (REGLAN PO) Take 5 mg by mouth 3 times daily     Historical Provider, MD     Past Medical History    has a past medical history of Acid reflux, Atrophy of kidney, Bipolar affective ([address] Chronic headaches, Chronic kidney disease, Dehydration, Difficulty in walking, Dysphagia, Fall, GERD (gastroesophageal reflux disease), H/O recurrent pneumonia, Hearing loss, Hydrocephalus ([address] Hydronephrosis, Lives in nursing home, Migraine, Schizo affective schizophrenia ([address] Sepsis ([address] Tachycardia, UTI (urinary tract infection), and Weakness. Past Surgical History   has a past surgical history that includes other surgical history; shunt revision; Cholecystectomy; Tonsillectomy; Hip fracture surgery (Left, 2017); hip pinning (Left, 2017); and Cystoscopy (Left, 2021). Social History   reports that she has never smoked. She has never used smokeless tobacco.    reports no history of alcohol use. reports no history of drug use. Marital Status single  Children none  Occupation disability  Family History  Family Status   Relation Name Status    Mother      Father       family history is not on file.     Review of Systems:  CONSTITUTIONAL:   negative for fevers, chills, fatigue and malaise    EYES:   negative for double vision, blurred vision and photophobia    HEENT:   negative for tinnitus, epistaxis and sore throat     RESPIRATORY:   negative for cough, shortness of breath, wheezing     CARDIOVASCULAR:   negative for chest pain, palpitations, syncope, edema     GASTROINTESTINAL:   negative for nausea, vomiting history of abdominal pain, urinary frequency, UTI. GENITOURINARY:   negative for incontinence     MUSCULOSKELETAL:   negative for neck or back pain     NEUROLOGICAL:   Negative for weakness and tingling  negative for headaches and dizziness     PSYCHIATRIC:   negative for anxiety       OBJECTIVE:   VITALS:  height is 5' 1\" (1.549 m) and weight is 133 lb 1.6 oz (60.4 kg). Her infrared temperature is 97.3 °F (36.3 °C). Her blood pressure is 137/77 and her pulse is 82. Her respiration is 16 and oxygen saturation is 99%. CONSTITUTIONAL:alert & oriented x 3, no acute distress. Calm and pleasant, poor historian. SKIN:  Warm and dry, no rashes to exposed areas of skin. HEAD:  Normocephalic, atraumatic. EYES: PERRL. EOMs intact. Wearing glasses. EARS:  Intact and equal bilaterally. No edema or thickening, without lumps, lesions, or discharge. Very hard of hearing; wears bilateral aides. NOSE:  Nares patent. No rhinorrhea   MOUTH/THROAT:  Mucous membranes pink and moist, uvula midline, teeth appear to be intact, many missing. NECK:supple, no lymphadenopathy  LUNGS: Respirations even and non-labored. Clear to auscultation bilaterally, no wheezes, rales, or rhonchi. CARDIOVASCULAR: Regular rate and rhythm, no murmurs/rubs/gallops   ABDOMEN: soft, non-tender, non-distended, bowel sounds active x 4   EXTREMITIES: No edema to bilateral lower extremities. No varicosities to bilateral lower extremities. NEUROLOGIC: CN II-XII are grossly intact. Gait not assessed, patient in wheelchair. Testing:   [id number]  Labs pending: drawn 2022   IMPRESSIONS:   Left kidney stone. PLANS:   HOLMIUM, CYSTO, URETEROSCOPY, STENT PLACEMENT - Left.     ANNALISA Arcos - CNP  Electronically signed 2022 at 2:49 PM

## 2022-01-13 NOTE — PROGRESS NOTES
Writer called and spoke with sister and Bridget Negrete, concerning pt [place]. [place] will be available by phone for consent of treatment and Anesthesia consent the day of surgery.

## 2022-01-14 LAB
EKG ATRIAL RATE: 77 BPM
EKG P AXIS: 40 DEGREES
EKG P-R INTERVAL: 132 MS
EKG Q-T INTERVAL: 372 MS
EKG QRS DURATION: 102 MS
EKG QTC CALCULATION (BAZETT): 420 MS
EKG R AXIS: 26 DEGREES
EKG T AXIS: 27 DEGREES
EKG VENTRICULAR RATE: 77 BPM

## 2022-01-15 LAB
CULTURE: ABNORMAL
Lab: ABNORMAL
SPECIMEN DESCRIPTION: ABNORMAL

## 2022-01-17 NOTE — PROGRESS NOTES
Left a voice mail message for Erin Bowen office about positive urine culture and faxed over results.

## 2022-01-18 ENCOUNTER — ANESTHESIA (OUTPATIENT)
Dept: OPERATING ROOM | Age: 72
End: 2022-01-18
Payer: MEDICARE

## 2022-01-18 ENCOUNTER — APPOINTMENT (OUTPATIENT)
Dept: GENERAL RADIOLOGY | Age: 72
End: 2022-01-18
Attending: UROLOGY
Payer: MEDICARE

## 2022-01-18 ENCOUNTER — ANESTHESIA EVENT (OUTPATIENT)
Dept: OPERATING ROOM | Age: 72
End: 2022-01-18
Payer: MEDICARE

## 2022-01-18 ENCOUNTER — HOSPITAL ENCOUNTER (OUTPATIENT)
Age: 72
Setting detail: OUTPATIENT SURGERY
Discharge: NURSING, REHAB OR LONG-TERM CARE | End: 2022-01-18
Attending: UROLOGY | Admitting: UROLOGY
Payer: MEDICARE

## 2022-01-18 VITALS — TEMPERATURE: 95.5 F | DIASTOLIC BLOOD PRESSURE: 92 MMHG | SYSTOLIC BLOOD PRESSURE: 151 MMHG | OXYGEN SATURATION: 100 %

## 2022-01-18 VITALS
SYSTOLIC BLOOD PRESSURE: 161 MMHG | DIASTOLIC BLOOD PRESSURE: 85 MMHG | WEIGHT: 116.4 LBS | RESPIRATION RATE: 21 BRPM | BODY MASS INDEX: 21.98 KG/M2 | TEMPERATURE: 97.3 F | OXYGEN SATURATION: 94 % | HEART RATE: 87 BPM | HEIGHT: 61 IN

## 2022-01-18 DIAGNOSIS — G89.18 POST-OP PAIN: Primary | ICD-10-CM

## 2022-01-18 PROCEDURE — 3700000000 HC ANESTHESIA ATTENDED CARE: Performed by: UROLOGY

## 2022-01-18 PROCEDURE — 3700000001 HC ADD 15 MINUTES (ANESTHESIA): Performed by: UROLOGY

## 2022-01-18 PROCEDURE — 3209999900 FLUORO FOR SURGICAL PROCEDURES

## 2022-01-18 PROCEDURE — A4217 STERILE WATER/SALINE, 500 ML: HCPCS | Performed by: UROLOGY

## 2022-01-18 PROCEDURE — 2500000003 HC RX 250 WO HCPCS: Performed by: NURSE ANESTHETIST, CERTIFIED REGISTERED

## 2022-01-18 PROCEDURE — 2580000003 HC RX 258: Performed by: STUDENT IN AN ORGANIZED HEALTH CARE EDUCATION/TRAINING PROGRAM

## 2022-01-18 PROCEDURE — 6370000000 HC RX 637 (ALT 250 FOR IP): Performed by: ANESTHESIOLOGY

## 2022-01-18 PROCEDURE — 7100000011 HC PHASE II RECOVERY - ADDTL 15 MIN: Performed by: UROLOGY

## 2022-01-18 PROCEDURE — 2580000003 HC RX 258: Performed by: UROLOGY

## 2022-01-18 PROCEDURE — 2580000003 HC RX 258: Performed by: ANESTHESIOLOGY

## 2022-01-18 PROCEDURE — 6360000002 HC RX W HCPCS

## 2022-01-18 PROCEDURE — 3600000014 HC SURGERY LEVEL 4 ADDTL 15MIN: Performed by: UROLOGY

## 2022-01-18 PROCEDURE — C2617 STENT, NON-COR, TEM W/O DEL: HCPCS | Performed by: UROLOGY

## 2022-01-18 PROCEDURE — 7100000001 HC PACU RECOVERY - ADDTL 15 MIN: Performed by: UROLOGY

## 2022-01-18 PROCEDURE — 6360000002 HC RX W HCPCS: Performed by: NURSE ANESTHETIST, CERTIFIED REGISTERED

## 2022-01-18 PROCEDURE — 6360000002 HC RX W HCPCS: Performed by: STUDENT IN AN ORGANIZED HEALTH CARE EDUCATION/TRAINING PROGRAM

## 2022-01-18 PROCEDURE — 7100000000 HC PACU RECOVERY - FIRST 15 MIN: Performed by: UROLOGY

## 2022-01-18 PROCEDURE — 3600000004 HC SURGERY LEVEL 4 BASE: Performed by: UROLOGY

## 2022-01-18 PROCEDURE — 2720000010 HC SURG SUPPLY STERILE: Performed by: UROLOGY

## 2022-01-18 PROCEDURE — 7100000010 HC PHASE II RECOVERY - FIRST 15 MIN: Performed by: UROLOGY

## 2022-01-18 DEVICE — URETERAL STENT
Type: IMPLANTABLE DEVICE | Site: URETER | Status: FUNCTIONAL
Brand: POLARIS™ ULTRA

## 2022-01-18 RX ORDER — MAGNESIUM HYDROXIDE 1200 MG/15ML
LIQUID ORAL PRN
Status: DISCONTINUED | OUTPATIENT
Start: 2022-01-18 | End: 2022-01-18 | Stop reason: ALTCHOICE

## 2022-01-18 RX ORDER — DEXAMETHASONE SODIUM PHOSPHATE 10 MG/ML
INJECTION INTRAMUSCULAR; INTRAVENOUS PRN
Status: DISCONTINUED | OUTPATIENT
Start: 2022-01-18 | End: 2022-01-18 | Stop reason: SDUPTHER

## 2022-01-18 RX ORDER — TAMSULOSIN HYDROCHLORIDE 0.4 MG/1
0.4 CAPSULE ORAL DAILY
Qty: 10 CAPSULE | Refills: 0 | Status: SHIPPED | OUTPATIENT
Start: 2022-01-18 | End: 2022-05-05

## 2022-01-18 RX ORDER — METOCLOPRAMIDE HYDROCHLORIDE 5 MG/ML
10 INJECTION INTRAMUSCULAR; INTRAVENOUS
Status: DISCONTINUED | OUTPATIENT
Start: 2022-01-18 | End: 2022-01-18 | Stop reason: HOSPADM

## 2022-01-18 RX ORDER — OXYBUTYNIN CHLORIDE 5 MG/1
5 TABLET, EXTENDED RELEASE ORAL DAILY PRN
Qty: 5 TABLET | Refills: 0 | Status: SHIPPED | OUTPATIENT
Start: 2022-01-18 | End: 2022-05-05 | Stop reason: DRUGHIGH

## 2022-01-18 RX ORDER — SODIUM CHLORIDE, SODIUM LACTATE, POTASSIUM CHLORIDE, CALCIUM CHLORIDE 600; 310; 30; 20 MG/100ML; MG/100ML; MG/100ML; MG/100ML
1000 INJECTION, SOLUTION INTRAVENOUS CONTINUOUS
Status: DISCONTINUED | OUTPATIENT
Start: 2022-01-18 | End: 2022-01-18 | Stop reason: HOSPADM

## 2022-01-18 RX ORDER — ONDANSETRON 2 MG/ML
INJECTION INTRAMUSCULAR; INTRAVENOUS PRN
Status: DISCONTINUED | OUTPATIENT
Start: 2022-01-18 | End: 2022-01-18 | Stop reason: SDUPTHER

## 2022-01-18 RX ORDER — MEPERIDINE HYDROCHLORIDE 50 MG/ML
12.5 INJECTION INTRAMUSCULAR; INTRAVENOUS; SUBCUTANEOUS EVERY 5 MIN PRN
Status: DISCONTINUED | OUTPATIENT
Start: 2022-01-18 | End: 2022-01-18 | Stop reason: HOSPADM

## 2022-01-18 RX ORDER — PROPOFOL 10 MG/ML
INJECTION, EMULSION INTRAVENOUS PRN
Status: DISCONTINUED | OUTPATIENT
Start: 2022-01-18 | End: 2022-01-18 | Stop reason: SDUPTHER

## 2022-01-18 RX ORDER — GENTAMICIN SULFATE 80 MG/50ML
80 INJECTION, SOLUTION INTRAVENOUS ONCE
Status: COMPLETED | OUTPATIENT
Start: 2022-01-18 | End: 2022-01-18

## 2022-01-18 RX ORDER — LIDOCAINE HYDROCHLORIDE 10 MG/ML
INJECTION, SOLUTION EPIDURAL; INFILTRATION; INTRACAUDAL; PERINEURAL PRN
Status: DISCONTINUED | OUTPATIENT
Start: 2022-01-18 | End: 2022-01-18 | Stop reason: SDUPTHER

## 2022-01-18 RX ORDER — OXYCODONE HYDROCHLORIDE 5 MG/1
5 TABLET ORAL EVERY 6 HOURS PRN
Qty: 12 TABLET | Refills: 0 | Status: SHIPPED | OUTPATIENT
Start: 2022-01-18 | End: 2022-01-21

## 2022-01-18 RX ORDER — HYDROCODONE BITARTRATE AND ACETAMINOPHEN 5; 325 MG/1; MG/1
1 TABLET ORAL
Status: COMPLETED | OUTPATIENT
Start: 2022-01-18 | End: 2022-01-18

## 2022-01-18 RX ORDER — AMOXICILLIN AND CLAVULANATE POTASSIUM 875; 125 MG/1; MG/1
1 TABLET, FILM COATED ORAL 2 TIMES DAILY
Qty: 10 TABLET | Refills: 0 | Status: SHIPPED | OUTPATIENT
Start: 2022-01-18 | End: 2022-01-23

## 2022-01-18 RX ORDER — DIPHENHYDRAMINE HYDROCHLORIDE 50 MG/ML
12.5 INJECTION INTRAMUSCULAR; INTRAVENOUS
Status: DISCONTINUED | OUTPATIENT
Start: 2022-01-18 | End: 2022-01-18 | Stop reason: HOSPADM

## 2022-01-18 RX ORDER — FENTANYL CITRATE 50 UG/ML
INJECTION, SOLUTION INTRAMUSCULAR; INTRAVENOUS PRN
Status: DISCONTINUED | OUTPATIENT
Start: 2022-01-18 | End: 2022-01-18 | Stop reason: SDUPTHER

## 2022-01-18 RX ORDER — FLUCONAZOLE 2 MG/ML
INJECTION, SOLUTION INTRAVENOUS PRN
Status: DISCONTINUED | OUTPATIENT
Start: 2022-01-18 | End: 2022-01-18 | Stop reason: SDUPTHER

## 2022-01-18 RX ORDER — HYDRALAZINE HYDROCHLORIDE 20 MG/ML
5 INJECTION INTRAMUSCULAR; INTRAVENOUS EVERY 10 MIN PRN
Status: DISCONTINUED | OUTPATIENT
Start: 2022-01-18 | End: 2022-01-18 | Stop reason: HOSPADM

## 2022-01-18 RX ORDER — PROMETHAZINE HYDROCHLORIDE 25 MG/ML
6.25 INJECTION, SOLUTION INTRAMUSCULAR; INTRAVENOUS
Status: DISCONTINUED | OUTPATIENT
Start: 2022-01-18 | End: 2022-01-18 | Stop reason: HOSPADM

## 2022-01-18 RX ORDER — MAGNESIUM HYDROXIDE 1200 MG/15ML
LIQUID ORAL CONTINUOUS PRN
Status: COMPLETED | OUTPATIENT
Start: 2022-01-18 | End: 2022-01-18

## 2022-01-18 RX ADMIN — FENTANYL CITRATE 25 MCG: 50 INJECTION, SOLUTION INTRAMUSCULAR; INTRAVENOUS at 13:26

## 2022-01-18 RX ADMIN — PROPOFOL 120 MG: 10 INJECTION, EMULSION INTRAVENOUS at 13:12

## 2022-01-18 RX ADMIN — Medication 0.5 MG: at 14:19

## 2022-01-18 RX ADMIN — FENTANYL CITRATE 25 MCG: 50 INJECTION, SOLUTION INTRAMUSCULAR; INTRAVENOUS at 13:29

## 2022-01-18 RX ADMIN — DEXAMETHASONE SODIUM PHOSPHATE 10 MG: 10 INJECTION INTRAMUSCULAR; INTRAVENOUS at 13:20

## 2022-01-18 RX ADMIN — LIDOCAINE HYDROCHLORIDE 50 MG: 10 INJECTION, SOLUTION EPIDURAL; INFILTRATION; INTRACAUDAL; PERINEURAL at 13:12

## 2022-01-18 RX ADMIN — AMPICILLIN 1000 MG: 1 INJECTION, POWDER, FOR SOLUTION INTRAMUSCULAR; INTRAVENOUS at 13:09

## 2022-01-18 RX ADMIN — GENTAMICIN SULFATE 80 MG: 80 INJECTION, SOLUTION INTRAVENOUS at 13:20

## 2022-01-18 RX ADMIN — ONDANSETRON 4 MG: 2 INJECTION INTRAMUSCULAR; INTRAVENOUS at 13:21

## 2022-01-18 RX ADMIN — SODIUM CHLORIDE, POTASSIUM CHLORIDE, SODIUM LACTATE AND CALCIUM CHLORIDE 1000 ML: 600; 310; 30; 20 INJECTION, SOLUTION INTRAVENOUS at 12:31

## 2022-01-18 RX ADMIN — HYDROCODONE BITARTRATE AND ACETAMINOPHEN 1 TABLET: 5; 325 TABLET ORAL at 14:36

## 2022-01-18 RX ADMIN — FLUCONAZOLE 200 MG: 200 INJECTION, SOLUTION INTRAVENOUS at 13:44

## 2022-01-18 RX ADMIN — HYDROMORPHONE HYDROCHLORIDE 0.5 MG: 1 INJECTION, SOLUTION INTRAMUSCULAR; INTRAVENOUS; SUBCUTANEOUS at 14:19

## 2022-01-18 RX ADMIN — FENTANYL CITRATE 50 MCG: 50 INJECTION, SOLUTION INTRAMUSCULAR; INTRAVENOUS at 13:12

## 2022-01-18 ASSESSMENT — PULMONARY FUNCTION TESTS
PIF_VALUE: 3
PIF_VALUE: 3
PIF_VALUE: 11
PIF_VALUE: 7
PIF_VALUE: 22
PIF_VALUE: 3
PIF_VALUE: 3
PIF_VALUE: 1
PIF_VALUE: 22
PIF_VALUE: 12
PIF_VALUE: 3
PIF_VALUE: 1
PIF_VALUE: 10
PIF_VALUE: 13
PIF_VALUE: 3
PIF_VALUE: 16
PIF_VALUE: 3
PIF_VALUE: 0
PIF_VALUE: 3
PIF_VALUE: 4
PIF_VALUE: 3
PIF_VALUE: 11
PIF_VALUE: 3
PIF_VALUE: 0
PIF_VALUE: 11
PIF_VALUE: 3
PIF_VALUE: 1
PIF_VALUE: 11
PIF_VALUE: 12
PIF_VALUE: 3
PIF_VALUE: 3
PIF_VALUE: 0
PIF_VALUE: 3
PIF_VALUE: 0
PIF_VALUE: 1
PIF_VALUE: 3
PIF_VALUE: 13
PIF_VALUE: 3
PIF_VALUE: 0
PIF_VALUE: 1
PIF_VALUE: 3
PIF_VALUE: 21

## 2022-01-18 ASSESSMENT — PAIN SCALES - GENERAL
PAINLEVEL_OUTOF10: 4
PAINLEVEL_OUTOF10: 8
PAINLEVEL_OUTOF10: 5
PAINLEVEL_OUTOF10: 8
PAINLEVEL_OUTOF10: 6

## 2022-01-18 ASSESSMENT — PAIN DESCRIPTION - PAIN TYPE
TYPE: CHRONIC PAIN
TYPE: SURGICAL PAIN

## 2022-01-18 ASSESSMENT — PAIN DESCRIPTION - ORIENTATION
ORIENTATION: LEFT
ORIENTATION: LEFT

## 2022-01-18 ASSESSMENT — PAIN DESCRIPTION - LOCATION
LOCATION: FACE;HEAD;LEG
LOCATION: ABDOMEN

## 2022-01-18 ASSESSMENT — PAIN - FUNCTIONAL ASSESSMENT: PAIN_FUNCTIONAL_ASSESSMENT: 0-10

## 2022-01-18 NOTE — ANESTHESIA PRE PROCEDURE
Department of Anesthesiology  Preprocedure Note       Name:  Akash Sun   Age:  66 y.o.  :  1950                                          MRN:  [id number]         Date:  2022      Surgeon: Wes Villanueva):  Robbin Yin MD    Procedure: Procedure(s):  HOLMIUM, CYSTO, URETEROSCOPY, STENT PLACEMENT    Medications prior to admission:   Prior to Admission medications    Medication Sig Start Date End Date Taking?  Authorizing Provider   budesonide-formoterol (SYMBICORT) 160-4.5 MCG/ACT AERO Inhale 2 puffs into the lungs 2 times daily As needed for SOB    Historical Provider, MD   albuterol sulfate  (90 Base) MCG/ACT inhaler Inhale 2 puffs into the lungs every 6 hours as needed for Wheezing    Historical Provider, MD   ondansetron (ZOFRAN) 4 MG tablet Take 4 mg by mouth every 8 hours as needed for Nausea or Vomiting    Historical Provider, MD   guaiFENesin (MUCINEX) 600 MG extended release tablet Take 1,200 mg by mouth 2 times daily    Historical Provider, MD   dicyclomine (BENTYL) 10 MG capsule Take 1 capsule by mouth every 6 hours as needed (cramps) 10/1/21   [place]rica Mckay MD   fluticasone (FLONASE) 50 MCG/ACT nasal spray 1 spray by Each Nostril route daily    Historical Provider, MD   loratadine (CLARITIN) 10 MG capsule Take 10 mg by mouth daily    Historical Provider, MD   omeprazole (PRILOSEC) 20 MG delayed release capsule Take 20 mg by mouth daily    Historical Provider, MD   polyethylene glycol (GLYCOLAX) 17 GM/SCOOP powder Take 17 g by mouth daily    Historical Provider, MD   QUEtiapine (SEROQUEL) 25 MG tablet Take 25 mg by mouth daily    Historical Provider, MD   VORTIoxetine (TRINTELLIX) 5 MG tablet Take 5 mg by mouth daily    Historical Provider, MD   topiramate (TOPAMAX) 100 MG tablet Take 100 mg by mouth every evening    Historical Provider, MD   lamoTRIgine (LAMICTAL) 200 MG tablet Take 200 mg by mouth nightly    Historical Provider, MD   melatonin 5 MG TBDP disintegrating tablet Take 5 mg by mouth nightly    Historical Provider, MD   QUEtiapine (SEROQUEL) 25 MG tablet Take 50 mg by mouth nightly    Historical Provider, MD   butalbital-APAP-caffeine (FIORICET) -40 MG CAPS per capsule Take 1 capsule by mouth every 6 hours as needed for Headaches    Historical Provider, MD   meclizine (ANTIVERT) 25 MG tablet Take 25 mg by mouth 3 times daily as needed for Dizziness    Historical Provider, MD   Ca Carbonate-Mag Hydroxide (ROLAIDS PO) Take 2 tablets by mouth every 4 hours as needed (acid reflux)    Historical Provider, MD   SUMAtriptan (IMITREX) 100 MG tablet Take 100 mg by mouth 2 times daily as needed for Migraine    Historical Provider, MD   acetaminophen (TYLENOL) 325 MG tablet Take 2 tablets by mouth every 6 hours as needed for Pain 6/3/20   Milana Mosqueda,    topiramate (TOPAMAX) 50 MG tablet Take 1 tablet by mouth daily 12/13/18   Geroge Clock, MD   Misc Natural Products (GLUCOSAMINE CHONDROITIN ADV PO) Take 1 tablet by mouth daily    Historical Provider, MD   busPIRone (BUSPAR) 5 MG tablet Take 5 mg by mouth 2 times daily    Historical Provider, MD   bismuth subsalicylate (BISMATROL) 262 MG chewable tablet Take 524 mg by mouth as needed for Heartburn (8 times daily)    Historical Provider, MD   senna-docusate (PERICOLACE) 8.6-50 MG per tablet Take 1 tablet by mouth as needed for Constipation (in eveninig) Or 2 tabs    Historical Provider, MD   magnesium hydroxide (MILK OF MAGNESIA) 400 MG/5ML suspension Take 5 mLs by mouth daily as needed for Constipation    Historical Provider, MD   calcium carbonate (TUMS) 500 MG chewable tablet Take 1 tablet by mouth 4 times daily as needed for Heartburn    Historical Provider, MD   alendronate (FOSAMAX) 70 MG tablet Take 70 mg by mouth every 7 days Every Wednesday 12/12/18   Historical Provider, MD   atorvastatin (LIPITOR) 10 MG tablet Take 10 mg by mouth daily    Historical Provider, MD   loperamide (IMODIUM) 2 MG capsule Take 2 mg by mouth 4 times daily as needed for Diarrhea    Historical Provider, MD   amLODIPine (NORVASC) 10 MG tablet Take 5 mg by mouth daily     Historical Provider, MD   Multiple Vitamins-Minerals (THERAPEUTIC MULTIVITAMIN-MINERALS) tablet Take 1 tablet by mouth daily. Historical Provider, MD   ferrous sulfate 325 (65 FE) MG EC tablet Take 325 mg by mouth daily (with breakfast). Historical Provider, MD   lamoTRIgine (LAMICTAL) 100 MG tablet Take 100 mg by mouth daily     Historical Provider, MD   aspirin 81 MG tablet Take 81 mg by mouth daily. Historical Provider, MD   Metoclopramide HCl (REGLAN PO) Take 5 mg by mouth 3 times daily     Historical Provider, MD       Current medications:    No current facility-administered medications for this encounter. Allergies: Allergies   Allergen Reactions    Levaquin [Levofloxacin In D5w] Other (See Comments)     The group home has this on their allergy list, but without what effects to the Pt. Pt doesn't know.  Chocolate Other (See Comments)     Sister, POA, denies pt having an allergy to chocolate    Indomethacin Other (See Comments)     unknown    Neurontin [Gabapentin] Other (See Comments)     unknown    Adhesive Tape Rash       Problem List:    Patient Active Problem List   Diagnosis Code    Contusion of hip S70.00XA    UTI (urinary tract infection), uncomplicated W69.2    Inability to ambulate due to hip R26.2    Closed fracture of left inferior pubic ramus ([facility]) S32.592A    Closed compression fracture of L4 lumbar vertebra S32.040A    Cellulitis of left leg L03. 116    Failure of outpatient treatment Z78.9    Hydrocephalus ([facility] G91.9    S/P  shunt Z98.2    Leg edema R60.0    Hydronephrosis of left kidney N13.30    Severe sepsis ([facility]) A41.9, R65.20    Bipolar mood disorder ([facility]) F31.9    Acute kidney injury superimposed on CKD ([facility]) N17.9, N18.9    Aspiration pneumonitis ([facility]) J69.0       Past Medical History:        Diagnosis Date    (60.4 kg)   01/08/22 148 lb (67.1 kg)   11/09/21 147 lb 3.2 oz (66.8 kg)     There is no height or weight on file to calculate BMI.    CBC:   Lab Results   Component Value Date    WBC 10.9 01/13/2022    RBC 3.45 01/13/2022    HGB 10.8 01/13/2022    HCT 33.7 01/13/2022    MCV 97.7 01/13/2022    RDW 13.4 01/13/2022     01/13/2022       CMP:   Lab Results   Component Value Date     01/13/2022    K 4.5 01/13/2022     01/13/2022    CO2 17 01/13/2022    BUN 27 01/13/2022    CREATININE 1.57 01/13/2022    GFRAA 39 01/13/2022    LABGLOM 32 01/13/2022    GLUCOSE 111 01/13/2022    PROT 5.8 11/09/2021    CALCIUM 8.8 01/13/2022    BILITOT <0.15 11/09/2021    ALKPHOS 193 11/09/2021    AST 26 11/09/2021    ALT 23 11/09/2021       POC Tests: No results for input(s): POCGLU, POCNA, POCK, POCCL, POCBUN, POCHEMO, POCHCT in the last 72 hours. Coags:   Lab Results   Component Value Date    PROTIME 13.4 04/22/2021    INR 1.0 04/22/2021    APTT 35.6 04/22/2021       HCG (If Applicable): No results found for: PREGTESTUR, PREGSERUM, HCG, HCGQUANT     ABGs: No results found for: PHART, PO2ART, OHC7PQY, AEM0HOL, BEART, H2HFYWQX     Type & Screen (If Applicable):  No results found for: LABABO, LABRH    Drug/Infectious Status (If Applicable):  No results found for: HIV, HEPCAB    COVID-19 Screening (If Applicable):   Lab Results   Component Value Date    COVID19 Not Detected 11/11/2021    COVID19 Not Detected 11/07/2021           Anesthesia Evaluation  Patient summary reviewed and Nursing notes reviewed no history of anesthetic complications:   Airway: Mallampati: I  TM distance: >3 FB   Neck ROM: limited  Mouth opening: > = 3 FB Dental:      Comment: Loose upper incisors remainder appear to be in fair condition.     Pulmonary:Negative Pulmonary ROS and normal exam                               Cardiovascular:    (+) hypertension:, hyperlipidemia                  Neuro/Psych:   (+) headaches: migraine headaches, psychiatric history (schizoaffective disorder):depression/anxiety             GI/Hepatic/Renal:   (+) GERD:,           Endo/Other: Negative Endo/Other ROS                    Abdominal:             Vascular: Other Findings:           Anesthesia Plan      general     ASA 3       Induction: intravenous. MIPS: Postoperative opioids intended and Prophylactic antiemetics administered. Anesthetic plan and risks discussed with patient. Plan discussed with CRNA.                   Patricio Vital MD   1/18/2022

## 2022-01-18 NOTE — H&P
History and Physical    Patient: Joseph Valadez  MRN: [id number]  YOB: 1950    CHIEF COMPLAINT: Left ureteral and kidney stones    HISTORY OF PRESENT ILLNESS:   The patient is a 66 y.o. female who presents with left UPJ 10 mm stone, left intrarenal stones. S/P left 6x24 cm double-J ureteral stent placement on 11/7/2021. Here for LEFT HOLMIUM LASER LITHOTRIPSY, CYSTO, URETEROSCOPY, STENT EXCHANGE. Past Medical History:    Past Medical History:   Diagnosis Date    Acid reflux     Atrophy of kidney     Bipolar affective ([address]     Chronic headaches     Chronic kidney disease     COVID-19 11/29/2021    tested positive @ [facility]. [facility] to [facility] for 2 weeks.  No symptoms    Dehydration     Difficulty in walking     Dysphagia     Fall 01/08/2022    head laceration, taken to ER from facility    GERD (gastroesophageal reflux disease)     H/O recurrent pneumonia     Hard of hearing     wears bilateral aides    Hearing loss     Hydrocephalus ([address]     Hydronephrosis     Hypertension     Kidney stones     Lives in nursing home 11/15/2021    currently at [place], [place] # [phone], fax # [phone]    Migraine     Schizo affective schizophrenia ([address]     Sepsis ([address]     Tachycardia     UTI (urinary tract infection)     Weakness        Past Surgical History:    Past Surgical History:   Procedure Laterality Date    CHOLECYSTECTOMY      CYSTOSCOPY Left 11/7/2021    CYSTOSCOPY URETERAL STENT INSERTION performed by Angela Ac MD at [address] [place] [address] Left 06/09/2017    PINNING AND SCREW    HIP PINNING Left 6/9/2017    HIP PINNING 7.3 CANNULATED SCREW WITH SYNTHES PRODUCT APPLICATION AND INTRAOPERATIVE C-ARM performed by Devon Mckeon MD at [facility]      shunt x3 head    SHUNT REVISION      TONSILLECTOMY         Medications Prior to Admission:    Prior to Admission medications    Medication Sig Start Date End Date Taking?  Authorizing Provider   budesonide-formoterol (SYMBICORT) 160-4.5 MCG/ACT AERO Inhale 2 puffs into the lungs 2 times daily As needed for SOB    Historical Provider, MD   albuterol sulfate  (90 Base) MCG/ACT inhaler Inhale 2 puffs into the lungs every 6 hours as needed for Wheezing    Historical Provider, MD   ondansetron (ZOFRAN) 4 MG tablet Take 4 mg by mouth every 8 hours as needed for Nausea or Vomiting    Historical Provider, MD   guaiFENesin (MUCINEX) 600 MG extended release tablet Take 1,200 mg by mouth 2 times daily    Historical Provider, MD   dicyclomine (BENTYL) 10 MG capsule Take 1 capsule by mouth every 6 hours as needed (cramps) 10/1/21   Kelvin Chavira, MD   fluticasone (FLONASE) 50 MCG/ACT nasal spray 1 spray by Each Nostril route daily    Historical Provider, MD   loratadine (CLARITIN) 10 MG capsule Take 10 mg by mouth daily    Historical Provider, MD   omeprazole (PRILOSEC) 20 MG delayed release capsule Take 20 mg by mouth daily    Historical Provider, MD   polyethylene glycol (GLYCOLAX) 17 GM/SCOOP powder Take 17 g by mouth daily    Historical Provider, MD   QUEtiapine (SEROQUEL) 25 MG tablet Take 25 mg by mouth daily    Historical Provider, MD   VORTIoxetine (TRINTELLIX) 5 MG tablet Take 5 mg by mouth daily    Historical Provider, MD   topiramate (TOPAMAX) 100 MG tablet Take 100 mg by mouth every evening    Historical Provider, MD   lamoTRIgine (LAMICTAL) 200 MG tablet Take 200 mg by mouth nightly    Historical Provider, MD   melatonin 5 MG TBDP disintegrating tablet Take 5 mg by mouth nightly    Historical Provider, MD   QUEtiapine (SEROQUEL) 25 MG tablet Take 50 mg by mouth nightly    Historical Provider, MD   butalbital-APAP-caffeine (FIORICET) -40 MG CAPS per capsule Take 1 capsule by mouth every 6 hours as needed for Headaches    Historical Provider, MD   meclizine (ANTIVERT) 25 MG tablet Take 25 mg by mouth 3 times daily as needed for Dizziness Historical Provider, MD   Ca Carbonate-Mag Hydroxide (ROLAIDS PO) Take 2 tablets by mouth every 4 hours as needed (acid reflux)    Historical Provider, MD   SUMAtriptan (IMITREX) 100 MG tablet Take 100 mg by mouth 2 times daily as needed for Migraine    Historical Provider, MD   acetaminophen (TYLENOL) 325 MG tablet Take 2 tablets by mouth every 6 hours as needed for Pain 6/3/20   Alvaro Marsh DO   topiramate (TOPAMAX) 50 MG tablet Take 1 tablet by mouth daily 12/13/18   Zac Rai MD   Misc Natural Products (GLUCOSAMINE CHONDROITIN ADV PO) Take 1 tablet by mouth daily    Historical Provider, MD   busPIRone (BUSPAR) 5 MG tablet Take 5 mg by mouth 2 times daily    Historical Provider, MD   bismuth subsalicylate (BISMATROL) 262 MG chewable tablet Take 524 mg by mouth as needed for Heartburn (8 times daily)    Historical Provider, MD   senna-docusate ([place]) 8.6-50 MG per tablet Take 1 tablet by mouth as needed for Constipation (in eveninig) Or 2 tabs    Historical Provider, MD   magnesium hydroxide (MILK OF MAGNESIA) 400 MG/5ML suspension Take 5 mLs by mouth daily as needed for Constipation    Historical Provider, MD   calcium carbonate (TUMS) 500 MG chewable tablet Take 1 tablet by mouth 4 times daily as needed for Heartburn    Historical Provider, MD   alendronate (FOSAMAX) 70 MG tablet Take 70 mg by mouth every 7 days Every Wednesday 12/12/18   Historical Provider, MD   atorvastatin (LIPITOR) 10 MG tablet Take 10 mg by mouth daily    Historical Provider, MD   loperamide (IMODIUM) 2 MG capsule Take 2 mg by mouth 4 times daily as needed for Diarrhea    Historical Provider, MD   amLODIPine (NORVASC) 10 MG tablet Take 5 mg by mouth daily     Historical Provider, MD   Multiple Vitamins-Minerals (THERAPEUTIC MULTIVITAMIN-MINERALS) tablet Take 1 tablet by mouth daily. Historical Provider, MD   ferrous sulfate 325 (65 FE) MG EC tablet Take 325 mg by mouth daily (with breakfast).     Historical Provider, MD lamoTRIgine (LAMICTAL) 100 MG tablet Take 100 mg by mouth daily     Historical Provider, MD   aspirin 81 MG tablet Take 81 mg by mouth daily. Historical Provider, MD   Metoclopramide HCl (REGLAN PO) Take 5 mg by mouth 3 times daily     Historical Provider, MD       Allergies:  Levaquin [levofloxacin in d5w], Chocolate, Cocoa, Indomethacin, Neurontin [gabapentin], and Adhesive tape    Social History:    Social History     Socioeconomic History    Marital status: Single     Spouse name: Not on file    Number of children: Not on file    Years of education: Not on file    Highest education level: Not on file   Occupational History    Not on file   Tobacco Use    Smoking status: Never Smoker    Smokeless tobacco: Never Used   Vaping Use    Vaping Use: Never used   Substance and Sexual Activity    Alcohol use: No    Drug use: No    Sexual activity: Not on file   Other Topics Concern    Not on file   Social History Narrative    Not on file     Social Determinants of Health     Financial Resource Strain:     Difficulty of Paying Living Expenses: Not on file   Food Insecurity:     Worried About Running Out of Food in the Last Year: Not on file    Myles of Food in the Last Year: Not on file   Transportation Needs:     Lack of Transportation (Medical): Not on file    Lack of Transportation (Non-Medical):  Not on file   Physical Activity:     Days of Exercise per Week: Not on file    Minutes of Exercise per Session: Not on file   Stress:     Feeling of Stress : Not on file   Social Connections:     Frequency of Communication with Friends and Family: Not on file    Frequency of Social Gatherings with Friends and Family: Not on file    Attends Hindu Services: Not on file    Active Member of Clubs or Organizations: Not on file    Attends Club or Organization Meetings: Not on file    Marital Status: Not on file   Intimate Partner Violence:     Fear of Current or Ex-Partner: Not on file   Imelda Newby Emotionally Abused: Not on file    Physically Abused: Not on file    Sexually Abused: Not on file   Housing Stability:     Unable to Pay for Housing in the Last Year: Not on file    Number of Places Lived in the Last Year: Not on file    Unstable Housing in the Last Year: Not on file       Family History:  History reviewed. No pertinent family history. REVIEW OF SYSTEMS:  Constitutional: negative  Eyes: negative  Respiratory: negative  Cardiovascular: negative  Gastrointestinal: negative  Genitourinary: see HPI  Musculoskeletal: negative  Skin: negative   Neurological: negative  Hematological/Lymphatic: negative  Psychological: negative      Physical Exam:      Patient Vitals for the past 24 hrs:   Height Weight   01/18/22 1138 5' 1\" (1.549 m) 116 lb 6.5 oz (52.8 kg)     Constitutional: Patient in no acute distress; Neuro: alert and oriented to person place and time. Psych: Mood and affect normal.  Lungs: Respiratory effort normal  Cardiovascular:  Normal peripheral pulses. Regular rate. Abdomen: Soft, non-tender, non-distended      LABS:   No results for input(s): WBC, HGB, HCT, MCV, PLT in the last 72 hours. No results for input(s): NA, K, CL, CO2, PHOS, BUN, CREATININE, CA in the last 72 hours. No results found for: PSA    Additional Lab/culture results:    Urinalysis: No results for input(s): COLORU, PHUR, LABCAST, WBCUA, RBCUA, MUCUS, TRICHOMONAS, YEAST, BACTERIA, CLARITYU, SPECGRAV, LEUKOCYTESUR, UROBILINOGEN, BILIRUBINUR, BLOODU in the last 72 hours. Invalid input(s): NITRATE, GLUCOSEUKETONESUAMORPHOUS     -----------------------------------------------------------------  Imaging Results:  CT A/P (11/7/2021): Moderately severe left hydronephrosis secondary to a 10 mm stone in the left   UPJ. Also multiple left intrarenal stones.  Stable left parapelvic renal cyst.       Atrophic right kidney renal stones and resolving hydronephrosis       No other significant changes are seen from the prior exam         Assessment and Plan   Impression:    66 y.o. female with   Left UPJ 10 mm stone  Left intrarenal stones      Plan:   OR today for LEFT HOLMIUM LASER LITHOTRIPSY, CYSTO, URETEROSCOPY, STENT PLACEMENT.     Dennie Keener, MD  11:51 AM 1/18/2022

## 2022-01-18 NOTE — OP NOTE
Operative Note      Patient: Linda Payton  YOB: 1950  MRN: [id number]    Date of Procedure: 1/18/2022    Pre-Op Diagnosis: LEFT URETERAL STONE, LEFT KIDNEY STONE    Post-Op Diagnosis: SAME       Procedure:  Cystoscopy, left ureteroscopy, holmium laser lithotripsy and stent exchange    Surgeon(s):  Dileep Shultz MD    Assistant:   João Doherty MD PGY-2  Rocio Cox MD PGY-3    Anesthesia: General    Estimated Blood Loss (mL): Minimal    Complications: None    Specimens: None    Implants: Left 6Fr x 24 cm double-J ureteral stent    Drains: None    Findings:   1. Left ureteroscopy: Left UPJ stone, left renal stone    Indications: Patient is a 65 yo Female who presents with left UPJ 10 mm stone, left intrarenal stones. Underwent placement of left 6Fr x24 cm double-J ureteral stent on 11/7/2021. Patient is here for left ureteroscopy with holmium laser lithotripsy, stent exchange. Patient was explained the risks and benefits of the procedure and he/she elected to proceed. Informed consent was obtained. Narrative of the Procedure:    After informed consent was obtained in the preoperative area, the patient was taken back to the operating room and transferred to the operating table in supine position. EPC cuffs were placed. The machine was turned on. Anesthesia was induced and antibiotics were given. The patient was placed in modified dorsal lithotomy position and sterilely prepped and draped in a standard fashion. A timeout occurred. Two patient identifiers were used. We entered the urethra with a 22 [employer] scope with 30 degree lens. The left ureteral orifice was visualized and a 6Fr x 24 cm ureteral stent was seen. The stent was grasped using a stent grasper and brought to the urethral meatus. The stent was cannulated with a straight 0.035 Glidewire. This was advanced into the kidney with fluoroscopic guidance.  A dual lumen ureteral catheter was then used to place a second 0.035 Glidewire into the kidney, also using fluoroscopic guidance. The flexible ureteroscope was assembled, place over the Glidewire, and advanced into the kidney carefully under fluoro. The second wire remained in place as a safety. Pan-nephroscopy was completed. The stones were located in the left midpole and using a 200 micron laser fiber they were fragmented into sub-200 micron size pieces for easy passage. Repeat nephroscopy and ureteroscopy demonstrated no further stone fragments. In addition, there were no papillary lesions within the kidney, or erythematous patches concerning for malignant disease. With the safety wire in place, the ureteroscope was slowly retracted down the ureter. The entire ureter was surveyed. There was no evidence of stricture, stone disease, ureteral trauma, or papillary lesion. A ureteral stent was back loaded over the wire and the stent was advanced until it was in proper location under fluoroscopy. The Glidewire was then removed. A curl could be seen in the left renal pelvis under using fluoroscopic vision, and in the bladder under fluroscopy. The patient's bladder was drained. All instrumentation was removed. A string was left on the stent. The patient was then awakened, extubated, and discharged back to the PACU in good and stable condition. Dr. Amrita Gayle was scrubbed and present for the entirety of the surgery. Disposition: Patient is discharged in stable condition from the PACU with oral antibiotics, oral pain medications, flomax, and oxybutynin. Patient is instructed to remove stent using attached strings on 5 days. Rohan Baez MD  Urology Resident, PGY-2    Electronically signed on 1/18/2022 at 1:59 PM    Electronically signed by Gege Kim MD on 1/18/2022 at 1:54 PM

## 2022-01-18 NOTE — PROGRESS NOTES
Called [facility] to give report to pts nurse. All questions were answered. Pt discharged with sister and all belongings and scripts.

## 2022-01-19 NOTE — ANESTHESIA POSTPROCEDURE EVALUATION
POST- ANESTHESIA EVALUATION       Pt Name: Alberto Hummel  MRN: [id number]  [place]: 1950  Date of evaluation: 1/19/2022  Time:  12:17 PM      BP (!) 161/85   Pulse 87   Temp 97.3 °F (36.3 °C) (Temporal)   Resp 21   Ht 5' 1\" (1.549 m)   Wt 116 lb 6.5 oz (52.8 kg)   SpO2 94%   BMI 21.99 kg/m²      Consciousness Level  Awake  Cardiopulmonary Status  Stable  Pain Adequately Treated YES  Nausea / Vomiting  NO  Adequate Hydration  YES  Anesthesia Related Complications NONE      Electronically signed by Cary Curling, MD on 1/19/2022 at 12:17 PM       Department of Anesthesiology  Postprocedure Note    Patient: Alberto Hummel  MRN: [id number]  YOB: 1950  Date of evaluation: 1/19/2022  Time:  12:17 PM     Procedure Summary     Date: 01/18/22 Room / Location: [address]    Anesthesia Start: 1252 Anesthesia Stop: 6179    Procedure: LITHOTRIPSY, CYSTOSCOPY, URETEROSCOPY, LEFT STENT EXCHANGE (Left Bladder) Diagnosis: (LEFT KIDNEY STONE)    Surgeons: Milvia Banks MD Responsible Provider: Cary Curling, MD    Anesthesia Type: general ASA Status: 3          Anesthesia Type: general    Mitzi Phase I: Mitzi Score: 10    Mitzi Phase II: Mitzi Score: 10    Last vitals: Reviewed and per EMR flowsheets.        Anesthesia Post Evaluation

## 2022-02-01 ENCOUNTER — OFFICE VISIT (OUTPATIENT)
Dept: PODIATRY | Age: 72
End: 2022-02-01
Payer: MEDICARE

## 2022-02-01 VITALS — BODY MASS INDEX: 21.92 KG/M2 | WEIGHT: 116 LBS

## 2022-02-01 DIAGNOSIS — B35.1 ONYCHOMYCOSIS OF TOENAIL: Primary | ICD-10-CM

## 2022-02-01 DIAGNOSIS — M79.674 PAIN OF TOES OF BOTH FEET: ICD-10-CM

## 2022-02-01 DIAGNOSIS — M79.675 PAIN OF TOES OF BOTH FEET: ICD-10-CM

## 2022-02-01 PROCEDURE — 1123F ACP DISCUSS/DSCN MKR DOCD: CPT | Performed by: PODIATRIST

## 2022-02-01 PROCEDURE — 1036F TOBACCO NON-USER: CPT | Performed by: PODIATRIST

## 2022-02-01 PROCEDURE — 4040F PNEUMOC VAC/ADMIN/RCVD: CPT | Performed by: PODIATRIST

## 2022-02-01 PROCEDURE — 99203 OFFICE O/P NEW LOW 30 MIN: CPT | Performed by: PODIATRIST

## 2022-02-01 PROCEDURE — G8420 CALC BMI NORM PARAMETERS: HCPCS | Performed by: PODIATRIST

## 2022-02-01 PROCEDURE — 11721 DEBRIDE NAIL 6 OR MORE: CPT | Performed by: PODIATRIST

## 2022-02-01 PROCEDURE — G8427 DOCREV CUR MEDS BY ELIG CLIN: HCPCS | Performed by: PODIATRIST

## 2022-02-01 PROCEDURE — 3017F COLORECTAL CA SCREEN DOC REV: CPT | Performed by: PODIATRIST

## 2022-02-01 PROCEDURE — G8484 FLU IMMUNIZE NO ADMIN: HCPCS | Performed by: PODIATRIST

## 2022-02-01 PROCEDURE — 1090F PRES/ABSN URINE INCON ASSESS: CPT | Performed by: PODIATRIST

## 2022-02-01 PROCEDURE — G8400 PT W/DXA NO RESULTS DOC: HCPCS | Performed by: PODIATRIST

## 2022-02-01 RX ORDER — ERENUMAB-AOOE 140 MG/ML
INJECTION, SOLUTION SUBCUTANEOUS
COMMUNITY
Start: 2021-11-11 | End: 2022-05-05

## 2022-02-01 RX ORDER — TRAMADOL HYDROCHLORIDE 50 MG/1
50 TABLET ORAL 3 TIMES DAILY
Status: ON HOLD | COMMUNITY
End: 2022-05-09 | Stop reason: HOSPADM

## 2022-02-01 ASSESSMENT — ENCOUNTER SYMPTOMS
NAUSEA: 0
SHORTNESS OF BREATH: 0
COLOR CHANGE: 0
BACK PAIN: 0
DIARRHEA: 0

## 2022-02-01 NOTE — PROGRESS NOTES
Pilo Pope is a 66 y.o. female who presents to the office today with chief complaint of thick, painful nails to both feet. Chief Complaint   Patient presents with    Nail Problem     nail trim/last saw Naresh Monsivais 1/31/22   Symptoms began about 1 year(s) ago. Patient denies injury to the feet. Patient states that the nails are painful with shoe gear and ambulation. Pain is rated 6 out of 10 at it's worst and is described as intermittent. Treatments prior to today's visit include: None. Allergies   Allergen Reactions    Levaquin [Levofloxacin In D5w] Other (See Comments)     The group home has this on their allergy list, but without what effects to the Pt. Pt doesn't know.  Chocolate Other (See Comments)     Sister, POA, denies pt having an allergy to chocolate    Cocoa     Indomethacin Other (See Comments)     unknown    Neurontin [Gabapentin] Other (See Comments)     unknown    Adhesive Tape Rash and Hives       Past Medical History:   Diagnosis Date    Acid reflux     Atrophy of kidney     Bipolar affective ([address]     Chronic headaches     Chronic kidney disease     COVID-19 11/29/2021    tested positive @ [facility]. [facility] weeks. No symptoms    Dehydration     Difficulty in walking     Dysphagia     Fall 01/08/2022    head laceration, taken to ER from facility    GERD (gastroesophageal reflux disease)     H/O recurrent pneumonia     Hard of hearing     wears bilateral aides    Hearing loss     Hydrocephalus ([address]     Hydronephrosis     Hypertension     Kidney stones     Lives in nursing home 11/15/2021    currently at [place], [place] # [phone], fax # [phone]    Migraine     Schizo affective schizophrenia ([address]     Sepsis ([address]     Tachycardia     UTI (urinary tract infection)     Weakness        Prior to Admission medications    Medication Sig Start Date End Date Taking?  Authorizing Provider   AIMOVIG 140 MG/ML SOAJ  11/11/21  Yes Historical Provider, MD   traMADol (ULTRAM) 50 MG tablet Take 50 mg by mouth every 6 hours as needed for Pain.    Yes Historical Provider, MD   albuterol sulfate  (90 Base) MCG/ACT inhaler Inhale 2 puffs into the lungs every 6 hours as needed for Wheezing   Yes Historical Provider, MD   ondansetron (ZOFRAN) 4 MG tablet Take 4 mg by mouth every 8 hours as needed for Nausea or Vomiting   Yes Historical Provider, MD   guaiFENesin (MUCINEX) 600 MG extended release tablet Take 1,200 mg by mouth 2 times daily   Yes Historical Provider, MD   dicyclomine (BENTYL) 10 MG capsule Take 1 capsule by mouth every 6 hours as needed (cramps) 10/1/21  Yes Karen Thomas MD   fluticasone (FLONASE) 50 MCG/ACT nasal spray 1 spray by Each Nostril route daily   Yes Historical Provider, MD   loratadine (CLARITIN) 10 MG capsule Take 10 mg by mouth daily   Yes Historical Provider, MD   omeprazole (PRILOSEC) 20 MG delayed release capsule Take 20 mg by mouth daily   Yes Historical Provider, MD   polyethylene glycol (GLYCOLAX) 17 GM/SCOOP powder Take 17 g by mouth daily   Yes Historical Provider, MD   QUEtiapine (SEROQUEL) 25 MG tablet Take 25 mg by mouth daily   Yes Historical Provider, MD   VORTIoxetine (TRINTELLIX) 5 MG tablet Take 5 mg by mouth daily   Yes Historical Provider, MD   topiramate (TOPAMAX) 100 MG tablet Take 100 mg by mouth every evening   Yes Historical Provider, MD   lamoTRIgine (LAMICTAL) 200 MG tablet Take 200 mg by mouth nightly   Yes Historical Provider, MD   melatonin 5 MG TBDP disintegrating tablet Take 5 mg by mouth nightly   Yes Historical Provider, MD   QUEtiapine (SEROQUEL) 25 MG tablet Take 50 mg by mouth nightly   Yes Historical Provider, MD   butalbital-APAP-caffeine (FIORICET) -40 MG CAPS per capsule Take 1 capsule by mouth every 6 hours as needed for Headaches   Yes Historical Provider, MD   meclizine (ANTIVERT) 25 MG tablet Take 25 mg by mouth 3 times daily as needed for Dizziness   Yes Historical Provider, MD   Ca Carbonate-Mag Hydroxide (ROLAIDS PO) Take 2 tablets by mouth every 4 hours as needed (acid reflux)   Yes Historical Provider, MD   SUMAtriptan (IMITREX) 100 MG tablet Take 100 mg by mouth 2 times daily as needed for Migraine   Yes Historical Provider, MD   topiramate (TOPAMAX) 50 MG tablet Take 1 tablet by mouth daily 12/13/18  Yes Sukhjinder Wilhelm MD   Misc Natural Products (GLUCOSAMINE CHONDROITIN ADV PO) Take 1 tablet by mouth daily   Yes Historical Provider, MD   busPIRone (BUSPAR) 5 MG tablet Take 5 mg by mouth 2 times daily   Yes Historical Provider, MD   bismuth subsalicylate (BISMATROL) 262 MG chewable tablet Take 524 mg by mouth as needed for Heartburn (8 times daily)   Yes Historical Provider, MD   senna-docusate (PERICOLACE) 8.6-50 MG per tablet Take 1 tablet by mouth as needed for Constipation (in eveninig) Or 2 tabs   Yes Historical Provider, MD   magnesium hydroxide (MILK OF MAGNESIA) 400 MG/5ML suspension Take 5 mLs by mouth daily as needed for Constipation   Yes Historical Provider, MD   calcium carbonate (TUMS) 500 MG chewable tablet Take 1 tablet by mouth 4 times daily as needed for Heartburn   Yes Historical Provider, MD   alendronate (FOSAMAX) 70 MG tablet Take 70 mg by mouth every 7 days Every Wednesday 12/12/18  Yes Historical Provider, MD   atorvastatin (LIPITOR) 10 MG tablet Take 10 mg by mouth daily   Yes Historical Provider, MD   loperamide (IMODIUM) 2 MG capsule Take 2 mg by mouth 4 times daily as needed for Diarrhea   Yes Historical Provider, MD   amLODIPine (NORVASC) 10 MG tablet Take 5 mg by mouth daily    Yes Historical Provider, MD   Multiple Vitamins-Minerals (THERAPEUTIC MULTIVITAMIN-MINERALS) tablet Take 1 tablet by mouth daily. Yes Historical Provider, MD   ferrous sulfate 325 (65 FE) MG EC tablet Take 325 mg by mouth daily (with breakfast).    Yes Historical Provider, MD   lamoTRIgine (LAMICTAL) 100 MG tablet Take 100 mg by mouth daily Yes Historical Provider, MD   Metoclopramide HCl (REGLAN PO) Take 5 mg by mouth 3 times daily    Yes Historical Provider, MD   tamsulosin (FLOMAX) 0.4 MG capsule Take 1 capsule by mouth daily for 10 days 1/18/22 1/28/22  Lina Haley MD   oxybutynin (DITROPAN XL) 5 MG extended release tablet Take 1 tablet by mouth daily as needed (bladder spasms) 1/18/22 1/23/22  Lina Haley MD   budesonide-formoterol (SYMBICORT) 160-4.5 MCG/ACT AERO Inhale 2 puffs into the lungs 2 times daily As needed for SOB  Patient not taking: Reported on 2/1/2022    Historical Provider, MD       Past Surgical History:   Procedure Laterality Date    CHOLECYSTECTOMY      CYSTOSCOPY Left 11/07/2021    CYSTOSCOPY URETERAL STENT INSERTION performed by Katt Muniz MD at [place] [address] Left 01/18/2022    HOLMIUM, CYSTO, URETEROSCOPY, STENT PLACEMENT    HIP FRACTURE SURGERY Left 06/09/2017    PINNING AND SCREW    HIP PINNING Left 06/09/2017    HIP PINNING 7.3 CANNULATED SCREW WITH SYNTHES PRODUCT APPLICATION AND INTRAOPERATIVE C-ARM performed by Yesenia Witt MD at [address] [place] [address]      shunt x3 head    SHUNT REVISION      TONSILLECTOMY      URETER SURGERY Left 1/18/2022    LITHOTRIPSY, CYSTOSCOPY, URETEROSCOPY, LEFT STENT EXCHANGE performed by Katt Muniz MD at [address] [place] [address] reviewed. No pertinent family history. Social History     Tobacco Use    Smoking status: Never Smoker    Smokeless tobacco: Never Used   Substance Use Topics    Alcohol use: No       Review of Systems   Constitutional: Negative for activity change, appetite change, chills, diaphoresis, fatigue and fever. Respiratory: Negative for shortness of breath. Cardiovascular: Negative for leg swelling. Gastrointestinal: Negative for diarrhea and nausea. Endocrine: Negative for cold intolerance, heat intolerance and polyuria. Musculoskeletal: Positive for arthralgias and gait problem.  Negative for back pain, joint swelling and myalgias. Skin: Negative for color change, pallor, rash and wound. Allergic/Immunologic: Negative for environmental allergies and food allergies. Neurological: Negative for dizziness, weakness, light-headedness and numbness. Hematological: Does not bruise/bleed easily. Psychiatric/Behavioral: Negative for behavioral problems, confusion and self-injury. The patient is not nervous/anxious. Vitals: There were no vitals filed for this visit. General: AAO x 3 in NAD. Integument: There are no rashes, ulcers, or breaks in the skin noted to the bilateral lower extremities. There is no induration, subcutaneous nodules, or tightening of the skin noted to the bilateral.     Toenails 1-5 of the right foot do present with thickness, elongation, discoloration, brittleness, subungual debris. Toenails 1-5 of the left foot do present with thickness, elongation, discoloration, brittleness, subungual debris. There is pain with palpation and debridement of toenails 1-5 of the right foot and 1-5 of the left foot. Interdigital maceration absent to web spaces 1-4, Bilateral.     There are no preulcerative lesions noted to the right foot. There are no preulcerative lesions noted to the left foot. The skin to the bilateral feet is not thin and shiny. The skin to the bilateral feet is  warm, supple, and dry. Vascular: DP pulse of the right foot is  palpable. DP pulse of the left foot is  palpable. PT pulse of the right foot is  palpable. PT pulse of the left foot is  palpable. CFT is less than 3 secs to the digits of the right foot. CFT is less than 3 secs to the digits of the left foot. There is edema noted to the bilateral ankles. There is no hair growth noted to the digits of the bilateral feet. There are varicosities noted to the right foot/ankle. There are varicosities noted to the left foot/ankle.      Erythema is absent to the bilateral feet. Neurological: Reflexes are present to the right plantar foot and to the Achilles tendon. Reflexes are present to the left plantar foot and to the Achilles tendon. Epicritic sensation is  intact to the right foot. Epicritic sensation is  intact to the left foot. Musculoskeletal:  Muscle strength is +5/5 to all four muscle groups of the right lower extremity and +5/5 to all four muscle groups of the left lower extremity. There are no areas of subluxation, dislocation, or laxity noted to either lower extremity. Range of motion to the right ankle is  free of pain or grinding. Range of motion to the left ankle is  free of pain or grinding. Range of motion to the right subtalar joint is  free of pain or grinding. Range of motion to the left subtalar joint is  free of pain or grinding. No abnormalities, asymmetries, or misalignments are seen between the extremities. Weightbearing evaluation does reveal rearfoot eversion, medial prominence of the talar head, loss of the medial longitudinal arch height, and too many toes sign bilaterally. The lesser digits of the right foot are contracted. The lesser digits of the left foot are contracted. There is no prominence noted to the first metatarsal head without abduction of the hallux of the right foot. There is no prominence noted to the first metatarsal head without abduction of the hallux of the left foot. Shoe examination was performed. Biomechanical Exam: abnormal as patient uses a walker to aid in ambulation. Asessment: Patient is a 66 y.o. female with:    Diagnosis Orders   1. Onychomycosis of toenail  SD DEBRIDEMENT OF NAILS, 6 OR MORE   2. Pain of toes of both feet  SD DEBRIDEMENT OF NAILS, 6 OR MORE       Plan:  1. Clinical evaluation of the patient.  2. Toenails 1-5 of the right foot and 1-5 of the left foot were debrided in length and thickness using a nail nipper and a collin. 3. Contact office with any questions/problems/concerns. Return in about 9 weeks (around 4/5/2022) for Painful fungal nails.    2/1/2022      Ron Khalil DPM

## 2022-03-17 ENCOUNTER — HOSPITAL ENCOUNTER (OUTPATIENT)
Age: 72
Setting detail: SPECIMEN
Discharge: HOME | End: 2022-03-17

## 2022-03-18 LAB
-: ABNORMAL
BACTERIA: ABNORMAL
BILIRUBIN URINE: NEGATIVE
CASTS UA: ABNORMAL /LPF (ref 0–8)
COLOR: YELLOW
CRYSTALS, UA: ABNORMAL /HPF
EPITHELIAL CELLS UA: ABNORMAL /HPF (ref 0–5)
GLUCOSE URINE: NEGATIVE
KETONES, URINE: NEGATIVE
LEUKOCYTE ESTERASE, URINE: ABNORMAL
NITRITE, URINE: NEGATIVE
PH UA: 6.5 (ref 5–8)
PROTEIN UA: ABNORMAL
RBC UA: ABNORMAL /HPF (ref 0–4)
SPECIFIC GRAVITY UA: 1.01 (ref 1–1.03)
TURBIDITY: ABNORMAL
URINE HGB: NEGATIVE
UROBILINOGEN, URINE: NORMAL
WBC UA: ABNORMAL /HPF (ref 0–5)

## 2022-03-19 LAB
CULTURE: ABNORMAL
SPECIMEN DESCRIPTION: ABNORMAL

## 2022-03-25 ENCOUNTER — OFFICE VISIT (OUTPATIENT)
Dept: UROLOGY | Age: 72
End: 2022-03-25
Payer: MEDICARE

## 2022-03-25 VITALS
WEIGHT: 114 LBS | SYSTOLIC BLOOD PRESSURE: 109 MMHG | DIASTOLIC BLOOD PRESSURE: 62 MMHG | HEART RATE: 85 BPM | BODY MASS INDEX: 21.52 KG/M2 | HEIGHT: 61 IN

## 2022-03-25 DIAGNOSIS — N20.0 KIDNEY STONE ON LEFT SIDE: ICD-10-CM

## 2022-03-25 DIAGNOSIS — N39.0 UTI (URINARY TRACT INFECTION), UNCOMPLICATED: Primary | ICD-10-CM

## 2022-03-25 DIAGNOSIS — N26.1 ATROPHY OF RIGHT KIDNEY: ICD-10-CM

## 2022-03-25 DIAGNOSIS — N39.0 RECURRENT UTI (URINARY TRACT INFECTION): ICD-10-CM

## 2022-03-25 LAB
BILIRUBIN, POC: NEGATIVE
BLOOD URINE, POC: NEGATIVE
CLARITY, POC: ABNORMAL
COLOR, POC: YELLOW
GLUCOSE URINE, POC: NEGATIVE
KETONES, POC: NEGATIVE
LEUKOCYTE EST, POC: ABNORMAL
NITRITE, POC: NEGATIVE
PH, POC: 7
PROTEIN, POC: ABNORMAL
SPECIFIC GRAVITY, POC: 1.02
UROBILINOGEN, POC: 0.2

## 2022-03-25 PROCEDURE — 3017F COLORECTAL CA SCREEN DOC REV: CPT | Performed by: UROLOGY

## 2022-03-25 PROCEDURE — G8427 DOCREV CUR MEDS BY ELIG CLIN: HCPCS | Performed by: UROLOGY

## 2022-03-25 PROCEDURE — 1123F ACP DISCUSS/DSCN MKR DOCD: CPT | Performed by: UROLOGY

## 2022-03-25 PROCEDURE — G8484 FLU IMMUNIZE NO ADMIN: HCPCS | Performed by: UROLOGY

## 2022-03-25 PROCEDURE — 4040F PNEUMOC VAC/ADMIN/RCVD: CPT | Performed by: UROLOGY

## 2022-03-25 PROCEDURE — 1036F TOBACCO NON-USER: CPT | Performed by: UROLOGY

## 2022-03-25 PROCEDURE — G8420 CALC BMI NORM PARAMETERS: HCPCS | Performed by: UROLOGY

## 2022-03-25 PROCEDURE — 81002 URINALYSIS NONAUTO W/O SCOPE: CPT | Performed by: UROLOGY

## 2022-03-25 PROCEDURE — G8400 PT W/DXA NO RESULTS DOC: HCPCS | Performed by: UROLOGY

## 2022-03-25 PROCEDURE — 1090F PRES/ABSN URINE INCON ASSESS: CPT | Performed by: UROLOGY

## 2022-03-25 PROCEDURE — 99214 OFFICE O/P EST MOD 30 MIN: CPT | Performed by: UROLOGY

## 2022-03-25 RX ORDER — PYRIDOXINE HCL (VITAMIN B6) 100 MG
500 TABLET ORAL 2 TIMES DAILY
Qty: 60 CAPSULE | Refills: 3 | Status: SHIPPED | OUTPATIENT
Start: 2022-03-25 | End: 2022-06-24 | Stop reason: SDUPTHER

## 2022-03-25 NOTE — PROGRESS NOTES
Bella Lorenz MD   Urology Clinic Consultation / New Patient Visit      Patient: Rosalia Franks  YOB: 1950  Date: 3/25/2022    HISTORY OF PRESENT ILLNESS:   The patient is a 66 y.o. female who presents today for evaluation of the following problem(s):      1. UTI (urinary tract infection), uncomplicated    2. Kidney stone on left side    3. Recurrent UTI (urinary tract infection)    4. Atrophy of right kidney          She states she is having a UTI at the moment. Her Ucx on 3/18/22 showed E. Coli and is on 10 days of antibiotics. She denies any flank pain, fever, chills, nausea, or vomiting. Overall the problem(s) : are improving. Associated Symptoms: No dysuria, gross hematuria. Pain Severity: 0    Summary of old records:   She had a left obstructing stone and UTI with a right atrophic kidney. She underwent stent placement on 11/7/21. She was bacteremic for E. Coli. She underwent Left URS/HLL/stent exchange with strings on 1/18/22.      Last several PSA's:  No results found for: PSA    Last total testosterone:  No results found for: TESTOSTERONE    Urinalysis today:  Results for POC orders placed in visit on 03/25/22   POCT Urinalysis no Micro   Result Value Ref Range    Color, UA yellow     Clarity, UA dark     Glucose, UA POC negative     Bilirubin, UA negative     Ketones, UA negative     Spec Grav, UA 1.020     Blood, UA POC negative     pH, UA 7.0     Protein, UA POC trace     Urobilinogen, UA 0.2     Leukocytes, UA small     Nitrite, UA negative          Last BUN and creatinine:  Lab Results   Component Value Date    BUN 27 (H) 01/13/2022     Lab Results   Component Value Date    CREATININE 1.57 (H) 01/13/2022       Imaging Reviewed during this Office Visit:   (results were independently reviewed by physician and radiology report verified)    PAST MEDICAL, FAMILY AND SOCIAL HISTORY:  Past Medical History:   Diagnosis Date    Acid reflux     Atrophy of kidney     [facility]     Chronic headaches     Chronic kidney disease     COVID-19 11/29/2021    tested positive [facility]. [facility] weeks. No symptoms    Dehydration     Difficulty in walking     Dysphagia     Fall 01/08/2022    head laceration, taken to ER from facility    GERD (gastroesophageal reflux disease)     H/O recurrent pneumonia     Hard of hearing     wears bilateral aides    Hearing loss     Hydrocephalus ([facility]     Hydronephrosis     Hypertension     Kidney stones     Lives in nursing home 11/15/2021    currently at [place], [place] # [phone], fax # [phone]    Migraine     Schizo affective schizophrenia ([facility]     Sepsis ([facility]     Tachycardia     UTI (urinary tract infection)     Weakness      Past Surgical History:   Procedure Laterality Date    CHOLECYSTECTOMY      CYSTOSCOPY Left 11/07/2021    CYSTOSCOPY URETERAL STENT INSERTION performed by Tamika Mirza MD at [address] [place] [address] Left 01/18/2022    HOLMIUM, CYSTO, URETEROSCOPY, STENT PLACEMENT    HIP FRACTURE SURGERY Left 06/09/2017    PINNING AND SCREW    HIP PINNING Left 06/09/2017    HIP PINNING 7.3 CANNULATED SCREW WITH SYNTHES PRODUCT APPLICATION AND INTRAOPERATIVE C-ARM performed by James Shah MD at [facility] [place]      shunt x3 head    SHUNT REVISION      TONSILLECTOMY      URETER SURGERY Left 1/18/2022    LITHOTRIPSY, CYSTOSCOPY, URETEROSCOPY, LEFT STENT EXCHANGE performed by Tamika Mirza MD at [place] [address]     No family history on file.   Outpatient Medications Marked as Taking for the 3/25/22 encounter (Office Visit) with Tamika Mirza MD   Medication Sig Dispense Refill    D-Mannose 500 MG CAPS Take 500 mg by mouth in the morning and at bedtime 30 capsule 3    Cranberry 500 MG CAPS Take 1 capsule by mouth 2 times daily 60 capsule 3    AIMOVIG 140 MG/ML SOAJ       traMADol (ULTRAM) 50 MG tablet Take 50 mg by mouth every 6 hours as needed for Pain.       budesonide-formoterol (SYMBICORT) 160-4.5 MCG/ACT AERO Inhale 2 puffs into the lungs 2 times daily As needed for SOB       albuterol sulfate  (90 Base) MCG/ACT inhaler Inhale 2 puffs into the lungs every 6 hours as needed for Wheezing      ondansetron (ZOFRAN) 4 MG tablet Take 4 mg by mouth every 8 hours as needed for Nausea or Vomiting      guaiFENesin (MUCINEX) 600 MG extended release tablet Take 1,200 mg by mouth 2 times daily      dicyclomine (BENTYL) 10 MG capsule Take 1 capsule by mouth every 6 hours as needed (cramps) 20 capsule 0    fluticasone (FLONASE) 50 MCG/ACT nasal spray 1 spray by Each Nostril route daily      loratadine (CLARITIN) 10 MG capsule Take 10 mg by mouth daily      omeprazole (PRILOSEC) 20 MG delayed release capsule Take 20 mg by mouth daily      polyethylene glycol (GLYCOLAX) 17 GM/SCOOP powder Take 17 g by mouth daily      QUEtiapine (SEROQUEL) 25 MG tablet Take 25 mg by mouth daily      VORTIoxetine (TRINTELLIX) 5 MG tablet Take 5 mg by mouth daily      topiramate (TOPAMAX) 100 MG tablet Take 100 mg by mouth every evening      lamoTRIgine (LAMICTAL) 200 MG tablet Take 200 mg by mouth nightly      melatonin 5 MG TBDP disintegrating tablet Take 5 mg by mouth nightly      QUEtiapine (SEROQUEL) 25 MG tablet Take 50 mg by mouth nightly      butalbital-APAP-caffeine (FIORICET) -40 MG CAPS per capsule Take 1 capsule by mouth every 6 hours as needed for Headaches      meclizine (ANTIVERT) 25 MG tablet Take 25 mg by mouth 3 times daily as needed for Dizziness      Ca Carbonate-Mag Hydroxide (ROLAIDS PO) Take 2 tablets by mouth every 4 hours as needed (acid reflux)      SUMAtriptan (IMITREX) 100 MG tablet Take 100 mg by mouth 2 times daily as needed for Migraine      topiramate (TOPAMAX) 50 MG tablet Take 1 tablet by mouth daily 60 tablet 3    Misc Natural Products (GLUCOSAMINE CHONDROITIN ADV PO) Take 1 tablet by mouth daily      busPIRone (BUSPAR) 5 MG tablet Take 5 mg by mouth 2 times daily      bismuth subsalicylate (BISMATROL) 262 MG chewable tablet Take 524 mg by mouth as needed for Heartburn (8 times daily)      senna-docusate (PERICOLACE) 8.6-50 MG per tablet Take 1 tablet by mouth as needed for Constipation (in eveninig) Or 2 tabs      magnesium hydroxide (MILK OF MAGNESIA) 400 MG/5ML suspension Take 5 mLs by mouth daily as needed for Constipation      calcium carbonate (TUMS) 500 MG chewable tablet Take 1 tablet by mouth 4 times daily as needed for Heartburn      alendronate (FOSAMAX) 70 MG tablet Take 70 mg by mouth every 7 days Every Wednesday      atorvastatin (LIPITOR) 10 MG tablet Take 10 mg by mouth daily      loperamide (IMODIUM) 2 MG capsule Take 2 mg by mouth 4 times daily as needed for Diarrhea      amLODIPine (NORVASC) 10 MG tablet Take 5 mg by mouth daily       Multiple Vitamins-Minerals (THERAPEUTIC MULTIVITAMIN-MINERALS) tablet Take 1 tablet by mouth daily.  ferrous sulfate 325 (65 FE) MG EC tablet Take 325 mg by mouth daily (with breakfast).       lamoTRIgine (LAMICTAL) 100 MG tablet Take 100 mg by mouth daily       Metoclopramide HCl (REGLAN PO) Take 5 mg by mouth 3 times daily          Levaquin [levofloxacin in d5w], Chocolate, Cocoa, Indomethacin, Neurontin [gabapentin], and Adhesive tape  Social History     Tobacco Use   Smoking Status Never Smoker   Smokeless Tobacco Never Used       Social History     Substance and Sexual Activity   Alcohol Use No       REVIEW OF SYSTEMS:  Constitutional: negative  Eyes: negative  Respiratory: negative  Cardiovascular: negative  Gastrointestinal: negative  Musculoskeletal: negative  Genitourinary: negative except for what is in HPI  Skin: negative   Neurological: negative  Hematological/Lymphatic: negative  Psychological: negative    Physical Exam:    This a 66 y.o. male   Vitals:    03/25/22 1013   BP: 109/62   Pulse: 85     Constitutional: Patient in no acute distress; Neuro: alert and oriented to person place and time. Psych: Mood and affect normal.  Skin: Normal  Lungs: Respiratory effort normal  Cardiovascular:  Normal peripheral pulses  Abdomen: Soft, non-tender, non-distended   Bladder non-tender and not distended. Lymphatics: no palpable lymphadenopathy  Gait is within normal limits  Musculoskeletal: Normal range of motion       Assessment and Plan      1. UTI (urinary tract infection), uncomplicated    2. Kidney stone on left side    3. Recurrent UTI (urinary tract infection)    4. Atrophy of right kidney           Plan:      Return in about 6 months (around 9/25/2022) for with renal us and KUB . Obtain KUB and renal US in 6 months  Continue 10 days worth of antibiotics for E. Coli UTI on 3/18/22  Start on D-mannose and Cranberry BID for recurrent UTIs       Amy Gray MD  [facility]    I have discussed the care of this patient including pertinent history and exam findings, with the resident. I have seen and examined the patient and the key elements of all parts of the encounter have been performed by me. I agree with the assessment, plan and orders as documented by the resident.     Terence Hogan M.D, MD, MD

## 2022-03-29 ENCOUNTER — HOSPITAL ENCOUNTER (OUTPATIENT)
Dept: GENERAL RADIOLOGY | Age: 72
Discharge: HOME | End: 2022-03-31
Payer: MEDICARE

## 2022-03-29 ENCOUNTER — HOSPITAL ENCOUNTER (OUTPATIENT)
Age: 72
Discharge: HOME | End: 2022-03-31
Payer: MEDICARE

## 2022-03-29 ENCOUNTER — HOSPITAL ENCOUNTER (OUTPATIENT)
Dept: ULTRASOUND IMAGING | Age: 72
Discharge: HOME | End: 2022-03-31
Payer: MEDICARE

## 2022-03-29 DIAGNOSIS — N20.0 KIDNEY STONE ON LEFT SIDE: ICD-10-CM

## 2022-03-29 DIAGNOSIS — N20.0 KIDNEY STONE: ICD-10-CM

## 2022-03-29 PROCEDURE — 76770 US EXAM ABDO BACK WALL COMP: CPT

## 2022-03-29 PROCEDURE — 74018 RADEX ABDOMEN 1 VIEW: CPT

## 2022-03-30 DIAGNOSIS — N20.0 BILATERAL KIDNEY STONES: Primary | ICD-10-CM

## 2022-03-31 ENCOUNTER — HOSPITAL ENCOUNTER (OUTPATIENT)
Dept: CT IMAGING | Age: 72
Discharge: HOME | End: 2022-04-02
Payer: MEDICARE

## 2022-03-31 DIAGNOSIS — N20.0 BILATERAL KIDNEY STONES: ICD-10-CM

## 2022-03-31 PROCEDURE — 74176 CT ABD & PELVIS W/O CONTRAST: CPT

## 2022-04-12 ENCOUNTER — TELEPHONE (OUTPATIENT)
Dept: UROLOGY | Age: 72
End: 2022-04-12

## 2022-04-12 NOTE — TELEPHONE ENCOUNTER
Milvia with [phone] calling reporting patient has hx of b/l stones, recurrent UTIs, constipation and patient now having confusion and abdominal pain. I advised this may be that her UTI has not completely gone away and she may want to have her further evaluated. Cheyenne Kim wanted to be sure the provider got this information. Please advise is there is anything else you'd recommend.

## 2022-04-14 NOTE — TELEPHONE ENCOUNTER
Returned call to [facility] advised per Dr. Ron Díaz he feels if she is not improving she can go to ER for evaluation.

## 2022-04-26 DIAGNOSIS — N39.0 UTI (URINARY TRACT INFECTION), UNCOMPLICATED: Primary | ICD-10-CM

## 2022-05-05 ENCOUNTER — APPOINTMENT (OUTPATIENT)
Dept: CT IMAGING | Age: 72
DRG: 871 | End: 2022-05-05
Payer: MEDICARE

## 2022-05-05 ENCOUNTER — APPOINTMENT (OUTPATIENT)
Dept: GENERAL RADIOLOGY | Age: 72
DRG: 871 | End: 2022-05-05
Payer: MEDICARE

## 2022-05-05 ENCOUNTER — HOSPITAL ENCOUNTER (INPATIENT)
Age: 72
LOS: 3 days | Discharge: NURSING, REHAB OR LONG-TERM CARE | DRG: 871 | End: 2022-05-09
Attending: STUDENT IN AN ORGANIZED HEALTH CARE EDUCATION/TRAINING PROGRAM | Admitting: INTERNAL MEDICINE
Payer: MEDICARE

## 2022-05-05 DIAGNOSIS — A41.9 SEPTICEMIA (HCC): Primary | ICD-10-CM

## 2022-05-05 DIAGNOSIS — N17.9 AKI (ACUTE KIDNEY INJURY) (HCC): ICD-10-CM

## 2022-05-05 DIAGNOSIS — N30.01 ACUTE CYSTITIS WITH HEMATURIA: ICD-10-CM

## 2022-05-05 LAB
-: ABNORMAL
ABSOLUTE BANDS #: 0.45 K/UL (ref 0–1)
ABSOLUTE EOS #: 0 K/UL (ref 0–0.4)
ABSOLUTE LYMPH #: 0.68 K/UL (ref 1–4.8)
ABSOLUTE MONO #: 1.13 K/UL (ref 0.1–1.3)
ACETAMINOPHEN LEVEL: <5 UG/ML (ref 10–30)
AMORPHOUS: ABNORMAL
AMPHETAMINE SCREEN URINE: NEGATIVE
BACTERIA: ABNORMAL
BANDS: 2 % (ref 0–10)
BARBITURATE SCREEN URINE: POSITIVE
BASOPHILS # BLD: 1 % (ref 0–2)
BASOPHILS ABSOLUTE: 0.23 K/UL (ref 0–0.2)
BENZODIAZEPINE SCREEN, URINE: NEGATIVE
BILIRUBIN URINE: NEGATIVE
CANNABINOID SCREEN URINE: NEGATIVE
COCAINE METABOLITE, URINE: NEGATIVE
COLOR: YELLOW
EOSINOPHILS RELATIVE PERCENT: 0 % (ref 0–4)
EPITHELIAL CELLS UA: ABNORMAL /HPF
ETHANOL PERCENT: <0.01 %
ETHANOL: <10 MG/DL
GLUCOSE BLD-MCNC: 124 MG/DL
GLUCOSE BLD-MCNC: 124 MG/DL (ref 65–105)
GLUCOSE URINE: NEGATIVE
HCT VFR BLD CALC: 35.4 % (ref 36–46)
HEMOGLOBIN: 11.5 G/DL (ref 12–16)
INR BLD: 1.4
KETONES, URINE: NEGATIVE
LACTIC ACID, SEPSIS: 1 MMOL/L (ref 0.5–1.9)
LEUKOCYTE ESTERASE, URINE: ABNORMAL
LIPASE: 20 U/L (ref 13–60)
LYMPHOCYTES # BLD: 3 % (ref 24–44)
MCH RBC QN AUTO: 30 PG (ref 26–34)
MCHC RBC AUTO-ENTMCNC: 32.5 G/DL (ref 31–37)
MCV RBC AUTO: 92.3 FL (ref 80–100)
METHADONE SCREEN, URINE: NEGATIVE
MONOCYTES # BLD: 5 % (ref 1–7)
MORPHOLOGY: ABNORMAL
NITRITE, URINE: NEGATIVE
OPIATES, URINE: NEGATIVE
OXYCODONE SCREEN URINE: NEGATIVE
PARTIAL THROMBOPLASTIN TIME: 35.5 SEC (ref 24–36)
PDW BLD-RTO: 13.8 % (ref 11.5–14.9)
PH UA: 6 (ref 5–8)
PHENCYCLIDINE, URINE: NEGATIVE
PLATELET # BLD: 175 K/UL (ref 150–450)
PMV BLD AUTO: 7.9 FL (ref 6–12)
PROTEIN UA: ABNORMAL
PROTHROMBIN TIME: 16.7 SEC (ref 11.8–14.6)
RBC # BLD: 3.83 M/UL (ref 4–5.2)
RBC UA: ABNORMAL /HPF
SALICYLATE LEVEL: <1 MG/DL (ref 3–10)
SEG NEUTROPHILS: 89 % (ref 36–66)
SEGMENTED NEUTROPHILS ABSOLUTE COUNT: 20.11 K/UL (ref 1.3–9.1)
SPECIFIC GRAVITY UA: 1.01 (ref 1–1.03)
TEST INFORMATION: ABNORMAL
TRICYCLIC ANTIDEP,URINE: NEGATIVE
TURBIDITY: ABNORMAL
URINE HGB: ABNORMAL
UROBILINOGEN, URINE: NORMAL
WBC # BLD: 22.6 K/UL (ref 3.5–11)
WBC UA: ABNORMAL /HPF

## 2022-05-05 PROCEDURE — 71045 X-RAY EXAM CHEST 1 VIEW: CPT

## 2022-05-05 PROCEDURE — 87086 URINE CULTURE/COLONY COUNT: CPT

## 2022-05-05 PROCEDURE — 70450 CT HEAD/BRAIN W/O DYE: CPT

## 2022-05-05 PROCEDURE — 96365 THER/PROPH/DIAG IV INF INIT: CPT

## 2022-05-05 PROCEDURE — 87077 CULTURE AEROBIC IDENTIFY: CPT

## 2022-05-05 PROCEDURE — 80307 DRUG TEST PRSMV CHEM ANLYZR: CPT

## 2022-05-05 PROCEDURE — 93005 ELECTROCARDIOGRAM TRACING: CPT | Performed by: EMERGENCY MEDICINE

## 2022-05-05 PROCEDURE — 85025 COMPLETE CBC W/AUTO DIFF WBC: CPT

## 2022-05-05 PROCEDURE — 36415 COLL VENOUS BLD VENIPUNCTURE: CPT

## 2022-05-05 PROCEDURE — 82947 ASSAY GLUCOSE BLOOD QUANT: CPT

## 2022-05-05 PROCEDURE — 80179 DRUG ASSAY SALICYLATE: CPT

## 2022-05-05 PROCEDURE — 87899 AGENT NOS ASSAY W/OPTIC: CPT

## 2022-05-05 PROCEDURE — 85610 PROTHROMBIN TIME: CPT

## 2022-05-05 PROCEDURE — 85730 THROMBOPLASTIN TIME PARTIAL: CPT

## 2022-05-05 PROCEDURE — 87040 BLOOD CULTURE FOR BACTERIA: CPT

## 2022-05-05 PROCEDURE — 83690 ASSAY OF LIPASE: CPT

## 2022-05-05 PROCEDURE — 96361 HYDRATE IV INFUSION ADD-ON: CPT

## 2022-05-05 PROCEDURE — 81001 URINALYSIS AUTO W/SCOPE: CPT

## 2022-05-05 PROCEDURE — 6360000002 HC RX W HCPCS: Performed by: EMERGENCY MEDICINE

## 2022-05-05 PROCEDURE — G0480 DRUG TEST DEF 1-7 CLASSES: HCPCS

## 2022-05-05 PROCEDURE — 80053 COMPREHEN METABOLIC PANEL: CPT

## 2022-05-05 PROCEDURE — 83605 ASSAY OF LACTIC ACID: CPT

## 2022-05-05 PROCEDURE — 87449 NOS EACH ORGANISM AG IA: CPT

## 2022-05-05 PROCEDURE — 99285 EMERGENCY DEPT VISIT HI MDM: CPT

## 2022-05-05 PROCEDURE — 2580000003 HC RX 258: Performed by: EMERGENCY MEDICINE

## 2022-05-05 PROCEDURE — 80143 DRUG ASSAY ACETAMINOPHEN: CPT

## 2022-05-05 PROCEDURE — 87186 SC STD MICRODIL/AGAR DIL: CPT

## 2022-05-05 RX ORDER — ASPIRIN 81 MG/1
81 TABLET ORAL DAILY
COMMUNITY

## 2022-05-05 RX ORDER — SKIN PROTECTANT 44 G/100G
OINTMENT TOPICAL 3 TIMES DAILY PRN
COMMUNITY

## 2022-05-05 RX ORDER — OXYBUTYNIN CHLORIDE 5 MG/1
5 TABLET, EXTENDED RELEASE ORAL DAILY
COMMUNITY
End: 2022-06-24 | Stop reason: SINTOL

## 2022-05-05 RX ORDER — BUPRENORPHINE 15 UG/H
1 PATCH TRANSDERMAL WEEKLY
COMMUNITY

## 2022-05-05 RX ORDER — ATOGEPANT 60 MG/1
1 TABLET ORAL DAILY
COMMUNITY

## 2022-05-05 RX ORDER — CARBOXYMETHYLCELLULOSE SODIUM 5 MG/ML
1 SOLUTION/ DROPS OPHTHALMIC DAILY PRN
COMMUNITY

## 2022-05-05 RX ORDER — DICYCLOMINE HYDROCHLORIDE 10 MG/1
20 CAPSULE ORAL 3 TIMES DAILY
COMMUNITY

## 2022-05-05 RX ORDER — BUTALBITAL AND ACETAMINOPHEN 25; 325 MG/1; MG/1
1 TABLET ORAL EVERY 6 HOURS PRN
COMMUNITY

## 2022-05-05 RX ORDER — MV-MIN/FA/VIT K/LUTEIN/ZEAXANT 200MCG-5MG
1 CAPSULE ORAL 2 TIMES DAILY
COMMUNITY

## 2022-05-05 RX ORDER — LIDOCAINE 4 G/G
1 PATCH TOPICAL DAILY
COMMUNITY

## 2022-05-05 RX ORDER — SODIUM CHLORIDE, SODIUM LACTATE, POTASSIUM CHLORIDE, AND CALCIUM CHLORIDE .6; .31; .03; .02 G/100ML; G/100ML; G/100ML; G/100ML
30 INJECTION, SOLUTION INTRAVENOUS ONCE
Status: COMPLETED | OUTPATIENT
Start: 2022-05-05 | End: 2022-05-06

## 2022-05-05 RX ADMIN — CEFTRIAXONE SODIUM 1000 MG: 1 INJECTION, POWDER, FOR SOLUTION INTRAMUSCULAR; INTRAVENOUS at 23:07

## 2022-05-05 RX ADMIN — SODIUM CHLORIDE, POTASSIUM CHLORIDE, SODIUM LACTATE AND CALCIUM CHLORIDE 1482 ML: 600; 310; 30; 20 INJECTION, SOLUTION INTRAVENOUS at 20:19

## 2022-05-05 NOTE — ED PROVIDER NOTES
[address]  Emergency Department Encounter  EmergencyMedicine Resident     Pt Name:Kendra Mooney  MRN: [id number]  [place] 1950  Date of evaluation: 5/5/22  PCP:  Tobie Hatchet, MD    CHIEF COMPLAINT       Chief Complaint   Patient presents with    Shortness of Breath       HISTORY OF PRESENT ILLNESS  (Location/Symptom, Timing/Onset, Context/Setting, Quality, Duration, Modifying Factors, Severity.)      Joe Reyes is a 66 y.o. female who presents to the emergency department with altered mental status. History is unclear at the time of arrival and a staff member arrives with patient and states she is only cursorily familiar with the patient. Per triage patient was brought in from her group home after her primary care doctor requested patient be sent to the ER for fluid on the left lung, tachycardic in office, losing weight, increased confusion, \"possible UTI or kidney stone\", and was winded when speaking. Patient herself states that she has pain across her chest and across the upper aspect of her abdomen as well as pain in the bilateral lower extremities. She knows she is at [facility] but believes the year is 2002. Poor historian otherwise and appears tachypneic. She at times does not appear to react to questioning, history of being hard of hearing. Documented history of frequent UTIs. LVEF >55% on 9/20/2021. PAST MEDICAL / SURGICAL / SOCIAL / FAMILY HISTORY      has a past medical history of Acid reflux, Atrophy of kidney, Bipolar affective ([address] Chronic headaches, Chronic kidney disease, COVID-19, Dehydration, Difficulty in walking, Dysphagia, Fall, GERD (gastroesophageal reflux disease), H/O recurrent pneumonia, Hard of hearing, Hearing loss, Hydrocephalus ([address] Hydronephrosis, Hypertension, Kidney stones, Lives in nursing home, Migraine, Schizo affective schizophrenia ([address] Sepsis ([address] Tachycardia, UTI (urinary tract infection), and Weakness.      has a past surgical history that includes other surgical history; shunt revision; Cholecystectomy; Tonsillectomy; Hip fracture surgery (Left, 06/09/2017); hip pinning (Left, 06/09/2017); Cystoscopy (Left, 11/07/2021); Cystoscopy (Left, 01/18/2022); and Ureter surgery (Left, 1/18/2022). Social History     Socioeconomic History    Marital status: Single     Spouse name: Not on file    Number of children: Not on file    Years of education: Not on file    Highest education level: Not on file   Occupational History    Not on file   Tobacco Use    Smoking status: Never Smoker    Smokeless tobacco: Never Used   Vaping Use    Vaping Use: Never used   Substance and Sexual Activity    Alcohol use: No    Drug use: No    Sexual activity: Not on file   Other Topics Concern    Not on file   Social History Narrative    Not on file     Social Determinants of Health     Financial Resource Strain:     Difficulty of Paying Living Expenses: Not on file   Food Insecurity:     Worried About Running Out of Food in the Last Year: Not on file    Myles of Food in the Last Year: Not on file   Transportation Needs:     Lack of Transportation (Medical): Not on file    Lack of Transportation (Non-Medical):  Not on file   Physical Activity:     Days of Exercise per Week: Not on file    Minutes of Exercise per Session: Not on file   Stress:     Feeling of Stress : Not on file   Social Connections:     Frequency of Communication with Friends and Family: Not on file    Frequency of Social Gatherings with Friends and Family: Not on file    Attends Confucianist Services: Not on file    Active Member of Clubs or Organizations: Not on file    Attends Club or Organization Meetings: Not on file    Marital Status: Not on file   Intimate Partner Violence:     Fear of Current or Ex-Partner: Not on file    Emotionally Abused: Not on file    Physically Abused: Not on file    Sexually Abused: Not on file   Housing Stability:     Unable to Pay for Housing in the Last Year: Not on file    Number of Places Lived in the Last Year: Not on file    Unstable Housing in the Last Year: Not on file       No family history on file. Allergies:  Levaquin [levofloxacin in d5w], Chocolate, Cocoa, Indomethacin, Neurontin [gabapentin], and Adhesive tape    Home Medications:  Prior to Admission medications    Medication Sig Start Date End Date Taking? Authorizing Provider   oxybutynin (DITROPAN-XL) 5 MG extended release tablet Take 5 mg by mouth daily   Yes Historical Provider, MD   buprenorphine (BUPRENEX) 15 MCG/HR PTWK Place 1 patch onto the skin once a week. Indications: change patch on Thursdays   Yes Historical Provider, MD   lidocaine 4 % external patch Place 1 patch onto the skin daily Indications: to hip   Yes Historical Provider, MD   aspirin 81 MG EC tablet Take 81 mg by mouth daily   Yes Historical Provider, MD   Multiple Vitamins-Minerals (PRESERVISION AREDS 2+MULTI VIT) CAPS Take 1 caplet by mouth in the morning and at bedtime   Yes Historical Provider, MD   dicyclomine (BENTYL) 10 MG capsule Take 20 mg by mouth 3 times daily   Yes Historical Provider, MD   Atogepant (QULIPTA) 60 MG TABS Take 1 tablet by mouth daily   Yes Historical Provider, MD   Emollient [facility]) OINT ointment Apply topically 3 times daily as needed (to areas of dry/cracked skin)   Yes Historical Provider, MD   carboxymethylcellulose PF (REFRESH PLUS) 0.5 % SOLN ophthalmic solution Place 1 drop into both eyes daily as needed   Yes Historical Provider, MD   Butalbital-Acetaminophen  MG TABS Take 1 tablet by mouth every 6 hours as needed (migraine)   Yes Historical Provider, MD   D-Mannose 500 MG CAPS Take 500 mg by mouth in the morning and at bedtime 3/31/22 4/30/22  Porter Song MD   Cranberry 500 MG CAPS Take 1 capsule by mouth 2 times daily 3/25/22 7/23/22  Emily Santizo MD   traMADol (ULTRAM) 50 MG tablet Take 50 mg by mouth 3 times daily. Indications: hold if patient is sedated     Historical Provider, MD   budesonide-formoterol (SYMBICORT) 160-4.5 MCG/ACT AERO Inhale 2 puffs into the lungs 2 times daily     Historical Provider, MD   fluticasone (FLONASE) 50 MCG/ACT nasal spray 1 spray by Each Nostril route daily    Historical Provider, MD   loratadine (CLARITIN) 10 MG tablet Take 10 mg by mouth daily     Historical Provider, MD   omeprazole (PRILOSEC) 40 MG delayed release capsule Take 40 mg by mouth daily     Historical Provider, MD   polyethylene glycol (GLYCOLAX) 17 GM/SCOOP powder Take 17 g by mouth daily as needed (constipation)     Historical Provider, MD   QUEtiapine (SEROQUEL) 25 MG tablet Take 25 mg by mouth daily    Historical Provider, MD   topiramate (TOPAMAX) 100 MG tablet Take 100 mg by mouth every evening    Historical Provider, MD   lamoTRIgine (LAMICTAL) 200 MG tablet Take 200 mg by mouth nightly    Historical Provider, MD   QUEtiapine (SEROQUEL) 50 MG tablet Take 50 mg by mouth nightly     Historical Provider, MD   meclizine (ANTIVERT) 25 MG CHEW Take 25 mg by mouth daily as needed for Dizziness     Historical Provider, MD   SUMAtriptan (IMITREX) 50 MG tablet Take 50 mg by mouth as needed for Migraine (max 2 tablets per day and max 2 days per week)     Historical Provider, MD   topiramate (TOPAMAX) 50 MG tablet Take 1 tablet by mouth daily 12/13/18   Aaron Riojas MD   Misc Natural Products (GLUCOSAMINE CHONDROITIN ADV PO) Take 1 tablet by mouth daily    Historical Provider, MD   busPIRone (BUSPAR) 5 MG tablet Take 5 mg by mouth 2 times daily    Historical Provider, MD   magnesium hydroxide (MILK OF MAGNESIA) 400 MG/5ML suspension Take 5 mLs by mouth 2 times daily as needed for Constipation     Historical Provider, MD   calcium carbonate (TUMS) 500 MG chewable tablet Take 1 tablet by mouth 4 times daily as needed for Heartburn    Historical Provider, MD   alendronate (FOSAMAX) 70 MG tablet Take 70 mg by mouth every 7 days Indications: Wednesdays 12/12/18   Historical Provider, MD   atorvastatin (LIPITOR) 10 MG tablet Take 10 mg by mouth nightly     Historical Provider, MD   loperamide (IMODIUM) 2 MG capsule Take 2 mg by mouth 4 times daily as needed for Diarrhea    Historical Provider, MD   amLODIPine (NORVASC) 5 MG tablet Take 5 mg by mouth daily     Historical Provider, MD   Multiple Vitamins-Minerals (THERAPEUTIC MULTIVITAMIN-MINERALS) tablet Take 1 tablet by mouth daily. Historical Provider, MD   ferrous sulfate 325 (65 FE) MG EC tablet Take 325 mg by mouth daily (with breakfast). Historical Provider, MD   lamoTRIgine (LAMICTAL) 100 MG tablet Take 100 mg by mouth daily     Historical Provider, MD   metoclopramide (REGLAN) 5 MG tablet Take 5 mg by mouth 3 times daily (before meals)     Historical Provider, MD       REVIEW OF SYSTEMS    (2-9 systems for level 4, 10 or more for level 5)      Review of Systems   Unable to perform ROS: Mental status change       PHYSICAL EXAM   (up to 7 for level 4, 8 or more for level 5)      INITIAL VITALS:   /71   Pulse 121   Temp 98.9 °F (37.2 °C) (Oral)   Resp 20   Ht 5' 1\" (1.549 m)   Wt 109 lb (49.4 kg)   SpO2 98%   BMI 20.60 kg/m²     Physical Exam  Vitals and nursing note reviewed. Constitutional:       General: She is not in acute distress. Appearance: Normal appearance. She is ill-appearing. She is not diaphoretic. Comments: Ill-appearing pale appearing thin female   HENT:      Head: Normocephalic and atraumatic. Right Ear: External ear normal.      Left Ear: External ear normal.      Nose: Nose normal.      Mouth/Throat:      Mouth: Mucous membranes are dry. Eyes:      Conjunctiva/sclera: Conjunctivae normal.   Neck:      Trachea: No tracheal deviation. Cardiovascular:      Rate and Rhythm: Regular rhythm. Tachycardia present. Heart sounds: Normal heart sounds. No murmur heard. No friction rub. No gallop.     Pulmonary:      Effort: Pulmonary effort is normal. No respiratory distress. Comments: Minimal air movement bilaterally but symmetric breath sounds  Abdominal:      General: Abdomen is flat. There is no distension. Comments: Patient is curled up into the fetal position, refuses to lay flat for abdominal exam   Musculoskeletal:         General: No swelling, deformity or signs of injury. Cervical back: Normal range of motion and neck supple. Skin:     General: Skin is warm and dry. Coloration: Skin is pale. Skin is not jaundiced. Findings: No bruising or lesion. Neurological:      General: No focal deficit present. Cranial Nerves: No cranial nerve deficit. Sensory: No sensory deficit. Motor: No weakness or abnormal muscle tone. Comments: Patient believes year is 2002, otherwise no focal deficits. Generalized weakness noted         DIFFERENTIAL  DIAGNOSIS     PLAN (LABS / IMAGING / EKG):  Orders Placed This Encounter   Procedures    Culture, Urine    Culture, Blood 1    Culture, Blood 2    XR CHEST PORTABLE    CT HEAD WO CONTRAST    CBC with Auto Differential    Comprehensive Metabolic Panel w/ Reflex to MG    Urinalysis    Lipase    Ethanol    Acetaminophen level    Salicylate    Drug screen, tricyclic    URINE DRUG SCREEN    Protime-INR    APTT    Microscopic Urinalysis    Inpatient consult to Internal Medicine    Initiate Oxygen Therapy Protocol    POCT Glucose    POC Glucose Fingerstick    EKG 12 lead    ADMIT TO INPATIENT       MEDICATIONS ORDERED:  Orders Placed This Encounter   Medications    lactated ringers bolus     Order Specific Question:   Was full 30mL/kg fluid bolus ordered? Answer:    Yes    AND Linked Order Group     cefTRIAXone (ROCEPHIN) 1000 mg IVPB in 50 mL D5W minibag      Order Specific Question:   Antimicrobial Indications      Answer:   Pneumonia (CAP)     azithromycin (ZITHROMAX) 500 mg in D5W 250ml addavial      Order Specific Question: Antimicrobial Indications      Answer:   Pneumonia (CAP)       DDX: Coty Armas is a 66 y.o. female who presents to the emergency department with altered mental status. Differential diagnosis includes sepsis, UTI, intracranial pathology such as bleed or lesion, electrolyte disturbance, hypoglycemia    DIAGNOSTIC RESULTS / EMERGENCY DEPARTMENT COURSE / MDM   LAB RESULTS:  Results for orders placed or performed during the hospital encounter of 05/05/22   Culture, Blood 1    Specimen: Blood   Result Value Ref Range    Specimen Description . BLOOD R HAND     Special Requests RT HAND     Culture NO GROWTH <24 HRS    Culture, Blood 2    Specimen: Blood   Result Value Ref Range    Specimen Description . BLOOD R FOOT     Culture NO GROWTH <24 HRS    CBC with Auto Differential   Result Value Ref Range    WBC 22.6 (H) 3.5 - 11.0 k/uL    RBC 3.83 (L) 4.0 - 5.2 m/uL    Hemoglobin 11.5 (L) 12.0 - 16.0 g/dL    Hematocrit 35.4 (L) 36 - 46 %    MCV 92.3 80 - 100 fL    MCH 30.0 26 - 34 pg    MCHC 32.5 31 - 37 g/dL    RDW 13.8 11.5 - 14.9 %    Platelets 471 534 - 153 k/uL    MPV 7.9 6.0 - 12.0 fL    Seg Neutrophils 89 (H) 36 - 66 %    Lymphocytes 3 (L) 24 - 44 %    Monocytes 5 1 - 7 %    Eosinophils % 0 0 - 4 %    Basophils 1 0 - 2 %    Bands 2 0 - 10 %    Segs Absolute 20.11 (H) 1.3 - 9.1 k/uL    Absolute Lymph # 0.68 (L) 1.0 - 4.8 k/uL    Absolute Mono # 1.13 0.1 - 1.3 k/uL    Absolute Eos # 0.00 0.0 - 0.4 k/uL    Basophils Absolute 0.23 (H) 0.0 - 0.2 k/uL    Absolute Bands # 0.45 0.0 - 1.0 k/uL    Morphology TOXIC GRANULATION PRESENT    Comprehensive Metabolic Panel w/ Reflex to MG   Result Value Ref Range    Glucose 131 (H) 70 - 99 mg/dL    BUN 45 (H) 8 - 23 mg/dL    CREATININE 1.94 (H) 0.50 - 0.90 mg/dL    Calcium 9.1 8.6 - 10.4 mg/dL    Sodium 140 135 - 144 mmol/L    Potassium 4.0 3.7 - 5.3 mmol/L    Chloride 103 98 - 107 mmol/L    CO2 21 20 - 31 mmol/L    Anion Gap 16 9 - 17 mmol/L    Alkaline Phosphatase 195 (H) 35 - 104 U/L    ALT 50 (H) 5 - 33 U/L    AST 25 <32 U/L    Total Bilirubin 0.29 (L) 0.3 - 1.2 mg/dL    Total Protein 6.7 6.4 - 8.3 g/dL    Albumin 3.5 3.5 - 5.2 g/dL    GFR Non- 25 (L) >60 mL/min    GFR  31 (L) >60 mL/min    GFR Comment         Urinalysis   Result Value Ref Range    Color, UA Yellow Yellow    Turbidity UA Cloudy (A) Clear    Glucose, Ur NEGATIVE NEGATIVE    Bilirubin Urine NEGATIVE NEGATIVE    Ketones, Urine NEGATIVE NEGATIVE    Specific [place], UA 1.013 1.000 - 1.030    Urine Hgb MOD (A) NEGATIVE    pH, UA 6.0 5.0 - 8.0    Protein, UA 2+ (A) NEGATIVE    Urobilinogen, Urine Normal Normal    Nitrite, Urine NEGATIVE NEGATIVE    Leukocyte Esterase, Urine LARGE (A) NEGATIVE   Lactate, Sepsis   Result Value Ref Range    Lactic Acid, Sepsis 1.0 0.5 - 1.9 mmol/L   Lipase   Result Value Ref Range    Lipase 20 13 - 60 U/L   Ethanol   Result Value Ref Range    Ethanol <10 <10 mg/dL    Ethanol percent <0.010 %   Acetaminophen level   Result Value Ref Range    Acetaminophen Level <5 (L) 10 - 30 ug/mL   Salicylate   Result Value Ref Range    Salicylate Lvl <1 (L) 3 - 10 mg/dL   Drug screen, tricyclic   Result Value Ref Range    Tricyclic Antidep,Urine NEGATIVE NEGATIVE   URINE DRUG SCREEN   Result Value Ref Range    Amphetamine Screen, Ur NEGATIVE NEGATIVE    Barbiturate Screen, Ur POSITIVE (A) NEGATIVE    Benzodiazepine Screen, Urine NEGATIVE NEGATIVE    Cocaine Metabolite, Urine NEGATIVE NEGATIVE    Methadone Screen, Urine NEGATIVE NEGATIVE    Opiates, Urine NEGATIVE NEGATIVE    Phencyclidine, Urine NEGATIVE NEGATIVE    Cannabinoid Scrn, Ur NEGATIVE NEGATIVE    Oxycodone Screen, Ur NEGATIVE NEGATIVE    Test Information       Assay provides medical screening only. The absence of expected drug(s) and/or metabolite(s) may indicate diluted or adulterated urine, limitations of testing or timing of collection.    Protime-INR   Result Value Ref Range    Protime 16.7 (H) 11.8 - 14.6 sec INR 1.4    APTT   Result Value Ref Range    PTT 35.5 24.0 - 36.0 sec   Microscopic Urinalysis   Result Value Ref Range    -          WBC, UA TOO NUMEROUS TO COUNT /HPF    RBC, UA 10 TO 20 /HPF    Epithelial Cells UA 0 TO 2 /HPF    Bacteria, UA MANY (A) None    Amorphous, UA 2+ (A) None   POCT Glucose   Result Value Ref Range    Glucose 124 mg/dL   POC Glucose Fingerstick   Result Value Ref Range    POC Glucose 124 (H) 65 - 105 mg/dL   EKG 12 lead   Result Value Ref Range    Ventricular Rate 115 BPM    Atrial Rate 115 BPM    P-R Interval 136 ms    QRS Duration 104 ms    Q-T Interval 336 ms    QTc Calculation (Bazett) 464 ms    R Axis 125 degrees    T Axis 76 degrees       IMPRESSION: Lux Terry is a 66 y.o. female who presents to the emergency department with altered mental status. On examination she is significantly tachycardic and mild to moderately hypotensive with normal maps, and examination demonstrates a pale, curled up uncomfortable appearing female minimally cooperative with examination but with no focal neurologic deficits although she is alert and oriented x1. Strong suspicion of infection given presentation and so we will proceed with septic work-up, 30 cc/kg bolus, empiric antibiotics for suspected pulmonary versus urinary source, and plan for admission. RADIOLOGY:  No results found. EKG  EKG Interpretation    Interpreted by emergency department physician    Rhythm: Sinus tachycardia  Rate: 115  Axis: Right  Ectopy: none  Conduction: normal  ST Segments: no acute change  T Waves: T wave inversion in V2  Q Waves: none    Clinical Impression: New T wave inversion V2, sinus tachycardia, abnormal EKG    Gerson Barrios MD    All EKG's are interpreted by the Emergency Department Physician who either signs or co-signs this chart in the absence of a cardiologist.    EMERGENCY DEPARTMENT COURSE:  ED Course as of 05/06/22 0134   Fri May 06, 2022   0134 Vital signs remained stable throughout stay in the emergency department, heart rate mildly improved from 120s to about 110s after 30 cc/kg fluid bolus. Antibiotics administered and patient accepted to medicine for further care on the stepdown service. [TS]      ED Course User Index  [TS] Hunter Plasencia MD       PROCEDURES:  None    CONSULTS:  IP CONSULT TO INTERNAL MEDICINE    CRITICAL CARE:  None    FINAL IMPRESSION      1. Septicemia ([address]    2. Acute cystitis with hematuria          DISPOSITION / PLAN     DISPOSITION Admitted 05/06/2022 01:07:07 AM      PATIENT REFERRED TO:  No follow-up provider specified. DISCHARGE MEDICATIONS:  New Prescriptions    No medications on file       Hunter Plasencia MD  Emergency Medicine Resident    This patient was evaluated in the Emergency Department for symptoms described in the history of present illness. He/she was evaluated in the context of the global COVID-19 pandemic, which necessitated consideration that the patient might be at risk for infection with the SARS-CoV-2 virus that causes COVID-19. Institutional protocols and algorithms that pertain to the evaluation of patients at risk for COVID-19 are in a state of rapid change based on information released by regulatory bodies including the CDC and federal and state organizations. These policies and algorithms were followed during the patient's care in the ED.     (Please note that portions of thisnote were completed with a voice recognition program.  Efforts were made to edit the dictations but occasionally words are mis-transcribed.)        Hunter Plasencia MD  Resident  05/06/22 9102

## 2022-05-05 NOTE — ED TRIAGE NOTES
Patient brought in by staff of [facility]. States had visit with PCP today who wanted patient sent to the ER. Per note from PCP's office, patient has fluid on left lung, tachycardic in office, losing weight, has increased confusion, possible UTI or kidney stone, and was winded when speaking. Patient complains of headache only in triage.

## 2022-05-06 ENCOUNTER — TELEPHONE (OUTPATIENT)
Dept: UROLOGY | Age: 72
End: 2022-05-06

## 2022-05-06 PROBLEM — A41.9 SEPSIS (HCC): Status: ACTIVE | Noted: 2022-05-06

## 2022-05-06 PROBLEM — N30.01 ACUTE CYSTITIS WITH HEMATURIA: Status: ACTIVE | Noted: 2022-05-06

## 2022-05-06 PROBLEM — R91.8 OPACITY OF LUNG ON IMAGING STUDY: Status: ACTIVE | Noted: 2022-05-06

## 2022-05-06 PROBLEM — R33.9 URINARY RETENTION: Status: ACTIVE | Noted: 2022-05-06

## 2022-05-06 LAB
ALBUMIN SERPL-MCNC: 3.5 G/DL (ref 3.5–5.2)
ALP BLD-CCNC: 195 U/L (ref 35–104)
ALT SERPL-CCNC: 50 U/L (ref 5–33)
ANION GAP SERPL CALCULATED.3IONS-SCNC: 16 MMOL/L (ref 9–17)
AST SERPL-CCNC: 25 U/L
BILIRUB SERPL-MCNC: 0.29 MG/DL (ref 0.3–1.2)
BUN BLDV-MCNC: 45 MG/DL (ref 8–23)
CALCIUM SERPL-MCNC: 9.1 MG/DL (ref 8.6–10.4)
CHLORIDE BLD-SCNC: 103 MMOL/L (ref 98–107)
CO2: 21 MMOL/L (ref 20–31)
CREAT SERPL-MCNC: 1.94 MG/DL (ref 0.5–0.9)
EKG ATRIAL RATE: 115 BPM
EKG P-R INTERVAL: 136 MS
EKG Q-T INTERVAL: 336 MS
EKG QRS DURATION: 104 MS
EKG QTC CALCULATION (BAZETT): 464 MS
EKG R AXIS: 125 DEGREES
EKG T AXIS: 76 DEGREES
EKG VENTRICULAR RATE: 115 BPM
GFR AFRICAN AMERICAN: 31 ML/MIN
GFR NON-AFRICAN AMERICAN: 25 ML/MIN
GFR SERPL CREATININE-BSD FRML MDRD: ABNORMAL ML/MIN/{1.73_M2}
GLUCOSE BLD-MCNC: 131 MG/DL (ref 70–99)
POTASSIUM SERPL-SCNC: 4 MMOL/L (ref 3.7–5.3)
SODIUM BLD-SCNC: 140 MMOL/L (ref 135–144)
TOTAL PROTEIN: 6.7 G/DL (ref 6.4–8.3)

## 2022-05-06 PROCEDURE — 6360000002 HC RX W HCPCS: Performed by: NURSE PRACTITIONER

## 2022-05-06 PROCEDURE — 2060000000 HC ICU INTERMEDIATE R&B

## 2022-05-06 PROCEDURE — 2580000003 HC RX 258: Performed by: NURSE PRACTITIONER

## 2022-05-06 PROCEDURE — 6370000000 HC RX 637 (ALT 250 FOR IP): Performed by: INTERNAL MEDICINE

## 2022-05-06 PROCEDURE — 51702 INSERT TEMP BLADDER CATH: CPT

## 2022-05-06 PROCEDURE — 96367 TX/PROPH/DG ADDL SEQ IV INF: CPT

## 2022-05-06 PROCEDURE — 93010 ELECTROCARDIOGRAM REPORT: CPT | Performed by: INTERNAL MEDICINE

## 2022-05-06 PROCEDURE — 99223 1ST HOSP IP/OBS HIGH 75: CPT | Performed by: INTERNAL MEDICINE

## 2022-05-06 PROCEDURE — 6360000002 HC RX W HCPCS: Performed by: EMERGENCY MEDICINE

## 2022-05-06 PROCEDURE — 2580000003 HC RX 258: Performed by: EMERGENCY MEDICINE

## 2022-05-06 PROCEDURE — 6370000000 HC RX 637 (ALT 250 FOR IP): Performed by: NURSE PRACTITIONER

## 2022-05-06 RX ORDER — MECLIZINE HYDROCHLORIDE 25 MG/1
25 TABLET ORAL DAILY PRN
Status: DISCONTINUED | OUTPATIENT
Start: 2022-05-06 | End: 2022-05-06

## 2022-05-06 RX ORDER — SODIUM CHLORIDE 0.9 % (FLUSH) 0.9 %
5-40 SYRINGE (ML) INJECTION EVERY 12 HOURS SCHEDULED
Status: DISCONTINUED | OUTPATIENT
Start: 2022-05-06 | End: 2022-05-09 | Stop reason: HOSPADM

## 2022-05-06 RX ORDER — M-VIT,TX,IRON,MINS/CALC/FOLIC 27MG-0.4MG
1 TABLET ORAL DAILY
Status: DISCONTINUED | OUTPATIENT
Start: 2022-05-06 | End: 2022-05-09 | Stop reason: HOSPADM

## 2022-05-06 RX ORDER — TOPIRAMATE 50 MG/1
50 TABLET, FILM COATED ORAL DAILY
Status: DISCONTINUED | OUTPATIENT
Start: 2022-05-06 | End: 2022-05-09 | Stop reason: HOSPADM

## 2022-05-06 RX ORDER — HEPARIN SODIUM 5000 [USP'U]/ML
5000 INJECTION, SOLUTION INTRAVENOUS; SUBCUTANEOUS 2 TIMES DAILY
Status: DISCONTINUED | OUTPATIENT
Start: 2022-05-06 | End: 2022-05-09 | Stop reason: HOSPADM

## 2022-05-06 RX ORDER — POLYETHYLENE GLYCOL 3350 17 G/17G
17 POWDER, FOR SOLUTION ORAL DAILY PRN
Status: DISCONTINUED | OUTPATIENT
Start: 2022-05-06 | End: 2022-05-06 | Stop reason: CLARIF

## 2022-05-06 RX ORDER — SUMATRIPTAN 50 MG/1
50 TABLET, FILM COATED ORAL PRN
Status: DISCONTINUED | OUTPATIENT
Start: 2022-05-06 | End: 2022-05-09 | Stop reason: HOSPADM

## 2022-05-06 RX ORDER — ASPIRIN 81 MG/1
81 TABLET ORAL DAILY
Status: DISCONTINUED | OUTPATIENT
Start: 2022-05-06 | End: 2022-05-09 | Stop reason: HOSPADM

## 2022-05-06 RX ORDER — SODIUM CHLORIDE 0.9 % (FLUSH) 0.9 %
5-40 SYRINGE (ML) INJECTION PRN
Status: DISCONTINUED | OUTPATIENT
Start: 2022-05-06 | End: 2022-05-09 | Stop reason: HOSPADM

## 2022-05-06 RX ORDER — BUPRENORPHINE 15 UG/H
1 PATCH TRANSDERMAL WEEKLY
Status: DISCONTINUED | OUTPATIENT
Start: 2022-05-06 | End: 2022-05-06

## 2022-05-06 RX ORDER — PANTOPRAZOLE SODIUM 40 MG/1
40 TABLET, DELAYED RELEASE ORAL
Status: DISCONTINUED | OUTPATIENT
Start: 2022-05-07 | End: 2022-05-09 | Stop reason: HOSPADM

## 2022-05-06 RX ORDER — BUSPIRONE HYDROCHLORIDE 5 MG/1
5 TABLET ORAL 2 TIMES DAILY
Status: DISCONTINUED | OUTPATIENT
Start: 2022-05-06 | End: 2022-05-09 | Stop reason: HOSPADM

## 2022-05-06 RX ORDER — BUDESONIDE AND FORMOTEROL FUMARATE DIHYDRATE 160; 4.5 UG/1; UG/1
2 AEROSOL RESPIRATORY (INHALATION) 2 TIMES DAILY
Status: DISCONTINUED | OUTPATIENT
Start: 2022-05-06 | End: 2022-05-09 | Stop reason: HOSPADM

## 2022-05-06 RX ORDER — TOPIRAMATE 100 MG/1
100 TABLET, FILM COATED ORAL EVERY EVENING
Status: DISCONTINUED | OUTPATIENT
Start: 2022-05-06 | End: 2022-05-09 | Stop reason: HOSPADM

## 2022-05-06 RX ORDER — CALCIUM CARBONATE 200(500)MG
500 TABLET,CHEWABLE ORAL 4 TIMES DAILY PRN
Status: DISCONTINUED | OUTPATIENT
Start: 2022-05-06 | End: 2022-05-09 | Stop reason: HOSPADM

## 2022-05-06 RX ORDER — LAMOTRIGINE 100 MG/1
100 TABLET ORAL DAILY
Status: DISCONTINUED | OUTPATIENT
Start: 2022-05-06 | End: 2022-05-09 | Stop reason: HOSPADM

## 2022-05-06 RX ORDER — SODIUM CHLORIDE 9 MG/ML
INJECTION, SOLUTION INTRAVENOUS CONTINUOUS
Status: DISCONTINUED | OUTPATIENT
Start: 2022-05-06 | End: 2022-05-09 | Stop reason: HOSPADM

## 2022-05-06 RX ORDER — CETIRIZINE HYDROCHLORIDE 5 MG/1
5 TABLET ORAL DAILY
Status: DISCONTINUED | OUTPATIENT
Start: 2022-05-06 | End: 2022-05-06

## 2022-05-06 RX ORDER — LIDOCAINE 4 G/G
1 PATCH TOPICAL DAILY
Status: DISCONTINUED | OUTPATIENT
Start: 2022-05-06 | End: 2022-05-09 | Stop reason: HOSPADM

## 2022-05-06 RX ORDER — FERROUS SULFATE 325(65) MG
325 TABLET ORAL
Status: DISCONTINUED | OUTPATIENT
Start: 2022-05-07 | End: 2022-05-09 | Stop reason: HOSPADM

## 2022-05-06 RX ORDER — AMLODIPINE BESYLATE 5 MG/1
5 TABLET ORAL DAILY
Status: DISCONTINUED | OUTPATIENT
Start: 2022-05-06 | End: 2022-05-09 | Stop reason: HOSPADM

## 2022-05-06 RX ORDER — QUETIAPINE FUMARATE 50 MG/1
50 TABLET, FILM COATED ORAL NIGHTLY
Status: DISCONTINUED | OUTPATIENT
Start: 2022-05-06 | End: 2022-05-09 | Stop reason: HOSPADM

## 2022-05-06 RX ORDER — QUETIAPINE FUMARATE 50 MG/1
25 TABLET, FILM COATED ORAL DAILY
Status: DISCONTINUED | OUTPATIENT
Start: 2022-05-06 | End: 2022-05-09 | Stop reason: HOSPADM

## 2022-05-06 RX ORDER — METOCLOPRAMIDE 5 MG/1
5 TABLET ORAL
Status: DISCONTINUED | OUTPATIENT
Start: 2022-05-06 | End: 2022-05-07

## 2022-05-06 RX ORDER — BUPRENORPHINE 7.5 UG/H
1 PATCH TRANSDERMAL WEEKLY
Status: DISCONTINUED | OUTPATIENT
Start: 2022-05-12 | End: 2022-05-06

## 2022-05-06 RX ORDER — POLYETHYLENE GLYCOL 3350 17 G/17G
17 POWDER, FOR SOLUTION ORAL DAILY PRN
Status: DISCONTINUED | OUTPATIENT
Start: 2022-05-06 | End: 2022-05-09 | Stop reason: HOSPADM

## 2022-05-06 RX ORDER — LAMOTRIGINE 100 MG/1
200 TABLET ORAL NIGHTLY
Status: DISCONTINUED | OUTPATIENT
Start: 2022-05-06 | End: 2022-05-09 | Stop reason: HOSPADM

## 2022-05-06 RX ORDER — MINERAL OIL/HYDROPHIL PETROLAT
OINTMENT (GRAM) TOPICAL 3 TIMES DAILY PRN
Status: DISCONTINUED | OUTPATIENT
Start: 2022-05-06 | End: 2022-05-06 | Stop reason: CLARIF

## 2022-05-06 RX ORDER — OXYBUTYNIN CHLORIDE 5 MG/1
5 TABLET, EXTENDED RELEASE ORAL DAILY
Status: DISCONTINUED | OUTPATIENT
Start: 2022-05-06 | End: 2022-05-06

## 2022-05-06 RX ORDER — ACETAMINOPHEN 325 MG/1
650 TABLET ORAL EVERY 6 HOURS PRN
Status: DISCONTINUED | OUTPATIENT
Start: 2022-05-06 | End: 2022-05-09 | Stop reason: HOSPADM

## 2022-05-06 RX ORDER — FLUTICASONE PROPIONATE 50 MCG
1 SPRAY, SUSPENSION (ML) NASAL DAILY
Status: DISCONTINUED | OUTPATIENT
Start: 2022-05-06 | End: 2022-05-09 | Stop reason: HOSPADM

## 2022-05-06 RX ORDER — SODIUM CHLORIDE 9 MG/ML
INJECTION, SOLUTION INTRAVENOUS PRN
Status: DISCONTINUED | OUTPATIENT
Start: 2022-05-06 | End: 2022-05-09 | Stop reason: HOSPADM

## 2022-05-06 RX ORDER — LANOLIN ALCOHOL/MO/W.PET/CERES
CREAM (GRAM) TOPICAL 3 TIMES DAILY PRN
Status: DISCONTINUED | OUTPATIENT
Start: 2022-05-06 | End: 2022-05-09 | Stop reason: HOSPADM

## 2022-05-06 RX ORDER — ACETAMINOPHEN 650 MG/1
650 SUPPOSITORY RECTAL EVERY 6 HOURS PRN
Status: DISCONTINUED | OUTPATIENT
Start: 2022-05-06 | End: 2022-05-09 | Stop reason: HOSPADM

## 2022-05-06 RX ORDER — TRAMADOL HYDROCHLORIDE 50 MG/1
50 TABLET ORAL 3 TIMES DAILY
Status: DISCONTINUED | OUTPATIENT
Start: 2022-05-06 | End: 2022-05-07

## 2022-05-06 RX ORDER — POLYVINYL ALCOHOL 14 MG/ML
1 SOLUTION/ DROPS OPHTHALMIC DAILY PRN
Status: DISCONTINUED | OUTPATIENT
Start: 2022-05-06 | End: 2022-05-09 | Stop reason: HOSPADM

## 2022-05-06 RX ORDER — ATORVASTATIN CALCIUM 10 MG/1
10 TABLET, FILM COATED ORAL NIGHTLY
Status: DISCONTINUED | OUTPATIENT
Start: 2022-05-06 | End: 2022-05-09 | Stop reason: HOSPADM

## 2022-05-06 RX ADMIN — BUSPIRONE HYDROCHLORIDE 5 MG: 5 TABLET ORAL at 20:17

## 2022-05-06 RX ADMIN — TOPIRAMATE 100 MG: 100 TABLET, FILM COATED ORAL at 17:30

## 2022-05-06 RX ADMIN — METOCLOPRAMIDE 5 MG: 5 TABLET ORAL at 17:31

## 2022-05-06 RX ADMIN — AMLODIPINE BESYLATE 5 MG: 5 TABLET ORAL at 12:04

## 2022-05-06 RX ADMIN — SODIUM CHLORIDE: 9 INJECTION, SOLUTION INTRAVENOUS at 20:17

## 2022-05-06 RX ADMIN — SODIUM CHLORIDE: 9 INJECTION, SOLUTION INTRAVENOUS at 03:02

## 2022-05-06 RX ADMIN — LAMOTRIGINE 200 MG: 100 TABLET ORAL at 20:17

## 2022-05-06 RX ADMIN — BUSPIRONE HYDROCHLORIDE 5 MG: 5 TABLET ORAL at 12:03

## 2022-05-06 RX ADMIN — OXYBUTYNIN CHLORIDE 5 MG: 5 TABLET, EXTENDED RELEASE ORAL at 12:03

## 2022-05-06 RX ADMIN — TRAMADOL HYDROCHLORIDE 50 MG: 50 TABLET, COATED ORAL at 12:04

## 2022-05-06 RX ADMIN — QUETIAPINE FUMARATE 50 MG: 50 TABLET ORAL at 20:18

## 2022-05-06 RX ADMIN — MULTIPLE VITAMINS W/ MINERALS TAB 1 TABLET: TAB at 12:04

## 2022-05-06 RX ADMIN — AZITHROMYCIN MONOHYDRATE 500 MG: 500 INJECTION, POWDER, LYOPHILIZED, FOR SOLUTION INTRAVENOUS at 00:09

## 2022-05-06 RX ADMIN — TRAMADOL HYDROCHLORIDE 50 MG: 50 TABLET, COATED ORAL at 20:17

## 2022-05-06 RX ADMIN — ASPIRIN 81 MG: 81 TABLET, COATED ORAL at 12:03

## 2022-05-06 RX ADMIN — ACETAMINOPHEN 650 MG: 325 TABLET ORAL at 17:30

## 2022-05-06 RX ADMIN — ATORVASTATIN CALCIUM 10 MG: 10 TABLET, FILM COATED ORAL at 20:18

## 2022-05-06 RX ADMIN — METOCLOPRAMIDE 5 MG: 5 TABLET ORAL at 12:03

## 2022-05-06 RX ADMIN — SODIUM CHLORIDE: 9 INJECTION, SOLUTION INTRAVENOUS at 08:11

## 2022-05-06 RX ADMIN — CEFTRIAXONE SODIUM 1000 MG: 1 INJECTION, POWDER, FOR SOLUTION INTRAMUSCULAR; INTRAVENOUS at 22:30

## 2022-05-06 RX ADMIN — QUETIAPINE FUMARATE 25 MG: 50 TABLET ORAL at 12:03

## 2022-05-06 RX ADMIN — AZITHROMYCIN MONOHYDRATE 500 MG: 500 INJECTION, POWDER, LYOPHILIZED, FOR SOLUTION INTRAVENOUS at 23:09

## 2022-05-06 ASSESSMENT — PAIN SCALES - GENERAL
PAINLEVEL_OUTOF10: 6
PAINLEVEL_OUTOF10: 5
PAINLEVEL_OUTOF10: 0

## 2022-05-06 ASSESSMENT — PAIN DESCRIPTION - LOCATION
LOCATION: HEAD
LOCATION: BACK

## 2022-05-06 ASSESSMENT — PAIN DESCRIPTION - DESCRIPTORS: DESCRIPTORS: ACHING

## 2022-05-06 NOTE — PROGRESS NOTES
RN spoke with patient's legal guardian/sister, Francisco, via phone. Most admission questions answered by Francisco d/t patient's mental status. Per guardian, patient lives at [facility] and is high functioning MRDD. She previously ambulated with assistance from staff using a walker. Francisco states patient Kalpana Robbins always had issues with urination\", including retention and urgency. She also added that she \"doesn't chew well\" d/t missing teeth and would benefit from an SLP consult. She states she will be visiting patient tomorrow.

## 2022-05-06 NOTE — PLAN OF CARE
Problem: Discharge Planning  Goal: Discharge to home or other facility with appropriate resources  Outcome: Progressing     Problem: Safety - Adult  Goal: Free from fall injury  Outcome: Progressing     Problem: Pain  Goal: Verbalizes/displays adequate comfort level or baseline comfort level  Outcome: Progressing     Problem: Skin/Tissue Integrity  Goal: Absence of new skin breakdown  Description: 1. Monitor for areas of redness and/or skin breakdown  2. Assess vascular access sites hourly  3. Every 4-6 hours minimum:  Change oxygen saturation probe site  4. Every 4-6 hours:  If on nasal continuous positive airway pressure, respiratory therapy assess nares and determine need for appliance change or resting period.   Outcome: Progressing

## 2022-05-06 NOTE — PROGRESS NOTES
Medication History completed:    New medications: Preservision, lidocaine patches, dermaphor, dicyclomine, carboxymethylcellulose ophthalmic solution, butalbital-acetaminophen, buprenorphine patches, Qulipta    Medications discontinued: vortioxetine, tamsulosin, senna-docusate, ondansetron, melatonin, guaifenesin ER tablets, Rolaids, Fioricet, bismuth subsalicylate, albuterol inhaler, Aimovig    Changes to dosing:   Tramadol changed to 50 mg three times daily - hold for sedation  Sumatriptan changed to 50 mg as needed for migraine (max 2 tablets per day and shiv two days per week)  Polyethylene glycol changed to 17 g daily as needed  Oxybutynin ER changed to 5 mg daily  Omeprazole changed to 40 mg daily  Meclizine changed to 25 mg daily as needed  Milk of magnesia changed to 5 mL twice daily as needed    Stated allergies: As listed    Other pertinent information: Medications confirmed with facility MAR.      Thank you,  Ngoc Akins, PharmD, BCPS  [phone]

## 2022-05-06 NOTE — ED PROVIDER NOTES
EMERGENCY DEPARTMENT ENCOUNTER   ATTENDING ATTESTATION     Pt Name: Delmar Quintero  MRN: [id number]  [place] [birth year]  Date of evaluation: 5/5/22       Delmar Quintero is a 66 y.o. female who presents with Shortness of Breath      MDM:   Presents allegedly with altered mental status and shortness of breath however patient is unable to give me an exact complaint. She simply states that she is not feeling well. Patient was brought from a care facility and the physician that saw her at the care facility was concerned for possible urinary tract infection as she often gets urinary tract infections. They are also concerned about possible pleural effusion as well. Patient is tachycardic in the room, borderline hypotensive. Mildly tachypneic with a respiratory rate of 20. Given patient's altered mental status, tachycardia, I am concerned for possible infection. Broad lab work-up, will get a shunt series, CT head, urinalysis and reevaluate. We will go ahead and give the 30 cc/kg fluid bolus as this only accounts for nearly 1.5L of fluid for this patient. Plan for admission. Vitals:   Vitals:    05/05/22 1915 05/05/22 1924 05/05/22 1959   BP: (!) 114/48  102/71   Pulse: 81  121   Resp: 20  20   Temp: 98.9 °F (37.2 °C)     TempSrc: Oral     SpO2: 96%  98%   Weight:  109 lb (49.4 kg)    Height: 5' 1\" (1.549 m)         PHYSICAL:   Temp: 98.9 °F (37.2 °C),  Pulse: 121, Resp: 20, BP: 102/71, SpO2: 98 %  Gen: Non-toxic, Afebrile  Neck: Supple, shunt catheter visualized along the left neck, no obvious kinks  Cards:  Tachycardic rate and regular rhythm  Pulm: Rhonchi on the left, scattered rhonchi on the right  Abdomen: Soft, non-tender, non-distended  Skin: warm, dry  Extremities: pulses 2+ radial / dorsalis pedis, no clubbing, cyanosis, edema  Neurologic: CAOX1, no facial asymmetry, no dysarthria or aphasia       EKG    EKG Interpretation    Interpreted by me    Rhythm: Sinus tachycardia  Rate: Tachycardic, 115  Axis: Right axis deviation  Ectopy: none  Conduction: normal  ST Segments: no acute change  T Waves: Biphasic anterior leads  Q Waves: none    Clinical Impression: Sinus tachycardia rate of 115. No ST segment changes, no other arrhythmia, no ectopy. Either rightward axis or possible armored reversal.  Biphasic T waves in the anterior leads    All EKG's are interpreted by the Emergency Department Physician whoeither signs or Co-signs this chart in the absence of a cardiologist.    I personally saw and examined the patient. I have reviewed and agree with the resident's findings, including all diagnostic interpretations and treatment plan as written. I was present for the key portions of any procedures performed and the inclusive time noted for any critical care statement. There was a high probability of clinically significant/life threatening deterioration in this patient's condition which required my urgent intervention. Total critical care time was 15 minutes. This excludes any time for separately reportable procedures. ED Course as of 05/06/22 1100   Fri May 06, 2022   0134 Vital signs remained stable throughout stay in the emergency department, heart rate mildly improved from 120s to about 110s after 30 cc/kg fluid bolus. Antibiotics administered and patient accepted to medicine for further care on the stepdown service. [TS]      ED Course User Index  [TS] Danika Muñoz MD       The care is provided during an unprecedented national emergency due to the novel coronavirus, COVID 19.   Gabriel Calvin DO  Attending Emergency Physician          Gabriel Calvin DO  05/06/22 1102

## 2022-05-06 NOTE — ED NOTES
Patient straight-cathed for 600ml cloudy, yellow, foul-smelling urine. Sample sent to lab.      Kanchan Lee RN  05/05/22 0194

## 2022-05-06 NOTE — PROGRESS NOTES
Pharmacy Consult      [address] Vázquez is a 66 y.o. female for whom pharmacy has been consulted to monitor polypharmacy. Patient Active Problem List   Diagnosis    Contusion of hip    UTI (urinary tract infection), uncomplicated    Inability to ambulate due to hip    Closed fracture of left inferior pubic ramus (HCC)    Closed compression fracture of L4 lumbar vertebra    Cellulitis of left leg    Failure of outpatient treatment    Hydrocephalus (HCC)    S/P  shunt    Leg edema    Hydronephrosis of left kidney    Severe sepsis (HCC)    Bipolar mood disorder (HCC)    ARYAN (acute kidney injury) ([facility] [address]    Aspiration pneumonitis (HCC)    Sepsis (HCC)    Opacity of lung on imaging study    Acute cystitis with hematuria    Urinary retention       Allergies:  Levaquin [levofloxacin in d5w], Chocolate, Cocoa, Indomethacin, Neurontin [gabapentin], and Adhesive tape     Recent Labs     05/05/22  2145   CREATININE 1.94*       Ht/Wt:   Ht Readings from Last 1 Encounters:   05/05/22 5' 1\" (1.549 m)        Wt Readings from Last 1 Encounters:   05/05/22 109 lb (49.4 kg)         Estimated Creatinine Clearance: 20 mL/min (A) (based on SCr of 1.94 mg/dL (H)). Assessment/Plan:    Upon reviewing the patients medications, the following was found:    -the patient is taking Topiramate for migraine prophylaxis (dosing appropriate)  -the patient is taking Lamictal as a mood stabilizer (dosing appropriate, no level recommended)  -the patient is taking Seroquel for BP2 disorder      Recommendations: consider reducing the patients dose of tramadol to 25 mg TID for altered renal function. Also recommend discontinuing Reglan as this can cause altered mental status is patients with renal dysfunction and of elderly age. Please consider the risk vs benefit of stopping therapy. Thank you for the consult. Will continue to follow.     Nick Sullivan, FernandoD, BCPS  5/6/2022 2:54 PM

## 2022-05-06 NOTE — PROGRESS NOTES
Jim RN informed me patient having difficulty urinating and burning with urination. Also, after 100 mL of urine the bladder scan was 860 mL. Dr Tyra Nieto notified of bladder scan results . Consult urology and place hoff.  Electronically signed by Jeannine Flynn RN on 5/6/2022 at 12:43 PM

## 2022-05-06 NOTE — ED NOTES
Report given to Gini Lora RN from U. Report method by phone   The following was reviewed with receiving RN:   Current vital signs:  /68   Pulse 114   Temp 98.9 °F (37.2 °C) (Oral)   Resp 22   Ht 5' 1\" (1.549 m)   Wt 109 lb (49.4 kg)   SpO2 96%   BMI 20.60 kg/m²                      Any medication or safety alerts were reviewed. Any pending diagnostics and notifications were also reviewed, as well as any safety concerns or issues, abnormal labs, abnormal imaging, and abnormal assessment findings. Questions were answered.             Radha Juarez RN  05/06/22 9185

## 2022-05-06 NOTE — PROGRESS NOTES
Dr. Jessie Pineda (Urology - on call for  [address] [facility] [address]) notified of consult.

## 2022-05-06 NOTE — FLOWSHEET NOTE
05/06/22 1246   Encounter Summary   Encounter Overview/Reason  Volunteer Encounter   Service Provided For: Patient   Referral/Consult From: Finn   Last Encounter  05/06/22  (V)   Complexity of Encounter Low   Spiritual/Emotional needs   Type Spiritual Support   Rituals, Rites and Sacraments   Type Islam Communion   Assessment/Intervention/Outcome   Intervention Prayer (assurance of)/[place]

## 2022-05-06 NOTE — PROGRESS NOTES
Patient admitted to PCU from ED, vitals obtained, monitor applied. Patient's caregiver from group home no longer at bedside. Patient agitated with staff and during care, stating \"you're not poking me anymore! \".

## 2022-05-06 NOTE — TELEPHONE ENCOUNTER
Milvia with [phone] called to inform the provider that the patient is inpatient due to septicemia.

## 2022-05-06 NOTE — ED NOTES
Two RN's (including myself) have attempted to get blood from this patient without success. 22g IV established, but no blood. Phleb will attempt next.      Ascencion Anguiano RN  05/05/22 2024

## 2022-05-06 NOTE — CONSULTS
Urology Consultation    Patient: Luis Serna  MRN: [id number]  YOB: 1950    CHIEF COMPLAINT: retention, UTI    HISTORY OF PRESENT ILLNESS:   The patient is a 66 y.o. female who presents from her facility with multiple issues. On admission, she was found to be in retention. A hoff was placed for 1200ml. She had been on Ditropan, which was stopped. She cannot offer any history. CT in March showed non obstructing stones. No hydro. Nursing denies any fevers. Patient's old records, notes and chart reviewed and summarized above. Past Medical History:    Past Medical History:   Diagnosis Date    Acid reflux     Atrophy of kidney     Bipolar affective ([facility] [address]     Chronic headaches     Chronic kidney disease     COVID-19 11/29/2021    tested positive @ [facility]. [facility] for 2 weeks.  No symptoms    Dehydration     Difficulty in walking     Dysphagia     Fall 01/08/2022    head laceration, taken to ER from facility    GERD (gastroesophageal reflux disease)     H/O recurrent pneumonia     Hard of hearing     wears bilateral aides    Hearing loss     Hydrocephalus ([facility] [address]     Hydronephrosis     Hypertension     Kidney stones     Lives in nursing home 11/15/2021    currently at [place], [place] # [phone], fax # [phone]    Migraine     Schizo affective schizophrenia ([facility] [address]     Sepsis ([facility] [address]     Tachycardia     UTI (urinary tract infection)     Weakness        Past Surgical History:    Past Surgical History:   Procedure Laterality Date    CHOLECYSTECTOMY      CYSTOSCOPY Left 11/07/2021    CYSTOSCOPY URETERAL STENT INSERTION performed by Sergio Gay MD at [address] [place] [address] Drive Left 01/18/2022    HOLMIUM, CYSTO, URETEROSCOPY, STENT PLACEMENT    HIP FRACTURE SURGERY Left 06/09/2017    PINNING AND SCREW    HIP PINNING Left 06/09/2017    HIP PINNING 7.3 CANNULATED SCREW WITH SYNTHES PRODUCT APPLICATION AND INTRAOPERATIVE C-ARM performed by Donna Zendejas MD at [address] [place] [address] Drive,[facility] [place] [facility]      shunt x3 head    SHUNT REVISION      TONSILLECTOMY      URETER SURGERY Left 1/18/2022    LITHOTRIPSY, CYSTOSCOPY, URETEROSCOPY, LEFT STENT EXCHANGE performed by Chelsea Norton MD at [facility]     Previous  surgery: none   Medications:    Scheduled Meds:   sodium chloride flush  5-40 mL IntraVENous 2 times per day    heparin (porcine)  5,000 Units SubCUTAneous BID    cefTRIAXone (ROCEPHIN) IV  1,000 mg IntraVENous Q24H    [START ON 5/7/2022] azithromycin  500 mg IntraVENous Q24H    amLODIPine  5 mg Oral Daily    aspirin  81 mg Oral Daily    atorvastatin  10 mg Oral Nightly    budesonide-formoterol  2 puff Inhalation BID    busPIRone  5 mg Oral BID    [START ON 5/7/2022] ferrous sulfate  325 mg Oral Daily with breakfast    fluticasone  1 spray Each Nostril Daily    lamoTRIgine  100 mg Oral Daily    lamoTRIgine  200 mg Oral Nightly    lidocaine  1 patch TransDERmal Daily    metoclopramide  5 mg Oral TID AC    therapeutic multivitamin-minerals  1 tablet Oral Daily    [START ON 5/7/2022] pantoprazole  40 mg Oral QAM AC    QUEtiapine  25 mg Oral Daily    QUEtiapine  50 mg Oral Nightly    topiramate  100 mg Oral QPM    topiramate  50 mg Oral Daily    traMADol  50 mg Oral TID     Continuous Infusions:   sodium chloride      sodium chloride 75 mL/hr at 05/06/22 0811     PRN Meds:.sodium chloride flush, sodium chloride, acetaminophen **OR** acetaminophen, calcium carbonate, polyvinyl alcohol, magnesium hydroxide, SUMAtriptan, polyethylene glycol, Hydrocerin    Allergies:  Levaquin [levofloxacin in d5w], Chocolate, Cocoa, Indomethacin, Neurontin [gabapentin], and Adhesive tape    Social History:    Social History     Socioeconomic History    Marital status: Single     Spouse name: Not on file    Number of children: Not on file    Years of education: Not on file    Highest education level: Not on file Occupational History    Not on file   Tobacco Use    Smoking status: Never Smoker    Smokeless tobacco: Never Used   Vaping Use    Vaping Use: Never used   Substance and Sexual Activity    Alcohol use: No    Drug use: No    Sexual activity: Not on file   Other Topics Concern    Not on file   Social History Narrative    Not on file     Social Determinants of Health     Financial Resource Strain:     Difficulty of Paying Living Expenses: Not on file   Food Insecurity:     Worried About Running Out of Food in the Last Year: Not on file    Myles of Food in the Last Year: Not on file   Transportation Needs:     Lack of Transportation (Medical): Not on file    Lack of Transportation (Non-Medical): Not on file   Physical Activity:     Days of Exercise per Week: Not on file    Minutes of Exercise per Session: Not on file   Stress:     Feeling of Stress : Not on file   Social Connections:     Frequency of Communication with Friends and Family: Not on file    Frequency of Social Gatherings with Friends and Family: Not on file    Attends Hoahaoism Services: Not on file    Active Member of [address] [place] [address] or Organizations: Not on file    Attends Club or Organization Meetings: Not on file    Marital Status: Not on file   Intimate Partner Violence:     Fear of Current or Ex-Partner: Not on file    Emotionally Abused: Not on file    Physically Abused: Not on file    Sexually Abused: Not on file   Housing Stability:     Unable to Pay for Housing in the Last Year: Not on file    Number of [place] in the Last Year: Not on file    Unstable Housing in the Last Year: Not on file       Family History:  No family history on file. Previous Urologic Family history: none  REVIEW OF SYSTEMS:  Unobtainable.      Physical Exam:      This a 66 y.o. female   Patient Vitals for the past 24 hrs:   BP Temp Temp src Pulse Resp SpO2 Height Weight   05/06/22 1215 119/75 98.5 °F (36.9 °C) Oral 101 20 98 % -- --   05/06/22 0800 120/80 97.5 °F (36.4 °C) Axillary 106 20 100 % -- --   05/06/22 0515 124/68 -- -- 114 22 96 % -- --   05/06/22 0500 124/75 -- -- 113 26 -- -- --   05/06/22 0430 (!) 118/54 -- -- 113 21 -- -- --   05/06/22 0330 97/84 -- -- 118 21 -- -- --   05/06/22 0300 114/66 -- -- 120 27 -- -- --   05/06/22 0230 125/65 -- -- 116 20 -- -- --   05/06/22 0200 (!) 121/59 -- -- 114 24 -- -- --   05/06/22 0130 (!) 122/100 -- -- 113 24 -- -- --   05/06/22 0030 (!) 150/137 -- -- 111 24 97 % -- --   05/05/22 1959 102/71 -- -- 121 20 98 % -- --   05/05/22 1924 -- -- -- -- -- -- -- 109 lb (49.4 kg)   05/05/22 1915 (!) 114/48 98.9 °F (37.2 °C) Oral 81 20 96 % 5' 1\" (1.549 m) --     Constitutional: Patient in no acute distress. Neuro: Alert and oriented to person, place and time. Psych: mood and affect normal  HEENT negative  Lungs: Respiratory effort is normal  Cardiovascular: Normal peripheral pulses  Abdomen: Soft, non-tender, non-distended with no CVA, flank pain or hepatosplenomegaly. No hernias. Kidneys normal.  Lymphatics: No palpable lymphadenopathy. Bladder non-tender and not distended. Pelvic exam: deferred  Rectal exam not indicated  Marmolejo in place, urine clear. LABS:   Recent Labs     05/05/22 2145   WBC 22.6*   HGB 11.5*   HCT 35.4*   MCV 92.3        Recent Labs     05/05/22 2145      K 4.0      CO2 21   BUN 45*   CREATININE 1.94*       Additional Lab/culture results:    Urinalysis:   Recent Labs     05/05/22  6851   COLORU Yellow   PHUR 6.0   WBCUA TOO NUMEROUS TO COUNT   RBCUA 10 TO 20   BACTERIA MANY*   SPECGRAV 1.013   LEUKOCYTESUR LARGE*   UROBILINOGEN Normal   BILIRUBINUR NEGATIVE        -----------------------------------------------------------------  Imaging Results:    CT shows non obstructing stones in march.        Assessment and Plan   Impression:    Patient Active Problem List   Diagnosis    Contusion of hip    UTI (urinary tract infection), uncomplicated    Inability to ambulate due to hip    Closed fracture of left inferior pubic ramus (HCC)    Closed compression fracture of L4 lumbar vertebra    Cellulitis of left leg    Failure of outpatient treatment    Hydrocephalus (HCC)    S/P  shunt    Leg edema    Hydronephrosis of left kidney    Severe sepsis (HCC)    Bipolar mood disorder (HCC)    AYRAN (acute kidney injury) ([facility] [address]    Aspiration pneumonitis (HCC)    Sepsis (HCC)    Opacity of lung on imaging study    Acute cystitis with hematuria    Urinary retention       Plan: Marmolejo in place for urinary retention. UCx shows GNR. On Rocephin. Await final culture. Repeat upper tract imaging if she spikes a fever. Void trial prior to D/C.      Tracy Hardin MD  5:00 PM 5/6/2022

## 2022-05-06 NOTE — H&P
[address] [facility] [place]     HISTORY AND PHYSICAL EXAMINATION            Date:   5/6/2022  Patientname: Topher Avery  Date of admission:  5/5/2022  7:24 PM  MRN:   [id number]  Account:  [de-identified]  YOB: 1950  PCP:    Mark Puentes MD  Room:   2097/2097-01  Code Status:    Full Code    CHIEF COMPLAINT     Chief Complaint   Patient presents with    Shortness of Breath       HISTORY OF PRESENT ILLNESS  (Character, Onset, Location, Duration,  Exacerbating/RelievingFactors, Radiation,   Associated Symptoms, Severity )      The patient is a 66 y.o. female, with a history of multiple medical problems proving but not limited to bipolar schizoaffective disorder, hydrocephalus with shunt who presents with altered mental status/confusion from prison. Patient does not contribute to HPI or review of systems and information is taken from the chart. The prison sent a piece of paper that according to primary care needed to be sent to the emergency room for evaluation of \"fluid on the lungs, tachycardia, losing weight, and increased confusion, and possible UTI or kidney stone, and being winded while talking\"     Work up in the emergency room     Laboratories:   Metabolic panel: Sodium 514, potassium 4.0, chloride 103, CO2 21, BUN 45, creatinine 1.94  Cardiac Markers: not Done  Lactic acid: 1.0  Liver profile:  Albumin 3.5, alk phos 195, ALT 50, AST 25, bilirubin 0.29, lipase 20  Hematology: WBC 22.6, hemoglobin 11.5, hematocrit 35.4, neutrophils 89 segs absolute 20.11 and 2% bands  Coagulation: PT 16.7, INR 1.4, PTT 35.5  Urine-cloudy urine, negative nitrite, large leukocyte Estrace, WBCs too numerous to count, many bacteria, moderate hemoglobin,      Imaging and Diagnostics:   EKG (as documented in ED note):    Rhythm: Sinus tachycardia  Rate: 115  Axis: Right  Ectopy: none  Conduction: normal  ST Segments: no acute change  T Waves: T wave inversion in V2  Q Waves: none     Clinical Impression: New T wave inversion V2, sinus tachycardia, abnormal EKG    XR CHEST PORTABLE   1. Stable opacity lower 1/3 left hemithorax obscuring the left heart margin and hemidiaphragm in keeping with consolidation and pleural effusion; pneumonia is a consideration. 2. Senescent pulmonary changes. 3. No pulmonary infiltrate evident right lung. CT HEAD WO CONTRAST    1. No acute intracranial hemorrhage, intra-axial mass, or acute territorial infarct   2. Bilateral ventricular shunt catheter is in place, with a stable appearance to the ventricular system. PAST MEDICAL HISTORY   Patient  has a past medical history of Acid reflux, Atrophy of kidney, Bipolar affective ([address] Chronic headaches, Chronic kidney disease, COVID-19, Dehydration, Difficulty in walking, Dysphagia, Fall, GERD (gastroesophageal reflux disease), H/O recurrent pneumonia, Hard of hearing, Hearing loss, Hydrocephalus ([address] Hydronephrosis, Hypertension, Kidney stones, Lives in nursing home, Migraine, Schizo affective schizophrenia ([address] Sepsis ([address] Tachycardia, UTI (urinary tract infection), and Weakness. PAST SURGICAL HISTORY    Patient  has a past surgical history that includes other surgical history; shunt revision; Cholecystectomy; Tonsillectomy; Hip fracture surgery (Left, 06/09/2017); hip pinning (Left, 06/09/2017); Cystoscopy (Left, 11/07/2021); Cystoscopy (Left, 01/18/2022); and Ureter surgery (Left, 1/18/2022). FAMILY HISTORY    Patient family history is not on file. SOCIAL HISTORY    Patient  reports that she has never smoked. She has never used smokeless tobacco. She reports that she does not drink alcohol and does not use drugs. HOME MEDICATIONS        Prior to Admission medications    Medication Sig Start Date End Date Taking?  Authorizing Provider   oxybutynin (DITROPAN-XL) 5 MG extended release tablet Take 5 mg by mouth daily   Yes Historical Provider, MD adia Mcclendon) 15 MCG/HR PTWK Place 1 patch onto the skin once a week. Indications: change patch on Thursdays   Yes Historical Provider, MD   lidocaine 4 % external patch Place 1 patch onto the skin daily Indications: to hip   Yes Historical Provider, MD   aspirin 81 MG EC tablet Take 81 mg by mouth daily   Yes Historical Provider, MD   Multiple Vitamins-Minerals (PRESERVISION AREDS 2+MULTI VIT) CAPS Take 1 caplet by mouth in the morning and at bedtime   Yes Historical Provider, MD   dicyclomine (BENTYL) 10 MG capsule Take 20 mg by mouth 3 times daily   Yes Historical Provider, MD   Atogepant (QULIPTA) 60 MG TABS Take 1 tablet by mouth daily   Yes Historical Provider, MD   Emollient [facility] OINT ointment Apply topically 3 times daily as needed (to areas of dry/cracked skin)   Yes Historical Provider, MD   carboxymethylcellulose PF (REFRESH PLUS) 0.5 % SOLN ophthalmic solution Place 1 drop into both eyes daily as needed   Yes Historical Provider, MD   Butalbital-Acetaminophen  MG TABS Take 1 tablet by mouth every 6 hours as needed (migraine)   Yes Historical Provider, MD   D-Mannose 500 MG CAPS Take 500 mg by mouth in the morning and at bedtime 3/31/22 4/30/22  Farzad Harris MD   Cranberry 500 MG CAPS Take 1 capsule by mouth 2 times daily 3/25/22 7/23/22  Barak Bradshaw MD   traMADol (ULTRAM) 50 MG tablet Take 50 mg by mouth 3 times daily.  Indications: hold if patient is sedated     Historical Provider, MD   budesonide-formoterol (SYMBICORT) 160-4.5 MCG/ACT AERO Inhale 2 puffs into the lungs 2 times daily     Historical Provider, MD   fluticasone (FLONASE) 50 MCG/ACT nasal spray 1 spray by Each Nostril route daily    Historical Provider, MD   loratadine (CLARITIN) 10 MG tablet Take 10 mg by mouth daily     Historical Provider, MD   omeprazole (PRILOSEC) 40 MG delayed release capsule Take 40 mg by mouth daily     Historical Provider, MD   polyethylene glycol (GLYCOLAX) 17 GM/SCOOP powder Take 17 g by mouth daily as needed (constipation)     Historical Provider, MD   QUEtiapine (SEROQUEL) 25 MG tablet Take 25 mg by mouth daily    Historical Provider, MD   topiramate (TOPAMAX) 100 MG tablet Take 100 mg by mouth every evening    Historical Provider, MD   lamoTRIgine (LAMICTAL) 200 MG tablet Take 200 mg by mouth nightly    Historical Provider, MD   QUEtiapine (SEROQUEL) 50 MG tablet Take 50 mg by mouth nightly     Historical Provider, MD   meclizine (ANTIVERT) 25 MG CHEW Take 25 mg by mouth daily as needed for Dizziness     Historical Provider, MD   SUMAtriptan (IMITREX) 50 MG tablet Take 50 mg by mouth as needed for Migraine (max 2 tablets per day and max 2 days per week)     Historical Provider, MD   topiramate (TOPAMAX) 50 MG tablet Take 1 tablet by mouth daily 12/13/18   Johnna Castillo MD   Misc Natural Products (GLUCOSAMINE CHONDROITIN ADV PO) Take 1 tablet by mouth daily    Historical Provider, MD   busPIRone (BUSPAR) 5 MG tablet Take 5 mg by mouth 2 times daily    Historical Provider, MD   magnesium hydroxide (MILK OF MAGNESIA) 400 MG/5ML suspension Take 5 mLs by mouth 2 times daily as needed for Constipation     Historical Provider, MD   calcium carbonate (TUMS) 500 MG chewable tablet Take 1 tablet by mouth 4 times daily as needed for Heartburn    Historical Provider, MD   alendronate (FOSAMAX) 70 MG tablet Take 70 mg by mouth every 7 days Indications: Wednesdays 12/12/18   Historical Provider, MD   atorvastatin (LIPITOR) 10 MG tablet Take 10 mg by mouth nightly     Historical Provider, MD   loperamide (IMODIUM) 2 MG capsule Take 2 mg by mouth 4 times daily as needed for Diarrhea    Historical Provider, MD   amLODIPine (NORVASC) 5 MG tablet Take 5 mg by mouth daily     Historical Provider, MD   Multiple Vitamins-Minerals (THERAPEUTIC MULTIVITAMIN-MINERALS) tablet Take 1 tablet by mouth daily. Historical Provider, MD   ferrous sulfate 325 (65 FE) MG EC tablet Take 325 mg by mouth daily (with breakfast).     Historical Provider, MD   lamoTRIgine (LAMICTAL) 100 MG tablet Take 100 mg by mouth daily     Historical Provider, MD   metoclopramide (REGLAN) 5 MG tablet Take 5 mg by mouth 3 times daily (before meals)     Historical Provider, MD       ALLERGIES      Levaquin [levofloxacin in d5w], Chocolate, Cocoa, Indomethacin, Neurontin [gabapentin], and Adhesive tape    REVIEW OF SYSTEMS     Review of Systems   Unable to perform ROS: Other (not interactive/ refuses)     PHYSICAL EXAM      Vitals:    05/06/22 0500 05/06/22 0515 05/06/22 0800 05/06/22 1215   BP: 124/75 124/68 120/80 119/75   Pulse: 113 114 106 101   Resp: 26 22 20 20   Temp:   97.5 °F (36.4 °C) 98.5 °F (36.9 °C)   TempSrc:   Axillary Oral   SpO2:  96% 100% 98%   Weight:       Height:           /75   Pulse 101   Temp 98.5 °F (36.9 °C) (Oral)   Resp 20   Ht 5' 1\" (1.549 m)   Wt 109 lb (49.4 kg)   SpO2 98%   BMI 20.60 kg/m²  Body mass index is 20.6 kg/m². Physical Exam  Vitals and nursing note reviewed. Constitutional:       General: She is sleeping. She is not in acute distress. Appearance: She is well-developed and underweight. She is ill-appearing. She is not diaphoretic. HENT:      Head: Normocephalic and atraumatic. Right Ear: Hearing normal.      Left Ear: Hearing normal.      Nose: Nose normal. No rhinorrhea. Mouth/Throat:      Mouth: Mucous membranes are dry. Eyes:      General: Lids are normal.      Extraocular Movements:      Right eye: Normal extraocular motion. Left eye: Normal extraocular motion. Conjunctiva/sclera:      Right eye: Right conjunctiva is not injected. Left eye: Left conjunctiva is not injected. Pupils: Pupils are equal, round, and reactive to light. Pupils are equal.      Right eye: Pupil is reactive. Left eye: Pupil is reactive. Comments: Refused EOM/ exam    Neck:      Vascular: No JVD. Trachea: Trachea and phonation normal. No tracheal deviation.    Cardiovascular:      Rate and Rhythm: Regular rhythm. Tachycardia present. Pulses: Normal pulses. Heart sounds: Normal heart sounds. No murmur heard. Pulmonary:      Effort: Pulmonary effort is normal. No respiratory distress. Breath sounds: No stridor. Examination of the right-lower field reveals decreased breath sounds. Examination of the left-lower field reveals decreased breath sounds. Decreased breath sounds present. Abdominal:      General: Abdomen is protuberant. Bowel sounds are normal. There is no distension. Tenderness: There is no guarding. Comments: Does not allow full exam,    Musculoskeletal:         General: No tenderness. Cervical back: Neck supple. Comments: Refuses ROM, moves extremities as she fights staff/ aid at bedside  during personal care    Skin:     General: Skin is warm and dry. Findings: No erythema, lesion or rash. Neurological:      Mental Status: She is easily aroused. She is not disoriented. Comments: Does not answer questions  Does not participate in exam   No obvious deficits at glance    Psychiatric:         Behavior: Behavior is uncooperative and combative. Cognition and Memory: Cognition is impaired. Judgment: Judgment is impulsive. DIAGNOSTICS          Labs:  CBC:   Recent Labs     05/05/22 2145   WBC 22.6*   HGB 11.5*        BMP:    Recent Labs     05/05/22 2030 05/05/22 2145   NA  --  140   K  --  4.0   CL  --  103   CO2  --  21   BUN  --  45*   CREATININE  --  1.94*   GLUCOSE 124 131*     S. Calcium:  Recent Labs     05/05/22 2145   CALCIUM 9.1     S. Ionized Calcium:No results for input(s): IONCA in the last 72 hours. S. Magnesium:No results for input(s): MG in the last 72 hours. S. Phosphorus:No results for input(s): PHOS in the last 72 hours.   S. Glucose:  Recent Labs     05/05/22 2028   POCGLU 124*     Glycosylated hemoglobin A1C: No results found for: LABA1C  Hepatic:   Recent Labs     05/05/22 2145   AST 25   ALT 50*   ALKPHOS 195*     CARDIAC ENZY: No results for input(s): CKTOTAL, CKMB, CKMBINDEX, TROPHS, MYOGLOBIN in the last 72 hours. INR:   Recent Labs     05/05/22  2225   INR 1.4     BNP: No results for input(s): PROBNP in the last 72 hours. ABGs: No results for input(s): PH, PCO2, PO2, HCO3, O2SAT in the last 72 hours. Lipids: No results for input(s): CHOL, TRIG, HDL, LDL, LDLCALC in the last 72 hours. Pancreatic functions:  Recent Labs     05/05/22  2145   LIPASE 20     S. LacticAcid: No results for input(s): LACTA in the last 72 hours. Thyroid functions: No results found for: T4, TSH   U/A:  Recent Labs     05/05/22  2251   COLORU Yellow   WBCUA TOO NUMEROUS TO COUNT   RBCUA 10 TO 20   BACTERIA MANY*   SPECGRAV 1.013   LEUKOCYTESUR LARGE*   GLUCOSEU NEGATIVE   AMORPHOUS 2+*     No results for input(s): COVID19 in the last 72 hours. Imaging/Diagonstics:     CT HEAD WO CONTRAST    Result Date: 5/5/2022  EXAMINATION: CT OF THE HEAD WITHOUT CONTRAST  5/5/2022 2:53 pm TECHNIQUE: CT of the head was performed without the administration of intravenous contrast. Dose modulation, iterative reconstruction, and/or weight based adjustment of the mA/kV was utilized to reduce the radiation dose to as low as reasonably achievable. COMPARISON: January 8, 2022 HISTORY: ORDERING SYSTEM PROVIDED HISTORY: AMS for several days TECHNOLOGIST PROVIDED HISTORY: AMS for several days Decision Support Exception - unselect if not a suspected or confirmed emergency medical condition->Emergency Medical Condition (MA) Reason for Exam: AMS for several days Additional signs and symptoms: PT unable to give hx. Visitor in Pt room says pt suffers from chronic migraines FINDINGS: BRAIN/VENTRICLES: No acute intracranial hemorrhage, or intra-axial mass is present. Bilateral ventricular shunt catheters are again noted in place, stable in appearance. Ventricular system is stable in size and configuration.   No extra-axial fluid collections are present. Posterior fossa contents are stable. Encephalomalacia is again noted within the right frontal lobe, and right parietal lobe. ORBITS: The visualized portion of the orbits demonstrate no acute abnormality. SINUSES: Partial opacification of the frontal sinus is noted. SOFT TISSUES/SKULL:  Postsurgical changes are again noted involving the calvarium. No acute osseous abnormality is present. 1. No acute intracranial hemorrhage, intra-axial mass, or acute territorial infarct 2. Bilateral ventricular shunt catheter is in place, with a stable appearance to the ventricular system. XR CHEST PORTABLE    Result Date: 5/5/2022  EXAMINATION: ONE XRAY VIEW OF THE CHEST 5/5/2022 7:59 pm COMPARISON: None. HISTORY: ORDERING SYSTEM PROVIDED HISTORY: Septic w/u, AMS TECHNOLOGIST PROVIDED HISTORY: Septic w/u, AMS Reason for Exam: Shortness of breath and AMS FINDINGS: *Stable opacity lower 1/3 left hemithorax obscuring the left heart margin and hemidiaphragm in keeping with consolidation and pleural effusion; pneumonia is a consideration. *Senescent pulmonary changes. *No pulmonary infiltrate evident right lung. *Visualized portions of the cardiac silhouette appear unremarkable. *Right left hilar silhouettes and silhouette of the mid and upper mediastinum appear unremarkable. *No pneumothorax evident. 1. Stable opacity lower 1/3 left hemithorax obscuring the left heart margin and hemidiaphragm in keeping with consolidation and pleural effusion; pneumonia is a consideration. 2. Senescent pulmonary changes. 3. No pulmonary infiltrate evident right lung. ASSESSMENT  and  PLAN     Principal Problem:    Sepsis ([address]  Active Problems:    Opacity of lung on imaging study    Acute cystitis with hematuria    Urinary retention    Hydrocephalus (HCC)    S/P  shunt    Bipolar mood disorder (HCC)    Acute kidney injury superimposed on CKD ([address]  Resolved Problems:    * No resolved hospital problems. *        Plan:      Sepsis  -Fluid bolus administered in ED  -IV antibiotics initiated  -Blood culture x2  -Lactate 1.0      Urinary tract infection  -Rocephin antibiotic  -Urine culture pending  -de-escalate antibiotics based on c&s      Pneumonia   -IV antibiotic initiated in ED rocephin/ azithro  --continue on admission  -Not from SNF, she lives in a home/ group home    Acute Kidney Injury  -Creatinine 1.94; Baseline 1.57- normal with alyssa elevated to 2.9   -NS fluid bolus in ED  -continue IVF on admission  -hold diuretics/nephrotoxic medications  -Consult nephrologist if no improvement in labs   -monitor BMP                IVF:  0.9 normal saline at 75 an hour  Diet:  Regular diet  GI ppx:  Diet  DVT ppx: Heparin  Medication Reconciliation: Done  Code status: Full code  Upcoming Procedure: None  Dispo: Admit    Consultations:     IP CONSULT TO INTERNAL MEDICINE  IP CONSULT TO [address] [place] [address], Jorgito LERMA FNP-BC   5/6/2022  12:43 PM    [address] Phone [phone]     I have discussed the care of [facility] ,   including pertinent history and exam findings,      5/5/22   with the Kalyani Nichols DNP, AGACNP, FNP-BC   I have seen and examined the patient and the key elements of all parts of the encounter have been performed by me . I agree with the assessment, plan and orders as documented by the resident. Principal Problem:    Sepsis ([facility]  Active Problems:    Opacity of lung on imaging study    Acute cystitis with hematuria    Urinary retention    Hydrocephalus (HCC)    S/P  shunt    Bipolar mood disorder (HCC)    ARYAN (acute kidney injury) ([address]  Resolved Problems:    * No resolved hospital problems. *    5/6/22    · Has urinary retention . Ordered hoff and urology eval  · Has aryan  · On multiple anticholinergics . stopped a few . Medications: Allergies:     Allergies   Allergen Reactions    Levaquin [Levofloxacin In D5w] Other (See Comments)     The group home has this on their allergy list, but without what effects to the Pt. Pt doesn't know.  Chocolate Other (See Comments)     Sister, POA, denies pt having an allergy to chocolate    Cocoa     Indomethacin Other (See Comments)     unknown    Neurontin [Gabapentin] Other (See Comments)     unknown    Adhesive Tape Rash and Hives       Current Meds:   Scheduled Meds:    sodium chloride flush  5-40 mL IntraVENous 2 times per day    heparin (porcine)  5,000 Units SubCUTAneous BID    cefTRIAXone (ROCEPHIN) IV  1,000 mg IntraVENous Q24H    [START ON 5/7/2022] azithromycin  500 mg IntraVENous Q24H    amLODIPine  5 mg Oral Daily    aspirin  81 mg Oral Daily    atorvastatin  10 mg Oral Nightly    budesonide-formoterol  2 puff Inhalation BID    busPIRone  5 mg Oral BID    Atogepant  1 tablet Oral Daily    [START ON 5/7/2022] ferrous sulfate  325 mg Oral Daily with breakfast    fluticasone  1 spray Each Nostril Daily    lamoTRIgine  100 mg Oral Daily    lamoTRIgine  200 mg Oral Nightly    lidocaine  1 patch TransDERmal Daily    cetirizine  5 mg Oral Daily    metoclopramide  5 mg Oral TID AC    therapeutic multivitamin-minerals  1 tablet Oral Daily    [START ON 5/7/2022] pantoprazole  40 mg Oral QAM AC    oxybutynin  5 mg Oral Daily    QUEtiapine  25 mg Oral Daily    QUEtiapine  50 mg Oral Nightly    topiramate  100 mg Oral QPM    topiramate  50 mg Oral Daily    traMADol  50 mg Oral TID     Continuous Infusions:    sodium chloride      sodium chloride 75 mL/hr at 05/06/22 0811     1. PRN Meds: sodium chloride flush, sodium chloride, acetaminophen **OR** acetaminophen, calcium carbonate, polyvinyl alcohol, magnesium hydroxide, meclizine, SUMAtriptan, polyethylene glycol, Hydrocerin  2.

## 2022-05-06 NOTE — PROGRESS NOTES
Patient refused labs, yelling at staff \"you're not poking me anymore, I already had it done. \" RN attempted to educate patient but continued to refuse. Lab instructed to try again later this shift. Patient also agitated with RN and pharmacist, when attempting to look for Butrans patch, yelling \"They already looked! Stop! \" Patient also does not want to take meds at this time; will try again later during shift. Update: Patient agreed to take pills after much coaxing. Still refusing heparin injection, stating \"no more pokes! \"

## 2022-05-07 LAB
ALBUMIN SERPL-MCNC: 3 G/DL (ref 3.5–5.2)
ALP BLD-CCNC: 212 U/L (ref 35–104)
ALT SERPL-CCNC: 35 U/L (ref 5–33)
ANION GAP SERPL CALCULATED.3IONS-SCNC: 10 MMOL/L (ref 9–17)
AST SERPL-CCNC: 23 U/L
BILIRUB SERPL-MCNC: 0.24 MG/DL (ref 0.3–1.2)
BUN BLDV-MCNC: 30 MG/DL (ref 8–23)
CALCIUM SERPL-MCNC: 8.4 MG/DL (ref 8.6–10.4)
CHLORIDE BLD-SCNC: 111 MMOL/L (ref 98–107)
CO2: 23 MMOL/L (ref 20–31)
CREAT SERPL-MCNC: 1.44 MG/DL (ref 0.5–0.9)
CULTURE: ABNORMAL
GFR AFRICAN AMERICAN: 43 ML/MIN
GFR NON-AFRICAN AMERICAN: 36 ML/MIN
GFR SERPL CREATININE-BSD FRML MDRD: ABNORMAL ML/MIN/{1.73_M2}
GLUCOSE BLD-MCNC: 142 MG/DL (ref 70–99)
INR BLD: 1.3
LEGIONELLA PNEUMOPHILIA AG, URINE: NEGATIVE
MAGNESIUM: 1.9 MG/DL (ref 1.6–2.6)
POTASSIUM SERPL-SCNC: 3.4 MMOL/L (ref 3.7–5.3)
PROCALCITONIN: 0.9 NG/ML
PROTHROMBIN TIME: 16 SEC (ref 11.8–14.6)
SODIUM BLD-SCNC: 144 MMOL/L (ref 135–144)
SOURCE: NORMAL
SPECIMEN DESCRIPTION: ABNORMAL
STREP PNEUMONIAE ANTIGEN: NEGATIVE
TOTAL PROTEIN: 6 G/DL (ref 6.4–8.3)

## 2022-05-07 PROCEDURE — 2060000000 HC ICU INTERMEDIATE R&B

## 2022-05-07 PROCEDURE — 6360000002 HC RX W HCPCS: Performed by: NURSE PRACTITIONER

## 2022-05-07 PROCEDURE — 85610 PROTHROMBIN TIME: CPT

## 2022-05-07 PROCEDURE — 36415 COLL VENOUS BLD VENIPUNCTURE: CPT

## 2022-05-07 PROCEDURE — 6370000000 HC RX 637 (ALT 250 FOR IP): Performed by: NURSE PRACTITIONER

## 2022-05-07 PROCEDURE — 6370000000 HC RX 637 (ALT 250 FOR IP): Performed by: INTERNAL MEDICINE

## 2022-05-07 PROCEDURE — 80053 COMPREHEN METABOLIC PANEL: CPT

## 2022-05-07 PROCEDURE — 99233 SBSQ HOSP IP/OBS HIGH 50: CPT | Performed by: INTERNAL MEDICINE

## 2022-05-07 PROCEDURE — 83735 ASSAY OF MAGNESIUM: CPT

## 2022-05-07 PROCEDURE — 2580000003 HC RX 258: Performed by: NURSE PRACTITIONER

## 2022-05-07 PROCEDURE — 84145 PROCALCITONIN (PCT): CPT

## 2022-05-07 RX ORDER — TRAMADOL HYDROCHLORIDE 50 MG/1
25 TABLET ORAL 3 TIMES DAILY
Status: DISCONTINUED | OUTPATIENT
Start: 2022-05-07 | End: 2022-05-09 | Stop reason: HOSPADM

## 2022-05-07 RX ORDER — POTASSIUM CHLORIDE 20 MEQ/1
40 TABLET, EXTENDED RELEASE ORAL
Status: COMPLETED | OUTPATIENT
Start: 2022-05-07 | End: 2022-05-08

## 2022-05-07 RX ADMIN — METOCLOPRAMIDE 5 MG: 5 TABLET ORAL at 05:12

## 2022-05-07 RX ADMIN — QUETIAPINE FUMARATE 25 MG: 50 TABLET ORAL at 08:47

## 2022-05-07 RX ADMIN — PANTOPRAZOLE SODIUM 40 MG: 40 TABLET, DELAYED RELEASE ORAL at 05:12

## 2022-05-07 RX ADMIN — AZITHROMYCIN MONOHYDRATE 500 MG: 500 INJECTION, POWDER, LYOPHILIZED, FOR SOLUTION INTRAVENOUS at 23:31

## 2022-05-07 RX ADMIN — ATORVASTATIN CALCIUM 10 MG: 10 TABLET, FILM COATED ORAL at 21:08

## 2022-05-07 RX ADMIN — CEFTRIAXONE SODIUM 1000 MG: 1 INJECTION, POWDER, FOR SOLUTION INTRAMUSCULAR; INTRAVENOUS at 21:10

## 2022-05-07 RX ADMIN — TRAMADOL HYDROCHLORIDE 25 MG: 50 TABLET, COATED ORAL at 21:08

## 2022-05-07 RX ADMIN — TOPIRAMATE 50 MG: 50 TABLET, FILM COATED ORAL at 08:52

## 2022-05-07 RX ADMIN — ACETAMINOPHEN 650 MG: 325 TABLET ORAL at 02:26

## 2022-05-07 RX ADMIN — QUETIAPINE FUMARATE 50 MG: 50 TABLET ORAL at 21:07

## 2022-05-07 RX ADMIN — LAMOTRIGINE 200 MG: 100 TABLET ORAL at 21:12

## 2022-05-07 RX ADMIN — ASPIRIN 81 MG: 81 TABLET, COATED ORAL at 08:47

## 2022-05-07 RX ADMIN — LAMOTRIGINE 100 MG: 100 TABLET ORAL at 08:52

## 2022-05-07 RX ADMIN — AMLODIPINE BESYLATE 5 MG: 5 TABLET ORAL at 08:48

## 2022-05-07 RX ADMIN — FLUTICASONE PROPIONATE 1 SPRAY: 50 SPRAY, METERED NASAL at 08:52

## 2022-05-07 RX ADMIN — ACETAMINOPHEN 650 MG: 325 TABLET ORAL at 23:34

## 2022-05-07 RX ADMIN — MULTIPLE VITAMINS W/ MINERALS TAB 1 TABLET: TAB at 08:48

## 2022-05-07 RX ADMIN — TRAMADOL HYDROCHLORIDE 25 MG: 50 TABLET, COATED ORAL at 12:12

## 2022-05-07 RX ADMIN — HEPARIN SODIUM 5000 UNITS: 5000 INJECTION INTRAVENOUS; SUBCUTANEOUS at 21:07

## 2022-05-07 RX ADMIN — POTASSIUM CHLORIDE 40 MEQ: 20 TABLET, EXTENDED RELEASE ORAL at 17:31

## 2022-05-07 RX ADMIN — FERROUS SULFATE TAB 325 MG (65 MG ELEMENTAL FE) 325 MG: 325 (65 FE) TAB at 08:47

## 2022-05-07 RX ADMIN — SODIUM CHLORIDE, PRESERVATIVE FREE 10 ML: 5 INJECTION INTRAVENOUS at 09:13

## 2022-05-07 RX ADMIN — BUSPIRONE HYDROCHLORIDE 5 MG: 5 TABLET ORAL at 21:08

## 2022-05-07 RX ADMIN — SODIUM CHLORIDE: 9 INJECTION, SOLUTION INTRAVENOUS at 17:35

## 2022-05-07 RX ADMIN — TRAMADOL HYDROCHLORIDE 50 MG: 50 TABLET, COATED ORAL at 08:48

## 2022-05-07 RX ADMIN — TOPIRAMATE 100 MG: 100 TABLET, FILM COATED ORAL at 17:31

## 2022-05-07 RX ADMIN — BUSPIRONE HYDROCHLORIDE 5 MG: 5 TABLET ORAL at 08:48

## 2022-05-07 RX ADMIN — HEPARIN SODIUM 5000 UNITS: 5000 INJECTION INTRAVENOUS; SUBCUTANEOUS at 08:49

## 2022-05-07 ASSESSMENT — PAIN DESCRIPTION - ORIENTATION: ORIENTATION: LOWER

## 2022-05-07 ASSESSMENT — PAIN DESCRIPTION - LOCATION: LOCATION: BACK

## 2022-05-07 ASSESSMENT — PAIN SCALES - GENERAL
PAINLEVEL_OUTOF10: 2
PAINLEVEL_OUTOF10: 0
PAINLEVEL_OUTOF10: 6
PAINLEVEL_OUTOF10: 2
PAINLEVEL_OUTOF10: 0

## 2022-05-07 ASSESSMENT — PAIN - FUNCTIONAL ASSESSMENT: PAIN_FUNCTIONAL_ASSESSMENT: ACTIVITIES ARE NOT PREVENTED

## 2022-05-07 ASSESSMENT — PAIN DESCRIPTION - DESCRIPTORS: DESCRIPTORS: ACHING

## 2022-05-07 NOTE — ACP (ADVANCE CARE PLANNING)
Advance Care Planning     Advance Care Planning Activator (Inpatient)  Conversation Note      Date of ACP Conversation: 5/7/2022     Conversation Conducted with: Patient with Decision Making Capacity    ACP Activator: Fe Aguila RN        Health Care Decision Maker:     Current Designated Health Care Decision Maker:     Primary Decision Maker: Zehra Lauren - Brother/SisterDelia Staff - [phone]  Click here to complete Healthcare Decision Makers including section of the Healthcare Decision Maker Relationship (ie \"Primary\")      Care Preferences    Ventilation: \"If you were in your present state of health and suddenly became very ill and were unable to breathe on your own, what would your preference be about the use of a ventilator (breathing machine) if it were available to you? \"      Would the patient desire the use of ventilator (breathing machine)?: yes    \"If your health worsens and it becomes clear that your chance of recovery is unlikely, what would your preference be about the use of a ventilator (breathing machine) if it were available to you? \"     Would the patient desire the use of ventilator (breathing machine)?: Yes      Resuscitation  \"CPR works best to restart the heart when there is a sudden event, like a heart attack, in someone who is otherwise healthy. Unfortunately, CPR does not typically restart the heart for people who have serious health conditions or who are very sick. \"    \"In the event your heart stopped as a result of an underlying serious health condition, would you want attempts to be made to restart your heart (answer \"yes\" for attempt to resuscitate) or would you prefer a natural death (answer \"no\" for do not attempt to resuscitate)? \" yes       [] Yes   [] No   Educated Patient / Wally Qiu regarding differences between Advance Directives and portable DNR orders.     Length of ACP Conversation in minutes:      Conversation Outcomes:  [x] ACP discussion completed  [] Existing advance directive reviewed with patient; no changes to patient's previously recorded wishes  [] New Advance Directive completed  [] Portable Do Not Rescitate prepared for Provider review and signature  [] POLST/POST/MOLST/MOST prepared for Provider review and signature      Follow-up plan:    [] Schedule follow-up conversation to continue planning  [] Referred individual to Provider for additional questions/concerns   [] Advised patient/agent/surrogate to review completed ACP document and update if needed with changes in condition, patient preferences or care setting    [x] This note routed to one or more involved healthcare providers

## 2022-05-07 NOTE — PROGRESS NOTES
Urology Progress Note    Subjective: Urine clear. Patient Vitals for the past 24 hrs:   BP Temp Temp src Pulse Resp SpO2   05/07/22 0730 132/69 97.9 °F (36.6 °C) Axillary 101 20 96 %   05/06/22 2312 124/60 98.8 °F (37.1 °C) Axillary 106 20 96 %   05/06/22 1945 114/86 98.6 °F (37 °C) Axillary 109 18 95 %       Intake/Output Summary (Last 24 hours) at 5/7/2022 1222  Last data filed at 5/7/2022 1215  Gross per 24 hour   Intake 480 ml   Output 2750 ml   Net -2270 ml       Recent Labs     05/05/22  2145   WBC 22.6*   HGB 11.5*   HCT 35.4*   MCV 92.3        Recent Labs     05/05/22  2145      K 4.0      CO2 21   BUN 45*   CREATININE 1.94*       Recent Labs     05/05/22  2251   COLORU Yellow   PHUR 6.0   WBCUA TOO NUMEROUS TO COUNT   RBCUA 10 TO 20   BACTERIA MANY*   SPECGRAV 1.013   LEUKOCYTESUR LARGE*   UROBILINOGEN Normal   BILIRUBINUR NEGATIVE       Additional Lab/culture results:    Physical Exam: soft, nontender, nondistended, no masses or organomegaly    Interval Imaging Findings:    Impression: Retention and UTI. Patient Active Problem List   Diagnosis    Contusion of hip    UTI (urinary tract infection), uncomplicated    Inability to ambulate due to hip    Closed fracture of left inferior pubic ramus (HCC)    Closed compression fracture of L4 lumbar vertebra    Cellulitis of left leg    Failure of outpatient treatment    Hydrocephalus ([address]    S/P  shunt    Leg edema    Hydronephrosis of left kidney    Severe sepsis (HCC)    Bipolar mood disorder ([address]    ARYAN (acute kidney injury) ([address]    Aspiration pneumonitis (HCC)    Sepsis (HCC)    Opacity of lung on imaging study    Acute cystitis with hematuria    Urinary retention       Plan: Gram negative rods in urine. On Rocephin. Await sensitivity. Void trial prior to discharge.     Urbano Gatica MD  12:22 PM 5/7/2022

## 2022-05-07 NOTE — PROGRESS NOTES
Call placed to Access RN for midline placement.  communicated that RN will be at hospital later in the morning for placement.

## 2022-05-07 NOTE — PROGRESS NOTES
[facility]    Progress Note     5/7/2022    11:23 AM    Name:   Sridevi Espinal  MRN:     [id number]     Acct:      [de-identified]   Room:   2097/2097-01   Day:  1  Admit Date:  5/5/2022  7:24 PM    PCP:   Marni Witt MD  Code Status:  Full Code    Subjective:     C/C:   Chief Complaint   Patient presents with    Shortness of Breath     Principal Problem:    Sepsis ([facility]  Active Problems:    Opacity of lung on imaging study    Acute cystitis with hematuria    Urinary retention    Hydrocephalus ([facility] [address]    S/P  shunt    Bipolar mood disorder ([facility] [address]    ARYAN (acute kidney injury) ([facility] [address]  Resolved Problems:    * No resolved hospital problems. *      On admission  The patient is a 66 y.o. female, with a history of multiple medical problems proving but not limited to bipolar schizoaffective disorder, hydrocephalus with shunt who presents with altered mental status/confusion from [facility]. Patient does not contribute to HPI or review of systems and information is taken from the chart. The [facility] sent a piece of paper that according to primary care needed to be sent to the emergency room for evaluation of \"fluid on the lungs, tachycardia, losing weight, and increased confusion, and possible UTI or kidney stone, and being winded while talking\"       Pharmacy review appreciated      -the patient is taking Topiramate for migraine prophylaxis (dosing appropriate)  -the patient is taking Lamictal as a mood stabilizer (dosing appropriate, no level recommended)  -the patient is taking Seroquel for BP2 disorder       Recommendations: consider reducing the patients dose of tramadol to 25 mg TID for altered renal function. Also recommend discontinuing Reglan as this can cause altered mental status is patients with renal dysfunction and of elderly age.  Please consider the risk vs benefit of stopping therapy     Significant last 24 hr data reviewed ;   Vitals:    05/06/22 1215 05/06/22 1945 05/06/22 2312 05/07/22 0730   BP: 119/75 114/86 124/60 132/69   Pulse: 101 109 106 101   Resp: 20 18 20 20   Temp: 98.5 °F (36.9 °C) 98.6 °F (37 °C) 98.8 °F (37.1 °C) 97.9 °F (36.6 °C)   TempSrc: Oral Axillary Axillary Axillary   SpO2: 98% 95% 96% 96%   Weight:       Height:          No results found for this or any previous visit (from the past 24 hour(s)). Recent Labs     05/05/22 2028   POCGLU 124*        CT HEAD WO CONTRAST    Result Date: 5/5/2022  EXAMINATION: CT OF THE HEAD WITHOUT CONTRAST  5/5/2022 2:53 pm TECHNIQUE: CT of the head was performed without the administration of intravenous contrast. Dose modulation, iterative reconstruction, and/or weight based adjustment of the mA/kV was utilized to reduce the radiation dose to as low as reasonably achievable. COMPARISON: January 8, 2022 HISTORY: ORDERING SYSTEM PROVIDED HISTORY: AMS for several days TECHNOLOGIST PROVIDED HISTORY: AMS for several days Decision Support Exception - unselect if not a suspected or confirmed emergency medical condition->Emergency Medical Condition (MA) Reason for Exam: AMS for several days Additional signs and symptoms: PT unable to give hx. Visitor in Pt room says pt suffers from chronic migraines FINDINGS: BRAIN/VENTRICLES: No acute intracranial hemorrhage, or intra-axial mass is present. Bilateral ventricular shunt catheters are again noted in place, stable in appearance. Ventricular system is stable in size and configuration. No extra-axial fluid collections are present. Posterior fossa contents are stable. Encephalomalacia is again noted within the right frontal lobe, and right parietal lobe. ORBITS: The visualized portion of the orbits demonstrate no acute abnormality. SINUSES: Partial opacification of the frontal sinus is noted. SOFT TISSUES/SKULL:  Postsurgical changes are again noted involving the calvarium. No acute osseous abnormality is present.      1. No acute intracranial hemorrhage, intra-axial mass, or acute territorial infarct 2. Bilateral ventricular shunt catheter is in place, with a stable appearance to the ventricular system. XR CHEST PORTABLE    Result Date: 5/5/2022  EXAMINATION: ONE XRAY VIEW OF THE CHEST 5/5/2022 7:59 pm COMPARISON: None. HISTORY: ORDERING SYSTEM PROVIDED HISTORY: Septic w/u, AMS TECHNOLOGIST PROVIDED HISTORY: Septic w/u, AMS Reason for Exam: Shortness of breath and AMS FINDINGS: *Stable opacity lower 1/3 left hemithorax obscuring the left heart margin and hemidiaphragm in keeping with consolidation and pleural effusion; pneumonia is a consideration. *Senescent pulmonary changes. *No pulmonary infiltrate evident right lung. *Visualized portions of the cardiac silhouette appear unremarkable. *Right left hilar silhouettes and silhouette of the mid and upper mediastinum appear unremarkable. *No pneumothorax evident. 1. Stable opacity lower 1/3 left hemithorax obscuring the left heart margin and hemidiaphragm in keeping with consolidation and pleural effusion; pneumonia is a consideration. 2. Senescent pulmonary changes. 3. No pulmonary infiltrate evident right lung. On admission         Review of Systems:     Constitutional:  negative for chills, fevers, sweats  Respiratory:  negative for cough, dyspnea on exertion, hemoptysis, shortness of breath, wheezing  Cardiovascular:  negative for chest pain, chest pressure/discomfort, lower extremity edema, palpitations  Gastrointestinal:  negative for abdominal pain, constipation, diarrhea, nausea, vomiting  Neurological:  negative for dizziness, headache  Data:     Past Medical History:  no change     Social History:  no change    Family History: @no change    Vitals:      I/O (24Hr): Intake/Output Summary (Last 24 hours) at 5/7/2022 1123  Last data filed at 5/7/2022 0946  Gross per 24 hour   Intake 360 ml   Output 3335 ml   Net -2975 ml       Labs:    Radiology:    Medications:      Allergies: Current Meds:   Scheduled Meds:    sodium chloride flush  5-40 mL IntraVENous 2 times per day    heparin (porcine)  5,000 Units SubCUTAneous BID    cefTRIAXone (ROCEPHIN) IV  1,000 mg IntraVENous Q24H    azithromycin  500 mg IntraVENous Q24H    amLODIPine  5 mg Oral Daily    aspirin  81 mg Oral Daily    atorvastatin  10 mg Oral Nightly    budesonide-formoterol  2 puff Inhalation BID    busPIRone  5 mg Oral BID    ferrous sulfate  325 mg Oral Daily with breakfast    fluticasone  1 spray Each Nostril Daily    lamoTRIgine  100 mg Oral Daily    lamoTRIgine  200 mg Oral Nightly    lidocaine  1 patch TransDERmal Daily    metoclopramide  5 mg Oral TID AC    therapeutic multivitamin-minerals  1 tablet Oral Daily    pantoprazole  40 mg Oral QAM AC    QUEtiapine  25 mg Oral Daily    QUEtiapine  50 mg Oral Nightly    topiramate  100 mg Oral QPM    topiramate  50 mg Oral Daily    traMADol  50 mg Oral TID     Continuous Infusions:    sodium chloride      sodium chloride 75 mL/hr at 22 2017     PRN Meds: sodium chloride flush, sodium chloride, acetaminophen **OR** acetaminophen, calcium carbonate, polyvinyl alcohol, magnesium hydroxide, SUMAtriptan, polyethylene glycol, Hydrocerin      Physical Examination:        Vitals:    22 1215 22 1945 22 2312 22 0730   BP: 119/75 114/86 124/60 132/69   Pulse: 101 109 106 101   Resp: 20 18 20 20   Temp: 98.5 °F (36.9 °C) 98.6 °F (37 °C) 98.8 °F (37.1 °C) 97.9 °F (36.6 °C)   TempSrc: Oral Axillary Axillary Axillary   SpO2: 98% 95% 96% 96%   Weight:       Height:           /69   Pulse 101   Temp 97.9 °F (36.6 °C) (Axillary)   Resp 20   Ht 5' 1\" (1.549 m)   Wt 109 lb (49.4 kg)   SpO2 96%   BMI 20.60 kg/m²   Temp (24hrs), Av.5 °F (36.9 °C), Min:97.9 °F (36.6 °C), Max:98.8 °F (37.1 °C)    Recent Labs     22   POCGLU 124*       Intake/Output Summary (Last 24 hours) at 2022 1123  Last data filed at 2022 4274  Gross per 24 hour   Intake 360 ml   Output 3335 ml   Net -2975 ml       General Appearance:  alert, well appearing, and in no acute distress  Mental status:   Head:  normocephalic, atraumatic. Eye: no icterus, redness, pupils equal and reactive, extraocular eye movements intact, conjunctiva clear  Ear: normal external ear, no discharge, hearing intact  Nose:  no drainage noted  Mouth: mucous membranes moist  Neck: supple, no carotid bruits, thyroid not palpable  Lungs: Bilateral equal air entry, clear to ausculation, no wheezing, rales or rhonchi, normal effort  Cardiovascular: normal rate, regular rhythm, no murmur, gallop, rub. Abdomen: Soft, nontender, nondistended, normal bowel sounds, no hepatomegaly or splenomegaly  Neurologic: There are no new focal motor or sensory deficits,   Skin: No gross lesions, rashes, bruising or bleeding on exposed skin area  Extremities:  peripheral pulses palpable, no pedal edema or calf pain with palpation  Psych:             Assessment:        Primary Problem  Sepsis ([address]    Principal Problem:    Sepsis ([address]  Active Problems:    Opacity of lung on imaging study    Acute cystitis with hematuria    Urinary retention    Hydrocephalus (HCC)    S/P  shunt    Bipolar mood disorder (HCC)    ARYAN (acute kidney injury) ([address]  Resolved Problems:    * No resolved hospital problems. *       Plan:          5/7/22   afebrile   · wii adjust meds   · Dc reglan   · Reduce tramadol   Urology input  Marmolejo in place for urinary retention. UCx shows GNR. On Rocephin. Await final culture. Repeat upper tract imaging if she spikes a fever. Void trial prior to D/C. [facility] Problems           Last Modified POA    * (Principal) Sepsis ([address] 5/6/2022 Yes    Opacity of lung on imaging study 5/6/2022 Yes    Acute cystitis with hematuria 5/6/2022 Yes    Urinary retention 5/6/2022 Yes    Hydrocephalus ([address] 5/6/2022 Yes    S/P  shunt 5/6/2022 Yes    Bipolar mood disorder ([address] 5/6/2022 Yes    ARYAN (acute kidney injury) ([facility] [address] 5/6/2022 Yes                         Thanks for consulting us . Will monitor vitals and clinical course , and  Optimize therapy  as needed .            Arabella Caba MD

## 2022-05-07 NOTE — CARE COORDINATION
CASE MANAGEMENT NOTE:    Admission Date:  5/5/2022 Leonardo Faustin is a 66 y.o.  female    Admitted for : Septicemia [facility]) [A41.9]  Acute cystitis with hematuria [N30.01]  Sepsis ([facility] [address] [A41.9]     Met with:  Called and spoke to sister     PCP:  Dr. Louisa Barber:  Medicare       Is patient alert and oriented at time of discussion:  NA     Current Residence/ Living Arrangements: In a group home              Current Services PTA:  24/7 aide     Does patient go to outpatient dialysis: No  If yes, location and chair time:      Is patient agreeable to VNS: No     Freedom of choice provided:  No     List of [address] provided: No     VNS chosen:  No     DME:  walker     Home Oxygen: No     Nebulizer: No     CPAP/BIPAP: No     Supplier: N/A     Potential Assistance Needed: No     SNF needed: No     Freedom of choice and list provided: NA     Pharmacy:  [facility]        Does Patient want to use MEDS to BEDS? No     Is patient currently receiving oral anticoagulation therapy? No     Is the Patient an [facility] with Readmission Risk Score greater than 14%? No  If yes, pt needs a follow up appointment made within 7 days.     Family Members/Caregivers that pt would like involved in their care: Yes     If yes, list name here:  Francisco     Transportation Provider:  Family            Discharge Plan:  5/7/22  Medicare Pt is from the [facility] DME walker VNS denies Pt has 24/7 caregivers Plan is to discharge to home with no needs will continue to follow for needs . //tv                    Electronically signed by:  Triny Bustillo RN on 5/7/2022 at 3:30 PM

## 2022-05-07 NOTE — PLAN OF CARE
Problem: Discharge Planning  Goal: Discharge to home or other facility with appropriate resources  5/7/2022 0433 by Samantha Monae RN  Outcome: Progressing     Problem: Safety - Adult  Goal: Free from fall injury  5/7/2022 0433 by Samantha Monae RN  Outcome: Progressing     Problem: Skin/Tissue Integrity  Goal: Absence of new skin breakdown  Description: 1. Monitor for areas of redness and/or skin breakdown  2. Assess vascular access sites hourly  3. Every 4-6 hours minimum:  Change oxygen saturation probe site  4. Every 4-6 hours:  If on nasal continuous positive airway pressure, respiratory therapy assess nares and determine need for appliance change or resting period.   5/7/2022 0433 by Samantha Monae RN  Outcome: Progressing     Problem: Pain  Goal: Verbalizes/displays adequate comfort level or baseline comfort level  5/7/2022 0433 by Samantha Monae RN  Outcome: Progressing     Problem: ABCDS Injury Assessment  Goal: Absence of physical injury  Outcome: Progressing

## 2022-05-07 NOTE — PLAN OF CARE
Problem: Discharge Planning  Goal: Discharge to home or other facility with appropriate resources  5/7/2022 1750 by Idania Ozuna RN  Outcome: Progressing  5/7/2022 1721 by Idania Ozuna RN  Outcome: Progressing  5/7/2022 0433 by Lindie Gaucher, RN  Outcome: Progressing     Problem: Safety - Adult  Goal: Free from fall injury  5/7/2022 1750 by Idania Ozuna RN  Outcome: Progressing  5/7/2022 1721 by Idania Ozuna RN  Outcome: Progressing  5/7/2022 0433 by Lindie Gaucher, RN  Outcome: Progressing     Problem: Pain  Goal: Verbalizes/displays adequate comfort level or baseline comfort level  5/7/2022 1750 by Idania Ozuna RN  Outcome: Progressing  5/7/2022 1721 by Idania Ozuna RN  Outcome: Progressing  5/7/2022 0433 by Lindie Gaucher, RN  Outcome: Progressing     Problem: Skin/Tissue Integrity  Goal: Absence of new skin breakdown  Description: 1. Monitor for areas of redness and/or skin breakdown  2. Assess vascular access sites hourly  3. Every 4-6 hours minimum:  Change oxygen saturation probe site  4. Every 4-6 hours:  If on nasal continuous positive airway pressure, respiratory therapy assess nares and determine need for appliance change or resting period.   5/7/2022 1750 by Idania Ozuna RN  Outcome: Progressing  5/7/2022 1721 by Idania Ozuna RN  Outcome: Progressing  5/7/2022 0433 by Lindie Gaucher, RN  Outcome: Progressing     Problem: ABCDS Injury Assessment  Goal: Absence of physical injury  5/7/2022 1750 by Idania Ozuna RN  Outcome: Progressing  5/7/2022 1721 by Idania Ozuna RN  Outcome: Progressing  5/7/2022 0433 by Lindie Gaucher, RN  Outcome: Progressing

## 2022-05-07 NOTE — PLAN OF CARE
Problem: Discharge Planning  Goal: Discharge to home or other facility with appropriate resources  5/7/2022 1721 by Brenda Sharp RN  Outcome: Progressing  5/7/2022 0433 by Renetta Hutchinson RN  Outcome: Progressing     Problem: Safety - Adult  Goal: Free from fall injury  5/7/2022 1721 by Brenda Sharp RN  Outcome: Progressing  5/7/2022 0433 by Renetta Hutchinson RN  Outcome: Progressing     Problem: Pain  Goal: Verbalizes/displays adequate comfort level or baseline comfort level  5/7/2022 1721 by Brenda Sharp RN  Outcome: Progressing  5/7/2022 0433 by Renetta Hutchinson RN  Outcome: Progressing     Problem: Skin/Tissue Integrity  Goal: Absence of new skin breakdown  Description: 1. Monitor for areas of redness and/or skin breakdown  2. Assess vascular access sites hourly  3. Every 4-6 hours minimum:  Change oxygen saturation probe site  4. Every 4-6 hours:  If on nasal continuous positive airway pressure, respiratory therapy assess nares and determine need for appliance change or resting period.   5/7/2022 1721 by Brenda Sharp RN  Outcome: Progressing  5/7/2022 0433 by Renetta Hutchinson RN  Outcome: Progressing     Problem: ABCDS Injury Assessment  Goal: Absence of physical injury  5/7/2022 1721 by Brenda Sharp RN  Outcome: Progressing  5/7/2022 0433 by Renetta Hutchinson RN  Outcome: Progressing

## 2022-05-07 NOTE — PROGRESS NOTES
Patient IV infiltrated. This RN sent message via [employer] to NP Dudley Lundy, order for Access RN to place line. Iv removed. Redness noted at site. Patient complaints of pain. PRN tylenol provided, patient arm elevated with pillow and ice applied to site.

## 2022-05-08 LAB
ANION GAP SERPL CALCULATED.3IONS-SCNC: 10 MMOL/L (ref 9–17)
BUN BLDV-MCNC: 24 MG/DL (ref 8–23)
CALCIUM SERPL-MCNC: 7.9 MG/DL (ref 8.6–10.4)
CHLORIDE BLD-SCNC: 112 MMOL/L (ref 98–107)
CO2: 21 MMOL/L (ref 20–31)
CREAT SERPL-MCNC: 1.18 MG/DL (ref 0.5–0.9)
GFR AFRICAN AMERICAN: 55 ML/MIN
GFR NON-AFRICAN AMERICAN: 45 ML/MIN
GFR SERPL CREATININE-BSD FRML MDRD: ABNORMAL ML/MIN/{1.73_M2}
GLUCOSE BLD-MCNC: 102 MG/DL (ref 70–99)
POTASSIUM SERPL-SCNC: 3.2 MMOL/L (ref 3.7–5.3)
SODIUM BLD-SCNC: 143 MMOL/L (ref 135–144)

## 2022-05-08 PROCEDURE — 6360000002 HC RX W HCPCS: Performed by: NURSE PRACTITIONER

## 2022-05-08 PROCEDURE — 6370000000 HC RX 637 (ALT 250 FOR IP): Performed by: INTERNAL MEDICINE

## 2022-05-08 PROCEDURE — 2060000000 HC ICU INTERMEDIATE R&B

## 2022-05-08 PROCEDURE — 99232 SBSQ HOSP IP/OBS MODERATE 35: CPT | Performed by: INTERNAL MEDICINE

## 2022-05-08 PROCEDURE — 2580000003 HC RX 258: Performed by: NURSE PRACTITIONER

## 2022-05-08 PROCEDURE — 80048 BASIC METABOLIC PNL TOTAL CA: CPT

## 2022-05-08 RX ORDER — AMOXICILLIN 500 MG/1
500 CAPSULE ORAL EVERY 8 HOURS SCHEDULED
Status: DISCONTINUED | OUTPATIENT
Start: 2022-05-08 | End: 2022-05-09 | Stop reason: HOSPADM

## 2022-05-08 RX ORDER — POTASSIUM CHLORIDE 20 MEQ/1
20 TABLET, EXTENDED RELEASE ORAL 2 TIMES DAILY WITH MEALS
Status: DISCONTINUED | OUTPATIENT
Start: 2022-05-08 | End: 2022-05-09 | Stop reason: HOSPADM

## 2022-05-08 RX ADMIN — TOPIRAMATE 50 MG: 50 TABLET, FILM COATED ORAL at 10:22

## 2022-05-08 RX ADMIN — TRAMADOL HYDROCHLORIDE 25 MG: 50 TABLET, COATED ORAL at 20:10

## 2022-05-08 RX ADMIN — LAMOTRIGINE 200 MG: 100 TABLET ORAL at 20:14

## 2022-05-08 RX ADMIN — SODIUM CHLORIDE: 9 INJECTION, SOLUTION INTRAVENOUS at 06:24

## 2022-05-08 RX ADMIN — ATORVASTATIN CALCIUM 10 MG: 10 TABLET, FILM COATED ORAL at 20:11

## 2022-05-08 RX ADMIN — BUSPIRONE HYDROCHLORIDE 5 MG: 5 TABLET ORAL at 09:58

## 2022-05-08 RX ADMIN — BUSPIRONE HYDROCHLORIDE 5 MG: 5 TABLET ORAL at 20:11

## 2022-05-08 RX ADMIN — AMOXICILLIN 500 MG: 500 CAPSULE ORAL at 12:50

## 2022-05-08 RX ADMIN — POTASSIUM CHLORIDE 40 MEQ: 20 TABLET, EXTENDED RELEASE ORAL at 09:57

## 2022-05-08 RX ADMIN — QUETIAPINE FUMARATE 50 MG: 50 TABLET ORAL at 20:11

## 2022-05-08 RX ADMIN — SODIUM CHLORIDE: 9 INJECTION, SOLUTION INTRAVENOUS at 17:07

## 2022-05-08 RX ADMIN — PANTOPRAZOLE SODIUM 40 MG: 40 TABLET, DELAYED RELEASE ORAL at 05:32

## 2022-05-08 RX ADMIN — MULTIPLE VITAMINS W/ MINERALS TAB 1 TABLET: TAB at 09:58

## 2022-05-08 RX ADMIN — ASPIRIN 81 MG: 81 TABLET, COATED ORAL at 09:57

## 2022-05-08 RX ADMIN — QUETIAPINE FUMARATE 25 MG: 50 TABLET ORAL at 09:58

## 2022-05-08 RX ADMIN — FERROUS SULFATE TAB 325 MG (65 MG ELEMENTAL FE) 325 MG: 325 (65 FE) TAB at 10:22

## 2022-05-08 RX ADMIN — LAMOTRIGINE 100 MG: 100 TABLET ORAL at 10:23

## 2022-05-08 RX ADMIN — TRAMADOL HYDROCHLORIDE 25 MG: 50 TABLET, COATED ORAL at 12:50

## 2022-05-08 RX ADMIN — TRAMADOL HYDROCHLORIDE 25 MG: 50 TABLET, COATED ORAL at 09:58

## 2022-05-08 RX ADMIN — AMOXICILLIN 500 MG: 500 CAPSULE ORAL at 20:13

## 2022-05-08 RX ADMIN — FLUTICASONE PROPIONATE 1 SPRAY: 50 SPRAY, METERED NASAL at 10:23

## 2022-05-08 RX ADMIN — AMLODIPINE BESYLATE 5 MG: 5 TABLET ORAL at 09:58

## 2022-05-08 RX ADMIN — HEPARIN SODIUM 5000 UNITS: 5000 INJECTION INTRAVENOUS; SUBCUTANEOUS at 20:11

## 2022-05-08 RX ADMIN — HEPARIN SODIUM 5000 UNITS: 5000 INJECTION INTRAVENOUS; SUBCUTANEOUS at 10:22

## 2022-05-08 ASSESSMENT — PAIN DESCRIPTION - ORIENTATION: ORIENTATION: LOWER

## 2022-05-08 ASSESSMENT — PAIN SCALES - GENERAL
PAINLEVEL_OUTOF10: 5
PAINLEVEL_OUTOF10: 7

## 2022-05-08 ASSESSMENT — PAIN DESCRIPTION - DESCRIPTORS: DESCRIPTORS: ACHING

## 2022-05-08 ASSESSMENT — PAIN DESCRIPTION - LOCATION: LOCATION: BACK

## 2022-05-08 NOTE — PLAN OF CARE
Problem: Discharge Planning  Goal: Discharge to home or other facility with appropriate resources  Outcome: Progressing     Problem: Safety - Adult  Goal: Free from fall injury  Outcome: Progressing     Problem: Pain  Goal: Verbalizes/displays adequate comfort level or baseline comfort level  Outcome: Progressing     Problem: Skin/Tissue Integrity  Goal: Absence of new skin breakdown  Description: 1. Monitor for areas of redness and/or skin breakdown  2. Assess vascular access sites hourly  3. Every 4-6 hours minimum:  Change oxygen saturation probe site  4. Every 4-6 hours:  If on nasal continuous positive airway pressure, respiratory therapy assess nares and determine need for appliance change or resting period.   Outcome: Progressing     Problem: ABCDS Injury Assessment  Goal: Absence of physical injury  Outcome: Progressing

## 2022-05-08 NOTE — PROGRESS NOTES
[facility] Internal Medicine    Progress Note     5/8/2022    3:27 PM    Name:   Neha Lynne  MRN:     [id number]     Acct:      [de-identified]   Room:   2097/2097-01  IP Day:  2  Admit Date:  5/5/2022  7:24 PM    PCP:   Vivi Buck MD  Code Status:  Full Code    Subjective:     C/C:   Chief Complaint   Patient presents with    Shortness of Breath     Principal Problem:    Sepsis ([facility]  Active Problems:    Opacity of lung on imaging study    Acute cystitis with hematuria    Urinary retention    Hydrocephalus ([facility]    S/P  shunt    Bipolar mood disorder ([facility]    ARYAN (acute kidney injury) ([facility]  Resolved Problems:    * No resolved hospital problems. *      On admission  The patient is a 66 y.o. female, with a history of multiple medical problems proving but not limited to bipolar schizoaffective disorder, hydrocephalus with shunt who presents with altered mental status/confusion from [facility]. Patient does not contribute to HPI or review of systems and information is taken from the chart. The [facility] sent a piece of paper that according to primary care needed to be sent to the emergency room for evaluation of \"fluid on the lungs, tachycardia, losing weight, and increased confusion, and possible UTI or kidney stone, and being winded while talking\"       Pharmacy review appreciated      -the patient is taking Topiramate for migraine prophylaxis (dosing appropriate)  -the patient is taking Lamictal as a mood stabilizer (dosing appropriate, no level recommended)  -the patient is taking Seroquel for BP2 disorder       Recommendations: consider reducing the patients dose of tramadol to 25 mg TID for altered renal function. Also recommend discontinuing Reglan as this can cause altered mental status is patients with renal dysfunction and of elderly age.  Please consider the risk vs benefit of stopping therapy     Significant last 24 hr data reviewed ;   Vitals:    05/07/22 2315 05/08/22 0545 05/08/22 0730 05/08/22 1250   BP: 127/67  137/78 132/83   Pulse: 110  110 110   Resp: 16  18 16   Temp: 100 °F (37.8 °C) 97.9 °F (36.6 °C) 98.4 °F (36.9 °C) 97.3 °F (36.3 °C)   TempSrc: Axillary Axillary Oral Oral   SpO2: 94%  98% 96%   Weight:       Height:          Recent Results (from the past 24 hour(s))   Basic Metabolic Panel    Collection Time: 05/08/22  6:22 AM   Result Value Ref Range    Glucose 102 (H) 70 - 99 mg/dL    BUN 24 (H) 8 - 23 mg/dL    CREATININE 1.18 (H) 0.50 - 0.90 mg/dL    Calcium 7.9 (L) 8.6 - 10.4 mg/dL    Sodium 143 135 - 144 mmol/L    Potassium 3.2 (L) 3.7 - 5.3 mmol/L    Chloride 112 (H) 98 - 107 mmol/L    CO2 21 20 - 31 mmol/L    Anion Gap 10 9 - 17 mmol/L    GFR Non-African American 45 (L) >60 mL/min    GFR  55 (L) >60 mL/min    GFR Comment           Recent Labs     05/05/22 2028   POCGLU 124*        CT HEAD WO CONTRAST    Result Date: 5/5/2022  EXAMINATION: CT OF THE HEAD WITHOUT CONTRAST  5/5/2022 2:53 pm TECHNIQUE: CT of the head was performed without the administration of intravenous contrast. Dose modulation, iterative reconstruction, and/or weight based adjustment of the mA/kV was utilized to reduce the radiation dose to as low as reasonably achievable. COMPARISON: January 8, 2022 HISTORY: ORDERING SYSTEM PROVIDED HISTORY: AMS for several days TECHNOLOGIST PROVIDED HISTORY: AMS for several days Decision Support Exception - unselect if not a suspected or confirmed emergency medical condition->Emergency Medical Condition (MA) Reason for Exam: AMS for several days Additional signs and symptoms: PT unable to give hx. Visitor in Pt room says pt suffers from chronic migraines FINDINGS: BRAIN/VENTRICLES: No acute intracranial hemorrhage, or intra-axial mass is present. Bilateral ventricular shunt catheters are again noted in place, stable in appearance. Ventricular system is stable in size and configuration.   No extra-axial fluid collections are present. Posterior fossa contents are stable. Encephalomalacia is again noted within the right frontal lobe, and right parietal lobe. ORBITS: The visualized portion of the orbits demonstrate no acute abnormality. SINUSES: Partial opacification of the frontal sinus is noted. SOFT TISSUES/SKULL:  Postsurgical changes are again noted involving the calvarium. No acute osseous abnormality is present. 1. No acute intracranial hemorrhage, intra-axial mass, or acute territorial infarct 2. Bilateral ventricular shunt catheter is in place, with a stable appearance to the ventricular system. XR CHEST PORTABLE    Result Date: 5/5/2022  EXAMINATION: ONE XRAY VIEW OF THE CHEST 5/5/2022 7:59 pm COMPARISON: None. HISTORY: ORDERING SYSTEM PROVIDED HISTORY: Septic w/u, AMS TECHNOLOGIST PROVIDED HISTORY: Septic w/u, AMS Reason for Exam: Shortness of breath and AMS FINDINGS: *Stable opacity lower 1/3 left hemithorax obscuring the left heart margin and hemidiaphragm in keeping with consolidation and pleural effusion; pneumonia is a consideration. *Senescent pulmonary changes. *No pulmonary infiltrate evident right lung. *Visualized portions of the cardiac silhouette appear unremarkable. *Right left hilar silhouettes and silhouette of the mid and upper mediastinum appear unremarkable. *No pneumothorax evident. 1. Stable opacity lower 1/3 left hemithorax obscuring the left heart margin and hemidiaphragm in keeping with consolidation and pleural effusion; pneumonia is a consideration. 2. Senescent pulmonary changes. 3. No pulmonary infiltrate evident right lung.              On admission         Review of Systems:     Constitutional:  negative for chills, fevers, sweats  Respiratory:  negative for cough, dyspnea on exertion, hemoptysis, shortness of breath, wheezing  Cardiovascular:  negative for chest pain, chest pressure/discomfort, lower extremity edema, palpitations  Gastrointestinal:  negative for abdominal pain, constipation, diarrhea, nausea, vomiting  Neurological:  negative for dizziness, headache  Data:     Past Medical History:  no change     Social History:  no change    Family History: @no change    Vitals:      I/O (24Hr): Intake/Output Summary (Last 24 hours) at 5/8/2022 1527  Last data filed at 5/8/2022 1430  Gross per 24 hour   Intake 3590 ml   Output 2150 ml   Net 1440 ml       Labs:    Radiology:    Medications:      Allergies:      Current Meds:   Scheduled Meds:    potassium chloride  20 mEq Oral BID WC    amoxicillin  500 mg Oral 3 times per day    traMADol  25 mg Oral TID    sodium chloride flush  5-40 mL IntraVENous 2 times per day    heparin (porcine)  5,000 Units SubCUTAneous BID    amLODIPine  5 mg Oral Daily    aspirin  81 mg Oral Daily    atorvastatin  10 mg Oral Nightly    budesonide-formoterol  2 puff Inhalation BID    busPIRone  5 mg Oral BID    ferrous sulfate  325 mg Oral Daily with breakfast    fluticasone  1 spray Each Nostril Daily    lamoTRIgine  100 mg Oral Daily    lamoTRIgine  200 mg Oral Nightly    lidocaine  1 patch TransDERmal Daily    therapeutic multivitamin-minerals  1 tablet Oral Daily    pantoprazole  40 mg Oral QAM AC    QUEtiapine  25 mg Oral Daily    QUEtiapine  50 mg Oral Nightly    topiramate  100 mg Oral QPM    topiramate  50 mg Oral Daily     Continuous Infusions:    sodium chloride      sodium chloride 75 mL/hr at 05/08/22 0624     PRN Meds: sodium chloride flush, sodium chloride, acetaminophen **OR** acetaminophen, calcium carbonate, polyvinyl alcohol, magnesium hydroxide, SUMAtriptan, polyethylene glycol, Hydrocerin      Physical Examination:        Vitals:    05/07/22 2315 05/08/22 0545 05/08/22 0730 05/08/22 1250   BP: 127/67  137/78 132/83   Pulse: 110  110 110   Resp: 16  18 16   Temp: 100 °F (37.8 °C) 97.9 °F (36.6 °C) 98.4 °F (36.9 °C) 97.3 °F (36.3 °C)   TempSrc: Axillary Axillary Oral Oral   SpO2: 94%  98% 96%   Weight:       Height: /83   Pulse 110   Temp 97.3 °F (36.3 °C) (Oral)   Resp 16   Ht 5' 1\" (1.549 m)   Wt 109 lb (49.4 kg)   SpO2 96%   BMI 20.60 kg/m²   Temp (24hrs), Av.3 °F (36.8 °C), Min:97.3 °F (36.3 °C), Max:100 °F (37.8 °C)    Recent Labs     22   POCGLU 124*       Intake/Output Summary (Last 24 hours) at 2022 1527  Last data filed at 2022 1430  Gross per 24 hour   Intake 3590 ml   Output 2150 ml   Net 1440 ml       General Appearance:  alert, well appearing, and in no acute distress  Mental status:   Head:  normocephalic, atraumatic. Eye: no icterus, redness, pupils equal and reactive, extraocular eye movements intact, conjunctiva clear  Ear: normal external ear, no discharge, hearing intact  Nose:  no drainage noted  Mouth: mucous membranes moist  Neck: supple, no carotid bruits, thyroid not palpable  Lungs: Bilateral equal air entry, clear to ausculation, no wheezing, rales or rhonchi, normal effort  Cardiovascular: normal rate, regular rhythm, no murmur, gallop, rub. Abdomen: Soft, nontender, nondistended, normal bowel sounds, no hepatomegaly or splenomegaly  Neurologic: There are no new focal motor or sensory deficits,   Skin: No gross lesions, rashes, bruising or bleeding on exposed skin area  Extremities:  peripheral pulses palpable, no pedal edema or calf pain with palpation  Psych:             Assessment:        Primary Problem  Sepsis ([address]    Principal Problem:    Sepsis ([address]  Active Problems:    Opacity of lung on imaging study    Acute cystitis with hematuria    Urinary retention    Hydrocephalus ([facility])    S/P  shunt    Bipolar mood disorder ([facility])    ARYAN (acute kidney injury) ([address]  Resolved Problems:    * No resolved hospital problems. *       Plan:          22   afebrile   · wii adjust meds   · Dc reglan   · Reduce tramadol   Urology input  Marmolejo in place for urinary retention. UCx shows GNR. On Rocephin. Await final culture.   Repeat upper tract imaging if she spikes a fever. Void trial prior to D/C. [facility] Problems           Last Modified POA    * (Principal) Sepsis ([facility] 5/6/2022 Yes    Opacity of lung on imaging study 5/6/2022 Yes    Acute cystitis with hematuria 5/6/2022 Yes    Urinary retention 5/6/2022 Yes    Hydrocephalus ([facility] [facility] 5/6/2022 Yes    S/P  shunt 5/6/2022 Yes    Bipolar mood disorder ([facility] [facility] 5/6/2022 Yes    ARYAN (acute kidney injury) ([facility] 5/6/2022 Yes                    5/8/22    · E coli uti . rx changed to amoxil 1. Hypokalemia . rx ordered  2. Voiding trial hoff      '         Thanks for consulting us . Will monitor vitals and clinical course , and  Optimize therapy  as needed .            Milton Egan MD

## 2022-05-09 VITALS
HEART RATE: 85 BPM | WEIGHT: 109 LBS | TEMPERATURE: 97.3 F | HEIGHT: 61 IN | BODY MASS INDEX: 20.58 KG/M2 | DIASTOLIC BLOOD PRESSURE: 71 MMHG | SYSTOLIC BLOOD PRESSURE: 128 MMHG | RESPIRATION RATE: 16 BRPM | OXYGEN SATURATION: 100 %

## 2022-05-09 LAB
ANION GAP SERPL CALCULATED.3IONS-SCNC: 10 MMOL/L (ref 9–17)
BUN BLDV-MCNC: 17 MG/DL (ref 8–23)
CALCIUM SERPL-MCNC: 8 MG/DL (ref 8.6–10.4)
CHLORIDE BLD-SCNC: 113 MMOL/L (ref 98–107)
CO2: 18 MMOL/L (ref 20–31)
CREAT SERPL-MCNC: 0.99 MG/DL (ref 0.5–0.9)
GFR AFRICAN AMERICAN: >60 ML/MIN
GFR NON-AFRICAN AMERICAN: 55 ML/MIN
GFR SERPL CREATININE-BSD FRML MDRD: ABNORMAL ML/MIN/{1.73_M2}
GLUCOSE BLD-MCNC: 127 MG/DL (ref 70–99)
POTASSIUM SERPL-SCNC: 3.7 MMOL/L (ref 3.7–5.3)
PROCALCITONIN: 0.43 NG/ML
SODIUM BLD-SCNC: 141 MMOL/L (ref 135–144)

## 2022-05-09 PROCEDURE — 97166 OT EVAL MOD COMPLEX 45 MIN: CPT

## 2022-05-09 PROCEDURE — 97535 SELF CARE MNGMENT TRAINING: CPT

## 2022-05-09 PROCEDURE — 97162 PT EVAL MOD COMPLEX 30 MIN: CPT

## 2022-05-09 PROCEDURE — 94761 N-INVAS EAR/PLS OXIMETRY MLT: CPT

## 2022-05-09 PROCEDURE — 99239 HOSP IP/OBS DSCHRG MGMT >30: CPT | Performed by: INTERNAL MEDICINE

## 2022-05-09 PROCEDURE — 80048 BASIC METABOLIC PNL TOTAL CA: CPT

## 2022-05-09 PROCEDURE — 84145 PROCALCITONIN (PCT): CPT

## 2022-05-09 PROCEDURE — 6360000002 HC RX W HCPCS: Performed by: NURSE PRACTITIONER

## 2022-05-09 PROCEDURE — 6370000000 HC RX 637 (ALT 250 FOR IP): Performed by: INTERNAL MEDICINE

## 2022-05-09 PROCEDURE — 94640 AIRWAY INHALATION TREATMENT: CPT

## 2022-05-09 PROCEDURE — 97116 GAIT TRAINING THERAPY: CPT

## 2022-05-09 RX ORDER — AMOXICILLIN 500 MG/1
500 CAPSULE ORAL EVERY 8 HOURS SCHEDULED
Qty: 18 CAPSULE | Refills: 0 | Status: SHIPPED | OUTPATIENT
Start: 2022-05-09 | End: 2022-05-15

## 2022-05-09 RX ORDER — TRAMADOL HYDROCHLORIDE 50 MG/1
25 TABLET ORAL 3 TIMES DAILY
Qty: 5 TABLET | Refills: 0 | Status: SHIPPED | OUTPATIENT
Start: 2022-05-09 | End: 2022-05-12

## 2022-05-09 RX ADMIN — PANTOPRAZOLE SODIUM 40 MG: 40 TABLET, DELAYED RELEASE ORAL at 06:20

## 2022-05-09 RX ADMIN — QUETIAPINE FUMARATE 25 MG: 50 TABLET ORAL at 10:02

## 2022-05-09 RX ADMIN — ASPIRIN 81 MG: 81 TABLET, COATED ORAL at 10:02

## 2022-05-09 RX ADMIN — MULTIPLE VITAMINS W/ MINERALS TAB 1 TABLET: TAB at 10:02

## 2022-05-09 RX ADMIN — AMOXICILLIN 500 MG: 500 CAPSULE ORAL at 15:12

## 2022-05-09 RX ADMIN — TOPIRAMATE 50 MG: 50 TABLET, FILM COATED ORAL at 10:14

## 2022-05-09 RX ADMIN — AMOXICILLIN 500 MG: 500 CAPSULE ORAL at 06:20

## 2022-05-09 RX ADMIN — AMLODIPINE BESYLATE 5 MG: 5 TABLET ORAL at 10:02

## 2022-05-09 RX ADMIN — TRAMADOL HYDROCHLORIDE 25 MG: 50 TABLET, COATED ORAL at 10:04

## 2022-05-09 RX ADMIN — BUDESONIDE AND FORMOTEROL FUMARATE DIHYDRATE 2 PUFF: 160; 4.5 AEROSOL RESPIRATORY (INHALATION) at 08:32

## 2022-05-09 RX ADMIN — HEPARIN SODIUM 5000 UNITS: 5000 INJECTION INTRAVENOUS; SUBCUTANEOUS at 10:18

## 2022-05-09 RX ADMIN — FERROUS SULFATE TAB 325 MG (65 MG ELEMENTAL FE) 325 MG: 325 (65 FE) TAB at 10:15

## 2022-05-09 RX ADMIN — LAMOTRIGINE 100 MG: 100 TABLET ORAL at 10:21

## 2022-05-09 RX ADMIN — BUSPIRONE HYDROCHLORIDE 5 MG: 5 TABLET ORAL at 10:02

## 2022-05-09 RX ADMIN — POTASSIUM CHLORIDE 20 MEQ: 20 TABLET, EXTENDED RELEASE ORAL at 10:18

## 2022-05-09 ASSESSMENT — PAIN SCALES - GENERAL: PAINLEVEL_OUTOF10: 6

## 2022-05-09 NOTE — PROGRESS NOTES
Physical Therapy  Facility/Department: [address] [place] [address]  Physical Therapy Initial Assessment    Name: Edmundo Kim  :   MRN: [id number]  Date of Service: 2022    Discharge Recommendations:  Patient would benefit from continued therapy after discharge   PT Equipment Recommendations  Equipment Needed: No      Patient Diagnosis(es): The primary encounter diagnosis was Septicemia [facility]). Diagnoses of Acute cystitis with hematuria and ARYAN (acute kidney injury) ([address] were also pertinent to this visit. Past Medical History:  has a past medical history of Acid reflux, Atrophy of kidney, Bipolar affective ([address] Chronic headaches, Chronic kidney disease, COVID-19, Dehydration, Difficulty in walking, Dysphagia, Fall, GERD (gastroesophageal reflux disease), H/O recurrent pneumonia, Hard of hearing, Hearing loss, Hydrocephalus ([address] Hydronephrosis, Hypertension, Kidney stones, Lives in nursing home, Migraine, Schizo affective schizophrenia ([address] Sepsis ([address] Tachycardia, UTI (urinary tract infection), and Weakness. Past Surgical History:  has a past surgical history that includes other surgical history; shunt revision; Cholecystectomy; Tonsillectomy; Hip fracture surgery (Left, 2017); hip pinning (Left, 2017); Cystoscopy (Left, 2021); Cystoscopy (Left, 2022); and Ureter surgery (Left, 2022). Assessment   Body Structures, Functions, Activity Limitations Requiring Skilled Therapeutic Intervention: Decreased functional mobility ; Decreased cognition;Decreased balance;Decreased strength;Decreased safe awareness  Assessment: continue per POC. Therapist would recommend assist for all transfers and gait using wheeled walker.   Treatment Diagnosis: impaired mobility due to debility  Specific Instructions for Next Treatment: advance gait distance using wheeled walker, instruct in exercise program  Therapy Prognosis: Good  Decision Making: Medium Complexity  History: pt admitted due to septicemia  Exam: ROM, MMT, balance and mobility assessments  Clinical Presentation: SBA for rolling and supine > sit, CGA x 1 sit <> stand, 4 shuffle steps using wheeled walker w/ CGA x 1 from bed > BSC during a quarter turn then 6 shuffle steps using wheeled walker from [facility] > chair during a 180 degree turn w/ CGA x 1, FALL RISK, needs hearing aid batteries replaced  Barriers to Learning: cognition  Requires PT Follow-Up: Yes  Activity Tolerance  Activity Tolerance: Treatment limited secondary to decreased cognition     Plan   Plan  Plan:  (5-7 treatments/ week)  Specific Instructions for Next Treatment: advance gait distance using wheeled walker, instruct in exercise program  Current Treatment Recommendations: Strengthening,Gait training,Balance training,Functional mobility training,Transfer training,Safety education & training,Patient/Caregiver education & training,Endurance training  Safety Devices  Type of Devices: Gait belt,Chair alarm in place,Call light within reach,Left in chair,Nurse notified,Patient at risk for falls     Restrictions  Restrictions/Precautions  Restrictions/Precautions: Fall Risk,Up as Tolerated (urinary catheter, peripheral IV left brachial)  Required Braces or Orthoses?: No  Implants present? :  (bilateral ventricular shunts)  Position Activity Restriction  Other position/activity restrictions: up w/ assist     Subjective   Pain: C/O constant neck, face, head and left leg- will not give it a number  General  Patient assessed for rehabilitation services?: Yes  Response To Previous Treatment: Not applicable  Family / Caregiver Present: No  Referring Practitioner: Catarina Ellison CNP  Referral Date : 05/06/22  Diagnosis: septicemia  Follows Commands: Impaired  Other (Comment): OK per nurse Jacki Fuller to proceed w/ PT evaluation  Subjective  Subjective: pt is very [place] as her hearing batteries are completely dead.   Pt states that she is tied down to the bed referring to IV, telemetry box and urinary catheter line- therapist attempts to explain that these items can be moved. Pt reports that she needs the bedpan but was coaxed to get up to [facility] w/ therapists. Social/Functional History  Social/Functional History  Type of Home: Facility (group home for [facility])  Home Layout: One level  Home Equipment: Walker, rolling  ADL Assistance: Needs assistance (assist for showers for safety, states she dresses herself)  Homemaking Assistance: Needs assistance (staff completes cooking, cleaning and laundry)  Homemaking Responsibilities: No (staff completes cooking, cleaning and laundry)  Ambulation Assistance: Independent (uses wheeled walker)  Transfer Assistance: Independent  Active : No  Additional Comments: Limited information provided due to cognitive barrier and extreme hear deficits. Vision/Hearing  Vision Exceptions: Wears glasses at all times  Hearing: Exceptions to [facility]  Hearing Exceptions: Hard of hearing/hearing concerns;Bilateral hearing aid (needs batteries in her hearing aids)    Cognition   Orientation  Overall Orientation Status: Impaired (name and  stated correctly, place: unable to state but stated hospital after cued, , year: )  Orientation Level: Oriented to person;Disoriented to place; Disoriented to situation;Disoriented to time     Objective   Pulse: 99  Heart Rate Source: Monitor  BP: (!) 143/88  BP Location: Right upper arm  Patient Position: Lying right side  MAP (Calculated): 106.33  Resp: 18  SpO2: 100 %  O2 Device: None (Room air)     Observation/Palpation  Observation: urinary catheter, peripheral IV left brachial  Gross Assessment  Sensation:  (pt did not answer the question.)     AROM RLE (degrees)  RLE AROM: WFL  AROM LLE (degrees)  LLE AROM : WFL  AROM RUE (degrees)  RUE General AROM: see OT for UE assessment  AROM LUE (degrees)  LUE General AROM: see OT for UE assessment  Strength RLE  Comment: grossly 3+/5 hip flexors otherwise 4-/5  Strength LLE  Comment: grossly 3+/5 hip flexors otherwise 4-/5  Strength RUE  Comment: see OT for UE assessment  Strength LUE  Comment: see OT for UE assessment           Bed mobility  Rolling to Right: Stand by assistance  Supine to Sit: Stand by assistance  Scooting: Stand by assistance  Bed Mobility Comments: dangles at the EOB w/ supervision  Transfers  Sit to Stand: Contact guard assistance  Stand to sit: Contact guard assistance  Stand Pivot Transfers: Contact guard assistance  Comment: pt transfered bed > BSC > chair using wheeled walker w/ CGA x 1. Therapist provided CGA x 1 while pt stood using the wheeled walker while OT Aidecijulito cleaned the patient from a large amout of soft formed stool. Ambulation  Surface: level tile  Device: Rolling Walker  Assistance: Contact guard assistance  Gait Deviations: Slow Xenia; Shuffles  Distance: 4 shuffle steps using wheeled walker from bed > BSC during a quarter turn then 6 shuffle steps using wheeled walker from [facility] > chair during a 180 degree turn  Stairs/Curb  Stairs?: No     Balance  Sitting - Static: Good  Sitting - Dynamic: Good;-  Standing - Static: Good;- (used wheeled walker)  Standing - Dynamic: Fair;+ (used wheeled walker)           OutComes Score                                                  AM-PAC Score     AM-PAC Inpatient Mobility without Stair Climbing Raw Score : 15 (05/09/22 0838)  AM-PAC Inpatient without Stair Climbing T-Scale Score : 43.03 (05/09/22 2797)  Mobility Inpatient CMS 0-100% Score: 47.43 (05/09/22 5703)  Mobility Inpatient without Stair CMS G-Code Modifier : CK (05/09/22 8973)       Goals  Short Term Goals  Time Frame for Short term goals: 5-7 treatments/ week  Short term goal 1: pt to tolerate 1/2 hour of therapuetic exercise and activity  Short term goal 2: pt to demonstrate good technique w/ cuing to complete balance abd strengthening exercises accurately 75% of the time  Short term goal 3: pt to demonstrate bed mobility w/ supervision  Short term goal 4: pt to demonstrate transfers using wheeled walker w/ SBA x 1  Short term goal 5: pt to demonstrate gait 30-50' using wheeled walker w/ SBA x 1  Patient Goals   Patient goals : did not state a goal       Therapy Time   Individual Concurrent Group Co-treatment   Time In 0838         Time Out 0912         Minutes 34         Timed Code Treatment Minutes: [facility],[address], PT

## 2022-05-09 NOTE — FLOWSHEET NOTE
05/09/22 1406   Encounter Summary   Encounter Overview/Reason   Encounter   Service Provided For: Patient   Referral/Consult From: Rounding   Last Encounter  05/09/22   Complexity of Encounter Low   Spiritual/Emotional needs   Type Spiritual Support   Rituals, Rites and Sacraments   Type Anointing  ([place] 5-9-22)

## 2022-05-09 NOTE — CARE COORDINATION
ONGOING DISCHARGE PLAN:    Patient is alert and oriented x4. Spoke with patient regarding discharge plan and patient confirms that plan is still home with [facility]. Notified  Francisco legal guardian and Warden Mckenzie from [facility] on patients discharge status - [facility] will  patient around 2:30 PM - primary RN notified    Marmolejo removed, patient successfully voided. Will continue to follow for additional discharge needs.     Electronically signed by Oleksandr Merino RN on 5/9/2022 at 12:54 PM

## 2022-05-09 NOTE — FLOWSHEET NOTE
05/09/22 1234   Encounter Summary   Encounter Overview/Reason  Volunteer Encounter   Service Provided For: Patient   Referral/Consult From: Finn   Last Encounter  05/09/22   Spiritual/Emotional needs   Type Spiritual Support   Rituals, Rites and Sacraments   Type Jew Communion   Assessment/Intervention/Outcome   Intervention Prayer (assurance of)/[place]

## 2022-05-09 NOTE — DISCHARGE INSTR - COC
Continuity of Care Form    Patient Name: Joe Reyes   :  [birth year]  MRN:  [id number]    Admit date:  2022  Discharge date:  ***    Code Status Order: Full Code   Advance Directives:      Admitting Physician:  Noah Downing MD  PCP: Tobie Hatchet, MD    Discharging Nurse: [facility] Unit/Room#: 2097/2097-01  Discharging Unit Phone Number: ***    Emergency Contact:   Extended Emergency Contact Information  Primary Emergency Contact: Francisco Ignacio  Address: [address] [place] [address] [address] [facility] Phone: [phone]  Relation: Brother/Sister  Hearing or visual needs: None  Other needs: None  Preferred language: English   needed?  No  Secondary Emergency Contact: Tracie Gitelman, [address] [place] [address] Phone: [phone]  Relation: Lay Caregiver    Past Surgical History:  Past Surgical History:   Procedure Laterality Date    CHOLECYSTECTOMY      CYSTOSCOPY Left 2021    CYSTOSCOPY URETERAL STENT INSERTION performed by Laura Vidal MD at [facility] Left 2022    HOLMIUM, CYSTO, URETEROSCOPY, STENT PLACEMENT    HIP FRACTURE SURGERY Left 2017    PINNING AND SCREW    HIP PINNING Left 2017    HIP PINNING 7.3 CANNULATED SCREW WITH SYNTHES PRODUCT APPLICATION AND INTRAOPERATIVE C-ARM performed by Yunior Jurado MD at [place] [address].      shunt x3 head    SHUNT REVISION      TONSILLECTOMY      URETER SURGERY Left 2022    LITHOTRIPSY, CYSTOSCOPY, URETEROSCOPY, LEFT STENT EXCHANGE performed by Laura Vidal MD at [address] [place] [address] History:   Immunization History   Administered Date(s) Administered    COVID-19, Pfizer Purple top, DILUTE for use, 12+ yrs, 30mcg/0.3mL dose 2020, 2021       Active Problems:  Patient Active Problem List   Diagnosis Code    Contusion of hip S70.00XA    UTI (urinary tract infection), uncomplicated F93.5    Inability to ambulate due to hip R26.2    Closed fracture of left inferior pubic ramus ([facility]) S32.592A    Closed compression fracture of L4 lumbar vertebra S32.040A    Cellulitis of left leg L03.116    Failure of outpatient treatment Z78.9    Hydrocephalus (HCC) G91.9    S/P  shunt Z98.2    Leg edema R60.0    Hydronephrosis of left kidney N13.30    Severe sepsis ([facility]) A41.9, R65.20    Bipolar mood disorder ([facility]) F31.9    ARYAN (acute kidney injury) ([address] N17.9    Aspiration pneumonitis ([facility]) J69.0    Sepsis (HCC) A41.9    Opacity of lung on imaging study R91.8    Acute cystitis with hematuria N30.01    Urinary retention R33.9       Isolation/Infection:   Isolation            No Isolation          Patient Infection Status       Infection Onset Added Last Indicated Last Indicated By Review Planned Expiration Resolved Resolved By    None active    Resolved    COVID-19 (Rule Out) 11/07/21 11/07/21 11/07/21 COVID-19 & Influenza Combo (Ordered)   11/07/21 Rule-Out Test Resulted            Nurse Assessment:  Last Vital Signs: /71   Pulse 85   Temp 97.3 °F (36.3 °C) (Oral)   Resp 16   Ht 5' 1\" (1.549 m)   Wt 109 lb (49.4 kg)   SpO2 100%   BMI 20.60 kg/m²     Last documented pain score (0-10 scale): Pain Level: 6  Last Weight:   Wt Readings from Last 1 Encounters:   05/05/22 109 lb (49.4 kg)     Mental Status:  {IP PT MENTAL STATUS:20030}    IV Access:  { CRISTIAN IV ACCESS:321571331}    Nursing Mobility/ADLs:  Walking   {CHP DME UYBS:932677918}  Transfer  {P DME XABO:496902470}  Bathing  {P DME CRYA:789445820}  Dressing  {CHP DME VYFY:244583693}  Toileting  {CHP DME UOWV:857559766}  Feeding  {P DME KEBI:938232324}  Med Admin  {CHP DME ZDLI:130412669}  Med Delivery   { CRISTIAN MED Delivery:895857053}    Wound Care Documentation and Therapy:        Elimination:  Continence:    Bowel: {YES / PRICE:29432}  Bladder: {YES / IT:28679}  Urinary Catheter: {Urinary Catheter:046946360}   Colostomy/Ileostomy/Ileal Conduit: {YES / RM:96100}       Date of Last BM: ***    Intake/Output Summary (Last 24 hours) at 2022 1513  Last data filed at 2022 0645  Gross per 24 hour   Intake 857 ml   Output 1750 ml   Net -893 ml     I/O last 3 completed shifts: In: 4377 [P.O.:350;  I.V.:4027]  Out: 3550 [Urine:3550]    Safety Concerns:     { CRISTIAN Safety Concerns:391631108}    Impairments/Disabilities:      { CRISTIAN Impairments/Disabilities:617424798}    Nutrition Therapy:  Current Nutrition Therapy:   [address] CRISTIAN Diet List:785458925}    Routes of Feeding: {[facility] DME Other Feedings:421110278}  Liquids: {Slp liquid thickness:49014}  Daily Fluid Restriction: {CHP DME Yes amt example:901311608}  Last Modified Barium Swallow with Video (Video Swallowing Test): {Done Not Done FYQJ:183338624}    Treatments at the Time of Hospital Discharge:   Respiratory Treatments: ***  Oxygen Therapy:  {Therapy; copd oxygen:18088}  Ventilator:    {[facility] Vent PLCV:449402632}    Rehab Therapies: {THERAPEUTIC INTERVENTION:4095468968}  Weight Bearing Status/Restrictions: [address] CC Weight Bearin}  Other Medical Equipment (for information only, NOT a DME order):  {EQUIPMENT:827864400}  Other Treatments: ***    Patient's personal belongings (please select all that are sent with patient):  {[facility] DME Belongings:791696805}    RN SIGNATURE:  {Esignature:492670987}    CASE MANAGEMENT/SOCIAL WORK SECTION    Inpatient Status Date: ***    Readmission Risk Assessment Score:  Readmission Risk              Risk of Unplanned Readmission:  27           Discharging to Facility/ Agency   Name:   Address:  Phone:  Fax:    Dialysis Facility (if applicable)   Name:  Address:  Dialysis Schedule:  Phone:  Fax:    / signature: {Esignature:081384365}    PHYSICIAN SECTION    Prognosis: {Prognosis:9303642541}    Condition at Discharge: [address] Patient Condition:104925498}    Rehab Potential (if transferring to Rehab): {Prognosis:0374282520}    Recommended Labs or Other Treatments After Discharge: ***    Physician Certification: I certify the above information and transfer of Melvin Breen  is necessary for the continuing treatment of the diagnosis listed and that she requires {Admit to Appropriate Level of Care:38235} for {GREATER/LESS:127701864} 30 days.      Update Admission H&P: [facility]}    PHYSICIAN SIGNATURE:  {Esignature:623230812}

## 2022-05-09 NOTE — PROGRESS NOTES
Occupational Therapy  Facility/Department: [address] [place] [address]  Occupational Therapy Initial Assessment    Name: Lux Terry  : [birth year]  MRN: [id number]  Date of Service: 2022    Discharge Recommendations:  Patient would benefit from continued therapy after discharge  OT Equipment Recommendations  Other: TBD       Patient Diagnosis(es): The primary encounter diagnosis was Septicemia [facility]). Diagnoses of Acute cystitis with hematuria and ARYAN (acute kidney injury) ([facility] were also pertinent to this visit. Past Medical History:  has a past medical history of Acid reflux, Atrophy of kidney, Bipolar affective ([address] Chronic headaches, Chronic kidney disease, COVID-19, Dehydration, Difficulty in walking, Dysphagia, Fall, GERD (gastroesophageal reflux disease), H/O recurrent pneumonia, Hard of hearing, Hearing loss, Hydrocephalus ([address] Hydronephrosis, Hypertension, Kidney stones, Lives in nursing home, Migraine, Schizo affective schizophrenia ([address] Sepsis ([address] Tachycardia, UTI (urinary tract infection), and Weakness. Past Surgical History:  has a past surgical history that includes other surgical history; shunt revision; Cholecystectomy; Tonsillectomy; Hip fracture surgery (Left, 2017); hip pinning (Left, 2017); Cystoscopy (Left, 2021); Cystoscopy (Left, 2022); and Ureter surgery (Left, 2022). Treatment Diagnosis: Impaired self care status      Assessment   Performance deficits / Impairments: Decreased ADL status; Decreased functional mobility ; Decreased strength;Decreased safe awareness;Decreased cognition;Decreased endurance;Decreased balance  Treatment Diagnosis: Impaired self care status  Prognosis: Good  Decision Making: Medium Complexity  REQUIRES OT FOLLOW-UP: Yes  Activity Tolerance  Activity Tolerance: Patient Tolerated treatment well;Treatment limited secondary to decreased cognition (Limited due to hearing deficits)        Plan   Plan  Times per Week: 4-6  Current Treatment Recommendations: Strengthening,Endurance training,Self-Care / ADL,Patient/Caregiver education & training,Safety education & training,Cognitive reorientation,Balance training,Functional mobility training,Pain management,Equipment evaluation, education, & procurement     Restrictions  Restrictions/Precautions  Restrictions/Precautions: Fall Risk,Up as Tolerated (urinary catheter, peripheral IV left brachial)  Required Braces or Orthoses?: No  Implants present? :  (bilateral ventricular shunts)  Position Activity Restriction  Other position/activity restrictions: up w/ assist    Subjective   General  Chart Reviewed: Yes  Patient assessed for rehabilitation services?: Yes  Family / Caregiver Present: No  Referring Practitioner: ANNALISA Phillips CNP  Diagnosis: Septicemia  Subjective  Subjective: \"Your a smart cookie. \" Pt was pleasant and agreeable to OT/PT eval  General Comment  Comments: [id number] [address] per OMAIRA Fuller for OT/PT eval     Social/Functional History  Social/Functional History  Type of Home: Facility (group home for [facility])  Home Layout: One level  Home Equipment: Walker, rolling  ADL Assistance: Needs assistance (assist for showers for safety, states she dresses herself)  Homemaking Assistance: Needs assistance (staff completes cooking, cleaning and laundry)  Homemaking Responsibilities: No (staff completes cooking, cleaning and laundry)  Ambulation Assistance: Independent (uses wheeled walker)  Transfer Assistance: Independent  Active : No  Additional Comments: Limited information provided due to cognitive barrier and extreme hear deficits.         Objective   Pulse: 99  Heart Rate Source: Monitor  BP: (!) 143/88  BP Location: Right upper arm  Patient Position: Lying right side  MAP (Calculated): 106.33  Resp: 18  SpO2: 100 %  O2 Device: None (Room air)  Vision Exceptions: Wears glasses at all times  Hearing: Exceptions to [facility]  Hearing Exceptions: Hard of hearing/hearing concerns;Bilateral hearing aid (needs batteries in her hearing aids)       Observation/Palpation  Observation: urinary catheter, peripheral IV left brachial  Safety Devices  Type of Devices: Gait belt; Chair alarm in place;Call light within reach; Left in chair;Nurse notified; Patient at risk for falls     Toilet Transfers  Toilet - Technique: Stand step  Equipment Used: Standard bedside commode  Toilet Transfer: Contact guard assistance  Toilet Transfers Comments: Verbal cues for hand placement and safety with Fair carryover due to hearing deficits     ADL  Feeding: Setup  Grooming: Setup  UE Bathing: Stand by assistance  LE Bathing: Moderate assistance  UE Dressing: Stand by assistance  LE Dressing: Maximum assistance  LE Dressing Skilled Clinical Factors: OT attempted to have pt don brief while sitting on BSC. Pt required A with threading and pulling up in back  Toileting: Dependent/Total  Toileting Skilled Clinical Factors: Toileting complete at [facility] with A with clothing management and hygiene. Marmolejo catheter. Additional Comments: ADL scores based on clinical reasoning and skilled observation unless otherwise noted. Pt currently limited due to decreased strength, balance, activity tolerance, cognitive barriers, and Tribe limiting safety and independence with self care tasks. Tone RUE  RUE Tone: Normotonic  Tone LUE  LUE Tone: Normotonic  Coordination  Movements Are Fluid And Coordinated: Yes  Activity Tolerance  Activity Tolerance: Treatment limited secondary to decreased cognition  Bed mobility  Supine to Sit: Stand by assistance  Scooting: Stand by assistance  Bed Mobility Comments: Bed mobility completed with HOB slightly elevated with increased time and max verbal cues due to hearing deficits.    Transfers  Stand Pivot Transfers: Contact guard assistance  Sit to stand: Contact guard assistance  Stand to sit: Contact guard assistance  Transfer Comments: Verbal cues for hand placement and safety with Fair carryover due to hearing deficits. Education Given To: Patient  Education Provided: Role of Therapy;Plan of Care;Transfer Training  Education Method: Verbal;Demonstration  Barriers to Learning: Cognition; Hearing  Education Outcome: Continued education needed  LUE AROM (degrees)  LUE AROM : WFL  Left Hand AROM (degrees)  Left Hand AROM: WFL  RUE AROM (degrees)  RUE AROM : WFL  Right Hand AROM (degrees)  Right Hand AROM: WFL  LUE Strength  Gross LUE Strength: WFL  L Hand General: 4/5  RUE Strength  Gross RUE Strength: WFL  R Hand General: 4/5                  AM-PAC Score        AM-[facility] Inpatient Daily Activity Raw Score: 14 (05/09/22 1141)  AM-PAC Inpatient ADL T-Scale Score : 33.39 (05/09/22 1141)  ADL Inpatient CMS 0-100% Score: 59.67 (05/09/22 1141)  ADL Inpatient CMS G-Code Modifier : CK (05/09/22 1141)    Goals  Short Term Goals  Time Frame for Short term goals: By discharge  Short Term Goal 1: Pt will complete lower body bathing/dressing with Supervision and Good safety  Short Term Goal 2: Pt will complete functional transfers/mobility during self care tasks with Supervision and Good safety with use of least restrictive device  Short Term Goal 3: Pt will complete toileting task with Supervision and Good attention to task  Short Term Goal 4: Pt will follow simple 2 step command 50% of the time to increase participation in daily activities  Short Term Goal 5: Pt will verbalize/demonstrate Good understanding of home safety/fall prevention strategies to increase safety and independence with self care and mobiliyty  Short Term Goal 6: Pt will participate in 15+ minutes of therapeutic exercises/functional activities to increase safety and independence with self care and mobility       Therapy Time   Individual Concurrent Group Co-treatment   Time In 0838         Time Out 0912         Minutes 34         Timed Code Treatment Minutes: 15 Minutes       Beny Vidal OT

## 2022-05-09 NOTE — DISCHARGE SUMMARY
[place] [address] Internal Medicine    Discharge Summary     Patient ID: Nash Hebert  :     MRN: [id number]     ACCOUNT:  [de-identified]   Patient's PCP: Donna Alvarez MD  Admit Date: 2022   Discharge Date: 2022    Length of Stay: 3  Code Status:  Full Code  Admitting Physician: Liza Pelletier MD  Discharge Physician: Abner Hayes MD     Active Discharge Diagnoses:     Primary Problem  Sepsis [facility])      [place] Problems    Diagnosis Date Noted    Sepsis ([facility] [A41.9] 2022     Priority: Medium    Opacity of lung on imaging study [R91.8] 2022     Priority: Medium    Acute cystitis with hematuria [N30.01] 2022     Priority: Medium    Urinary retention [R33.9] 2022     Priority: Medium    ARYAN (acute kidney injury) ([address] [N17.9] 2021    Bipolar mood disorder ([address] [F31.9] 2021    S/P  shunt [Z98.2] 2020    Hydrocephalus ([address] [G91.9] 2020       Admission Condition:  fair     Discharged Condition: fair    Hospital Stay:     Hospital Course:   Nash Hebert is a 66 y.o. female who was admitted for the management of Sepsis [facility]) , presented to ER with Shortness of Breath  65-year-old pleasant lady with history of MRDD very hard of hearing history of hydrocephalus with a ventriculoperitoneal shunt admitted with acute metabolic toxic encephalopathy secondary to acute cystitis cultures were negative treated with IV antibiotic changed to oral  She was brought in with SIRS IV fluid resuscitated lactic acid was normal blood cultures negative  Possible community-acquired pneumonia antibiotic oral amoxicillin  Acute kidney injury admitted with creatinine 2.9  Baseline 0.9  Patient was found to be in acute patient received a Marmolejo's catheter trial without catheter was done at the time of this dictation likely secondary to constipation and fecal impaction patient had a good bowel movement  Post void bladder scan will be done today outpatient follow-up with urology  Patient has an atrophic right kidney on ultrasound  Patient discharged back to extended-care facility today    Significant therapeutic interventions:     Significant Diagnostic Studies:   Labs / Micro:        ,     Radiology:    CT HEAD WO CONTRAST    Result Date: 5/5/2022  EXAMINATION: CT OF THE HEAD WITHOUT CONTRAST  5/5/2022 2:53 pm TECHNIQUE: CT of the head was performed without the administration of intravenous contrast. Dose modulation, iterative reconstruction, and/or weight based adjustment of the mA/kV was utilized to reduce the radiation dose to as low as reasonably achievable. COMPARISON: January 8, 2022 HISTORY: ORDERING SYSTEM PROVIDED HISTORY: AMS for several days TECHNOLOGIST PROVIDED HISTORY: AMS for several days Decision Support Exception - unselect if not a suspected or confirmed emergency medical condition->Emergency Medical Condition (MA) Reason for Exam: AMS for several days Additional signs and symptoms: PT unable to give hx. Visitor in Pt room says pt suffers from chronic migraines FINDINGS: BRAIN/VENTRICLES: No acute intracranial hemorrhage, or intra-axial mass is present. Bilateral ventricular shunt catheters are again noted in place, stable in appearance. Ventricular system is stable in size and configuration. No extra-axial fluid collections are present. Posterior fossa contents are stable. Encephalomalacia is again noted within the right frontal lobe, and right parietal lobe. ORBITS: The visualized portion of the orbits demonstrate no acute abnormality. SINUSES: Partial opacification of the frontal sinus is noted. SOFT TISSUES/SKULL:  Postsurgical changes are again noted involving the calvarium. No acute osseous abnormality is present. 1. No acute intracranial hemorrhage, intra-axial mass, or acute territorial infarct 2.  Bilateral ventricular shunt catheter is in place, with a stable appearance to the ventricular system. XR CHEST PORTABLE    Result Date: 5/5/2022  EXAMINATION: ONE XRAY VIEW OF THE CHEST 5/5/2022 7:59 pm COMPARISON: None. HISTORY: ORDERING SYSTEM PROVIDED HISTORY: Septic w/u, AMS TECHNOLOGIST PROVIDED HISTORY: Septic w/u, AMS Reason for Exam: Shortness of breath and AMS FINDINGS: *Stable opacity lower 1/3 left hemithorax obscuring the left heart margin and hemidiaphragm in keeping with consolidation and pleural effusion; pneumonia is a consideration. *Senescent pulmonary changes. *No pulmonary infiltrate evident right lung. *Visualized portions of the cardiac silhouette appear unremarkable. *Right left hilar silhouettes and silhouette of the mid and upper mediastinum appear unremarkable. *No pneumothorax evident. 1. Stable opacity lower 1/3 left hemithorax obscuring the left heart margin and hemidiaphragm in keeping with consolidation and pleural effusion; pneumonia is a consideration. 2. Senescent pulmonary changes. 3. No pulmonary infiltrate evident right lung. Consultations:    Consults:     Final Specialist Recommendations/Findings:   IP CONSULT TO INTERNAL MEDICINE  IP CONSULT TO UROLOGY  IP CONSULT TO PHARMACY      The patient was seen and examined on day of discharge and this discharge summary is in conjunction with any daily progress note from day of discharge. Discharge plan:     Disposition: ecf    Physician Follow Up:   pcp  Urology    No follow-up provider specified.      Requiring Further Evaluation/Follow Up POST HOSPITALIZATION/Incidental Findings:    Diet: regular diet    Activity: As tolerated    Instructions to Patient:     Discharge Medications:      Medication List      START taking these medications    amoxicillin 500 MG capsule  Commonly known as: AMOXIL  Take 1 capsule by mouth every 8 hours for 18 doses        CHANGE how you take these medications    traMADol 50 MG tablet  Commonly known as: ULTRAM  Take 0.5 tablets by mouth 3 times daily for 3 days.  Indications: hold if patient is sedated  What changed: how much to take        CONTINUE taking these medications    amLODIPine 5 MG tablet  Commonly known as: NORVASC     aspirin 81 MG EC tablet     atorvastatin 10 MG tablet  Commonly known as: LIPITOR     buprenorphine 15 MCG/HR Ptwk  Commonly known as: BUPRENEX     busPIRone 5 MG tablet  Commonly known as: BUSPAR     Butalbital-Acetaminophen  MG Tabs     Cranberry 500 MG Caps  Take 1 capsule by mouth 2 times daily     D-Mannose 500 MG Caps  Take 500 mg by mouth in the morning and at bedtime     dermaphor Oint ointment     dicyclomine 10 MG capsule  Commonly known as: BENTYL     ferrous sulfate 325 (65 Fe) MG EC tablet  Commonly known as: FE TABS 325     fluticasone 50 MCG/ACT nasal spray  Commonly known as: FLONASE     GLUCOSAMINE CHONDROITIN ADV PO     * lamoTRIgine 200 MG tablet  Commonly known as: LAMICTAL     * lamoTRIgine 100 MG tablet  Commonly known as: LAMICTAL     lidocaine 4 % external patch     loperamide 2 MG capsule  Commonly known as: IMODIUM     loratadine 10 MG tablet  Commonly known as: CLARITIN     magnesium hydroxide 400 MG/5ML suspension  Commonly known as: MILK OF MAGNESIA     meclizine 25 MG Chew  Commonly known as: ANTIVERT     omeprazole 40 MG delayed release capsule  Commonly known as: PRILOSEC     oxybutynin 5 MG extended release tablet  Commonly known as: DITROPAN-XL     polyethylene glycol 17 GM/SCOOP powder  Commonly known as: GLYCOLAX     * QUEtiapine 25 MG tablet  Commonly known as: SEROQUEL     * SEROquel 50 MG tablet  Generic drug: QUEtiapine     Qulipta 60 MG Tabs  Generic drug: Atogepant     Refresh Plus 0.5 % Soln ophthalmic solution  Generic drug: carboxymethylcellulose PF     SUMAtriptan 50 MG tablet  Commonly known as: IMITREX     Symbicort 160-4.5 MCG/ACT Aero  Generic drug: budesonide-formoterol     * PreserVision AREDS 2+Multi Vit Caps     * therapeutic multivitamin-minerals tablet     * topiramate 100 MG tablet  Commonly known as: TOPAMAX     * Topamax 50 MG tablet  Generic drug: topiramate         * This list has 8 medication(s) that are the same as other medications prescribed for you. Read the directions carefully, and ask your doctor or other care provider to review them with you. STOP taking these medications    alendronate 70 MG tablet  Commonly known as: FOSAMAX     calcium carbonate 500 MG chewable tablet  Commonly known as: TUMS     Reglan 5 MG tablet  Generic drug: metoclopramide           Where to Get Your Medications      These medications were sent to [address] [place] [address] Way [address]    Phone: [phone]   · amoxicillin 500 MG capsule     You can get these medications from any pharmacy    Bring a paper prescription for each of these medications  · traMADol 50 MG tablet         Time Spent on discharge is  35 mins in patient examination, evaluation, counseling as well as medication reconciliation, prescriptions for required medications, discharge plan and follow up. Electronically signed by   Eric Chamorro MD  5/9/2022  11:02 AM      Thank you Dr. Rosa Colby MD for the opportunity to be involved in this patient's care.

## 2022-05-09 NOTE — PLAN OF CARE
Problem: Discharge Planning  Goal: Discharge to home or other facility with appropriate resources  5/9/2022 0105 by Roxie Shipman RN  Outcome: Progressing     Problem: Safety - Adult  Goal: Free from fall injury  5/9/2022 0105 by Roxie Shipman RN  Outcome: Progressing     Problem: Pain  Goal: Verbalizes/displays adequate comfort level or baseline comfort level  5/9/2022 0105 by Roxie Shipman RN  Outcome: Progressing     Problem: Skin/Tissue Integrity  Goal: Absence of new skin breakdown  Description: 1. Monitor for areas of redness and/or skin breakdown  2. Assess vascular access sites hourly  3. Every 4-6 hours minimum:  Change oxygen saturation probe site  4. Every 4-6 hours:  If on nasal continuous positive airway pressure, respiratory therapy assess nares and determine need for appliance change or resting period.   5/9/2022 0105 by Roxie Shipman RN  Outcome: Progressing     Problem: ABCDS Injury Assessment  Goal: Absence of physical injury  5/9/2022 0105 by Roxie Shipman RN  Outcome: Progressing  5/8/2022 1622 by Amanda Fowler RN  Outcome: Progressing

## 2022-05-11 ENCOUNTER — APPOINTMENT (OUTPATIENT)
Dept: CT IMAGING | Age: 72
End: 2022-05-11
Payer: MEDICARE

## 2022-05-11 ENCOUNTER — APPOINTMENT (OUTPATIENT)
Dept: GENERAL RADIOLOGY | Age: 72
End: 2022-05-11
Payer: MEDICARE

## 2022-05-11 ENCOUNTER — HOSPITAL ENCOUNTER (EMERGENCY)
Age: 72
Discharge: HOME | End: 2022-05-11
Attending: EMERGENCY MEDICINE
Payer: MEDICARE

## 2022-05-11 VITALS
DIASTOLIC BLOOD PRESSURE: 68 MMHG | BODY MASS INDEX: 20.6 KG/M2 | OXYGEN SATURATION: 97 % | RESPIRATION RATE: 18 BRPM | HEART RATE: 87 BPM | TEMPERATURE: 98.1 F | WEIGHT: 109 LBS | SYSTOLIC BLOOD PRESSURE: 120 MMHG

## 2022-05-11 DIAGNOSIS — J90 PLEURAL EFFUSION ON LEFT: Primary | ICD-10-CM

## 2022-05-11 DIAGNOSIS — R06.02 SHORTNESS OF BREATH: ICD-10-CM

## 2022-05-11 LAB
ABSOLUTE EOS #: 0.4 K/UL (ref 0–0.4)
ABSOLUTE LYMPH #: 1 K/UL (ref 1–4.8)
ABSOLUTE MONO #: 0.5 K/UL (ref 0.1–1.3)
ALBUMIN SERPL-MCNC: 3.6 G/DL (ref 3.5–5.2)
ALP BLD-CCNC: 186 U/L (ref 35–104)
ALT SERPL-CCNC: 77 U/L (ref 5–33)
ANION GAP SERPL CALCULATED.3IONS-SCNC: 13 MMOL/L (ref 9–17)
AST SERPL-CCNC: 68 U/L
BASOPHILS # BLD: 1 % (ref 0–2)
BASOPHILS ABSOLUTE: 0.1 K/UL (ref 0–0.2)
BILIRUB SERPL-MCNC: 0.29 MG/DL (ref 0.3–1.2)
BUN BLDV-MCNC: 18 MG/DL (ref 8–23)
CALCIUM SERPL-MCNC: 9.3 MG/DL (ref 8.6–10.4)
CHLORIDE BLD-SCNC: 106 MMOL/L (ref 98–107)
CO2: 23 MMOL/L (ref 20–31)
CREAT SERPL-MCNC: 0.98 MG/DL (ref 0.5–0.9)
CULTURE: NORMAL
CULTURE: NORMAL
EKG ATRIAL RATE: 89 BPM
EKG P AXIS: 54 DEGREES
EKG P-R INTERVAL: 138 MS
EKG Q-T INTERVAL: 380 MS
EKG QRS DURATION: 96 MS
EKG QTC CALCULATION (BAZETT): 462 MS
EKG R AXIS: 21 DEGREES
EKG T AXIS: 29 DEGREES
EKG VENTRICULAR RATE: 89 BPM
EOSINOPHILS RELATIVE PERCENT: 5 % (ref 0–4)
GFR AFRICAN AMERICAN: >60 ML/MIN
GFR NON-AFRICAN AMERICAN: 56 ML/MIN
GFR SERPL CREATININE-BSD FRML MDRD: ABNORMAL ML/MIN/{1.73_M2}
GLUCOSE BLD-MCNC: 129 MG/DL (ref 70–99)
HCT VFR BLD CALC: 36.9 % (ref 36–46)
HEMOGLOBIN: 11.9 G/DL (ref 12–16)
INFLUENZA A: NOT DETECTED
INFLUENZA B: NOT DETECTED
LYMPHOCYTES # BLD: 11 % (ref 24–44)
Lab: NORMAL
MCH RBC QN AUTO: 30.5 PG (ref 26–34)
MCHC RBC AUTO-ENTMCNC: 32.3 G/DL (ref 31–37)
MCV RBC AUTO: 94.4 FL (ref 80–100)
MONOCYTES # BLD: 5 % (ref 1–7)
PDW BLD-RTO: 14 % (ref 11.5–14.9)
PLATELET # BLD: 277 K/UL (ref 150–450)
PMV BLD AUTO: 7.1 FL (ref 6–12)
POTASSIUM SERPL-SCNC: 4.1 MMOL/L (ref 3.7–5.3)
PRO-BNP: 382 PG/ML
RBC # BLD: 3.91 M/UL (ref 4–5.2)
SARS-COV-2 RNA, RT PCR: NOT DETECTED
SEG NEUTROPHILS: 78 % (ref 36–66)
SEGMENTED NEUTROPHILS ABSOLUTE COUNT: 7 K/UL (ref 1.3–9.1)
SODIUM BLD-SCNC: 142 MMOL/L (ref 135–144)
SOURCE: NORMAL
SPECIMEN DESCRIPTION: NORMAL
TOTAL PROTEIN: 7.2 G/DL (ref 6.4–8.3)
TROPONIN, HIGH SENSITIVITY: 21 NG/L (ref 0–14)
TROPONIN, HIGH SENSITIVITY: 25 NG/L (ref 0–14)
WBC # BLD: 9 K/UL (ref 3.5–11)

## 2022-05-11 PROCEDURE — 80053 COMPREHEN METABOLIC PANEL: CPT

## 2022-05-11 PROCEDURE — 6360000004 HC RX CONTRAST MEDICATION: Performed by: EMERGENCY MEDICINE

## 2022-05-11 PROCEDURE — 99285 EMERGENCY DEPT VISIT HI MDM: CPT

## 2022-05-11 PROCEDURE — 84484 ASSAY OF TROPONIN QUANT: CPT

## 2022-05-11 PROCEDURE — 36415 COLL VENOUS BLD VENIPUNCTURE: CPT

## 2022-05-11 PROCEDURE — 71045 X-RAY EXAM CHEST 1 VIEW: CPT

## 2022-05-11 PROCEDURE — 71260 CT THORAX DX C+: CPT

## 2022-05-11 PROCEDURE — 87636 SARSCOV2 & INF A&B AMP PRB: CPT

## 2022-05-11 PROCEDURE — 83880 ASSAY OF NATRIURETIC PEPTIDE: CPT

## 2022-05-11 PROCEDURE — 93005 ELECTROCARDIOGRAM TRACING: CPT | Performed by: EMERGENCY MEDICINE

## 2022-05-11 PROCEDURE — 2580000003 HC RX 258: Performed by: EMERGENCY MEDICINE

## 2022-05-11 PROCEDURE — 85025 COMPLETE CBC W/AUTO DIFF WBC: CPT

## 2022-05-11 PROCEDURE — 93010 ELECTROCARDIOGRAM REPORT: CPT | Performed by: INTERNAL MEDICINE

## 2022-05-11 RX ORDER — ALENDRONATE SODIUM 70 MG/1
70 TABLET ORAL
COMMUNITY

## 2022-05-11 RX ORDER — SODIUM CHLORIDE 0.9 % (FLUSH) 0.9 %
10 SYRINGE (ML) INJECTION PRN
Status: DISCONTINUED | OUTPATIENT
Start: 2022-05-11 | End: 2022-05-11 | Stop reason: HOSPADM

## 2022-05-11 RX ORDER — 0.9 % SODIUM CHLORIDE 0.9 %
80 INTRAVENOUS SOLUTION INTRAVENOUS ONCE
Status: COMPLETED | OUTPATIENT
Start: 2022-05-11 | End: 2022-05-11

## 2022-05-11 RX ADMIN — SODIUM CHLORIDE, PRESERVATIVE FREE 10 ML: 5 INJECTION INTRAVENOUS at 13:40

## 2022-05-11 RX ADMIN — SODIUM CHLORIDE 80 ML: 9 INJECTION, SOLUTION INTRAVENOUS at 13:40

## 2022-05-11 RX ADMIN — IOPAMIDOL 75 ML: 755 INJECTION, SOLUTION INTRAVENOUS at 13:35

## 2022-05-11 ASSESSMENT — PAIN - FUNCTIONAL ASSESSMENT: PAIN_FUNCTIONAL_ASSESSMENT: NONE - DENIES PAIN

## 2022-05-11 ASSESSMENT — ENCOUNTER SYMPTOMS
SHORTNESS OF BREATH: 1
COUGH: 0

## 2022-05-11 NOTE — ED PROVIDER NOTES
EMERGENCY DEPARTMENT ENCOUNTER    Pt Name: Elsie Dorado  MRN: [id number]  [place] 1950  Date of evaluation: 5/11/22  CHIEF COMPLAINT       Chief Complaint   Patient presents with    Shortness of Breath     HISTORY OF PRESENT ILLNESS     Shortness of Breath  Severity:  Mild  Onset quality:  Gradual  Timing:  Intermittent  Progression:  Waxing and waning  Chronicity:  Recurrent  Relieved by:  Nothing  Worsened by:  Nothing  Ineffective treatments:  None tried  Associated symptoms: no chest pain, no cough and no fever      Chronic recurrent dyspnea  No trauma or fall      REVIEW OF SYSTEMS     Review of Systems   Constitutional: Negative for fever. Respiratory: Positive for shortness of breath. Negative for cough. Cardiovascular: Negative for chest pain. All other systems reviewed and are negative. PASTMEDICAL HISTORY     Past Medical History:   Diagnosis Date    Acid reflux     Atrophy of kidney     Bipolar affective ([address]     Chronic headaches     Chronic kidney disease     COVID-19 11/29/2021    tested positive @ [facility]. [facility].  No symptoms    Dehydration     Difficulty in walking     Dysphagia     Fall 01/08/2022    head laceration, taken to ER from facility    GERD (gastroesophageal reflux disease)     H/O recurrent pneumonia     Hard of hearing     wears bilateral aides    Hearing loss     Hydrocephalus ([address]     Hydronephrosis     Hypertension     Kidney stones     Lives in nursing home 11/15/2021    currently at [facility], [place] # [phone], fax # [phone]    Migraine     Schizo affective schizophrenia ([address]     Sepsis ([address]     Tachycardia     UTI (urinary tract infection)     Weakness      Past Problem List  Patient Active Problem List   Diagnosis Code    Contusion of hip S70.00XA    UTI (urinary tract infection), uncomplicated J87.2    Inability to ambulate due to hip R26.2    Closed fracture of left inferior pubic ramus ([facility]) S32.592A    Closed compression fracture of L4 lumbar vertebra S32.040A    Cellulitis of left leg L03. 116    Failure of outpatient treatment Z78.9    Hydrocephalus ([address] G91.9    S/P  shunt Z98.2    Leg edema R60.0    Hydronephrosis of left kidney N13.30    Severe sepsis ([facility]) A41.9, R65.20    Bipolar mood disorder ([facility]) F31.9    ARYAN (acute kidney injury) ([facility]) N17.9    Aspiration pneumonitis ([facility]) J69.0    Sepsis ([facility]) A41.9    Opacity of lung on imaging study R91.8    Acute cystitis with hematuria N30.01    Urinary retention R33.9     SURGICAL HISTORY       Past Surgical History:   Procedure Laterality Date    CHOLECYSTECTOMY      CYSTOSCOPY Left 11/07/2021    CYSTOSCOPY URETERAL STENT INSERTION performed by Kathy Sharif MD at [address] Left 01/18/2022    HOLMIUM, CYSTO, URETEROSCOPY, STENT PLACEMENT    HIP FRACTURE SURGERY Left 06/09/2017    PINNING AND SCREW    HIP PINNING Left 06/09/2017    HIP PINNING 7.3 CANNULATED SCREW WITH SYNTHES PRODUCT APPLICATION AND INTRAOPERATIVE C-ARM performed [facility]je[facility]      shunt x3 head    SHUNT REVISION      TONSILLECTOMY      URETER SURGERY Left 1/18/2022    LITHOTRIPSY, CYSTOSCOPY, URETEROSCOPY, LEFT STENT EXCHANGE performed by Kathy Sharif MD at [place] [address]       Previous Medications    ALENDRONATE (FOSAMAX) 70 MG TABLET    Take 70 mg by mouth every 7 days Indications:  Wednesdays    AMLODIPINE (NORVASC) 5 MG TABLET    Take 5 mg by mouth daily     AMOXICILLIN (AMOXIL) 500 MG CAPSULE    Take 1 capsule by mouth every 8 hours for 18 doses    ASPIRIN 81 MG EC TABLET    Take 81 mg by mouth daily    ATOGEPANT (QULIPTA) 60 MG TABS    Take 1 tablet by mouth daily    ATORVASTATIN (LIPITOR) 10 MG TABLET    Take 10 mg by mouth nightly     BUDESONIDE-FORMOTEROL (SYMBICORT) 160-4.5 MCG/ACT AERO    Inhale 2 puffs into the lungs 2 times daily BUPRENORPHINE (BUPRENEX) 15 MCG/HR PTWK    Place 1 patch onto the skin once a week. Indications: change patch on Thursdays    BUSPIRONE (BUSPAR) 5 MG TABLET    Take 5 mg by mouth 2 times daily    BUTALBITAL-ACETAMINOPHEN  MG TABS    Take 1 tablet by mouth every 6 hours as needed (migraine)    CARBOXYMETHYLCELLULOSE PF (REFRESH PLUS) 0.5 % SOLN OPHTHALMIC SOLUTION    Place 1 drop into both eyes daily as needed    CRANBERRY 500 MG CAPS    Take 1 capsule by mouth 2 times daily    D-MANNOSE 500 MG CAPS    Take 500 mg by mouth in the morning and at bedtime    DICYCLOMINE (BENTYL) 10 MG CAPSULE    Take 20 mg by mouth 3 times daily    EMOLLIENT (DERMAPHOR) OINT OINTMENT    Apply topically 3 times daily as needed (to areas of dry/cracked skin)    FERROUS SULFATE 325 (65 FE) MG EC TABLET    Take 325 mg by mouth daily (with breakfast). FLUTICASONE (FLONASE) 50 MCG/ACT NASAL SPRAY    1 spray by Each Nostril route daily    LAMOTRIGINE (LAMICTAL) 100 MG TABLET    Take 100 mg by mouth daily     LAMOTRIGINE (LAMICTAL) 200 MG TABLET    Take 200 mg by mouth nightly    LIDOCAINE 4 % EXTERNAL PATCH    Place 1 patch onto the skin daily Indications: to hip    LOPERAMIDE (IMODIUM) 2 MG CAPSULE    Take 2 mg by mouth 4 times daily as needed for Diarrhea    LORATADINE (CLARITIN) 10 MG TABLET    Take 10 mg by mouth daily     MAGNESIUM HYDROXIDE (MILK OF MAGNESIA) 400 MG/5ML SUSPENSION    Take 5 mLs by mouth 2 times daily as needed for Constipation     MECLIZINE (ANTIVERT) 25 MG CHEW    Take 25 mg by mouth daily as needed for Dizziness     MISC NATURAL PRODUCTS (GLUCOSAMINE CHONDROITIN ADV PO)    Take 1 tablet by mouth daily    MULTIPLE VITAMINS-MINERALS (PRESERVISION AREDS 2+MULTI VIT) CAPS    Take 1 caplet by mouth in the morning and at bedtime    MULTIPLE VITAMINS-MINERALS (THERAPEUTIC MULTIVITAMIN-MINERALS) TABLET    Take 1 tablet by mouth daily.     OMEPRAZOLE (PRILOSEC) 40 MG DELAYED RELEASE CAPSULE    Take 40 mg by mouth daily OXYBUTYNIN (DITROPAN-XL) 5 MG EXTENDED RELEASE TABLET    Take 5 mg by mouth daily    POLYETHYLENE GLYCOL (GLYCOLAX) 17 GM/SCOOP POWDER    Take 17 g by mouth daily as needed (constipation)     QUETIAPINE (SEROQUEL) 25 MG TABLET    Take 25 mg by mouth daily    QUETIAPINE (SEROQUEL) 50 MG TABLET    Take 50 mg by mouth nightly     SUMATRIPTAN (IMITREX) 50 MG TABLET    Take 50 mg by mouth as needed for Migraine (max 2 tablets per day and max 2 days per week)     TOPIRAMATE (TOPAMAX) 100 MG TABLET    Take 100 mg by mouth every evening    TOPIRAMATE (TOPAMAX) 50 MG TABLET    Take 1 tablet by mouth daily    TRAMADOL (ULTRAM) 50 MG TABLET    Take 0.5 tablets by mouth 3 times daily for 3 days. Indications: hold if patient is sedated     ALLERGIES     is allergic to levaquin [levofloxacin in d5w], chocolate, cocoa, indomethacin, neurontin [gabapentin], and adhesive tape. FAMILY HISTORY     She indicated that her mother is . She indicated that her father is . SOCIAL HISTORY       Social History     Tobacco Use    Smoking status: Never Smoker    Smokeless tobacco: Never Used   Vaping Use    Vaping Use: Never used   Substance Use Topics    Alcohol use: No    Drug use: No     PHYSICAL EXAM     INITIAL VITALS: /70   Pulse 90   Temp 98.1 °F (36.7 °C)   Resp 18   Wt 109 lb (49.4 kg)   SpO2 97%   BMI 20.60 kg/m²    Physical Exam  Constitutional:       General: She is not in acute distress. Appearance: Normal appearance. She is well-developed. She is not diaphoretic. HENT:      Head: Normocephalic and atraumatic. Right Ear: External ear normal.      Left Ear: External ear normal.      Nose: Nose normal. No congestion. Mouth/Throat:      Mouth: Mucous membranes are moist.      Pharynx: Oropharynx is clear. Eyes:      General:         Right eye: No discharge. Left eye: No discharge.       Conjunctiva/sclera: Conjunctivae normal.      Pupils: Pupils are equal, round, and reactive to light. Neck:      Trachea: No tracheal deviation. Cardiovascular:      Rate and Rhythm: Normal rate and regular rhythm. Pulses: Normal pulses. Heart sounds: Normal heart sounds. Pulmonary:      Effort: Pulmonary effort is normal. No respiratory distress. Breath sounds: Normal breath sounds. No stridor. No wheezing or rales. Abdominal:      Palpations: Abdomen is soft. Tenderness: There is no abdominal tenderness. There is no guarding or rebound. Musculoskeletal:         General: No tenderness or deformity. Normal range of motion. Cervical back: Normal range of motion and neck supple. Skin:     General: Skin is warm and dry. Capillary Refill: Capillary refill takes less than 2 seconds. Findings: No erythema or rash. Neurological:      General: No focal deficit present. Mental Status: She is alert and oriented to person, place, and time. Coordination: Coordination normal.   Psychiatric:         Mood and Affect: Mood normal.         Behavior: Behavior normal.         Thought Content: Thought content normal.         Judgment: Judgment normal.         MEDICAL DECISION MAKING:       ED Course as of 05/11/22 1519   Wed May 11, 2022   [facility] radiology [facility], tech just sent images, will have radiology review now.  [WM]      ED Course User Index  [WM] Jimmy Causey MD     Procedures      Chronic left pleural effusion present on ct  Do not suspect pneumonia or ami or acs or pe or empyema  Discussed with patient and caregiver anticipatory guidance, discharge instructions, follow up PCP and pulmonology 24 hours    DIAGNOSTIC RESULTS   EKG:All EKG's are interpreted by the Emergency Department Physician who either signs or Co-signs this chart in the absence of a cardiologist.  NSR, nonspecific changes, no acute ischemic changes on ST segments, no change compared to old, normal rate and normal intervals        RADIOLOGY:All plain film, CT, MRI, and formal ultrasound images (except ED bedside ultrasound) are read by the radiologist, see reports below, unless otherwisenoted in MDM or here. CT CHEST PULMONARY EMBOLISM W CONTRAST   Final Result   No evidence of pulmonary embolism or acute pulmonary abnormality. Chronic   moderate left pleural effusion with PleurX catheter in place. Incidental note made of a cyst in the the left kidney and scarring in the   upper pole of the right kidney. RECOMMENDATIONS:         XR CHEST PORTABLE   Final Result   Stable left lower lobe opacification consistent with pneumonia and/or pleural   effusion. LABS: All lab results were reviewed by myself, and all abnormals are listed below.   Labs Reviewed   CBC WITH AUTO DIFFERENTIAL - Abnormal; Notable for the following components:       Result Value    RBC 3.91 (*)     Hemoglobin 11.9 (*)     Seg Neutrophils 78 (*)     Lymphocytes 11 (*)     Eosinophils % 5 (*)     All other components within normal limits   COMPREHENSIVE METABOLIC PANEL - Abnormal; Notable for the following components:    Glucose 129 (*)     CREATININE 0.98 (*)     Alkaline Phosphatase 186 (*)     ALT 77 (*)     AST 68 (*)     Total Bilirubin 0.29 (*)     GFR Non- 56 (*)     All other components within normal limits   BRAIN NATRIURETIC PEPTIDE - Abnormal; Notable for the following components:    Pro- (*)     All other components within normal limits   TROPONIN - Abnormal; Notable for the following components:    Troponin, High Sensitivity 25 (*)     All other components within normal limits   TROPONIN - Abnormal; Notable for the following components:    Troponin, High Sensitivity 21 (*)     All other components within normal limits   COVID-19 & INFLUENZA COMBO       EMERGENCY DEPARTMENTCOURSE:         Vitals:    Vitals:    05/11/22 1009 05/11/22 1011 05/11/22 1245 05/11/22 1315   BP: 120/68 120/68 130/79 128/70   Pulse: 89  93 90   Resp: 15  17 18   Temp: 98.1 °F (36.7 °C) SpO2: 100%  97% 97%   Weight: 109 lb (49.4 kg)          The patient was given the following medications while in the emergency department:  Orders Placed This Encounter   Medications    iopamidol (ISOVUE-370) 76 % injection 75 mL    0.9 % sodium chloride bolus    sodium chloride flush 0.9 % injection 10 mL     CONSULTS:  IP CONSULT TO GENERAL SURGERY    FINAL IMPRESSION      1. Pleural effusion on left    2. Shortness of breath          DISPOSITION/PLAN   DISPOSITION Decision To Discharge 05/11/2022 03:15:34 PM      PATIENT REFERRED TO:  Reyes Prather MD  [address] [facility]  [address] [place] [address]  [phone]    Schedule an appointment as soon as possible for a visit in 1 day      [address]  [phone]    Schedule an appointment as soon as possible for a visit in 1 day      DISCHARGE MEDICATIONS:  New Prescriptions    No medications on file     The care is provided during an unprecedented national emergency due to the novel coronavirus, COVID 19.   MD Junior Rivera MD  05/11/22 0158

## 2022-05-11 NOTE — PROGRESS NOTES
Medication History completed:    New medications: alendronate    Medications discontinued: none    Changes to dosing: none    Stated allergies: As listed    Other pertinent information: Medications confirmed with facility MAR.      Thank you,  Ronan Solares, PharmD, BCPS  [phone]

## 2022-05-11 NOTE — ED TRIAGE NOTES
Mode of arrival (squad #, walk in, police, etc) : [place] complaint(s): SOB        Arrival Note (brief scenario, treatment PTA, etc). : Pt was sent in by group home after they noticed she was having some SOB. Pt denies SOB at this time. Pt states she has been up and staying busy this morning with no issues. NO distress noted at this time.

## 2022-05-20 ENCOUNTER — HOSPITAL ENCOUNTER (OUTPATIENT)
Dept: ULTRASOUND IMAGING | Age: 72
Discharge: HOME | End: 2022-05-22
Payer: MEDICARE

## 2022-05-20 ENCOUNTER — HOSPITAL ENCOUNTER (OUTPATIENT)
Dept: GENERAL RADIOLOGY | Age: 72
Discharge: HOME | End: 2022-05-22
Payer: MEDICARE

## 2022-05-20 VITALS — DIASTOLIC BLOOD PRESSURE: 80 MMHG | SYSTOLIC BLOOD PRESSURE: 125 MMHG | OXYGEN SATURATION: 98 % | HEART RATE: 88 BPM

## 2022-05-20 DIAGNOSIS — J90 PLEURAL EFFUSION, LEFT: ICD-10-CM

## 2022-05-20 PROCEDURE — 71045 X-RAY EXAM CHEST 1 VIEW: CPT

## 2022-05-20 PROCEDURE — 2709999900 US THORACENTESIS

## 2022-05-20 NOTE — PROGRESS NOTES
Thoracentesis: via  LEFT CHEST   Consent obtained via caregiver from [facility]    Patient to [address] room [address], identified, allergies confirmed. Sitting up, monitors on. Stable. Time out complete. 350 mLs clear yellow fluid drawn off. No orders for sample at this time. 2x2 with Tegaderm to back puncture. No problems noted. Patient tolerated well. Post CXR ordered and completed. Dr. Krysta Aguilar stated CXR was ok. Patient stable, off monitor, and transported to [facility] in [place] [address]. Wheelchair with caregiver. Pt. Denies SOB, denies chest pain.

## 2022-05-20 NOTE — BRIEF OP NOTE
Brief Postoperative Note for Thoracentesis    Malcolm Byrne  YOB: 1950  [id number]    Pre-operative Diagnosis: left Pleural effusion      Post-operative Diagnosis: Same    Procedure: Ultrasound guided Thoracentesis     Anesthesia: 1% Lidocaine     Surgeons/Assistants: Issa Peters PA-C    Complications: none    EBL: Minimal    Specimens: were obtained    Ultrasound guided left thoracentesis performed. 350 ml clear yellow fluid obtained. Dressing applied.       Electronically signed by IRIS Fields on 5/20/2022 at 2:12 PM

## 2022-06-24 ENCOUNTER — OFFICE VISIT (OUTPATIENT)
Dept: UROLOGY | Age: 72
End: 2022-06-24
Payer: MEDICARE

## 2022-06-24 VITALS
DIASTOLIC BLOOD PRESSURE: 65 MMHG | HEART RATE: 106 BPM | HEIGHT: 61 IN | BODY MASS INDEX: 20.5 KG/M2 | SYSTOLIC BLOOD PRESSURE: 113 MMHG | WEIGHT: 108.6 LBS

## 2022-06-24 DIAGNOSIS — N39.0 RECURRENT UTI: ICD-10-CM

## 2022-06-24 DIAGNOSIS — N32.81 OVERACTIVE BLADDER: ICD-10-CM

## 2022-06-24 DIAGNOSIS — N39.41 URGE INCONTINENCE: Primary | ICD-10-CM

## 2022-06-24 PROCEDURE — G8428 CUR MEDS NOT DOCUMENT: HCPCS | Performed by: UROLOGY

## 2022-06-24 PROCEDURE — 0509F URINE INCON PLAN DOCD: CPT | Performed by: UROLOGY

## 2022-06-24 PROCEDURE — 3017F COLORECTAL CA SCREEN DOC REV: CPT | Performed by: UROLOGY

## 2022-06-24 PROCEDURE — 99214 OFFICE O/P EST MOD 30 MIN: CPT | Performed by: UROLOGY

## 2022-06-24 PROCEDURE — G8400 PT W/DXA NO RESULTS DOC: HCPCS | Performed by: UROLOGY

## 2022-06-24 PROCEDURE — 1123F ACP DISCUSS/DSCN MKR DOCD: CPT | Performed by: UROLOGY

## 2022-06-24 PROCEDURE — 1090F PRES/ABSN URINE INCON ASSESS: CPT | Performed by: UROLOGY

## 2022-06-24 PROCEDURE — G8420 CALC BMI NORM PARAMETERS: HCPCS | Performed by: UROLOGY

## 2022-06-24 PROCEDURE — 1036F TOBACCO NON-USER: CPT | Performed by: UROLOGY

## 2022-06-24 RX ORDER — METHENAMINE HIPPURATE 1000 MG/1
1 TABLET ORAL 2 TIMES DAILY WITH MEALS
Qty: 180 TABLET | Refills: 1 | Status: SHIPPED | OUTPATIENT
Start: 2022-06-24 | End: 2022-09-22

## 2022-06-24 RX ORDER — TROSPIUM CHLORIDE 20 MG/1
20 TABLET, FILM COATED ORAL 2 TIMES DAILY
Qty: 60 TABLET | Refills: 3 | Status: SHIPPED | OUTPATIENT
Start: 2022-06-24

## 2022-06-24 RX ORDER — PYRIDOXINE HCL (VITAMIN B6) 100 MG
500 TABLET ORAL 2 TIMES DAILY
Qty: 180 CAPSULE | Refills: 2 | Status: SHIPPED | OUTPATIENT
Start: 2022-06-24 | End: 2022-09-22

## 2022-06-24 NOTE — PROGRESS NOTES
Shannon Garcia MD   Urology Clinic Consultation       Patient: Lilibeth Clark  YOB: 1950  Date: 6/24/2022    HISTORY OF PRESENT ILLNESS:   The patient is a 66 y.o. female who presents today for evaluation of the following problem(s):      1. Urge incontinence    2. Recurrent UTI    3. Overactive bladder          She states she is having a UTI at the moment. Her Ucx on 3/18/22 showed E. Coli and is on 10 days of antibiotics. She denies any flank pain, fever, chills, nausea, or vomiting. Overall the problem(s) : are improving. Associated Symptoms: No dysuria, gross hematuria. Pain Severity: 0    Summary of old records:   She had a left obstructing stone and UTI with a right atrophic kidney. She underwent stent placement on 11/7/21. She was bacteremic for E. Coli. She underwent Left URS/HLL/stent exchange with strings on 1/18/22. Last several PSA's:  No results found for: PSA    Last total testosterone:  No results found for: TESTOSTERONE    Urinalysis today:  No results found for this visit on 06/24/22. Last BUN and creatinine:  Lab Results   Component Value Date    BUN 18 05/11/2022     Lab Results   Component Value Date    CREATININE 0.98 (H) 05/11/2022       Imaging Reviewed during this Office Visit:   (results were independently reviewed by physician and radiology report verified)    PAST MEDICAL, FAMILY AND SOCIAL HISTORY:  Past Medical History:   Diagnosis Date    Acid reflux     Atrophy of kidney     Bipolar affective ([address]     Chronic headaches     Chronic kidney disease     COVID-19 11/29/2021    tested positive @ [facility]. [facility] weeks.  No symptoms    Dehydration     Difficulty in walking     Dysphagia     Fall 01/08/2022    head laceration, taken to ER from facility    GERD (gastroesophageal reflux disease)     H/O recurrent pneumonia     Hard of hearing     wears bilateral aides    Hearing loss     [facility]     Hydronephrosis     Hypertension     Kidney stones     Lives in nursing home 11/15/2021    currently at [place], [place] # [phone], fax # [phone]    Migraine     [facility] affective [facility]     Sepsis [facility]     Tachycardia     UTI (urinary tract infection)     Weakness      Past Surgical History:   Procedure Laterality Date    CHOLECYSTECTOMY      CYSTOSCOPY Left 11/07/2021    CYSTOSCOPY URETERAL STENT INSERTION performed by Sally Rodriguez MD at [facility] Left 01/18/2022    HOLMIUM, CYSTO, URETEROSCOPY, STENT PLACEMENT    HIP FRACTURE SURGERY Left 06/09/2017    PINNING AND SCREW    HIP PINNING Left 06/09/2017    HIP PINNING 7.3 CANNULATED SCREW WITH SYNTHES PRODUCT APPLICATION AND INTRAOPERATIVE C-ARM performed by Raz Elliott MD at [facility]      shunt x3 head    SHUNT REVISION      TONSILLECTOMY      URETER SURGERY Left 1/18/2022    LITHOTRIPSY, CYSTOSCOPY, URETEROSCOPY, LEFT STENT EXCHANGE performed by Sally Rodriguez MD at [place] [address]     No family history on file.   Outpatient Medications Marked as Taking for the 6/24/22 encounter (Office Visit) with Sally Rodriguez MD   Medication Sig Dispense Refill    methenamine (HIPREX) 1 g tablet Take 1 tablet by mouth 2 times daily (with meals) 180 tablet 1    D-Mannose 500 MG CAPS Take 500 mg by mouth in the morning and at bedtime 180 capsule 2    Cranberry 500 MG CAPS Take 1 capsule by mouth 2 times daily 180 capsule 2    trospium (SANCTURA) 20 MG tablet Take 1 tablet by mouth 2 times daily 60 tablet 3       Levaquin [levofloxacin in d5w], Chocolate, Cocoa, Indomethacin, Neurontin [gabapentin], and Adhesive tape  Social History     Tobacco Use   Smoking Status Never Smoker   Smokeless Tobacco Never Used       Social History     Substance and Sexual Activity   Alcohol Use No       REVIEW OF SYSTEMS:  Constitutional: negative  Eyes: negative  Respiratory: negative  Cardiovascular: negative  Gastrointestinal: negative  Musculoskeletal: negative  Genitourinary: negative except for what is in HPI  Skin: negative   Neurological: negative  Hematological/Lymphatic: negative  Psychological: negative    Physical Exam:    This a 66 y.o. male   Vitals:    06/24/22 1050   BP: 113/65   Pulse: (!) 106     Constitutional: Patient in no acute distress; Neuro: alert and oriented to person place and time. Psych: Mood and affect normal.  Skin: Normal  Lungs: Respiratory effort normal  Cardiovascular:  Normal peripheral pulses  Abdomen: Soft, non-tender, non-distended   Bladder non-tender and not distended. Lymphatics: no palpable lymphadenopathy  Gait is within normal limits  Musculoskeletal: Normal range of motion       Assessment and Plan      1. Urge incontinence    2. Recurrent UTI    3. Overactive bladder           Plan:      Return in about 3 months (around 9/24/2022) for 3 months for symptom check and PVR check- [place]. Continue 10 days worth of antibiotics for E. Coli UTI on 3/18/22  Start on D-mannose and Cranberry BID along with hiprex  Will switch to trospium       Shannon Garcia MD  [facility]    I have discussed the care of this patient including pertinent history and exam findings, with the resident. I have seen and examined the patient and the key elements of all parts of the encounter have been performed by me. I agree with the assessment, plan and orders as documented by the resident.     Genia Ríos M.D, MD, MD

## 2022-06-28 ENCOUNTER — HOSPITAL ENCOUNTER (OUTPATIENT)
Age: 72
Setting detail: SPECIMEN
Discharge: HOME | End: 2022-06-28

## 2022-06-28 LAB
ANION GAP SERPL CALCULATED.3IONS-SCNC: 12 MMOL/L (ref 9–17)
BUN BLDV-MCNC: 46 MG/DL (ref 8–23)
CALCIUM SERPL-MCNC: 9.7 MG/DL (ref 8.6–10.4)
CHLORIDE BLD-SCNC: 107 MMOL/L (ref 98–107)
CO2: 28 MMOL/L (ref 20–31)
CREAT SERPL-MCNC: 0.78 MG/DL (ref 0.5–0.9)
GFR AFRICAN AMERICAN: >60 ML/MIN
GFR NON-AFRICAN AMERICAN: >60 ML/MIN
GFR SERPL CREATININE-BSD FRML MDRD: ABNORMAL ML/MIN/{1.73_M2}
GLUCOSE BLD-MCNC: 115 MG/DL (ref 70–99)
HCT VFR BLD CALC: 36.1 % (ref 36.3–47.1)
HEMOGLOBIN: 10.9 G/DL (ref 11.9–15.1)
MCH RBC QN AUTO: 31.1 PG (ref 25.2–33.5)
MCHC RBC AUTO-ENTMCNC: 30.2 G/DL (ref 28.4–34.8)
MCV RBC AUTO: 103.1 FL (ref 82.6–102.9)
NRBC AUTOMATED: 0 PER 100 WBC
PDW BLD-RTO: 12.9 % (ref 11.8–14.4)
PLATELET # BLD: 293 K/UL (ref 138–453)
PMV BLD AUTO: 10.5 FL (ref 8.1–13.5)
POTASSIUM SERPL-SCNC: 3.3 MMOL/L (ref 3.7–5.3)
RBC # BLD: 3.5 M/UL (ref 3.95–5.11)
SODIUM BLD-SCNC: 147 MMOL/L (ref 135–144)
WBC # BLD: 11.7 K/UL (ref 3.5–11.3)

## 2022-06-28 PROCEDURE — 80048 BASIC METABOLIC PNL TOTAL CA: CPT

## 2022-06-28 PROCEDURE — 36415 COLL VENOUS BLD VENIPUNCTURE: CPT

## 2022-06-28 PROCEDURE — P9603 ONE-WAY ALLOW PRORATED MILES: HCPCS

## 2022-06-28 PROCEDURE — 85027 COMPLETE CBC AUTOMATED: CPT

## 2022-06-29 ENCOUNTER — HOSPITAL ENCOUNTER (OUTPATIENT)
Age: 72
Setting detail: SPECIMEN
Discharge: HOME | End: 2022-06-29

## 2022-09-06 ENCOUNTER — OFFICE VISIT (OUTPATIENT)
Dept: PODIATRY | Age: 72
End: 2022-09-06
Payer: MEDICARE

## 2022-09-06 VITALS — WEIGHT: 108 LBS | BODY MASS INDEX: 20.39 KG/M2 | HEIGHT: 61 IN

## 2022-09-06 DIAGNOSIS — M79.675 PAIN OF TOES OF BOTH FEET: ICD-10-CM

## 2022-09-06 DIAGNOSIS — B35.1 ONYCHOMYCOSIS OF TOENAIL: Primary | ICD-10-CM

## 2022-09-06 DIAGNOSIS — M79.674 PAIN OF TOES OF BOTH FEET: ICD-10-CM

## 2022-09-06 PROCEDURE — 11721 DEBRIDE NAIL 6 OR MORE: CPT | Performed by: PODIATRIST

## 2022-09-06 PROCEDURE — 99999 PR OFFICE/OUTPT VISIT,PROCEDURE ONLY: CPT | Performed by: PODIATRIST

## 2022-09-12 ENCOUNTER — HOSPITAL ENCOUNTER (OUTPATIENT)
Age: 72
Setting detail: SPECIMEN
Discharge: HOME | End: 2022-09-12

## 2022-09-12 ASSESSMENT — ENCOUNTER SYMPTOMS
NAUSEA: 0
SHORTNESS OF BREATH: 0
BACK PAIN: 0
DIARRHEA: 0
COLOR CHANGE: 0

## 2022-09-12 NOTE — PROGRESS NOTES
SUBJECTIVE: Clint Leigh is a 67 y.o. female who returns to the office with chief complaint of painful fungal toenails. Patient relates toe nails are thickened/difficult to trim as well as painful with ambulation and with shoe gear. Chief Complaint   Patient presents with    Nail Problem     Nail trim/last saw Krystal Chavarria 8/29/22     Review of Systems   Constitutional:  Negative for activity change, appetite change, chills, diaphoresis, fatigue and fever. Respiratory:  Negative for shortness of breath. Cardiovascular:  Negative for leg swelling. Gastrointestinal:  Negative for diarrhea and nausea. Endocrine: Negative for cold intolerance, heat intolerance and polyuria. Musculoskeletal:  Positive for arthralgias and gait problem. Negative for back pain, joint swelling and myalgias. Skin:  Negative for color change, pallor, rash and wound. Allergic/Immunologic: Negative for environmental allergies and food allergies. Neurological:  Negative for dizziness, weakness, light-headedness and numbness. Hematological:  Does not bruise/bleed easily. Psychiatric/Behavioral:  Negative for behavioral problems, confusion and self-injury. The patient is not nervous/anxious. OBJECTIVE: Clinical evaluation of patient reveals nails 1,2,3,4,5 of the right foot and nails 1,2,3,4,5 of the left foot to present with thickness, elongation, discoloration, brittleness, and subungual debris. There was pain with palpation and debridement of the toenails of the bilateral feet. No open lesions noted to either foot today. Class A Findings (1 needed)   [] Non-traumatic amputation of foot or integral skeleton portion thereof. [] Q7.      Class B Findings (2 needed)   1. [] Absent posterior tibial pulse   2. [] Absent dorsalis pedis pulse   3.  [] Advanced trophic changes; three of the following are required:   ·         [] hair growth (decrease or absence)   ·         [] nail changes (thickening)   · [] pigmentary changes (discoloration)   ·         [] skin texture (thin, shiny)   ·         [] skin color (rubor or redness)   [] Q8.      Class C Findings (1 Class B, 2 Class C needed)   1. [] Claudication   2. [] Temperature changes   3. [] Edema   4. [] Paresthesia   5. [] Burning   [] Q9.     NO CLASS FINDINGS ARE NOTED    ASSESSMENT:    Diagnosis Orders   1. Onychomycosis of toenail  HI DEBRIDEMENT OF NAILS, 6 OR MORE      2. Pain of toes of both feet  HI DEBRIDEMENT OF NAILS, 6 OR MORE        PLAN: Toenails 1,2,3,4,5 of the right foot and 1,2,3,4,5 of the left foot were debrided in length and thickness using a nail nipper and a . Return in about 9 weeks (around 11/8/2022) for Painful fungal nails.    9/6/2022      Kim Piper DPM

## 2022-09-13 LAB
AMORPHOUS: ABNORMAL
BACTERIA: ABNORMAL
BILIRUBIN URINE: NEGATIVE
COLOR: YELLOW
CRYSTALS, UA: ABNORMAL /HPF
EPITHELIAL CELLS UA: ABNORMAL /HPF (ref 0–5)
GLUCOSE URINE: NEGATIVE
KETONES, URINE: NEGATIVE
LEUKOCYTE ESTERASE, URINE: ABNORMAL
NITRITE, URINE: NEGATIVE
PH UA: 8 (ref 5–8)
PROTEIN UA: ABNORMAL
RBC UA: ABNORMAL /HPF (ref 0–2)
SPECIFIC GRAVITY UA: 1.02 (ref 1–1.03)
TURBIDITY: ABNORMAL
URINE HGB: NEGATIVE
UROBILINOGEN, URINE: NORMAL
WBC UA: ABNORMAL /HPF (ref 0–5)

## 2022-09-14 LAB
CULTURE: ABNORMAL
SPECIMEN DESCRIPTION: ABNORMAL

## 2022-09-27 ENCOUNTER — HOSPITAL ENCOUNTER (OUTPATIENT)
Dept: GENERAL RADIOLOGY | Age: 72
Discharge: HOME | End: 2022-09-29
Payer: MEDICARE

## 2022-09-27 ENCOUNTER — HOSPITAL ENCOUNTER (OUTPATIENT)
Dept: ULTRASOUND IMAGING | Age: 72
Discharge: HOME | End: 2022-09-29
Payer: MEDICARE

## 2022-09-27 DIAGNOSIS — J90 PLEURAL EFFUSION ON LEFT: ICD-10-CM

## 2022-09-27 DIAGNOSIS — Z98.890 STATUS POST THORACENTESIS: ICD-10-CM

## 2022-09-27 LAB
GLUCOSE, FLUID: 106 MG/DL
LACTATE DEHYDROGENASE, FLUID: 65 U/L
PH FLUID: 8
SPECIMEN TYPE: NORMAL
TOTAL PROTEIN, BODY FLUID: <1 G/DL

## 2022-09-27 PROCEDURE — 88305 TISSUE EXAM BY PATHOLOGIST: CPT

## 2022-09-27 PROCEDURE — 83615 LACTATE (LD) (LDH) ENZYME: CPT

## 2022-09-27 PROCEDURE — 88112 CYTOPATH CELL ENHANCE TECH: CPT

## 2022-09-27 PROCEDURE — 87205 SMEAR GRAM STAIN: CPT

## 2022-09-27 PROCEDURE — 82945 GLUCOSE OTHER FLUID: CPT

## 2022-09-27 PROCEDURE — 84157 ASSAY OF PROTEIN OTHER: CPT

## 2022-09-27 PROCEDURE — 87070 CULTURE OTHR SPECIMN AEROBIC: CPT

## 2022-09-27 PROCEDURE — 71045 X-RAY EXAM CHEST 1 VIEW: CPT

## 2022-09-27 PROCEDURE — 32555 ASPIRATE PLEURA W/ IMAGING: CPT

## 2022-09-27 PROCEDURE — 83986 ASSAY PH BODY FLUID NOS: CPT

## 2022-09-27 PROCEDURE — 87075 CULTR BACTERIA EXCEPT BLOOD: CPT

## 2022-09-27 PROCEDURE — 89051 BODY FLUID CELL COUNT: CPT

## 2022-09-27 NOTE — PROCEDURES
[address]                 [address] [facility], [address]                                 PROCEDURE NOTE    PATIENT NAME: Stephanie BURGESS                :        1950  MED REC NO:   [id number]                              ROOM:  ACCOUNT NO:   [de-identified]                           ADMIT DATE: 2022  PROVIDER:     Pattie Maza    DATE OF PROCEDURE:  2022    PREOPERATIVE DIAGNOSIS:  Left pleural effusion. POSTOPERATIVE DIAGNOSIS:  Left pleural effusion. OPERATION PERFORMED:  Thoracentesis. SURGEON:  Pattie Maza MD    FINDINGS:  The patient was brought to the outpatient ultrasound suite. She was very agitated and aggressive towards the ultrasound technicians. She said that she was told to come an hour early and had to wait. Thoracentesis was scheduled at 1 o'clock and we arrived at 01:10. She  then got upset and said that she had to be kept awaiting for 10 minutes. I tried to explain to her that we had very seriously ill patients on the  floor and she said that she did not care because \"I met a patient too. \"    I then try to explain the procedure to her. I told her that \"I know  that you have had this procedure before\" and she said that she did not. \"  However, she has had the procedure before and Dr. Thelma Tejeda himself took  out 425 mL on the previous procedure. At any rate, I went through of  the explanation of the procedure and she said that she was too weak to  sign the consents, so a verbal consent was obtained. This was witnessed  by the ultrasound technicians. She was then positioned and a small  pocket of fluid was found on the left side. The patient subsequently  was prepped and draped sterilely. She was given 1% lidocaine to  anesthetize the subcutaneous tissue and pleural space. A Cook catheter  was then inserted and clear yellowish fluid was obtained.   As the fluid  was being drained, the patient started complaining of chest pain. During the whole procedure, she was moving and talking despite being  told multiple times not to. The procedure was then terminated primarily  because of the patient's continued noncompliance. Approximately, 350 mL  of clear yellow fluid was obtained. It was sent for the usual analysis. Postprocedure film showed no significant change in the pleural effusion,  so I suspect that she has a component of loculated fluid. There was no  pneumothorax seen.         Greg Valencia    D: 09/27/2022 15:35:02       T: 09/27/2022 15:38:26     [email or web address]  Job#: [id number]     Doc#: [id number]    CC:

## 2022-09-28 ENCOUNTER — HOSPITAL ENCOUNTER (OUTPATIENT)
Age: 72
Setting detail: SPECIMEN
Discharge: HOME | End: 2022-09-28

## 2022-09-28 LAB
APPEARANCE FLUID: CLEAR
COLOR FLUID: YELLOW
FLUID DIFF COMMENT: ABNORMAL
LYMPHOCYTES, BODY FLUID: 17 %
NEUTROPHIL, FLUID: 2 %
OTHER CELLS FLUID: 81 %
RBC FLUID: 98 /MM3
SPECIMEN TYPE: ABNORMAL
WBC FLUID: 10 /MM3

## 2022-09-29 LAB — SURGICAL PATHOLOGY REPORT: NORMAL

## 2022-09-30 LAB
CULTURE: ABNORMAL
CULTURE: ABNORMAL
SPECIMEN DESCRIPTION: ABNORMAL

## 2022-10-03 LAB
CULTURE: NORMAL
DIRECT EXAM: NORMAL
Lab: NORMAL
SPECIMEN DESCRIPTION: NORMAL

## 2022-10-09 NOTE — ED NOTES
Admission Dx: Cellulitis of left leg    Pts Chief Complaints on Arrival: leg swelling  ADL's - Partial assistance [facility]    Pending Diagnostics:  [facility]    Residence PTA: assisted living facility    Special Considerations/Circumstances:  Use walker possible fall risk    Vitals: Current vital signs:  /71   Pulse 104   Temp 97.8 °F (36.6 °C) (Temporal)   Resp 16   Ht 5' 1\" (1.549 m)   Wt 143 lb (64.9 kg)   SpO2 94%   BMI 27.02 kg/m²                MEWS Score: [address] Ching RN  12/01/20 5557
Mode of arrival (squad #, walk in, police, etc) : Walk-in      Chief complaint(s): Leg Swelling         Arrival Note (brief scenario, treatment PTA, etc). : Patient presents to the ED with a chief complaint of Left lower redness and swelling. Patient reports being on antibiotics x2 weeks ago but has seen no improvement. C= \"Have you ever felt that you should Cut down on your drinking? \"  No  A= \"Have people Annoyed you by criticizing your drinking? \"  No  G= \"Have you ever felt bad or Guilty about your drinking? \"  No  E= \"Have you ever had a drink as an Eye-opener first thing in the morning to steady your nerves or to help a hangover? \"  No      Deferred []      Reason for deferring: N/A    *If yes to two or more: probable alcohol abuse. Yanci Padgett RN  11/30/20 8918
Pt. Transported to [facility]; No O2, IV medication    Pt.  Care handed off to OMAIRA Bowling, EMT-P  12/01/20 5614
Report given to OMAIRA Duncan from ED. Report method in person   The following was reviewed with receiving RN:   Current vital signs:  /65   Pulse 104   Temp 97.8 °F (36.6 °C) (Temporal)   Resp 16   Ht 5' 1\" (1.549 m)   Wt 143 lb (64.9 kg)   SpO2 93%   BMI 27.02 kg/m²                MEWS Score: 2     Any medication or safety alerts were reviewed. Any pending diagnostics and notifications were also reviewed, as well as any safety concerns or issues, abnormal labs, abnormal imaging, and abnormal assessment findings. Questions were answered.               Anastacio Patrick RN  11/30/20 4970
This RN attempted I.V. x1, unsuccessful.  Lab work was collected and sent to lalit Cabezas RN  11/30/20 7072
Vascular scan was completed by tech, No paper was provided or on chart with results of the scan.  When calling to speak with the tech he notified this RN as well as the physician that the scan was negative      Yasmine Morales RN  11/30/20 0516
negative - no polyuria, no polydipsia

## 2022-10-20 ENCOUNTER — TRANSCRIBE ORDERS (OUTPATIENT)
Dept: ADMINISTRATIVE | Age: 72
End: 2022-10-20

## 2022-10-20 DIAGNOSIS — J90 PLEURAL EFFUSION: Primary | ICD-10-CM

## 2022-11-30 NOTE — PROGRESS NOTES
Physical Therapy Cancel Note      DATE: 2022    NAME: Topher Avery  MRN: [id number]   : 1950      Patient not seen this date for Physical Therapy due to:  Patient sleeping and would not awake when name called.  Will try later as able      Electronically signed by Sajan Salcedo PT on 2022 at 10:58 AM normal appearance , no deformities , trachea midline ,

## 2022-12-14 ENCOUNTER — HOSPITAL ENCOUNTER (OUTPATIENT)
Dept: INTERVENTIONAL RADIOLOGY/VASCULAR | Age: 72
Discharge: HOME | End: 2022-12-16
Payer: MEDICARE

## 2022-12-14 ENCOUNTER — HOSPITAL ENCOUNTER (OUTPATIENT)
Dept: GENERAL RADIOLOGY | Age: 72
Discharge: HOME | End: 2022-12-16
Payer: MEDICARE

## 2022-12-14 VITALS
DIASTOLIC BLOOD PRESSURE: 86 MMHG | BODY MASS INDEX: 18.73 KG/M2 | HEIGHT: 61 IN | WEIGHT: 99.21 LBS | OXYGEN SATURATION: 98 % | RESPIRATION RATE: 16 BRPM | TEMPERATURE: 97.5 F | SYSTOLIC BLOOD PRESSURE: 159 MMHG | HEART RATE: 77 BPM

## 2022-12-14 DIAGNOSIS — J90 PLEURAL EFFUSION: ICD-10-CM

## 2022-12-14 LAB
INR BLD: 1
LACTATE DEHYDROGENASE, FLUID: 68 U/L
PARTIAL THROMBOPLASTIN TIME: 31 SEC (ref 24–36)
PH FLUID: 8.5
PLATELET # BLD: 192 K/UL (ref 150–450)
PROTHROMBIN TIME: 13.6 SEC (ref 11.8–14.6)
SPECIMEN TYPE: NORMAL
TOTAL PROTEIN, BODY FLUID: <1 G/DL

## 2022-12-14 PROCEDURE — 85610 PROTHROMBIN TIME: CPT

## 2022-12-14 PROCEDURE — 36415 COLL VENOUS BLD VENIPUNCTURE: CPT

## 2022-12-14 PROCEDURE — 85730 THROMBOPLASTIN TIME PARTIAL: CPT

## 2022-12-14 PROCEDURE — 85049 AUTOMATED PLATELET COUNT: CPT

## 2022-12-14 PROCEDURE — 7100000011 HC PHASE II RECOVERY - ADDTL 15 MIN

## 2022-12-14 PROCEDURE — 83615 LACTATE (LD) (LDH) ENZYME: CPT

## 2022-12-14 PROCEDURE — 7100000010 HC PHASE II RECOVERY - FIRST 15 MIN

## 2022-12-14 PROCEDURE — 71045 X-RAY EXAM CHEST 1 VIEW: CPT

## 2022-12-14 PROCEDURE — 87075 CULTR BACTERIA EXCEPT BLOOD: CPT

## 2022-12-14 PROCEDURE — 84157 ASSAY OF PROTEIN OTHER: CPT

## 2022-12-14 PROCEDURE — 87205 SMEAR GRAM STAIN: CPT

## 2022-12-14 PROCEDURE — 83986 ASSAY PH BODY FLUID NOS: CPT

## 2022-12-14 PROCEDURE — 87070 CULTURE OTHR SPECIMN AEROBIC: CPT

## 2022-12-14 PROCEDURE — 32555 ASPIRATE PLEURA W/ IMAGING: CPT

## 2022-12-14 RX ORDER — ACETAMINOPHEN 325 MG/1
650 TABLET ORAL EVERY 4 HOURS PRN
Status: DISCONTINUED | OUTPATIENT
Start: 2022-12-14 | End: 2022-12-17 | Stop reason: HOSPADM

## 2022-12-14 RX ORDER — FAMOTIDINE 20 MG/1
20 TABLET, FILM COATED ORAL DAILY
COMMUNITY

## 2022-12-14 RX ORDER — TRAMADOL HYDROCHLORIDE 50 MG/1
50 TABLET ORAL EVERY 8 HOURS PRN
COMMUNITY

## 2022-12-14 RX ORDER — SODIUM CHLORIDE 9 MG/ML
INJECTION, SOLUTION INTRAVENOUS PRN
Status: DISCONTINUED | OUTPATIENT
Start: 2022-12-14 | End: 2022-12-17 | Stop reason: HOSPADM

## 2022-12-14 RX ORDER — SODIUM CHLORIDE 0.9 % (FLUSH) 0.9 %
5-40 SYRINGE (ML) INJECTION EVERY 12 HOURS SCHEDULED
Status: CANCELLED | OUTPATIENT
Start: 2022-12-14

## 2022-12-14 RX ORDER — SODIUM CHLORIDE 0.9 % (FLUSH) 0.9 %
5-40 SYRINGE (ML) INJECTION EVERY 12 HOURS SCHEDULED
Status: DISCONTINUED | OUTPATIENT
Start: 2022-12-14 | End: 2022-12-17 | Stop reason: HOSPADM

## 2022-12-14 RX ORDER — SODIUM CHLORIDE 0.9 % (FLUSH) 0.9 %
5-40 SYRINGE (ML) INJECTION PRN
Status: DISCONTINUED | OUTPATIENT
Start: 2022-12-14 | End: 2022-12-17 | Stop reason: HOSPADM

## 2022-12-14 RX ORDER — SODIUM CHLORIDE 0.9 % (FLUSH) 0.9 %
5-40 SYRINGE (ML) INJECTION PRN
Status: CANCELLED | OUTPATIENT
Start: 2022-12-14

## 2022-12-14 RX ORDER — SODIUM CHLORIDE 9 MG/ML
INJECTION, SOLUTION INTRAVENOUS PRN
Status: CANCELLED | OUTPATIENT
Start: 2022-12-14

## 2022-12-14 RX ORDER — LANOLIN ALCOHOL/MO/W.PET/CERES
5 CREAM (GRAM) TOPICAL NIGHTLY PRN
COMMUNITY

## 2022-12-14 RX ORDER — DOCUSATE SODIUM 100 MG/1
100 CAPSULE, LIQUID FILLED ORAL DAILY
COMMUNITY

## 2022-12-14 ASSESSMENT — PAIN DESCRIPTION - LOCATION: LOCATION: BACK

## 2022-12-14 ASSESSMENT — PAIN SCALES - GENERAL: PAINLEVEL_OUTOF10: 3

## 2022-12-14 ASSESSMENT — PAIN DESCRIPTION - ORIENTATION: ORIENTATION: LEFT;POSTERIOR

## 2022-12-14 ASSESSMENT — ENCOUNTER SYMPTOMS
RESPIRATORY NEGATIVE: 1
EYES NEGATIVE: 1
TROUBLE SWALLOWING: 1
GASTROINTESTINAL NEGATIVE: 1

## 2022-12-14 ASSESSMENT — PAIN - FUNCTIONAL ASSESSMENT: PAIN_FUNCTIONAL_ASSESSMENT: NONE - DENIES PAIN

## 2022-12-14 ASSESSMENT — PAIN DESCRIPTION - PAIN TYPE: TYPE: SURGICAL PAIN

## 2022-12-14 ASSESSMENT — PAIN DESCRIPTION - DESCRIPTORS: DESCRIPTORS: BURNING

## 2022-12-14 NOTE — DISCHARGE INSTRUCTIONS
DISCHARGE INSTRUCTIONS FOR Thoracentesis    In order to continue your care at home, please follow the instructions below. Medications    Use over-the-counter pain medication as directed on bottle for pain control    Post Procedure Site/Care/Activity  For up to 2 days after the procedure, you may have a small amount of clear fluid coming out of the site where the needle was inserted, especially if you had a lot of fluid removed. You may need to change the bandage on the site. Do not lie totally flat until morning. You can do your normal activities after the procedure, unless instructed differently. Call your doctor or seek immediate medical care if:   You have symptoms of infection, such as increased pain, swelling, warmth, or redness, red streaks or pus. An oral temperature (by mouth) is 101 degrees or higher (fever), chills, fever. You are dizzy or lightheaded, or you feel like you may faint. You have new pain or difficulty breathing. Watch closely for changes in your health, and be sure to contact your doctor if:   Difficulty breathing   You do not get better as expected. IF YOU CANNOT REACH THE DOCTOR, GO TO THE NEAREST EMERGENCY ROOM OR CALL 911    Phone: Interventional Radiology   [phone]  After hours Radiology   [phone]     Dr Luz Palumbo performed procedure. Removed 300ml clear yellow fluid.   Patient tolerated poorly

## 2022-12-14 NOTE — OP NOTE
Brief Postoperative Note    Fernie House  YOB: 1950  [id number]    Pre-operative Diagnosis: pleural effusion    Post-operative Diagnosis: Same    Procedure: L thoracentesis    Anesthesia: Local     Surgeons/Assistants: Niru Price MD     Estimated Blood Loss: minimal    Complications: none immediate    Specimens: were obtained      Electronically signed by Niru Price MD on 12/14/2022 at 3:38 PM

## 2022-12-14 NOTE — H&P
HISTORY and [facility] [id number]       NAME:  Makeda Bell  MRN: [id number]   YOB: 1950   Date: 12/14/2022   Age: 67 y.o. Gender: female       COMPLAINT AND PRESENT HISTORY:     Makeda Bell is 67 y.o.,  female, presents [facility] IR GUIDED THORACENTESIS PLEURAL  Primary dx: Pleural effusion on left  HPI:    Makeda Bell is 67 y.o.,  female, here for [place] [id number]. pt present today on the wheelchair. Patient has history of MRDD lives in a group home, history of hydrocephalus with  shunt, hypertension, seizure disorder, schizophrenia, and  bipolar disorder  She has significant difficulty providing a history about her respiratory  problem  due to a speech issue. Pt present today with her caregiver Zehra Potts,. PT was very agitated during the H&P assessment today and her caregiver tried to calm her down. The Caregiver state that the Pt was seen at [facility] 9/27/22 for thoracentesis and took fluid from her lungs but not much due to pt was complaining of chest pain during the whole procedure, and  she was  unable to sit still for long time then the  procedure was terminated . Pt was  still complaining of chest pain at her group home so the sent her to the ER at [facility] for further evaluation. Pt had chest X-ray which showed improving pleural effusion and all her basic cardiac workup was negative and ECG was unchanged. Today Pt denies fever/chills, chest pain or SOB. Test completed r/t condition :  [facility] 9/27/2022 [address] [place] [address] Ultrasound     FINDINGS:  The patient was brought to the outpatient ultrasound suite. She was very agitated and aggressive towards the ultrasound technicians. She said that she was told to come an hour early and had to wait. Thoracentesis was scheduled at 1 o'clock and we arrived at 01:10.   She  then got upset and said that she had to be kept awaiting for 10 minutes. I tried to explain to her that we had very seriously ill patients on the  floor and she said that she did not care because \"I met a patient too. \"     I then try to explain the procedure to her. I told her that \"I know  that you have had this procedure before\" and she said that she did not. \"  However, she has had the procedure before and Dr. Brent Lepe himself took  out 425 mL on the previous procedure. At any rate, I went through of  the explanation of the procedure and she said that she was too weak to  sign the consents, so a verbal consent was obtained. This was witnessed  by the ultrasound technicians. She was then positioned and a small  pocket of fluid was found on the left side. The patient subsequently  was prepped and draped sterilely. She was given 1% lidocaine to  anesthetize the subcutaneous tissue and pleural space. A Cook catheter  was then inserted and clear yellowish fluid was obtained. As the fluid  was being drained, the patient started complaining of chest pain. During the whole procedure, she was moving and talking despite being  told multiple times not to. The procedure was then terminated primarily  because of the patient's continued noncompliance. Approximately, 350 mL  of clear yellow fluid was obtained. It was sent for the usual analysis. Postprocedure film showed no significant change in the pleural effusion,  so I suspect that she has a component of loculated fluid. There was no  pneumothorax seen. 9/27/22 Radiology:  X-ray chest 1 view    Result Date: 9/27/2022  CLINICAL INDICATION:Shortness of breath, chest pain. COMPARISON: Chest radiograph 7/30/2022. TECHNIQUE: XR CHEST 1 VW IMPRESSION: 1. Slightly decreased left pleural effusion and atelectasis. Right lung clear. No pneumothorax. 2. Stable cardiomediastinal silhouette and pulmonary vasculature. 3. Grossly unchanged shunt tubing overlying the thorax.  Approved by Resident: Jody Payne MD on 9/27/2022 8:16 PM SILVINO Jocelyne Antoine MD have personally reviewed the image(s) and agree with and/or edited the report Workstation:[id number] Finalized by Jocelyne Antoine MD on 9/27/2022 8:23 PM    US THORACENTESIS Which side should the procedure be performed? Left    Result Date: 9/27/2022  EXAMINATION: Sonographic guidance to assist in performing left thoracentesis 9/27/2022 11:06 am COMPARISON: May 20, 2022 HISTORY: ORDERING SYSTEM PROVIDED HISTORY: Pleural effusion on left TECHNOLOGIST PROVIDED HISTORY: Which side should the procedure be performed? ->Left FINDINGS: Grayscale images demonstrates a large left pleural effusion it is approximately 1.8 cm from the skin surface to the entrance of the fluid pocket. No radiologist was present during the exam.    Large left pleural effusion. XR CHEST PORTABLE    Result Date: 9/27/2022  EXAMINATION: ONE XRAY VIEW OF THE CHEST 9/27/2022 1:47 pm COMPARISON: 05/20/2022 HISTORY: ORDERING SYSTEM PROVIDED HISTORY: Status post thoracentesis FINDINGS: Cardiomediastinal silhouette is stable. Similar moderate left pleural effusion. No pneumothorax. Right lung is clear. No gross bony abnormality.     CBC auto differential  Specimen:  Blood - Blood (substance)   Ref Range & Units 7 d ago   White Blood Cells 4.0 - 11.0 X10E9/L 7.7    RBC count 3.80 - 5.20 X10E12/L 3.69 Low     Hemoglobin 11.7 - 15.5 g/dL 11.5 Low     Hematocrit 35 - 47 % 35.2    MCV 80 - 100 fL 95    MCH 27 - 34 pg 31.1    MCHC 32 - 36 g/dL 32.6    RDW 11.5 - 15.0 % 13.5    Platelets 515 - 169 P40C0/B 206    MPV 7 - 12 fL 8.2    % neutrophils % 67.6    % lymphocytes % 18.6    % monocytes % 8.1    % eosinophils % 4.9    % Basophils % 0.8    Neutrophils Absolute (A) 1.5 - 6.6 X10E9/L 5.2    Lymphocytes Absolute 1.0 - 3.5 X10E9/L 1.4    Monocytes Absolute 0 - 0.9 X10E9/L 0.6    Eosinophils Absolute 0.0 - 0.4 X10E9/L 0.4    Basophils Absolute 0.0 - 0.2 X10E9/L 0.1    Resulting Agency  [facility]   Specimen Collected: 12/06/22 20: 25 Last Resulted: 12/06/22 20:38   Received From: [facility]  Result Received: 12/14/22 11:36        Basic Metabolic Panel  Specimen:  Blood - PLASMA   Ref Range & Units 7 d ago Resulting Agency Comments   Sodium 134 - 146 mmol/L [address]    Potassium, Bld 3.5 - 5.0 mmol/L 4.2  [address]    Chloride 98 - 109 mmol/L 101  [address]    CO2 22 - 32 mmol/L 33 High   [address]    Anion gap 5 - 15 mmol/L 4 Low   [address]    BUN 5 - 27 mg/dL 31 High   [address]    Creatinine 0.40 - 1.00 mg/dL 1.04 High   [address] METHOD TRACEABLE TO IDMS STANDARD   Glucose 65 - 99 mg/dL 128 High   [address]    Calcium 8.5 - 10.5 mg/dL 9.7  [facility]    eGFR (CKD-EPI)non-race dependent >59 ml/min/1.73sq. 821 HAM-IT        Reported eGFR is based on the   CKD-EPI 2021 equation that does   not use a race coefficient. Specimen Collected: 12/06/22 20:25 Last Resulted: 12/07/22 04:20   Received From: [facility]  Result Received: 12/14/22 11:36     D-Dimer 12/6/22  Specimen:  Blood - PLASMA   Ref Range & Units 7 d ago Comments   D-dimer <255 ng/mL  High           Review of additional significant medical hx:  (See chart for additional detail, including current medications /see ROS for current S/S):     HTN  Current medication r/t condition : Norvasc, Lipitor     BP Readings from Last 3 Encounters:   12/14/22 131/79   06/24/22 113/65   05/20/22 125/80     ECG 5/11/22  Poor data quality, interpretation may be adversely affected  Normal sinus rhythm  Nonspecific T wave abnormality  Abnormal ECG    NPO status: pt Npo since 7:30 am today   Medications taken TODAY (with sip of water): she took all her am medication today with sip of water according to her caregiver.    Anticoagulation status: ASA  the last dose was last week   Denies personal hx of blood clots. Denies personal hx of MRSA infection. Denies any personal or family hx of previous complications w/anesthesia. PAST MEDICAL HISTORY     Past Medical History:   Diagnosis Date    Acid reflux     Atrophy of kidney     Bipolar affective ([address]     Chronic headaches     Chronic kidney disease     COVID-19 11/29/2021    tested positive @ [facility]. Sent to [facility] to quarantine for 2 weeks. No symptoms    Dehydration     Difficulty in walking     Dysphagia     Fall 01/08/2022    head laceration, taken to ER from facility    GERD (gastroesophageal reflux disease)     H/O recurrent pneumonia     Hard of hearing     wears bilateral aides    Hearing loss     Hydrocephalus ([address]     Hydronephrosis     Hypertension     Kidney stones     Lives in nursing home 11/15/2021    currently at [facility], [place] # [phone], fax # [phone]    Migraine     Schizo affective schizophrenia ([address]     Sepsis ([address]     Tachycardia     UTI (urinary tract infection)     Weakness        SURGICAL HISTORY       Past Surgical History:   Procedure Laterality Date    CHOLECYSTECTOMY      CYSTOSCOPY Left 11/07/2021    CYSTOSCOPY URETERAL STENT INSERTION performed by [facility]  Left 01/18/2022    HOLMIUM, CYSTO, URETEROSCOPY, STENT PLACEMENT    HIP FRACTURE SURGERY Left 06/09/2017    PINNING AND SCREW    HIP PINNING Left 06/09/2017    HIP PINNING 7.3 CANNULATED SCREW WITH SYNTHES PRODUCT APPLICATION AND INTRAOPERATIVE C-ARM performed by Emerson Cummings MD at [facility]      shunt x3 head    SHUNT REVISION      TONSILLECTOMY      URETER SURGERY Left 1/18/2022    LITHOTRIPSY, CYSTOSCOPY, URETEROSCOPY, LEFT STENT EXCHANGE performed by Anais Raman MD at [address] [place] [address]     No family history on file.     SOCIAL HISTORY       Social History     Socioeconomic History    Marital status: Single   Tobacco Use    Smoking status: Never    Smokeless tobacco: Never   Vaping Use    Vaping Use: Never used   Substance and Sexual Activity    Alcohol use: No    Drug use: No           REVIEW OF SYSTEMS      Allergies   Allergen Reactions    Levaquin [Levofloxacin In D5w] Other (See Comments)     The group home does not have levaquin on patient allergy list  Pt doesn't know. Instructed aide to inquire when back at facility     Chocolate Other (See Comments)     Sister, POA, denies pt having an allergy to chocolate    Cocoa      Questionable allergy  Facility does not have on her list  Aide states she eats chocolate    Indomethacin Other (See Comments)     Med is not on the facility\"s allergy list    Neurontin [Gabapentin] Other (See Comments)     This allergy is not listed on facility's paperwork    Adhesive Tape Hives and Rash     Will use papertape, but adhesive tape is not on [facility]'s allergy list       Current Outpatient Medications on File Prior to Encounter   Medication Sig Dispense Refill    D-Mannose 500 MG CAPS Take 500 mg by mouth in the morning and at bedtime 180 capsule 2    Cranberry 500 MG CAPS Take 1 capsule by mouth 2 times daily 180 capsule 2    trospium (SANCTURA) 20 MG tablet Take 1 tablet by mouth 2 times daily 60 tablet 3    alendronate (FOSAMAX) 70 MG tablet Take 70 mg by mouth every 7 days Indications: Wednesdays      buprenorphine (BUPRENEX) 15 MCG/HR PTWK Place 1 patch onto the skin once a week.  Indications: change patch on Thursdays      lidocaine 4 % external patch Place 1 patch onto the skin daily Indications: to hip      aspirin 81 MG EC tablet Take 81 mg by mouth daily      Multiple Vitamins-Minerals (PRESERVISION AREDS 2+MULTI VIT) CAPS Take 1 caplet by mouth in the morning and at bedtime      dicyclomine (BENTYL) 10 MG capsule Take 20 mg by mouth 3 times daily      Atogepant (QULIPTA) 60 MG TABS Take 1 tablet by mouth daily      Emollient (DERMAPHOR) OINT ointment Apply topically 3 times daily as needed (to areas of dry/cracked skin)      carboxymethylcellulose PF (REFRESH PLUS) 0.5 % SOLN ophthalmic solution Place 1 drop into both eyes daily as needed      Butalbital-Acetaminophen  MG TABS Take 1 tablet by mouth every 6 hours as needed (migraine)      budesonide-formoterol (SYMBICORT) 160-4.5 MCG/ACT AERO Inhale 2 puffs into the lungs 2 times daily       fluticasone (FLONASE) 50 MCG/ACT nasal spray 1 spray by Each Nostril route daily      loratadine (CLARITIN) 10 MG tablet Take 10 mg by mouth daily       omeprazole (PRILOSEC) 40 MG delayed release capsule Take 40 mg by mouth daily       polyethylene glycol (GLYCOLAX) 17 GM/SCOOP powder Take 17 g by mouth daily as needed (constipation)       QUEtiapine (SEROQUEL) 25 MG tablet Take 25 mg by mouth daily      topiramate (TOPAMAX) 100 MG tablet Take 100 mg by mouth every evening      lamoTRIgine (LAMICTAL) 200 MG tablet Take 200 mg by mouth nightly      QUEtiapine (SEROQUEL) 50 MG tablet Take 50 mg by mouth nightly       meclizine (ANTIVERT) 25 MG CHEW Take 25 mg by mouth daily as needed for Dizziness       SUMAtriptan (IMITREX) 50 MG tablet Take 50 mg by mouth as needed for Migraine (max 2 tablets per day and max 2 days per week)       topiramate (TOPAMAX) 50 MG tablet Take 1 tablet by mouth daily 60 tablet 3    Misc Natural Products (GLUCOSAMINE CHONDROITIN ADV PO) Take 1 tablet by mouth daily      busPIRone (BUSPAR) 5 MG tablet Take 5 mg by mouth 2 times daily      magnesium hydroxide (MILK OF MAGNESIA) 400 MG/5ML suspension Take 5 mLs by mouth 2 times daily as needed for Constipation       atorvastatin (LIPITOR) 10 MG tablet Take 10 mg by mouth nightly       loperamide (IMODIUM) 2 MG capsule Take 2 mg by mouth 4 times daily as needed for Diarrhea      amLODIPine (NORVASC) 5 MG tablet Take 5 mg by mouth daily       Multiple Vitamins-Minerals (THERAPEUTIC MULTIVITAMIN-MINERALS) tablet Take 1 tablet by mouth daily.       ferrous sulfate 325 (65 FE) MG EC tablet Take 325 mg by mouth daily (with breakfast). lamoTRIgine (LAMICTAL) 100 MG tablet Take 100 mg by mouth daily        No current facility-administered medications on file prior to encounter. Review of Systems   Constitutional:  Negative for activity change, diaphoresis and fatigue. HENT:  Positive for trouble swallowing. Eyes: Negative. Respiratory: Negative. Cardiovascular: Negative. Gastrointestinal: Negative. Genitourinary: Negative. Musculoskeletal:  Positive for arthralgias and gait problem. Skin: Negative. Hematological: Negative. Psychiatric/Behavioral:  Positive for agitation. The patient is nervous/anxious. GENERAL PHYSICAL EXAM     Vitals: /79   Pulse 80   Temp 97.3 °F (36.3 °C) (Infrared)   Resp 20   Ht 5' 1\" (1.549 m)   Wt 99 lb 3.3 oz (45 kg)   SpO2 98%   BMI 18.74 kg/m²  Body mass index is 18.74 kg/m². GENERAL APPEARANCE:   [facility] is 67 y.o.,  female, not obese, nourished, conscious, alert. Does not appear to be distress or pain at this time. Physical Exam  Constitutional:       Appearance: Normal appearance. She is normal weight. HENT:      Head: Normocephalic. Right Ear: External ear normal.      Left Ear: External ear normal.      Nose: Nose normal.      Mouth/Throat:      Mouth: Mucous membranes are dry. Eyes:      General:         Right eye: No discharge. Left eye: No discharge. Cardiovascular:      Rate and Rhythm: Normal rate and regular rhythm. Pulses: Normal pulses. Heart sounds: Normal heart sounds. No murmur heard. No gallop. Pulmonary:      Effort: Pulmonary effort is normal.      Breath sounds: Normal breath sounds. No wheezing or rhonchi. Abdominal:      General: There is no distension. Palpations: Abdomen is soft. Tenderness: There is no abdominal tenderness.       Comments: Pt has PEG tube in place without any leakage, with dry dressing .   Musculoskeletal:         General: No swelling or tenderness. Cervical back: Normal range of motion and neck supple. Right lower leg: No edema. Left lower leg: No edema. Skin:     General: Skin is warm and dry. Findings: No bruising or erythema. Neurological:      General: No focal deficit present. Mental Status: She is alert and oriented to person, place, and time. Motor: Weakness present. Gait: Gait abnormal.      Comments: Pt present on the wheelchair    Psychiatric:         Mood and Affect: Mood is anxious. Affect is angry. Speech: Speech is slurred. Behavior: Behavior is uncooperative, agitated and aggressive.                  PROVISIONAL DIAGNOSES / SURGERY:    STC IR GUIDED THORACENTESIS PLEURAL   Pleural effusion on left    Patient Active Problem List    Diagnosis Date Noted    Sepsis ([address] 05/06/2022    Opacity of lung on imaging study 05/06/2022    Acute cystitis with hematuria 05/06/2022    Urinary retention 05/06/2022    Aspiration pneumonitis ([address] 11/11/2021    ARYAN (acute kidney injury) ([address] 11/08/2021    Hydronephrosis of left kidney 11/07/2021    Severe sepsis ([address] 11/07/2021    Bipolar mood disorder ([address] 11/07/2021    Leg edema 04/22/2021    Failure of outpatient treatment 12/01/2020    Hydrocephalus ([address] 12/01/2020    S/P  shunt 12/01/2020    Cellulitis of left leg 11/30/2020    Closed compression fracture of L4 lumbar vertebra 02/08/2019    Closed fracture of left inferior pubic ramus ([address] 02/07/2019    UTI (urinary tract infection), uncomplicated 86/35/0144    Inability to ambulate due to hip 12/12/2018    Contusion of hip 12/11/2018           ANNALISA Romero - CNP on 12/14/2022 at 12:41 PM

## 2022-12-14 NOTE — PROGRESS NOTES
Alert and oriented. US in use. Lt posterior chest prepped draped. Numbed and accessed by Dr Richey Blood. Obtained L of clear yellow fluid.  Tolerated well Report called to Keanu Mcnulty RN Specimen to lab

## 2022-12-18 LAB
CULTURE: NORMAL
DIRECT EXAM: NORMAL
SPECIMEN DESCRIPTION: NORMAL

## 2022-12-30 ENCOUNTER — HOSPITAL ENCOUNTER (OUTPATIENT)
Age: 72
Setting detail: SPECIMEN
Discharge: HOME | End: 2022-12-30

## 2022-12-30 DIAGNOSIS — N39.0 UTI (URINARY TRACT INFECTION), UNCOMPLICATED: ICD-10-CM

## 2023-01-01 LAB
CULTURE: ABNORMAL
SPECIMEN DESCRIPTION: ABNORMAL

## 2023-02-02 ENCOUNTER — OFFICE VISIT (OUTPATIENT)
Dept: PODIATRY | Age: 73
End: 2023-02-02
Payer: MEDICARE

## 2023-02-02 VITALS — HEIGHT: 61 IN | BODY MASS INDEX: 18.88 KG/M2 | WEIGHT: 100 LBS

## 2023-02-02 DIAGNOSIS — B35.1 ONYCHOMYCOSIS OF TOENAIL: Primary | ICD-10-CM

## 2023-02-02 DIAGNOSIS — M79.675 PAIN OF TOES OF BOTH FEET: ICD-10-CM

## 2023-02-02 DIAGNOSIS — M79.674 PAIN OF TOES OF BOTH FEET: ICD-10-CM

## 2023-02-02 PROCEDURE — 11721 DEBRIDE NAIL 6 OR MORE: CPT | Performed by: PODIATRIST

## 2023-02-02 PROCEDURE — 99999 PR OFFICE/OUTPT VISIT,PROCEDURE ONLY: CPT | Performed by: PODIATRIST

## 2023-02-02 RX ORDER — LACTOSE-REDUCED FOOD
LIQUID (ML) ORAL
COMMUNITY
Start: 2022-11-22

## 2023-02-02 RX ORDER — INHALER, ASSIST DEVICES
SPACER (EA) MISCELLANEOUS
COMMUNITY
Start: 2022-11-03

## 2023-02-02 RX ORDER — OXCARBAZEPINE 150 MG/1
TABLET, FILM COATED ORAL
COMMUNITY
Start: 2023-01-27

## 2023-02-02 RX ORDER — BUPRENORPHINE 20 UG/H
PATCH TRANSDERMAL
COMMUNITY
Start: 2023-01-30

## 2023-02-02 RX ORDER — FREMANEZUMAB-VFRM 225 MG/1.5ML
INJECTION SUBCUTANEOUS
COMMUNITY
Start: 2023-01-12

## 2023-02-02 RX ORDER — SYRINGE, DISPOSABLE, 1 ML
SYRINGE, EMPTY DISPOSABLE MISCELLANEOUS
COMMUNITY
Start: 2022-12-23

## 2023-02-02 RX ORDER — SERTRALINE HYDROCHLORIDE 20 MG/ML
SOLUTION ORAL
COMMUNITY
Start: 2022-12-22

## 2023-02-02 RX ORDER — SODIUM PHOSPHATE,MONO-DIBASIC 19G-7G/118
ENEMA (ML) RECTAL
COMMUNITY
Start: 2023-01-05

## 2023-02-02 RX ORDER — MIRTAZAPINE 15 MG/1
TABLET, ORALLY DISINTEGRATING ORAL
COMMUNITY
Start: 2023-01-05

## 2023-02-02 ASSESSMENT — ENCOUNTER SYMPTOMS
BACK PAIN: 0
SHORTNESS OF BREATH: 0
DIARRHEA: 0
NAUSEA: 0
COLOR CHANGE: 0

## 2023-02-02 NOTE — PROGRESS NOTES
SUBJECTIVE: Mary Weir is a 67 y.o. female who returns to the office with chief complaint of painful fungal toenails. Patient relates toe nails are thickened/difficult to trim as well as painful with ambulation and with shoe gear. Chief Complaint   Patient presents with    Nail Problem     B/l nail trim,last seen Kassi Anaya MD 1/9/23     Review of Systems   Constitutional:  Negative for activity change, appetite change, chills, diaphoresis, fatigue and fever. Respiratory:  Negative for shortness of breath. Cardiovascular:  Negative for leg swelling. Gastrointestinal:  Negative for diarrhea and nausea. Endocrine: Negative for cold intolerance, heat intolerance and polyuria. Musculoskeletal:  Positive for arthralgias and gait problem. Negative for back pain, joint swelling and myalgias. Skin:  Negative for color change, pallor, rash and wound. Allergic/Immunologic: Negative for environmental allergies and food allergies. Neurological:  Negative for dizziness, weakness, light-headedness and numbness. Hematological:  Does not bruise/bleed easily. Psychiatric/Behavioral:  Negative for behavioral problems, confusion and self-injury. The patient is not nervous/anxious. OBJECTIVE: Clinical evaluation of patient reveals nails 1,2,3,4,5 of the right foot and nails 1,2,3,4,5 of the left foot to present with thickness, elongation, discoloration, brittleness, and subungual debris. There was pain with palpation and debridement of the toenails of the bilateral feet. No open lesions noted to either foot today. Class A Findings (1 needed)   [] Non-traumatic amputation of foot or integral skeleton portion thereof. [] Q7.      Class B Findings (2 needed)   1. [] Absent posterior tibial pulse   2. [] Absent dorsalis pedis pulse   3.  [] Advanced trophic changes; three of the following are required:   ·         [] hair growth (decrease or absence)   ·         [] nail changes (thickening)   · [] pigmentary changes (discoloration)   ·         [] skin texture (thin, shiny)   ·         [] skin color (rubor or redness)   [] Q8.      Class C Findings (1 Class B, 2 Class C needed)   1. [] Claudication   2. [] Temperature changes   3. [] Edema   4. [] Paresthesia   5. [] Burning   [] Q9.     NO CLASS FINDINGS ARE NOTED    ASSESSMENT:    Diagnosis Orders   1. Onychomycosis of toenail  GA DEBRIDEMENT NAIL ANY METHOD 6/>      2. Pain of toes of both feet  GA DEBRIDEMENT NAIL ANY METHOD 6/>        PLAN: Toenails 1,2,3,4,5 of the right foot and 1,2,3,4,5 of the left foot were debrided in length and thickness using a nail nipper and a . Return in about 9 weeks (around 4/6/2023) for Painful fungal nails.    2/2/2023      Leopoldo Corral, DPM

## 2023-02-17 ENCOUNTER — HOSPITAL ENCOUNTER (EMERGENCY)
Age: 73
Discharge: HOME | End: 2023-02-17
Attending: EMERGENCY MEDICINE
Payer: MEDICARE

## 2023-02-17 VITALS
DIASTOLIC BLOOD PRESSURE: 66 MMHG | WEIGHT: 98 LBS | TEMPERATURE: 98.6 F | OXYGEN SATURATION: 97 % | SYSTOLIC BLOOD PRESSURE: 117 MMHG | HEART RATE: 85 BPM | BODY MASS INDEX: 18.03 KG/M2 | RESPIRATION RATE: 18 BRPM | HEIGHT: 62 IN

## 2023-02-17 DIAGNOSIS — T85.598A FEEDING TUBE DYSFUNCTION, INITIAL ENCOUNTER: ICD-10-CM

## 2023-02-17 DIAGNOSIS — N30.00 ACUTE CYSTITIS WITHOUT HEMATURIA: Primary | ICD-10-CM

## 2023-02-17 LAB
ABSOLUTE EOS #: 0.23 K/UL (ref 0–0.44)
ABSOLUTE IMMATURE GRANULOCYTE: 0.02 K/UL (ref 0–0.3)
ABSOLUTE LYMPH #: 0.97 K/UL (ref 1.1–3.7)
ABSOLUTE MONO #: 0.64 K/UL (ref 0.1–1.2)
ANION GAP SERPL CALCULATED.3IONS-SCNC: 10 MMOL/L (ref 9–17)
BACTERIA: ABNORMAL
BASOPHILS # BLD: 0 % (ref 0–2)
BASOPHILS ABSOLUTE: 0.04 K/UL (ref 0–0.2)
BILIRUBIN URINE: NEGATIVE
BUN SERPL-MCNC: 31 MG/DL (ref 8–23)
BUN/CREAT BLD: 35 (ref 9–20)
CALCIUM SERPL-MCNC: 9.5 MG/DL (ref 8.6–10.4)
CHLORIDE SERPL-SCNC: 102 MMOL/L (ref 98–107)
CO2 SERPL-SCNC: 31 MMOL/L (ref 20–31)
COLOR: YELLOW
CREAT SERPL-MCNC: 0.89 MG/DL (ref 0.5–0.9)
EOSINOPHILS RELATIVE PERCENT: 3 % (ref 1–4)
EPITHELIAL CELLS UA: ABNORMAL /HPF (ref 0–5)
GFR SERPL CREATININE-BSD FRML MDRD: >60 ML/MIN/1.73M2
GLUCOSE SERPL-MCNC: 118 MG/DL (ref 70–99)
GLUCOSE UR STRIP.AUTO-MCNC: NEGATIVE MG/DL
HCT VFR BLD AUTO: 38.7 % (ref 36.3–47.1)
HGB BLD-MCNC: 11.9 G/DL (ref 11.9–15.1)
IMMATURE GRANULOCYTES: 0 %
KETONES UR STRIP.AUTO-MCNC: NEGATIVE MG/DL
LEUKOCYTE ESTERASE UR QL STRIP.AUTO: ABNORMAL
LYMPHOCYTES # BLD: 10 % (ref 24–43)
MCH RBC QN AUTO: 30.6 PG (ref 25.2–33.5)
MCHC RBC AUTO-ENTMCNC: 30.7 G/DL (ref 28.4–34.8)
MCV RBC AUTO: 99.5 FL (ref 82.6–102.9)
MONOCYTES # BLD: 7 % (ref 3–12)
NITRITE UR QL STRIP.AUTO: NEGATIVE
NRBC AUTOMATED: 0 PER 100 WBC
PDW BLD-RTO: 12.8 % (ref 11.8–14.4)
PLATELET # BLD AUTO: 194 K/UL (ref 138–453)
PMV BLD AUTO: 9.7 FL (ref 8.1–13.5)
POTASSIUM SERPL-SCNC: 4.5 MMOL/L (ref 3.7–5.3)
PROT UR STRIP.AUTO-MCNC: 6.5 MG/DL (ref 5–8)
PROT UR STRIP.AUTO-MCNC: NEGATIVE MG/DL
RBC # BLD: 3.89 M/UL (ref 3.95–5.11)
RBC CLUMPS #/AREA URNS AUTO: ABNORMAL /HPF (ref 0–2)
SEG NEUTROPHILS: 80 % (ref 36–65)
SEGMENTED NEUTROPHILS ABSOLUTE COUNT: 7.47 K/UL (ref 1.5–8.1)
SODIUM SERPL-SCNC: 143 MMOL/L (ref 135–144)
SPECIFIC GRAVITY UA: 1.01 (ref 1–1.03)
TURBIDITY: ABNORMAL
URINE HGB: NEGATIVE
UROBILINOGEN, URINE: NORMAL
WBC # BLD AUTO: 9.4 K/UL (ref 3.5–11.3)
WBC UA: ABNORMAL /HPF (ref 0–5)

## 2023-02-17 PROCEDURE — 87186 SC STD MICRODIL/AGAR DIL: CPT

## 2023-02-17 PROCEDURE — 99283 EMERGENCY DEPT VISIT LOW MDM: CPT

## 2023-02-17 PROCEDURE — 87086 URINE CULTURE/COLONY COUNT: CPT

## 2023-02-17 PROCEDURE — 85025 COMPLETE CBC W/AUTO DIFF WBC: CPT

## 2023-02-17 PROCEDURE — 80048 BASIC METABOLIC PNL TOTAL CA: CPT

## 2023-02-17 PROCEDURE — 87077 CULTURE AEROBIC IDENTIFY: CPT

## 2023-02-17 PROCEDURE — 81001 URINALYSIS AUTO W/SCOPE: CPT

## 2023-02-17 RX ORDER — CEPHALEXIN 500 MG/1
500 CAPSULE ORAL 3 TIMES DAILY
Qty: 21 CAPSULE | Refills: 0 | Status: SHIPPED | OUTPATIENT
Start: 2023-02-17 | End: 2023-02-24

## 2023-02-17 RX ORDER — MUPIROCIN CALCIUM 20 MG/G
CREAM TOPICAL
Qty: 15 G | Refills: 0 | Status: SHIPPED | OUTPATIENT
Start: 2023-02-17 | End: 2023-03-19

## 2023-02-17 NOTE — ED PROVIDER NOTES
[facility] ED  EMERGENCY DEPARTMENT ENCOUNTER      Pt Name: Enmanuel Larkin  MRN: [id number]  [place] 1950  Date of evaluation: 2/17/2023  Provider: Joy Babb MD    CHIEF COMPLAINT       Chief Complaint   Patient presents with    G Tube Complications     Pt is from a group home and is having isuess with G tube. Yellow/green drainage     Abdominal Pain    Urinary Frequency     Group home is concerned for UTI         HISTORY OF PRESENT ILLNESS  (Location/Symptom, Timing/Onset, Context/Setting, Quality, Duration, Modifying Factors, Severity.)   Enmanuel Larkin is a 67 y.o. female who presents to the emergency department for 2 issues. First the G-tube, which is not utilized for any function, was not flushing. They tried unclogging it at the facility but were not successful. There is also small amount of drainage from around the tube itself. Second she might have a UTI and staff at the facility wanted her to be checked for UTI. The patient is not verbal so no history is obtainable from the patient. The G-tube issue began today. Nursing Notes were reviewed. ALLERGIES     Levaquin [levofloxacin in d5w], Chocolate, Cocoa, Indomethacin, Neurontin [gabapentin], and Adhesive tape    CURRENT MEDICATIONS       Previous Medications    AJOVY 225 MG/1.5ML SOSY        ALENDRONATE (FOSAMAX) 70 MG TABLET    Take 70 mg by mouth every 7 days Indications:  Wednesdays    AMLODIPINE (NORVASC) 5 MG TABLET    Take 5 mg by mouth daily     APIXABAN (ELIQUIS) 5 MG TABS TABLET    Take 10 mg by mouth 2 times daily    ASPIRIN 81 MG EC TABLET    Take 81 mg by mouth daily    ATOGEPANT (QULIPTA) 60 MG TABS    Take 1 tablet by mouth daily    ATORVASTATIN (LIPITOR) 10 MG TABLET    Take 10 mg by mouth nightly     BD CATHETER TIP SYRINGE 60 ML MISC        BUDESONIDE-FORMOTEROL (SYMBICORT) 160-4.5 MCG/ACT AERO    Inhale 2 puffs into the lungs 2 times daily     BUPRENORPHINE (BUPRENEX) 15 MCG/HR PTWK    Place 1 patch onto the skin once a week. Indications: change patch on Thursdays    BUPRENORPHINE 20 MCG/HR PTWK        BUSPIRONE (BUSPAR) 5 MG TABLET    Take 5 mg by mouth 2 times daily    BUTALBITAL-ACETAMINOPHEN  MG TABS    Take 1 tablet by mouth every 6 hours as needed (migraine)    CARBOXYMETHYLCELLULOSE PF (REFRESH PLUS) 0.5 % SOLN OPHTHALMIC SOLUTION    Place 1 drop into both eyes daily as needed    CRANBERRY 500 MG CAPS    Take 1 capsule by mouth 2 times daily    D-MANNOSE 500 MG CAPS    Take 500 mg by mouth in the morning and at bedtime    DICYCLOMINE (BENTYL) 10 MG CAPSULE    Take 20 mg by mouth 3 times daily    DOCUSATE SODIUM (COLACE) 100 MG CAPSULE    Take 100 mg by mouth daily    EMOLLIENT (DERMAPHOR) OINT OINTMENT    Apply topically 3 times daily as needed (to areas of dry/cracked skin)    FAMOTIDINE (PEPCID) 20 MG TABLET    Take 20 mg by mouth Daily    FERROUS SULFATE 325 (65 FE) MG EC TABLET    Take 325 mg by mouth daily (with breakfast).     FLUTICASONE (FLONASE) 50 MCG/ACT NASAL SPRAY    1 spray by Each Nostril route daily    GAUZE PADS & DRESSINGS (STERI-PAD STERILE) 4\"X4\" PADS        GLUCOSAMINE-CHONDROITIN 500-400 MG CAPS        LAMOTRIGINE (LAMICTAL) 100 MG TABLET    Take 100 mg by mouth daily     LAMOTRIGINE (LAMICTAL) 200 MG TABLET    Take 200 mg by mouth nightly    LIDOCAINE 4 % EXTERNAL PATCH    Place 1 patch onto the skin daily Indications: to hip    LOPERAMIDE (IMODIUM) 2 MG CAPSULE    Take 2 mg by mouth 4 times daily as needed for Diarrhea    LORATADINE (CLARITIN) 10 MG TABLET    Take 10 mg by mouth daily     MAGNESIUM HYDROXIDE (MILK OF MAGNESIA) 400 MG/5ML SUSPENSION    Take 5 mLs by mouth 2 times daily as needed for Constipation    MECLIZINE (ANTIVERT) 25 MG CHEW    Take 25 mg by mouth daily as needed for Dizziness    MELATONIN 3 MG TABS TABLET    Take 5 mg by mouth nightly as needed    MIRTAZAPINE (REMERON SOL-TAB) 15 MG DISINTEGRATING TABLET        MISC NATURAL PRODUCTS (GLUCOSAMINE CHONDROITIN ADV PO)    Take 1 tablet by mouth daily    MULTIPLE VITAMINS-MINERALS (PRESERVISION AREDS 2+MULTI VIT) CAPS    Take 1 caplet by mouth in the morning and at bedtime    MULTIPLE VITAMINS-MINERALS (THERAPEUTIC MULTIVITAMIN-MINERALS) TABLET    Take 1 tablet by mouth daily. NUTRITIONAL SUPPLEMENTS (ENSURE) LIQD        OMEPRAZOLE (PRILOSEC) 40 MG DELAYED RELEASE CAPSULE    Take 40 mg by mouth daily     OXCARBAZEPINE (TRILEPTAL) 150 MG TABLET        POLYETHYLENE GLYCOL (GLYCOLAX) 17 GM/SCOOP POWDER    Take 17 g by mouth daily as needed (constipation)     QUETIAPINE (SEROQUEL) 25 MG TABLET    Take 25 mg by mouth daily    QUETIAPINE (SEROQUEL) 50 MG TABLET    Take 50 mg by mouth nightly    SERTRALINE (ZOLOFT) 20 MG/ML CONCENTRATED SOLUTION        SPACER/AERO-HOLDING CHAMBERS (OPTICHAMBER NILESH) MISC        SUMATRIPTAN (IMITREX) 50 MG TABLET    Take 50 mg by mouth as needed for Migraine (max 2 tablets per day and max 2 days per week)     TOPIRAMATE (TOPAMAX) 100 MG TABLET    Take 100 mg by mouth every evening    TOPIRAMATE (TOPAMAX) 50 MG TABLET    Take 50 mg by mouth daily    TRAMADOL (ULTRAM) 50 MG TABLET    Take 50 mg by mouth every 8 hours as needed for Pain. With meals    TROSPIUM (SANCTURA) 20 MG TABLET    Take 1 tablet by mouth 2 times daily       PAST MEDICAL HISTORY         Diagnosis Date    Acid reflux     Atrophy of kidney     Bipolar affective ([address]     Chronic headaches     Chronic kidney disease     COVID-19 11/29/2021    tested positive @ [facility]. [facility] weeks.  No symptoms    Dehydration     Difficulty in walking     Dysphagia     Fall 01/08/2022    head laceration, taken to ER from facility    GERD (gastroesophageal reflux disease)     H/O recurrent pneumonia     Hard of hearing     wears bilateral aides    Hearing loss     Hydrocephalus ([address]     Hydronephrosis     Hypertension     Kidney stones     Lives in nursing home 11/15/2021    [place] [phone], fax # [phone]    Migraine     Schizo affective schizophrenia ([facility]     Sepsis ([facility]     Tachycardia     UTI (urinary tract infection)     Weakness        SURGICAL HISTORY           Procedure Laterality Date    CHOLECYSTECTOMY      CYSTOSCOPY Left 2021    CYSTOSCOPY URETERAL STENT INSERTION performed by Virgil Castro MD at [address] Left 2022    HOLMIUM, CYSTO, URETEROSCOPY, STENT PLACEMENT    HIP FRACTURE SURGERY Left 2017    PINNING AND SCREW    HIP PINNING Left 2017    HIP PINNING 7.3 CANNULATED SCREW WITH SY[employer]THES PRODUCT APPLICATION AND INTRAOPERATIVE C-ARM performed by Mayuri Ojeda MD at [address]      shunt x3 head    SHUNT REVISION      TONSILLECTOMY      URETER SURGERY Left 2022    LITHOTRIPSY, CYSTOSCOPY, URETEROSCOPY, LEFT STENT EXCHANGE performed by Virgil Castro MD at [address]     History reviewed. No pertinent family history. Family Status   Relation Name Status    Mother      Father          SOCIAL HISTORY      reports that she has never smoked. She has never used smokeless tobacco. She reports that she does not drink alcohol and does not use drugs. REVIEW OF SYSTEMS    (2-9 systems for level 4, 10 or more for level 5)     Review of Systems   Unable to perform ROS: Psychiatric disorder      Except as noted above the remainder of the review of systems was reviewed and negative. PHYSICAL EXAM    (up to 7 for level 4, 8 or more for level 5)     Vitals:    23 1115   BP: 117/66   Pulse: 85   Resp: 18   Temp: 98.6 °F (37 °C)   TempSrc: Oral   SpO2: 97%   Weight: 98 lb (44.5 kg)   Height: 5' 2\" (1.575 m)       Physical Exam  Constitutional:       General: She is not in acute distress. Appearance: She is well-developed. She is not diaphoretic. HENT:      Head: Normocephalic and atraumatic. Eyes:      General: No scleral icterus.         Right eye: No discharge. Left eye: No discharge. Cardiovascular:      Rate and Rhythm: Normal rate and regular rhythm. Pulmonary:      Effort: Pulmonary effort is normal. No respiratory distress. Breath sounds: Normal breath sounds. No stridor. No wheezing or rales. Abdominal:      General: There is no distension. Palpations: Abdomen is soft. Tenderness: There is no abdominal tenderness. Comments: Feeding tube in place. Minimal erythema around it. Musculoskeletal:         General: Normal range of motion. Cervical back: Neck supple. Lymphadenopathy:      Cervical: No cervical adenopathy. Skin:     General: Skin is warm and dry. Findings: No erythema or rash. Neurological:      Mental Status: She is alert.       Comments: She is nonverbal.   Psychiatric:      Comments: Cannot be assessed           DIAGNOSTIC RESULTS     EKG: All EKG's are interpreted by the Emergency Department Physician who either signs or Co-signs this chart in the absence of a cardiologist.    RADIOLOGY:   Non-plain film images such as CT, Ultrasound and MRI are read by the radiologist. Plain radiographic images are visualized and preliminarily interpreted by the emergency physician with the below findings:    Interpretation per the Radiologist below, if available at the time of this note:        LABS:  Labs Reviewed   CBC WITH AUTO DIFFERENTIAL - Abnormal; Notable for the following components:       Result Value    RBC 3.89 (*)     Seg Neutrophils 80 (*)     Lymphocytes 10 (*)     Absolute Lymph # 0.97 (*)     All other components within normal limits   BASIC METABOLIC PANEL - Abnormal; Notable for the following components:    Glucose 118 (*)     BUN 31 (*)     Bun/Cre Ratio 35 (*)     All other components within normal limits   URINALYSIS - Abnormal; Notable for the following components:    Turbidity UA SLIGHTLY CLOUDY (*)     Leukocyte Esterase, Urine MOD (*)     All other components within normal limits   MICROSCOPIC URINALYSIS - Abnormal; Notable for the following components:    Bacteria, UA MANY (*)     All other components within normal limits       All other labs were within normal range or not returned as of this dictation. EMERGENCY DEPARTMENT COURSE and DIFFERENTIAL DIAGNOSIS/MDM:   Vitals:    Vitals:    02/17/23 1115   BP: 117/66   Pulse: 85   Resp: 18   Temp: 98.6 °F (37 °C)   TempSrc: Oral   SpO2: 97%   Weight: 98 lb (44.5 kg)   Height: 5' 2\" (1.575 m)       Orders Placed This Encounter   Medications    mupirocin (BACTROBAN) 2 % cream     Sig: Apply topically 3 times daily. Dispense:  15 g     Refill:  0    cephALEXin (KEFLEX) 500 MG capsule     Sig: Take 1 capsule by mouth 3 times daily for 7 days     Dispense:  21 capsule     Refill:  0       HEART SCORE: Not applicable    Medical Decision Making: The feeding tube was flushed by our nursing staff and it is now functioning normally. She has a mild UTI and is prescribed Keflex for that issue and was prescribed a Bactroban for the area around the feeding tube. Findings are discussed with the patient's caregiver. Evaluation and treatment course in the ED, and plan of care upon discharge was discussed in length with the patient. Patient had no further questions prior to being discharged and was instructed to return to the ED for new or worsening symptoms. DDx include UTI, cellulitis, feeding tube malfunction      I will order labs including CBC, BMP, urinalysis. Test considered, but not ordered: None    The patient was involved in his/her plan of care. The testing that was ordered was discussed with the patient. Any medications that may have been ordered were discussed with the patient. I have reviewed the patient's previous medical records using the electronic health record that we have available. Labs were reviewed. CONSULTS:  None    PROCEDURES:  None    FINAL IMPRESSION      1.  Acute cystitis without hematuria 2. Feeding tube dysfunction, initial encounter          DISPOSITION/PLAN   DISPOSITION Decision To Discharge 02/17/2023 12:21:03 PM      PATIENT REFERRED TO:   MD Shanika Powers  [facility] [address] [phone]      As needed    [facility] ED  [address] [facility]  [phone]    If symptoms worsen    DISCHARGE MEDICATIONS:     New Prescriptions    CEPHALEXIN (KEFLEX) 500 MG CAPSULE    Take 1 capsule by mouth 3 times daily for 7 days    MUPIROCIN (BACTROBAN) 2 % CREAM    Apply topically 3 times daily. The care is provided during an unprecedented national emergency due to the novel coronavirus, COVID-19.     (Please note that portions of this note were completed with a voice recognition program.  Efforts were made to edit the dictations but occasionally words are mis-transcribed.)    Cristo Vasquez MD  Attending Emergency Physician           Cristo Vasquez MD  02/17/23 7686

## 2023-02-19 LAB
MICROORGANISM SPEC CULT: ABNORMAL
SPECIMEN DESCRIPTION: ABNORMAL

## 2023-05-19 ENCOUNTER — HOSPITAL ENCOUNTER (OUTPATIENT)
Age: 73
Setting detail: SPECIMEN
Discharge: HOME | End: 2023-05-19

## 2023-05-19 LAB
ALBUMIN SERPL-MCNC: 3.9 G/DL (ref 3.5–5.2)
ALBUMIN/GLOB SERPL: 1.3 {RATIO} (ref 1–2.5)
ALP SERPL-CCNC: 111 U/L (ref 35–104)
ALT SERPL-CCNC: 27 U/L (ref 5–33)
ANION GAP SERPL CALCULATED.3IONS-SCNC: 14 MMOL/L (ref 9–17)
AST SERPL-CCNC: 29 U/L
BASOPHILS # BLD: 0.06 K/UL (ref 0–0.2)
BASOPHILS NFR BLD: 1 % (ref 0–2)
BILIRUB SERPL-MCNC: 0.2 MG/DL (ref 0.3–1.2)
BUN SERPL-MCNC: 26 MG/DL (ref 8–23)
CALCIUM SERPL-MCNC: 9.2 MG/DL (ref 8.6–10.4)
CHLORIDE SERPL-SCNC: 102 MMOL/L (ref 98–107)
CO2 SERPL-SCNC: 25 MMOL/L (ref 20–31)
CREAT SERPL-MCNC: 0.86 MG/DL (ref 0.5–0.9)
EOSINOPHIL # BLD: 0.13 K/UL (ref 0–0.44)
EOSINOPHILS RELATIVE PERCENT: 2 % (ref 1–4)
ERYTHROCYTE [DISTWIDTH] IN BLOOD BY AUTOMATED COUNT: 12.5 % (ref 11.8–14.4)
GFR SERPL CREATININE-BSD FRML MDRD: >60 ML/MIN/1.73M2
GLUCOSE SERPL-MCNC: 134 MG/DL (ref 70–99)
HCT VFR BLD AUTO: 44.6 % (ref 36.3–47.1)
HGB BLD-MCNC: 13.5 G/DL (ref 11.9–15.1)
IMM GRANULOCYTES # BLD AUTO: <0.03 K/UL (ref 0–0.3)
IMM GRANULOCYTES NFR BLD: 0 %
LYMPHOCYTES # BLD: 14 % (ref 24–43)
LYMPHOCYTES NFR BLD: 1.25 K/UL (ref 1.1–3.7)
MCH RBC QN AUTO: 31.8 PG (ref 25.2–33.5)
MCHC RBC AUTO-ENTMCNC: 30.3 G/DL (ref 28.4–34.8)
MCV RBC AUTO: 104.9 FL (ref 82.6–102.9)
MONOCYTES NFR BLD: 0.57 K/UL (ref 0.1–1.2)
MONOCYTES NFR BLD: 6 % (ref 3–12)
NEUTROPHILS NFR BLD: 77 % (ref 36–65)
NEUTS SEG NFR BLD: 6.88 K/UL (ref 1.5–8.1)
NRBC AUTOMATED: 0 PER 100 WBC
PLATELET # BLD AUTO: 202 K/UL (ref 138–453)
PMV BLD AUTO: 11.6 FL (ref 8.1–13.5)
POTASSIUM SERPL-SCNC: 4.2 MMOL/L (ref 3.7–5.3)
PROT SERPL-MCNC: 6.9 G/DL (ref 6.4–8.3)
RBC # BLD AUTO: 4.25 M/UL (ref 3.95–5.11)
RBC # BLD: ABNORMAL 10*6/UL
SODIUM SERPL-SCNC: 141 MMOL/L (ref 135–144)
WBC OTHER # BLD: 8.9 K/UL (ref 3.5–11.3)

## 2023-08-09 PROCEDURE — 99284 EMERGENCY DEPT VISIT MOD MDM: CPT

## 2023-08-09 PROCEDURE — 96372 THER/PROPH/DIAG INJ SC/IM: CPT

## 2023-08-09 RX ORDER — LORAZEPAM 2 MG/ML
1 INJECTION INTRAMUSCULAR ONCE
Status: COMPLETED | OUTPATIENT
Start: 2023-08-10 | End: 2023-08-10

## 2023-08-10 ENCOUNTER — HOSPITAL ENCOUNTER (EMERGENCY)
Age: 73
Discharge: HOSPICE | End: 2023-08-10
Attending: EMERGENCY MEDICINE
Payer: MEDICARE

## 2023-08-10 VITALS
SYSTOLIC BLOOD PRESSURE: 90 MMHG | HEART RATE: 136 BPM | DIASTOLIC BLOOD PRESSURE: 60 MMHG | OXYGEN SATURATION: 93 % | RESPIRATION RATE: 32 BRPM | TEMPERATURE: 97.9 F

## 2023-08-10 DIAGNOSIS — F41.1 ANXIETY STATE: Primary | ICD-10-CM

## 2023-08-10 PROCEDURE — 96372 THER/PROPH/DIAG INJ SC/IM: CPT

## 2023-08-10 PROCEDURE — 6360000002 HC RX W HCPCS: Performed by: EMERGENCY MEDICINE

## 2023-08-10 RX ADMIN — Medication 1 MG: at 00:13

## 2023-08-10 ASSESSMENT — PAIN - FUNCTIONAL ASSESSMENT: PAIN_FUNCTIONAL_ASSESSMENT: 0-10

## 2023-08-10 ASSESSMENT — LIFESTYLE VARIABLES
HOW OFTEN DO YOU HAVE A DRINK CONTAINING ALCOHOL: NEVER
HOW MANY STANDARD DRINKS CONTAINING ALCOHOL DO YOU HAVE ON A TYPICAL DAY: PATIENT DOES NOT DRINK

## 2023-08-10 ASSESSMENT — PAIN SCALES - GENERAL: PAINLEVEL_OUTOF10: 10

## 2023-08-10 NOTE — ED NOTES
Spoke with Family , , Sister Francisco and group home staff   Informed about condition   Family  do not wish  her to bring her to hospital , Told that the hospice staff should not  bring her to hospital , want to continue hospice care , family spoke with  hospice  care staff , Hospice  care  night supervisor is at bed , she spoke with hospice staff . Inform  to Dr Amy Cano. Patient will be transferred back to hospice care . Inform Adrianna   for setting up  transport.         Ivan Coles RN  08/10/23 0104

## 2023-08-10 NOTE — ED TRIAGE NOTES
Mode of arrival (squad #, walk in, police, etc) : [place] complaint(s): WES        Arrival Note (brief scenario, treatment PTA, etc). : Hospice pt sent from Group home for SOB. No meds for comfort given. Pt presents to the ER severely labored, tachypneic and tachycardic, appears to be in pain. Pt non verbal at this time. Only makes incomprehensible sounds        C= \"Have you ever felt that you should Cut down on your drinking? \"  Unable to answer  A= \"Have people Annoyed you by criticizing your drinking? \"  Unable to answer  G= \"Have you ever felt bad or Guilty about your drinking? \"  Unable to answer  E= \"Have you ever had a drink as an Eye-opener first thing in the morning to steady your nerves or to help a hangover? \"  Unable to answer      Deferred []      Reason for deferring:     *If yes to two or more: probable alcohol abuse. *

## 2023-08-10 NOTE — ED PROVIDER NOTES
[facility] ED  eMERGENCY dEPARTMENT eNCOUnter      Pt Name: Patti Masters  MRN: [id number]  [address] [facility] [place] 1950  Date of evaluation: 8/10/23      CHIEF COMPLAINT       Chief Complaint   Patient presents with    Shortness of [address] is a 67 y.o. female who presents complaining of shortness of breath. Patient lives in a nursing home was recently moved to hospice care and evidently had a bunch of medications stopped. Patient became very agitated and anxious and therefore they called the squad to bring the patient to the hospital.  Patient has a history of dementia and is unable to answer any questions for us. Patient does have a DNR status form. Patient is unable to give us any history. REVIEW OF SYSTEMS       Review of Systems   Unable to perform ROS: Mental status change     PAST MEDICAL HISTORY     Past Medical History:   Diagnosis Date    Acid reflux     Atrophy of kidney     Bipolar affective ([address])     Chronic headaches     Chronic kidney disease     COVID-19 11/29/2021    tested positive @ [facility]. [facility] for 2 weeks.  No symptoms    Dehydration     Difficulty in walking     Dysphagia     Fall 01/08/2022    head laceration, taken to ER from facility    GERD (gastroesophageal reflux disease)     H/O recurrent pneumonia     Hard of hearing     wears bilateral aides    Hearing loss     Hydrocephalus ([address])     Hydronephrosis     Hypertension     Kidney stones     Lives in nursing home 11/15/2021    currently at [facility], [place] # [phone], fax # [phone]    Migraine     Schizo affective schizophrenia ([address])     Sepsis ([address])     Tachycardia     UTI (urinary tract infection)     Weakness        SURGICAL HISTORY       Past Surgical History:   Procedure Laterality Date    CHOLECYSTECTOMY      CYSTOSCOPY Left 11/07/2021    CYSTOSCOPY URETERAL STENT INSERTION performed by Cristina Barreto MD at [address] [facility] [facility]

## 2023-08-24 NOTE — ED NOTES
Pt said that she went to go sit down in a chair and lost her balance and hit her head on a table on the way down. Pt said that she did not lose consciousness. Pt lives at a group home. Pt's C-spine was apparently cleared by EMS ([id number]).           Laurel Thomas RN  06/12/19 1950 Statement Selected

## 2024-07-16 ENCOUNTER — OFFICE VISIT (OUTPATIENT)
Dept: PODIATRY | Age: 74
End: 2024-07-16
Payer: MEDICARE

## 2024-07-16 VITALS — BODY MASS INDEX: 18.03 KG/M2 | WEIGHT: 98 LBS | HEIGHT: 62 IN

## 2024-07-16 DIAGNOSIS — M79.674 PAIN OF TOES OF BOTH FEET: ICD-10-CM

## 2024-07-16 DIAGNOSIS — M79.675 PAIN OF TOES OF BOTH FEET: ICD-10-CM

## 2024-07-16 DIAGNOSIS — B35.1 ONYCHOMYCOSIS OF TOENAIL: Primary | ICD-10-CM

## 2024-07-16 PROCEDURE — 99999 PR OFFICE/OUTPT VISIT,PROCEDURE ONLY: CPT | Performed by: PODIATRIST

## 2024-07-16 PROCEDURE — 11721 DEBRIDE NAIL 6 OR MORE: CPT | Performed by: PODIATRIST

## 2024-07-16 RX ORDER — HYDROMORPHONE HYDROCHLORIDE 2 MG/1
2 TABLET ORAL
COMMUNITY

## 2024-07-16 RX ORDER — DIVALPROEX SODIUM 125 MG/1
125 TABLET, DELAYED RELEASE ORAL 2 TIMES DAILY
COMMUNITY

## 2024-07-16 RX ORDER — PROCHLORPERAZINE MALEATE 10 MG
TABLET ORAL EVERY 6 HOURS PRN
COMMUNITY

## 2024-07-18 ASSESSMENT — ENCOUNTER SYMPTOMS
NAUSEA: 0
COLOR CHANGE: 0
DIARRHEA: 0
BACK PAIN: 0
SHORTNESS OF BREATH: 0

## 2024-07-18 NOTE — PROGRESS NOTES
SUBJECTIVE: Kendra Palacios is a 73 y.o. female who returns to the office with chief complaint of painful fungal toenails. Patient relates toe nails are thickened/difficult to trim as well as painful with ambulation and with shoe gear.   Chief Complaint   Patient presents with    Nail Problem     B/l nail trim/ last seen Dr. Katelin Hardin 3/19/2024     Review of Systems   Constitutional:  Negative for activity change, appetite change, chills, diaphoresis, fatigue and fever.   Respiratory:  Negative for shortness of breath.    Cardiovascular:  Negative for leg swelling.   Gastrointestinal:  Negative for diarrhea and nausea.   Endocrine: Negative for cold intolerance, heat intolerance and polyuria.   Musculoskeletal:  Positive for arthralgias and gait problem. Negative for back pain, joint swelling and myalgias.   Skin:  Negative for color change, pallor, rash and wound.   Allergic/Immunologic: Negative for environmental allergies and food allergies.   Neurological:  Negative for dizziness, weakness, light-headedness and numbness.   Hematological:  Does not bruise/bleed easily.   Psychiatric/Behavioral:  Negative for behavioral problems, confusion and self-injury. The patient is not nervous/anxious.      OBJECTIVE: Clinical evaluation of patient reveals nails 1,2,3,4,5 of the right foot and nails 1,2,3,4,5 of the left foot to present with thickness, elongation, discoloration, brittleness, and subungual debris. There was pain with palpation and debridement of the toenails of the bilateral feet. No open lesions noted to either foot today.     Class A Findings (1 needed)   [] Non-traumatic amputation of foot or integral skeleton portion thereof.   [] Q7.      Class B Findings (2 needed)   1. [] Absent posterior tibial pulse   2. [] Absent dorsalis pedis pulse   3. [] Advanced trophic changes; three of the following are required:   ·         [] hair growth (decrease or absence)   ·         [] nail changes (thickening)

## 2025-02-11 NOTE — PROGRESS NOTES
Physical Therapy  Facility/Department: [facility]        Physical Therapy Cancel Note      DATE: 2022    NAME: Elsie Dorado  MRN: [id number]   : 1950      Patient not seen this date for Physical Therapy due to: Other: PT jensen attempted, unable to rouse pt. Unknown PLOF at group home, although pt's personal RW in room. Earlier attempt - pt getting bed bath with PCTs. Attempted x2.       Electronically signed by Stormy Collins PT on 2022 at 2:35 PM 177.8

## (undated) DEVICE — Device: Brand: LEVEL 1

## (undated) DEVICE — SOLUTION IV IRRIG WATER 1000ML POUR BRL 2F7114

## (undated) DEVICE — SUTURE VCRL + SZ 2-0 L27IN ABSRB UD CP-1 1/2 CIR REV CUT VCP266H

## (undated) DEVICE — ST CHARLES CYSTO PACK: Brand: MEDLINE INDUSTRIES, INC.

## (undated) DEVICE — SOLUTION IRRIG 3000ML STRL H2O USP UROMATIC PLAS CONT

## (undated) DEVICE — 3M™ STERI-STRIP™ ANTIMICROBIAL SKIN CLOSURES 1 IN X 5 IN, 25/CAR, 4 CAR/CASE A1848: Brand: 3M™ STERI-STRIP™

## (undated) DEVICE — 3M™ TEGADERM™ TRANSPARENT FILM DRESSING FRAME STYLE, 1626W, 4 IN X 4-3/4 IN (10 CM X 12 CM), 50/CT 4CT/CASE: Brand: 3M™ TEGADERM™

## (undated) DEVICE — GUIDEWIRE URO L150CM DIA0.035IN STIFF NIT HYDRPHLC STR TIP

## (undated) DEVICE — STAPLER SKIN SQ 30 ABSRB STPL DISP INSORB

## (undated) DEVICE — NO USE 18 MONTHS GOWN STD LG  1153D

## (undated) DEVICE — Z DISCONTINUED PER MEDLINE USE 2741942 DRESSING AQUACEL 6 IN ALG W9XL15CM SIL CVR WTRPRF VIR BACT BARR ANTIMIC

## (undated) DEVICE — Device

## (undated) DEVICE — FIBER LASER HOLM DISP SU200RT] LEONI FIBER OPTICS INC]

## (undated) DEVICE — GUIDEWIRE ORTH L300MM DIA2.8MM S STL THRD SHRP TRCR TIP FOR

## (undated) DEVICE — MEDI-VAC YANKAUER SUCTION HANDLE W/BULBOUS TIP: Brand: CARDINAL HEALTH

## (undated) DEVICE — GLOVE ORANGE PI 7 1/2   MSG9075

## (undated) DEVICE — 1200CC GUARDIAN II: Brand: GUARDIAN

## (undated) DEVICE — SPONGE GZ W4XL4IN RAYON POLY FILL CVR W/ NONWOVEN FAB

## (undated) DEVICE — DRESSING,GAUZE,XEROFORM,CURAD,1"X8",ST: Brand: CURAD

## (undated) DEVICE — BIT DRL L300MM DIA5MM CANN QUIK CPL ADJ STP REUSE

## (undated) DEVICE — SOLUTION IV IRRIG POUR BRL 0.9% SODIUM CHL 2F7124

## (undated) DEVICE — 6619 2 PTNT ISO SYS INCISE AREA&LT;(&GT;&&LT;)&GT;P: Brand: STERI-DRAPE™ IOBAN™ 2

## (undated) DEVICE — SOLUTION IRRIG 1000ML STRL H2O USP PLAS POUR BTL

## (undated) DEVICE — MEDI-VAC NON-CONDUCTIVE SUCTION TUBING: Brand: CARDINAL HEALTH

## (undated) DEVICE — 3M™ STERI-DRAPE™ U-DRAPE 1015: Brand: STERI-DRAPE™

## (undated) DEVICE — SUTURE VCRL + SZ 1 L27IN ABSRB VLT CP-1 1/2 CIR REV CUT NDL VCP268H

## (undated) DEVICE — SURGICAL PROCEDURE PACK GWN W/ BTC A